# Patient Record
Sex: MALE | Race: WHITE | NOT HISPANIC OR LATINO | URBAN - METROPOLITAN AREA
[De-identification: names, ages, dates, MRNs, and addresses within clinical notes are randomized per-mention and may not be internally consistent; named-entity substitution may affect disease eponyms.]

---

## 2017-12-20 ENCOUNTER — OUTPATIENT (OUTPATIENT)
Dept: OUTPATIENT SERVICES | Facility: HOSPITAL | Age: 77
LOS: 1 days | End: 2017-12-20

## 2017-12-20 ENCOUNTER — APPOINTMENT (OUTPATIENT)
Dept: NUCLEAR MEDICINE | Facility: HOSPITAL | Age: 77
End: 2017-12-20
Payer: MEDICARE

## 2017-12-20 DIAGNOSIS — G20 PARKINSON'S DISEASE: ICD-10-CM

## 2017-12-20 DIAGNOSIS — Z95.9 PRESENCE OF CARDIAC AND VASCULAR IMPLANT AND GRAFT, UNSPECIFIED: Chronic | ICD-10-CM

## 2017-12-20 PROCEDURE — 78607: CPT | Mod: 26

## 2018-01-19 ENCOUNTER — APPOINTMENT (OUTPATIENT)
Dept: NEUROLOGY | Facility: CLINIC | Age: 78
End: 2018-01-19
Payer: MEDICARE

## 2018-01-19 PROCEDURE — 93886 INTRACRANIAL COMPLETE STUDY: CPT

## 2018-01-19 PROCEDURE — 93892 TCD EMBOLI DETECT W/O INJ: CPT

## 2018-03-26 ENCOUNTER — INPATIENT (INPATIENT)
Facility: HOSPITAL | Age: 78
LOS: 52 days | Discharge: NURSING, REHAB OR LONG-TERM CARE | DRG: 3 | End: 2018-05-18
Attending: STUDENT IN AN ORGANIZED HEALTH CARE EDUCATION/TRAINING PROGRAM | Admitting: STUDENT IN AN ORGANIZED HEALTH CARE EDUCATION/TRAINING PROGRAM
Payer: COMMERCIAL

## 2018-03-26 VITALS
HEART RATE: 78 BPM | RESPIRATION RATE: 20 BRPM | DIASTOLIC BLOOD PRESSURE: 50 MMHG | OXYGEN SATURATION: 98 % | SYSTOLIC BLOOD PRESSURE: 119 MMHG

## 2018-03-26 DIAGNOSIS — T07.XXXA UNSPECIFIED MULTIPLE INJURIES, INITIAL ENCOUNTER: ICD-10-CM

## 2018-03-26 DIAGNOSIS — Z95.9 PRESENCE OF CARDIAC AND VASCULAR IMPLANT AND GRAFT, UNSPECIFIED: Chronic | ICD-10-CM

## 2018-03-26 LAB
ALBUMIN SERPL ELPH-MCNC: 4 G/DL — SIGNIFICANT CHANGE UP (ref 3.3–5)
ALP SERPL-CCNC: 98 U/L — SIGNIFICANT CHANGE UP (ref 40–120)
ALT FLD-CCNC: 33 U/L RC — SIGNIFICANT CHANGE UP (ref 10–45)
ANION GAP SERPL CALC-SCNC: 12 MMOL/L — SIGNIFICANT CHANGE UP (ref 5–17)
ANION GAP SERPL CALC-SCNC: 23 MMOL/L — HIGH (ref 5–17)
APPEARANCE UR: CLEAR — SIGNIFICANT CHANGE UP
APTT BLD: 25.4 SEC — LOW (ref 27.5–37.4)
APTT BLD: 43.2 SEC — HIGH (ref 27.5–37.4)
AST SERPL-CCNC: 39 U/L — SIGNIFICANT CHANGE UP (ref 10–40)
BASE EXCESS BLDV CALC-SCNC: 3.2 MMOL/L — HIGH (ref -2–2)
BASOPHILS # BLD AUTO: 0 K/UL — SIGNIFICANT CHANGE UP (ref 0–0.2)
BASOPHILS NFR BLD AUTO: 0.2 % — SIGNIFICANT CHANGE UP (ref 0–2)
BILIRUB SERPL-MCNC: 1.3 MG/DL — HIGH (ref 0.2–1.2)
BILIRUB UR-MCNC: NEGATIVE — SIGNIFICANT CHANGE UP
BLD GP AB SCN SERPL QL: NEGATIVE — SIGNIFICANT CHANGE UP
BUN SERPL-MCNC: 20 MG/DL — SIGNIFICANT CHANGE UP (ref 7–23)
BUN SERPL-MCNC: 23 MG/DL — SIGNIFICANT CHANGE UP (ref 7–23)
CA-I SERPL-SCNC: 1.19 MMOL/L — SIGNIFICANT CHANGE UP (ref 1.12–1.3)
CALCIUM SERPL-MCNC: 14.1 MG/DL — CRITICAL HIGH (ref 8.4–10.5)
CALCIUM SERPL-MCNC: 9.2 MG/DL — SIGNIFICANT CHANGE UP (ref 8.4–10.5)
CHLORIDE BLDV-SCNC: 106 MMOL/L — SIGNIFICANT CHANGE UP (ref 96–108)
CHLORIDE SERPL-SCNC: 103 MMOL/L — SIGNIFICANT CHANGE UP (ref 96–108)
CHLORIDE SERPL-SCNC: 105 MMOL/L — SIGNIFICANT CHANGE UP (ref 96–108)
CO2 BLDV-SCNC: 30 MMOL/L — SIGNIFICANT CHANGE UP (ref 22–30)
CO2 SERPL-SCNC: 18 MMOL/L — LOW (ref 22–31)
CO2 SERPL-SCNC: 27 MMOL/L — SIGNIFICANT CHANGE UP (ref 22–31)
COLOR SPEC: YELLOW — SIGNIFICANT CHANGE UP
CREAT SERPL-MCNC: 1.1 MG/DL — SIGNIFICANT CHANGE UP (ref 0.5–1.3)
CREAT SERPL-MCNC: 1.36 MG/DL — HIGH (ref 0.5–1.3)
DIFF PNL FLD: ABNORMAL
EOSINOPHIL # BLD AUTO: 0.1 K/UL — SIGNIFICANT CHANGE UP (ref 0–0.5)
EOSINOPHIL NFR BLD AUTO: 1.1 % — SIGNIFICANT CHANGE UP (ref 0–6)
FIBRINOGEN PPP-MCNC: 211 MG/DL — LOW (ref 310–510)
GAS PNL BLDV: 139 MMOL/L — SIGNIFICANT CHANGE UP (ref 136–145)
GAS PNL BLDV: SIGNIFICANT CHANGE UP
GLUCOSE BLDV-MCNC: 139 MG/DL — HIGH (ref 70–99)
GLUCOSE SERPL-MCNC: 152 MG/DL — HIGH (ref 70–99)
GLUCOSE SERPL-MCNC: 239 MG/DL — HIGH (ref 70–99)
GLUCOSE UR QL: NEGATIVE — SIGNIFICANT CHANGE UP
HCO3 BLDV-SCNC: 29 MMOL/L — SIGNIFICANT CHANGE UP (ref 21–29)
HCT VFR BLD CALC: 23.8 % — LOW (ref 39–50)
HCT VFR BLD CALC: 30.4 % — LOW (ref 39–50)
HCT VFR BLD CALC: 39.6 % — SIGNIFICANT CHANGE UP (ref 39–50)
HCT VFR BLDA CALC: 38 % — LOW (ref 39–50)
HGB BLD CALC-MCNC: 12.4 G/DL — LOW (ref 13–17)
HGB BLD-MCNC: 10.6 G/DL — LOW (ref 13–17)
HGB BLD-MCNC: 12.7 G/DL — LOW (ref 13–17)
HGB BLD-MCNC: 8.1 G/DL — LOW (ref 13–17)
INR BLD: 1.05 RATIO — SIGNIFICANT CHANGE UP (ref 0.88–1.16)
INR BLD: 1.32 RATIO — HIGH (ref 0.88–1.16)
KETONES UR-MCNC: NEGATIVE — SIGNIFICANT CHANGE UP
LACTATE BLDV-MCNC: 2.1 MMOL/L — HIGH (ref 0.7–2)
LACTATE SERPL-SCNC: 8.8 MMOL/L — CRITICAL HIGH (ref 0.7–2)
LEUKOCYTE ESTERASE UR-ACNC: NEGATIVE — SIGNIFICANT CHANGE UP
LYMPHOCYTES # BLD AUTO: 2.3 K/UL — SIGNIFICANT CHANGE UP (ref 1–3.3)
LYMPHOCYTES # BLD AUTO: 20.6 % — SIGNIFICANT CHANGE UP (ref 13–44)
MCHC RBC-ENTMCNC: 31.2 PG — SIGNIFICANT CHANGE UP (ref 27–34)
MCHC RBC-ENTMCNC: 31.3 PG — SIGNIFICANT CHANGE UP (ref 27–34)
MCHC RBC-ENTMCNC: 32 GM/DL — SIGNIFICANT CHANGE UP (ref 32–36)
MCHC RBC-ENTMCNC: 33.6 PG — SIGNIFICANT CHANGE UP (ref 27–34)
MCHC RBC-ENTMCNC: 33.8 GM/DL — SIGNIFICANT CHANGE UP (ref 32–36)
MCHC RBC-ENTMCNC: 34.8 GM/DL — SIGNIFICANT CHANGE UP (ref 32–36)
MCV RBC AUTO: 89.5 FL — SIGNIFICANT CHANGE UP (ref 80–100)
MCV RBC AUTO: 97.9 FL — SIGNIFICANT CHANGE UP (ref 80–100)
MCV RBC AUTO: 99.3 FL — SIGNIFICANT CHANGE UP (ref 80–100)
MONOCYTES # BLD AUTO: 0.5 K/UL — SIGNIFICANT CHANGE UP (ref 0–0.9)
MONOCYTES NFR BLD AUTO: 4.2 % — SIGNIFICANT CHANGE UP (ref 2–14)
NEUTROPHILS # BLD AUTO: 8.4 K/UL — HIGH (ref 1.8–7.4)
NEUTROPHILS NFR BLD AUTO: 73.9 % — SIGNIFICANT CHANGE UP (ref 43–77)
NITRITE UR-MCNC: NEGATIVE — SIGNIFICANT CHANGE UP
OTHER CELLS CSF MANUAL: 12 ML/DL — LOW (ref 18–22)
PCO2 BLDV: 51 MMHG — HIGH (ref 35–50)
PH BLDV: 7.37 — SIGNIFICANT CHANGE UP (ref 7.35–7.45)
PH UR: 5.5 — SIGNIFICANT CHANGE UP (ref 5–8)
PLATELET # BLD AUTO: 130 K/UL — LOW (ref 150–400)
PLATELET # BLD AUTO: 177 K/UL — SIGNIFICANT CHANGE UP (ref 150–400)
PLATELET # BLD AUTO: 85 K/UL — LOW (ref 150–400)
PO2 BLDV: 39 MMHG — SIGNIFICANT CHANGE UP (ref 25–45)
POTASSIUM BLDV-SCNC: 4.1 MMOL/L — SIGNIFICANT CHANGE UP (ref 3.5–5)
POTASSIUM SERPL-MCNC: 3.6 MMOL/L — SIGNIFICANT CHANGE UP (ref 3.5–5.3)
POTASSIUM SERPL-MCNC: 4 MMOL/L — SIGNIFICANT CHANGE UP (ref 3.5–5.3)
POTASSIUM SERPL-SCNC: 3.6 MMOL/L — SIGNIFICANT CHANGE UP (ref 3.5–5.3)
POTASSIUM SERPL-SCNC: 4 MMOL/L — SIGNIFICANT CHANGE UP (ref 3.5–5.3)
PROT SERPL-MCNC: 6.7 G/DL — SIGNIFICANT CHANGE UP (ref 6–8.3)
PROT UR-MCNC: 300 MG/DL
PROTHROM AB SERPL-ACNC: 11.5 SEC — SIGNIFICANT CHANGE UP (ref 9.8–12.7)
PROTHROM AB SERPL-ACNC: 14.5 SEC — HIGH (ref 9.8–12.7)
RBC # BLD: 2.4 M/UL — LOW (ref 4.2–5.8)
RBC # BLD: 3.39 M/UL — LOW (ref 4.2–5.8)
RBC # BLD: 4.05 M/UL — LOW (ref 4.2–5.8)
RBC # FLD: 11.8 % — SIGNIFICANT CHANGE UP (ref 10.3–14.5)
RBC # FLD: 11.8 % — SIGNIFICANT CHANGE UP (ref 10.3–14.5)
RBC # FLD: 13.6 % — SIGNIFICANT CHANGE UP (ref 10.3–14.5)
RBC CASTS # UR COMP ASSIST: ABNORMAL /HPF (ref 0–2)
RH IG SCN BLD-IMP: POSITIVE — SIGNIFICANT CHANGE UP
SAO2 % BLDV: 70 % — SIGNIFICANT CHANGE UP (ref 67–88)
SODIUM SERPL-SCNC: 142 MMOL/L — SIGNIFICANT CHANGE UP (ref 135–145)
SODIUM SERPL-SCNC: 146 MMOL/L — HIGH (ref 135–145)
SP GR SPEC: 1.02 — SIGNIFICANT CHANGE UP (ref 1.01–1.02)
UROBILINOGEN FLD QL: 1 MG/DL
WBC # BLD: 10.4 K/UL — SIGNIFICANT CHANGE UP (ref 3.8–10.5)
WBC # BLD: 11.3 K/UL — HIGH (ref 3.8–10.5)
WBC # BLD: 23.2 K/UL — HIGH (ref 3.8–10.5)
WBC # FLD AUTO: 10.4 K/UL — SIGNIFICANT CHANGE UP (ref 3.8–10.5)
WBC # FLD AUTO: 11.3 K/UL — HIGH (ref 3.8–10.5)
WBC # FLD AUTO: 23.2 K/UL — HIGH (ref 3.8–10.5)
WBC UR QL: SIGNIFICANT CHANGE UP /HPF (ref 0–5)

## 2018-03-26 PROCEDURE — 73562 X-RAY EXAM OF KNEE 3: CPT | Mod: 26,LT

## 2018-03-26 PROCEDURE — 36247 INS CATH ABD/L-EXT ART 3RD: CPT

## 2018-03-26 PROCEDURE — 74177 CT ABD & PELVIS W/CONTRAST: CPT | Mod: 26

## 2018-03-26 PROCEDURE — 37244 VASC EMBOLIZE/OCCLUDE BLEED: CPT | Mod: 59

## 2018-03-26 PROCEDURE — 36246 INS CATH ABD/L-EXT ART 2ND: CPT | Mod: XS

## 2018-03-26 PROCEDURE — 99291 CRITICAL CARE FIRST HOUR: CPT

## 2018-03-26 PROCEDURE — 71260 CT THORAX DX C+: CPT | Mod: 26

## 2018-03-26 PROCEDURE — 73552 X-RAY EXAM OF FEMUR 2/>: CPT | Mod: 26,LT

## 2018-03-26 PROCEDURE — 99291 CRITICAL CARE FIRST HOUR: CPT | Mod: 25

## 2018-03-26 PROCEDURE — 71045 X-RAY EXAM CHEST 1 VIEW: CPT | Mod: 26

## 2018-03-26 PROCEDURE — 76937 US GUIDE VASCULAR ACCESS: CPT | Mod: 26

## 2018-03-26 PROCEDURE — 70450 CT HEAD/BRAIN W/O DYE: CPT | Mod: 26

## 2018-03-26 PROCEDURE — 36556 INSERT NON-TUNNEL CV CATH: CPT

## 2018-03-26 PROCEDURE — 37243 VASC EMBOLIZE/OCCLUDE ORGAN: CPT

## 2018-03-26 PROCEDURE — 31500 INSERT EMERGENCY AIRWAY: CPT

## 2018-03-26 PROCEDURE — 36245 INS CATH ABD/L-EXT ART 1ST: CPT | Mod: XS

## 2018-03-26 PROCEDURE — 72125 CT NECK SPINE W/O DYE: CPT | Mod: 26

## 2018-03-26 PROCEDURE — 36248 INS CATH ABD/L-EXT ART ADDL: CPT

## 2018-03-26 RX ORDER — ACETAMINOPHEN 500 MG
1000 TABLET ORAL ONCE
Qty: 0 | Refills: 0 | Status: COMPLETED | OUTPATIENT
Start: 2018-03-26 | End: 2018-03-26

## 2018-03-26 RX ORDER — SODIUM CHLORIDE 9 MG/ML
1000 INJECTION, SOLUTION INTRAVENOUS
Qty: 0 | Refills: 0 | Status: DISCONTINUED | OUTPATIENT
Start: 2018-03-26 | End: 2018-03-31

## 2018-03-26 RX ORDER — TETANUS TOXOID, REDUCED DIPHTHERIA TOXOID AND ACELLULAR PERTUSSIS VACCINE, ADSORBED 5; 2.5; 8; 8; 2.5 [IU]/.5ML; [IU]/.5ML; UG/.5ML; UG/.5ML; UG/.5ML
0.5 SUSPENSION INTRAMUSCULAR ONCE
Qty: 0 | Refills: 0 | Status: COMPLETED | OUTPATIENT
Start: 2018-03-26 | End: 2018-03-26

## 2018-03-26 RX ORDER — PHENYLEPHRINE HYDROCHLORIDE 10 MG/ML
4 INJECTION INTRAVENOUS
Qty: 80 | Refills: 0 | Status: DISCONTINUED | OUTPATIENT
Start: 2018-03-26 | End: 2018-03-27

## 2018-03-26 RX ORDER — MECLIZINE HCL 12.5 MG
12.5 TABLET ORAL DAILY
Qty: 0 | Refills: 0 | Status: DISCONTINUED | OUTPATIENT
Start: 2018-03-26 | End: 2018-03-27

## 2018-03-26 RX ORDER — FENTANYL CITRATE 50 UG/ML
1 INJECTION INTRAVENOUS
Qty: 2500 | Refills: 0 | Status: DISCONTINUED | OUTPATIENT
Start: 2018-03-26 | End: 2018-03-26

## 2018-03-26 RX ORDER — DESMOPRESSIN ACETATE 0.1 MG/1
20 TABLET ORAL ONCE
Qty: 0 | Refills: 0 | Status: COMPLETED | OUTPATIENT
Start: 2018-03-26 | End: 2018-03-26

## 2018-03-26 RX ORDER — ROCURONIUM BROMIDE 10 MG/ML
80 VIAL (ML) INTRAVENOUS ONCE
Qty: 0 | Refills: 0 | Status: COMPLETED | OUTPATIENT
Start: 2018-03-26 | End: 2018-03-26

## 2018-03-26 RX ORDER — FENTANYL CITRATE 50 UG/ML
1 INJECTION INTRAVENOUS
Qty: 5000 | Refills: 0 | Status: DISCONTINUED | OUTPATIENT
Start: 2018-03-26 | End: 2018-03-27

## 2018-03-26 RX ORDER — SODIUM CHLORIDE 9 MG/ML
1000 INJECTION INTRAMUSCULAR; INTRAVENOUS; SUBCUTANEOUS ONCE
Qty: 0 | Refills: 0 | Status: COMPLETED | OUTPATIENT
Start: 2018-03-26 | End: 2018-03-26

## 2018-03-26 RX ORDER — ETOMIDATE 2 MG/ML
20 INJECTION INTRAVENOUS ONCE
Qty: 0 | Refills: 0 | Status: COMPLETED | OUTPATIENT
Start: 2018-03-26 | End: 2018-03-26

## 2018-03-26 RX ADMIN — PHENYLEPHRINE HYDROCHLORIDE 90 MICROGRAM(S)/KG/MIN: 10 INJECTION INTRAVENOUS at 19:17

## 2018-03-26 RX ADMIN — ETOMIDATE 20 MILLIGRAM(S): 2 INJECTION INTRAVENOUS at 17:27

## 2018-03-26 RX ADMIN — Medication 80 MILLIGRAM(S): at 17:28

## 2018-03-26 RX ADMIN — SODIUM CHLORIDE 1000 MILLILITER(S): 9 INJECTION INTRAMUSCULAR; INTRAVENOUS; SUBCUTANEOUS at 14:20

## 2018-03-26 RX ADMIN — Medication 400 MILLIGRAM(S): at 14:29

## 2018-03-26 NOTE — PROGRESS NOTE ADULT - SUBJECTIVE AND OBJECTIVE BOX
Interventional Radiology Brief- Operative Note    Procedure: Emergent pelvic angiogram and embolization. Splenic angiogram and embolization    Operators: Oz    Anesthesia (type): general    Contrast: 198cc Omni    EBL: minimal    Findings/Follow up Plan of Care: Consent obtained from the patient's wife following discursion of risks/benefits. Pelvic angiogram performed demonstrating active extravasation from the inferior epigastric arteries bilaterally and other poorly defined foci of bleeding in the left pelvis. Successful left inferior epigastric artery catheterization and embolization. Embolization of left pubic rami supply from distal branch arising from CFA performed. Multiple foci of extravasation seen from left internal iliac which was embolized with Avitene following discussion with Dr. Morrow. Right inferior epigastric artery angiogram demonstrated active extravasation and embolization performed.  Splenic laceration seen on CT and angiogram demonstrated multiple areas of abnormality within the splenic parenchyma and AVF. Discussed findings with family and Dr. Morrow. Distal main splenic artery embolization performed.  Pt tolerated procedure and was discharged to SICU    Specimens Removed: none    Implants: Combination of microcoils, nBCA, and avitene    Complications: none    Condition/Disposition: stable/SICU    Please call Interventional Radiology x [phone] with any questions, concerns, or issues.
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
## 2018-03-26 NOTE — ED PROVIDER NOTE - PROGRESS NOTE DETAILS
Dionte Lopez MD (resident): called to bedside at 17:15 for hypotension and lightheadedness. FAST performed, positive in RUQ and LUQ, called for 2 units of emergency release blood. Pt ill-appearing, pale, lethargy noted. Dr Oliveros called and came down to bedside, upgraded to Lvl 1 trauma and called for MTP. During lvl 1 pt had acute change in mental status, possible seizure like activity w/ worsening hypotension to 50's. Intubated for airway protection and AMS. MTP started. Pt w/ lack of access, placed R subclavian as L subclavian would not thread (on side of PPM). Placed in Pelvic binder. Accepted to SICU, going to IR for pelvic Fx. Dr. Baeza (Attending Physician)  Upon returning from CT scan patient became unstable SBP 70s/40s more tachycardic to 110s, pale, lethargic, FAST positive.  Called Dr. Oliveros and Level 1 trauma activated found to have pelvic hematoma with active extrav on CTA.  Massive transfusion initiated.  Intubated with pushes of phenylephrine, etomidate and rocuronium.  Received 4 units RBC, 2 FFP, and 1 Plts, DDAVP ordered.  Going upstairs to IR with trauma team for embolization. Dr. Baeza (Attending Physician)  We were called to another critically ill patients room with plans for Mr. Hinds to go to CT.  Upon returning seeing several critically ill patients, I noticed Mr. Hinds CT was not done and spoke with surgical resident Dr. Waterman for update on CT scan status and then spoke with radiology when I realized CT had not been done. Upon returning from CT scan patient became unstable SBP 70s/40s more tachycardic to 110s, pale, lethargic, FAST positive.  Called Dr. Oliveros and Level 1 trauma activated found to have pelvic hematoma with active extrav on CTA.  Massive transfusion initiated.  Intubated with pushes of phenylephrine, etomidate and rocuronium.  Received 4 units RBC, 2 FFP, and 1 Plts, DDAVP ordered.  Going upstairs to IR with trauma team for embolization.
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
## 2018-03-26 NOTE — ED ADULT NURSE REASSESSMENT NOTE - NS ED NURSE REASSESS COMMENT FT1
Patient became increasingly altered, dusky skin appearance, cap refill >4 sec in upper extremities. MD Baeza made aware and FAST exam done at bedside by MD Lopez. Pelvic binder placed onto patient. Emergency release for 2 units of blood and an MTP protocol called to blood bank at 1720. Level 1 trauma called at 1720 due to hypotension and tachycardia. All four units of PRBC given under emergency circumstances. Patient intubated by MD Lopez at 1729 for altered mental status. Placement confirmed at 1729 by gloria breath sounds and CO2 detector color change. Left subclavian access placed and secured at 1731. Phenylephrine drip started for hypotension at 1732. Patient rushed to IR from ED 1745. Report given at bedside in IR to SAMMIE Ott at 1751.
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
## 2018-03-26 NOTE — ED PROCEDURE NOTE - ATTENDING CONTRIBUTION TO CARE
Dr. Baeza (Attending Physician)  Hepatorenal and splenorenal fast positive.  Done for hypotension after CT scan.  Found to have splenic laceration and pelvic fractures with active extravasation.
Dr. Baeza (Attending Physician)  Emergent nonsterile line placement for hemorrhagic shock.
Dr. Baeza (Attending Physician)  Intubated emergently for hemorrhagic shock.  Preoxgenated while drawing up meds. Intubation with glidescope supervised with Dr. Velasco. I pushed 20 mg Etomidate and 80 mg rocuronium and gave multiple pushes of phenylephrined to maintain BP during intubation. Massive transfusion started during intubation.
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
## 2018-03-26 NOTE — ED PROCEDURE NOTE - CPROC ED TIME OUT STATEMENT1
“Patient's name, , procedure and correct site were confirmed during the [employer] Timeout.”
“Patient's name, , procedure and correct site were confirmed during the [facility] Timeout.”
“Patient's name, , procedure and correct site were confirmed during the [facility] Timeout.”

## 2018-03-27 ENCOUNTER — TRANSCRIPTION ENCOUNTER (OUTPATIENT)
Age: 78
End: 2018-03-27

## 2018-03-27 DIAGNOSIS — E27.9 DISORDER OF ADRENAL GLAND, UNSPECIFIED: ICD-10-CM

## 2018-03-27 DIAGNOSIS — S36.039A UNSPECIFIED LACERATION OF SPLEEN, INITIAL ENCOUNTER: ICD-10-CM

## 2018-03-27 DIAGNOSIS — S22.42XA MULTIPLE FRACTURES OF RIBS, LEFT SIDE, INITIAL ENCOUNTER FOR CLOSED FRACTURE: ICD-10-CM

## 2018-03-27 DIAGNOSIS — E11.9 TYPE 2 DIABETES MELLITUS WITHOUT COMPLICATIONS: ICD-10-CM

## 2018-03-27 DIAGNOSIS — R57.8 OTHER SHOCK: ICD-10-CM

## 2018-03-27 DIAGNOSIS — R91.1 SOLITARY PULMONARY NODULE: ICD-10-CM

## 2018-03-27 DIAGNOSIS — E04.1 NONTOXIC SINGLE THYROID NODULE: ICD-10-CM

## 2018-03-27 DIAGNOSIS — I25.10 ATHEROSCLEROTIC HEART DISEASE OF NATIVE CORONARY ARTERY WITHOUT ANGINA PECTORIS: ICD-10-CM

## 2018-03-27 DIAGNOSIS — S32.9XXA FRACTURE OF UNSPECIFIED PARTS OF LUMBOSACRAL SPINE AND PELVIS, INITIAL ENCOUNTER FOR CLOSED FRACTURE: ICD-10-CM

## 2018-03-27 DIAGNOSIS — S37.032A LACERATION OF LEFT KIDNEY, UNSPECIFIED DEGREE, INITIAL ENCOUNTER: ICD-10-CM

## 2018-03-27 LAB
ALBUMIN SERPL ELPH-MCNC: 2.7 G/DL — LOW (ref 3.3–5)
ALP SERPL-CCNC: 49 U/L — SIGNIFICANT CHANGE UP (ref 40–120)
ALT FLD-CCNC: 22 U/L RC — SIGNIFICANT CHANGE UP (ref 10–45)
ANION GAP SERPL CALC-SCNC: 15 MMOL/L — SIGNIFICANT CHANGE UP (ref 5–17)
ANION GAP SERPL CALC-SCNC: 15 MMOL/L — SIGNIFICANT CHANGE UP (ref 5–17)
APTT BLD: 28 SEC — SIGNIFICANT CHANGE UP (ref 27.5–37.4)
APTT BLD: 33.2 SEC — SIGNIFICANT CHANGE UP (ref 27.5–37.4)
AST SERPL-CCNC: 30 U/L — SIGNIFICANT CHANGE UP (ref 10–40)
BILIRUB SERPL-MCNC: 2.8 MG/DL — HIGH (ref 0.2–1.2)
BUN SERPL-MCNC: 28 MG/DL — HIGH (ref 7–23)
BUN SERPL-MCNC: 33 MG/DL — HIGH (ref 7–23)
CALCIUM SERPL-MCNC: 10.3 MG/DL — SIGNIFICANT CHANGE UP (ref 8.4–10.5)
CALCIUM SERPL-MCNC: 9.3 MG/DL — SIGNIFICANT CHANGE UP (ref 8.4–10.5)
CHLORIDE SERPL-SCNC: 106 MMOL/L — SIGNIFICANT CHANGE UP (ref 96–108)
CHLORIDE SERPL-SCNC: 107 MMOL/L — SIGNIFICANT CHANGE UP (ref 96–108)
CO2 SERPL-SCNC: 23 MMOL/L — SIGNIFICANT CHANGE UP (ref 22–31)
CO2 SERPL-SCNC: 23 MMOL/L — SIGNIFICANT CHANGE UP (ref 22–31)
CREAT ?TM UR-MCNC: 153 MG/DL — SIGNIFICANT CHANGE UP
CREAT SERPL-MCNC: 1.41 MG/DL — HIGH (ref 0.5–1.3)
CREAT SERPL-MCNC: 1.75 MG/DL — HIGH (ref 0.5–1.3)
GAS PNL BLDA: SIGNIFICANT CHANGE UP
GLUCOSE BLDC GLUCOMTR-MCNC: 147 MG/DL — HIGH (ref 70–99)
GLUCOSE BLDC GLUCOMTR-MCNC: 159 MG/DL — HIGH (ref 70–99)
GLUCOSE BLDC GLUCOMTR-MCNC: 164 MG/DL — HIGH (ref 70–99)
GLUCOSE BLDC GLUCOMTR-MCNC: 174 MG/DL — HIGH (ref 70–99)
GLUCOSE SERPL-MCNC: 169 MG/DL — HIGH (ref 70–99)
GLUCOSE SERPL-MCNC: 235 MG/DL — HIGH (ref 70–99)
HCT VFR BLD CALC: 23.1 % — LOW (ref 39–50)
HCT VFR BLD CALC: 25 % — LOW (ref 39–50)
HCT VFR BLD CALC: 25.1 % — LOW (ref 39–50)
HCT VFR BLD CALC: 25.8 % — LOW (ref 39–50)
HCT VFR BLD CALC: 25.9 % — LOW (ref 39–50)
HCT VFR BLD CALC: 27.3 % — LOW (ref 39–50)
HGB BLD-MCNC: 8.4 G/DL — LOW (ref 13–17)
HGB BLD-MCNC: 8.9 G/DL — LOW (ref 13–17)
HGB BLD-MCNC: 9.2 G/DL — LOW (ref 13–17)
HGB BLD-MCNC: 9.3 G/DL — LOW (ref 13–17)
HGB BLD-MCNC: 9.4 G/DL — LOW (ref 13–17)
HGB BLD-MCNC: 9.5 G/DL — LOW (ref 13–17)
INR BLD: 1.16 RATIO — SIGNIFICANT CHANGE UP (ref 0.88–1.16)
INR BLD: 1.22 RATIO — HIGH (ref 0.88–1.16)
MAGNESIUM SERPL-MCNC: 1.5 MG/DL — LOW (ref 1.6–2.6)
MAGNESIUM SERPL-MCNC: 1.7 MG/DL — SIGNIFICANT CHANGE UP (ref 1.6–2.6)
MCHC RBC-ENTMCNC: 30.5 PG — SIGNIFICANT CHANGE UP (ref 27–34)
MCHC RBC-ENTMCNC: 31.5 PG — SIGNIFICANT CHANGE UP (ref 27–34)
MCHC RBC-ENTMCNC: 31.6 PG — SIGNIFICANT CHANGE UP (ref 27–34)
MCHC RBC-ENTMCNC: 31.8 PG — SIGNIFICANT CHANGE UP (ref 27–34)
MCHC RBC-ENTMCNC: 31.8 PG — SIGNIFICANT CHANGE UP (ref 27–34)
MCHC RBC-ENTMCNC: 32.2 PG — SIGNIFICANT CHANGE UP (ref 27–34)
MCHC RBC-ENTMCNC: 34.6 GM/DL — SIGNIFICANT CHANGE UP (ref 32–36)
MCHC RBC-ENTMCNC: 35.6 GM/DL — SIGNIFICANT CHANGE UP (ref 32–36)
MCHC RBC-ENTMCNC: 36.1 GM/DL — HIGH (ref 32–36)
MCHC RBC-ENTMCNC: 36.2 GM/DL — HIGH (ref 32–36)
MCHC RBC-ENTMCNC: 36.8 GM/DL — HIGH (ref 32–36)
MCHC RBC-ENTMCNC: 36.8 GM/DL — HIGH (ref 32–36)
MCV RBC AUTO: 86.4 FL — SIGNIFICANT CHANGE UP (ref 80–100)
MCV RBC AUTO: 87.3 FL — SIGNIFICANT CHANGE UP (ref 80–100)
MCV RBC AUTO: 87.5 FL — SIGNIFICANT CHANGE UP (ref 80–100)
MCV RBC AUTO: 88.1 FL — SIGNIFICANT CHANGE UP (ref 80–100)
MCV RBC AUTO: 88.1 FL — SIGNIFICANT CHANGE UP (ref 80–100)
MCV RBC AUTO: 88.6 FL — SIGNIFICANT CHANGE UP (ref 80–100)
OSMOLALITY UR: 399 MOS/KG — SIGNIFICANT CHANGE UP (ref 300–900)
PHOSPHATE SERPL-MCNC: 6.5 MG/DL — HIGH (ref 2.5–4.5)
PHOSPHATE SERPL-MCNC: 6.7 MG/DL — HIGH (ref 2.5–4.5)
PLATELET # BLD AUTO: 104 K/UL — LOW (ref 150–400)
PLATELET # BLD AUTO: 55 K/UL — LOW (ref 150–400)
PLATELET # BLD AUTO: 65 K/UL — LOW (ref 150–400)
PLATELET # BLD AUTO: 82 K/UL — LOW (ref 150–400)
PLATELET # BLD AUTO: 86 K/UL — LOW (ref 150–400)
PLATELET # BLD AUTO: 97 K/UL — LOW (ref 150–400)
POTASSIUM SERPL-MCNC: 3.9 MMOL/L — SIGNIFICANT CHANGE UP (ref 3.5–5.3)
POTASSIUM SERPL-MCNC: 4.6 MMOL/L — SIGNIFICANT CHANGE UP (ref 3.5–5.3)
POTASSIUM SERPL-SCNC: 3.9 MMOL/L — SIGNIFICANT CHANGE UP (ref 3.5–5.3)
POTASSIUM SERPL-SCNC: 4.6 MMOL/L — SIGNIFICANT CHANGE UP (ref 3.5–5.3)
PROT SERPL-MCNC: 4.8 G/DL — LOW (ref 6–8.3)
PROTHROM AB SERPL-ACNC: 12.6 SEC — SIGNIFICANT CHANGE UP (ref 9.8–12.7)
PROTHROM AB SERPL-ACNC: 13.4 SEC — HIGH (ref 9.8–12.7)
RBC # BLD: 2.65 M/UL — LOW (ref 4.2–5.8)
RBC # BLD: 2.83 M/UL — LOW (ref 4.2–5.8)
RBC # BLD: 2.87 M/UL — LOW (ref 4.2–5.8)
RBC # BLD: 2.93 M/UL — LOW (ref 4.2–5.8)
RBC # BLD: 2.99 M/UL — LOW (ref 4.2–5.8)
RBC # BLD: 3.1 M/UL — LOW (ref 4.2–5.8)
RBC # FLD: 13.5 % — SIGNIFICANT CHANGE UP (ref 10.3–14.5)
RBC # FLD: 13.6 % — SIGNIFICANT CHANGE UP (ref 10.3–14.5)
RBC # FLD: 13.7 % — SIGNIFICANT CHANGE UP (ref 10.3–14.5)
RBC # FLD: 13.7 % — SIGNIFICANT CHANGE UP (ref 10.3–14.5)
RBC # FLD: 13.8 % — SIGNIFICANT CHANGE UP (ref 10.3–14.5)
RBC # FLD: 14.4 % — SIGNIFICANT CHANGE UP (ref 10.3–14.5)
SODIUM SERPL-SCNC: 144 MMOL/L — SIGNIFICANT CHANGE UP (ref 135–145)
SODIUM SERPL-SCNC: 145 MMOL/L — SIGNIFICANT CHANGE UP (ref 135–145)
SODIUM UR-SCNC: <20 MMOL/L — SIGNIFICANT CHANGE UP
WBC # BLD: 7.3 K/UL — SIGNIFICANT CHANGE UP (ref 3.8–10.5)
WBC # BLD: 7.5 K/UL — SIGNIFICANT CHANGE UP (ref 3.8–10.5)
WBC # BLD: 7.6 K/UL — SIGNIFICANT CHANGE UP (ref 3.8–10.5)
WBC # BLD: 7.8 K/UL — SIGNIFICANT CHANGE UP (ref 3.8–10.5)
WBC # BLD: 7.9 K/UL — SIGNIFICANT CHANGE UP (ref 3.8–10.5)
WBC # BLD: 8.7 K/UL — SIGNIFICANT CHANGE UP (ref 3.8–10.5)
WBC # FLD AUTO: 7.3 K/UL — SIGNIFICANT CHANGE UP (ref 3.8–10.5)
WBC # FLD AUTO: 7.5 K/UL — SIGNIFICANT CHANGE UP (ref 3.8–10.5)
WBC # FLD AUTO: 7.6 K/UL — SIGNIFICANT CHANGE UP (ref 3.8–10.5)
WBC # FLD AUTO: 7.8 K/UL — SIGNIFICANT CHANGE UP (ref 3.8–10.5)
WBC # FLD AUTO: 7.9 K/UL — SIGNIFICANT CHANGE UP (ref 3.8–10.5)
WBC # FLD AUTO: 8.7 K/UL — SIGNIFICANT CHANGE UP (ref 3.8–10.5)

## 2018-03-27 PROCEDURE — 99291 CRITICAL CARE FIRST HOUR: CPT

## 2018-03-27 PROCEDURE — 71045 X-RAY EXAM CHEST 1 VIEW: CPT | Mod: 26

## 2018-03-27 PROCEDURE — 73130 X-RAY EXAM OF HAND: CPT | Mod: 26,50

## 2018-03-27 PROCEDURE — 99292 CRITICAL CARE ADDL 30 MIN: CPT

## 2018-03-27 PROCEDURE — 72170 X-RAY EXAM OF PELVIS: CPT | Mod: 26

## 2018-03-27 PROCEDURE — 93010 ELECTROCARDIOGRAM REPORT: CPT

## 2018-03-27 PROCEDURE — 93926 LOWER EXTREMITY STUDY: CPT | Mod: 26,RT

## 2018-03-27 PROCEDURE — 73551 X-RAY EXAM OF FEMUR 1: CPT | Mod: 26,50

## 2018-03-27 RX ORDER — LIDOCAINE 4 G/100G
1 CREAM TOPICAL DAILY
Qty: 0 | Refills: 0 | Status: DISCONTINUED | OUTPATIENT
Start: 2018-03-27 | End: 2018-05-18

## 2018-03-27 RX ORDER — DEXTROSE 50 % IN WATER 50 %
1 SYRINGE (ML) INTRAVENOUS ONCE
Qty: 0 | Refills: 0 | Status: DISCONTINUED | OUTPATIENT
Start: 2018-03-27 | End: 2018-03-27

## 2018-03-27 RX ORDER — DEXTROSE 50 % IN WATER 50 %
25 SYRINGE (ML) INTRAVENOUS ONCE
Qty: 0 | Refills: 0 | Status: DISCONTINUED | OUTPATIENT
Start: 2018-03-27 | End: 2018-03-27

## 2018-03-27 RX ORDER — MAGNESIUM SULFATE 500 MG/ML
2 VIAL (ML) INJECTION ONCE
Qty: 0 | Refills: 0 | Status: COMPLETED | OUTPATIENT
Start: 2018-03-27 | End: 2018-03-27

## 2018-03-27 RX ORDER — GLUCAGON INJECTION, SOLUTION 0.5 MG/.1ML
1 INJECTION, SOLUTION SUBCUTANEOUS ONCE
Qty: 0 | Refills: 0 | Status: DISCONTINUED | OUTPATIENT
Start: 2018-03-27 | End: 2018-03-27

## 2018-03-27 RX ORDER — IPRATROPIUM/ALBUTEROL SULFATE 18-103MCG
3 AEROSOL WITH ADAPTER (GRAM) INHALATION ONCE
Qty: 0 | Refills: 0 | Status: COMPLETED | OUTPATIENT
Start: 2018-03-27 | End: 2018-03-27

## 2018-03-27 RX ORDER — MAGNESIUM SULFATE 500 MG/ML
2 VIAL (ML) INJECTION ONCE
Qty: 0 | Refills: 0 | Status: DISCONTINUED | OUTPATIENT
Start: 2018-03-27 | End: 2018-03-27

## 2018-03-27 RX ORDER — CHLORHEXIDINE GLUCONATE 213 G/1000ML
15 SOLUTION TOPICAL
Qty: 0 | Refills: 0 | Status: DISCONTINUED | OUTPATIENT
Start: 2018-03-27 | End: 2018-03-31

## 2018-03-27 RX ORDER — DEXTROSE 50 % IN WATER 50 %
12.5 SYRINGE (ML) INTRAVENOUS ONCE
Qty: 0 | Refills: 0 | Status: DISCONTINUED | OUTPATIENT
Start: 2018-03-27 | End: 2018-03-27

## 2018-03-27 RX ORDER — INSULIN LISPRO 100/ML
VIAL (ML) SUBCUTANEOUS AT BEDTIME
Qty: 0 | Refills: 0 | Status: DISCONTINUED | OUTPATIENT
Start: 2018-03-27 | End: 2018-03-27

## 2018-03-27 RX ORDER — SODIUM CHLORIDE 9 MG/ML
500 INJECTION INTRAMUSCULAR; INTRAVENOUS; SUBCUTANEOUS ONCE
Qty: 0 | Refills: 0 | Status: DISCONTINUED | OUTPATIENT
Start: 2018-03-27 | End: 2018-03-27

## 2018-03-27 RX ORDER — SODIUM CHLORIDE 9 MG/ML
500 INJECTION, SOLUTION INTRAVENOUS ONCE
Qty: 0 | Refills: 0 | Status: COMPLETED | OUTPATIENT
Start: 2018-03-27 | End: 2018-03-27

## 2018-03-27 RX ORDER — DEXMEDETOMIDINE HYDROCHLORIDE IN 0.9% SODIUM CHLORIDE 4 UG/ML
0.1 INJECTION INTRAVENOUS
Qty: 200 | Refills: 0 | Status: DISCONTINUED | OUTPATIENT
Start: 2018-03-27 | End: 2018-03-27

## 2018-03-27 RX ORDER — SODIUM CHLORIDE 9 MG/ML
1000 INJECTION INTRAMUSCULAR; INTRAVENOUS; SUBCUTANEOUS ONCE
Qty: 0 | Refills: 0 | Status: COMPLETED | OUTPATIENT
Start: 2018-03-27 | End: 2018-03-27

## 2018-03-27 RX ORDER — SODIUM CHLORIDE 9 MG/ML
1000 INJECTION, SOLUTION INTRAVENOUS
Qty: 0 | Refills: 0 | Status: DISCONTINUED | OUTPATIENT
Start: 2018-03-27 | End: 2018-03-27

## 2018-03-27 RX ORDER — FENTANYL CITRATE 50 UG/ML
1 INJECTION INTRAVENOUS
Qty: 5000 | Refills: 0 | Status: DISCONTINUED | OUTPATIENT
Start: 2018-03-27 | End: 2018-03-28

## 2018-03-27 RX ORDER — INSULIN LISPRO 100/ML
VIAL (ML) SUBCUTANEOUS EVERY 6 HOURS
Qty: 0 | Refills: 0 | Status: DISCONTINUED | OUTPATIENT
Start: 2018-03-27 | End: 2018-04-02

## 2018-03-27 RX ORDER — INSULIN LISPRO 100/ML
VIAL (ML) SUBCUTANEOUS
Qty: 0 | Refills: 0 | Status: DISCONTINUED | OUTPATIENT
Start: 2018-03-27 | End: 2018-03-27

## 2018-03-27 RX ORDER — PANTOPRAZOLE SODIUM 20 MG/1
40 TABLET, DELAYED RELEASE ORAL DAILY
Qty: 0 | Refills: 0 | Status: DISCONTINUED | OUTPATIENT
Start: 2018-03-27 | End: 2018-03-31

## 2018-03-27 RX ORDER — HYDROMORPHONE HYDROCHLORIDE 2 MG/ML
0.5 INJECTION INTRAMUSCULAR; INTRAVENOUS; SUBCUTANEOUS
Qty: 0 | Refills: 0 | Status: DISCONTINUED | OUTPATIENT
Start: 2018-03-27 | End: 2018-03-27

## 2018-03-27 RX ORDER — PANTOPRAZOLE SODIUM 20 MG/1
20 TABLET, DELAYED RELEASE ORAL DAILY
Qty: 0 | Refills: 0 | Status: DISCONTINUED | OUTPATIENT
Start: 2018-03-27 | End: 2018-03-27

## 2018-03-27 RX ADMIN — LIDOCAINE 1 PATCH: 4 CREAM TOPICAL at 20:02

## 2018-03-27 RX ADMIN — Medication 1 MILLIGRAM(S): at 23:28

## 2018-03-27 RX ADMIN — HYDROMORPHONE HYDROCHLORIDE 0.5 MILLIGRAM(S): 2 INJECTION INTRAMUSCULAR; INTRAVENOUS; SUBCUTANEOUS at 06:54

## 2018-03-27 RX ADMIN — HYDROMORPHONE HYDROCHLORIDE 0.5 MILLIGRAM(S): 2 INJECTION INTRAMUSCULAR; INTRAVENOUS; SUBCUTANEOUS at 06:41

## 2018-03-27 RX ADMIN — SODIUM CHLORIDE 125 MILLILITER(S): 9 INJECTION, SOLUTION INTRAVENOUS at 20:02

## 2018-03-27 RX ADMIN — SODIUM CHLORIDE 2000 MILLILITER(S): 9 INJECTION, SOLUTION INTRAVENOUS at 13:04

## 2018-03-27 RX ADMIN — Medication 50 GRAM(S): at 06:39

## 2018-03-27 RX ADMIN — SODIUM CHLORIDE 4000 MILLILITER(S): 9 INJECTION INTRAMUSCULAR; INTRAVENOUS; SUBCUTANEOUS at 04:18

## 2018-03-27 RX ADMIN — PANTOPRAZOLE SODIUM 40 MILLIGRAM(S): 20 TABLET, DELAYED RELEASE ORAL at 13:02

## 2018-03-27 RX ADMIN — FENTANYL CITRATE 3 MICROGRAM(S)/KG/HR: 50 INJECTION INTRAVENOUS at 20:02

## 2018-03-27 RX ADMIN — HYDROMORPHONE HYDROCHLORIDE 0.5 MILLIGRAM(S): 2 INJECTION INTRAMUSCULAR; INTRAVENOUS; SUBCUTANEOUS at 03:32

## 2018-03-27 RX ADMIN — Medication 0.5 MILLIGRAM(S): at 13:03

## 2018-03-27 RX ADMIN — Medication 2: at 23:40

## 2018-03-27 RX ADMIN — Medication 1 MILLIGRAM(S): at 20:20

## 2018-03-27 RX ADMIN — Medication 1 MILLIGRAM(S): at 18:43

## 2018-03-27 RX ADMIN — Medication 50 GRAM(S): at 18:43

## 2018-03-27 RX ADMIN — HYDROMORPHONE HYDROCHLORIDE 0.5 MILLIGRAM(S): 2 INJECTION INTRAMUSCULAR; INTRAVENOUS; SUBCUTANEOUS at 03:50

## 2018-03-27 RX ADMIN — Medication 2: at 06:57

## 2018-03-27 RX ADMIN — FENTANYL CITRATE 3 MICROGRAM(S)/KG/HR: 50 INJECTION INTRAVENOUS at 18:43

## 2018-03-27 RX ADMIN — CHLORHEXIDINE GLUCONATE 15 MILLILITER(S): 213 SOLUTION TOPICAL at 20:02

## 2018-03-27 RX ADMIN — Medication 3 MILLILITER(S): at 11:21

## 2018-03-27 RX ADMIN — Medication 2: at 13:01
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
## 2018-03-27 NOTE — DISCHARGE NOTE ADULT - SECONDARY DIAGNOSIS.
Laceration of spleen, initial encounter Pulmonary nodule, left Thyroid nodule Adrenal nodule Respiratory failure following trauma Type 2 diabetes mellitus without complication, without long-term current use of insulin Urinary retention DVT (deep venous thrombosis)

## 2018-03-27 NOTE — DIETITIAN INITIAL EVALUATION ADULT. - NUTRITION INTERVENTION
Meals and Snack/Enteral Nutrition Medical Food Supplements/Meals and Snack/Enteral Nutrition/Nutrition Education
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
## 2018-03-27 NOTE — DIETITIAN INITIAL EVALUATION ADULT. - NS AS NUTRI INTERV MEALS SNACK
Carbohydrate - modified diet/Pending extubation and tolerance to clear liquids, advance to Consistent Carbohydrate diet; encourage intake of high protein, nutrient dense foods

## 2018-03-27 NOTE — DIETITIAN INITIAL EVALUATION ADULT. - NS AS NUTRI INTERV ENTERAL NUTRITION
Pending need for EN, recommend Vital 1.2, initiate at 10ml/hr, increase as tolerated to goal rate 50ml/hour to provide 1200ml formula, 1440 simon/day, 90Gm protein/day (meets 24cal/Kg and 1.5 Gm protein/Kg per dosing wt 60Kg)

## 2018-03-27 NOTE — DIETITIAN INITIAL EVALUATION ADULT. - FACTORS AFF FOOD INTAKE
Pt NPO, emergently intubated for hemorrhagic shock, off pressors. Cervical collar in place. +BM 3/27.

## 2018-03-27 NOTE — DISCHARGE NOTE ADULT - MEDICATION SUMMARY - MEDICATIONS TO TAKE
I will START or STAY ON the medications listed below when I get home from the hospital:    acetaminophen 160 mg/5 mL oral suspension  -- 20.31 milliliter(s) by gastrostomy tube every 6 hours, As Needed - 3)  -- Indication: For Pain    aspirin 81 mg oral tablet, chewable  -- 1 tab(s) by gastrostomy tube once a day  -- Indication: For secondary stroke prevention    doxazosin 2 mg oral tablet  -- 1 tab(s) by gastrostomy tube once a day (at bedtime)  -- Indication: For Urinary retention    enoxaparin  -- 40 milligram(s) subcutaneous once a day  -- Indication: For DVT ppx    clonazePAM 0.5 mg oral tablet  -- 1 tab(s) by gastrostomy tube  -- Indication: For Anxiety    nystatin 100,000 units/mL oral suspension  -- 5 milliliter(s) by mouth 3 times a day for 3 more days  -- Indication: For Thrush    simvastatin 40 mg oral tablet  -- 1 tab(s) by gastrostomy tube once a day (at bedtime)  -- Indication: For Cholesterol    clopidogrel 75 mg oral tablet  -- 1 tab(s) by gastrostomy tube once a day  -- Indication: For stent    QUEtiapine 50 mg oral tablet  -- 1 tab(s) by gastrostomy tube every 6 hours, As Needed  -- Indication: For Anxiety    QUEtiapine 25 mg oral tablet  -- 3 tab(s) by gastrostomy tube once a day at 8 am  -- Indication: For Anxiety    QUEtiapine 50 mg oral tablet  -- 1 tab(s) by gastrostomy tube once a day (at bedtime)  -- Indication: For Anxiety    metoprolol tartrate 100 mg oral tablet  -- 1 tab(s) by gastrostomy tube every 12 hours  -- Indication: For blood pressure    ipratropium-albuterol 0.5 mg-2.5 mg/3 mLinhalation solution  -- 3 milliliter(s) inhaled every 6 hours  -- Indication: For sob/wheezing    lidocaine 5% topical film  -- Apply on skin to affected area once a day to anterior chest wall  -- Indication: For Pain    lidocaine 5% topical film  -- Apply on skin to affected area every 12 hours apply to posterior chest pain  -- Indication: For Pain    famotidine 20 mg oral tablet  -- 1 tab(s) by mouth 2 times a day  -- Indication: For Reflux    simethicone 80 mg oral tablet, chewable  -- 1 tab(s) by gastrostomy tube every 8 hours  -- Indication: For gas    melatonin 3 mg oral tablet  -- 1 tab(s) by gastrostomy tube once a day (at bedtime), As Needed  -- Indication: For sleep    lactobacillus acidophilus oral capsule  -- 1 tab(s) by gastrostomy tube every 12 hours  -- Indication: For Probiotic I will START or STAY ON the medications listed below when I get home from the hospital:    acetaminophen 160 mg/5 mL oral suspension  -- 20.31 milliliter(s) by gastrostomy tube every 6 hours, As Needed - 3)  -- Indication: For Pain    aspirin 81 mg oral tablet, chewable  -- 1 tab(s) by gastrostomy tube once a day  -- Indication: For secondary stroke prevention    doxazosin 2 mg oral tablet  -- 1 tab(s) by gastrostomy tube once a day (at bedtime)  -- Indication: For Urinary retention    enoxaparin  -- 40 milligram(s) subcutaneous once a day  -- Indication: For DVT ppx    clonazePAM 0.5 mg oral tablet  -- 1 tab(s) by gastrostomy tube once a day (at bedtime) give at 1900  -- Indication: For Anxiety    nystatin 100,000 units/mL oral suspension  -- 5 milliliter(s) by mouth 3 times a day for 3 more days  -- Indication: For Thrush    simvastatin 40 mg oral tablet  -- 1 tab(s) by gastrostomy tube once a day (at bedtime)  -- Indication: For Cholesterol    clopidogrel 75 mg oral tablet  -- 1 tab(s) by gastrostomy tube once a day  -- Indication: For stent    QUEtiapine 50 mg oral tablet  -- 1 tab(s) by gastrostomy tube every 6 hours, As Needed  -- Indication: For Anxiety    QUEtiapine 25 mg oral tablet  -- 3 tab(s) by gastrostomy tube once a day at 8 am  -- Indication: For Anxiety    QUEtiapine 50 mg oral tablet  -- 1 tab(s) by gastrostomy tube once a day (at bedtime)  -- Indication: For Anxiety    metoprolol tartrate 100 mg oral tablet  -- 1 tab(s) by gastrostomy tube every 12 hours  -- Indication: For blood pressure    ipratropium-albuterol 0.5 mg-2.5 mg/3 mLinhalation solution  -- 3 milliliter(s) inhaled every 6 hours  -- Indication: For sob/wheezing    lidocaine 5% topical film  -- Apply on skin to affected area once a day to anterior chest wall  -- Indication: For Pain    lidocaine 5% topical film  -- Apply on skin to affected area every 12 hours apply to posterior chest pain  -- Indication: For Pain    famotidine 20 mg oral tablet  -- 1 tab(s) by mouth 2 times a day  -- Indication: For Reflux    simethicone 80 mg oral tablet, chewable  -- 1 tab(s) by gastrostomy tube every 8 hours  -- Indication: For gas    melatonin 3 mg oral tablet  -- 1 tab(s) by gastrostomy tube once a day (at bedtime), As Needed  -- Indication: For sleep    lactobacillus acidophilus oral capsule  -- 1 tab(s) by gastrostomy tube every 12 hours  -- Indication: For Probiotic
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
## 2018-03-27 NOTE — DIETITIAN INITIAL EVALUATION ADULT. - ENERGY NEEDS
estimated ht: 5 feet 9 inches, wt: 132 pounds, ~BMI: 20 Kg/m2, IBW: ~160 pounds (+/- 10%), 83% IBW. Edema: 2+ generalized; Skin: no pressure injuries.
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
## 2018-03-27 NOTE — PROGRESS NOTE ADULT - SUBJECTIVE AND OBJECTIVE BOX
TRAUMA SURGERY POST-PROCEDURE NOTE      PROCEDURE: Emergent pelvic angiogram and embolization. Splenic angiogram and embolization with IR      SUBJECTIVE/ROS: Patient seen and examined at bedside.         MEDICATIONS  (STANDING):  fentaNYL   Infusion 1 MICROgram(s)/kG/Hr (3 mL/Hr) IV Continuous <Continuous>  lactated ringers. 1000 milliLiter(s) (125 mL/Hr) IV Continuous <Continuous>  pantoprazole  Injectable 20 milliGRAM(s) IV Push daily  phenylephrine    Infusion 4 MICROgram(s)/kG/Min (90 mL/Hr) IV Continuous <Continuous>    MEDICATIONS  (PRN):      OBJECTIVE:    Vital Signs Last 24 Hrs  T(C): 35.2 (26 Mar 2018 23:31), Max: 35.2 (26 Mar 2018 23:31)  T(F): 95.3 (26 Mar 2018 23:31), Max: 95.3 (26 Mar 2018 23:31)  HR: 103 (27 Mar 2018 02:00) (78 - 110)  BP: 184/68 (26 Mar 2018 23:31) (71/44 - 184/68)  BP(mean): 95 (26 Mar 2018 23:31) (95 - 95)  RR: 20 (27 Mar 2018 02:00) (18 - 22)  SpO2: 100% (27 Mar 2018 02:00) (98% - 100%)        I&O's Detail    26 Mar 2018 07:01  -  27 Mar 2018 02:35  --------------------------------------------------------  IN:    Cryoprecipitate: 75 mL    fentaNYL  Infusion: 5.1 mL    lactated ringers.: 250 mL  Total IN: 330.1 mL    OUT:    Indwelling Catheter - Urethral: 150 mL  Total OUT: 150 mL    Total NET: 180.1 mL        Daily     LABS:                        9.4    7.9   )-----------( 55       ( 27 Mar 2018 01:47 )             27.3     -    144  |  106  |  28<H>  ----------------------------<  235<H>  3.9   |  23  |  1.41<H>    Ca    10.3      27 Mar 2018 01:47  Phos  6.5       Mg     1.5         TPro  4.8<L>  /  Alb  2.7<L>  /  TBili  2.8<H>  /  DBili  x   /  AST  30  /  ALT  22  /  AlkPhos  49  -    PT/INR - ( 27 Mar 2018 01:47 )   PT: 13.4 sec;   INR: 1.22 ratio         PTT - ( 27 Mar 2018 01:47 )  PTT:33.2 sec  Urinalysis Basic - ( 26 Mar 2018 16:22 )    Color: Yellow / Appearance: Clear / S.020 / pH: x  Gluc: x / Ketone: Negative  / Bili: Negative / Urobili: 1 mg/dL   Blood: x / Protein: 300 mg/dL / Nitrite: Negative   Leuk Esterase: Negative / RBC: 25-50 /HPF / WBC 2-5 /HPF   Sq Epi: x / Non Sq Epi: x / Bacteria: x                PHYSICAL EXAM:  Constitutional: well developed, well nourished, NAD  Eyes: anicteric  ENMT: normal facies, symmetric  Neck: supple  Respiratory: CTA bilaterally  Cardiovascular: RRR  Gastrointestinal: abdomen soft, nontender, nondistended. No obvious masses. No peritonitis  Extremities: FROM, warm  Neurological: intact, non-focal  Skin: no gross lesions  Lymph Nodes: no gross adenopathy  Musculoskeletal: equal strength bilateral upper and lower extremities  Psychiatric: oriented x 3; appropriate TRAUMA SURGERY POST-PROCEDURE NOTE      PROCEDURE: Emergent pelvic angiogram and embolization. Splenic angiogram and embolization with IR      SUBJECTIVE/ROS: Patient seen and examined at bedside.  Patient Intubated but able to follow simple commands.         MEDICATIONS  (STANDING):  fentaNYL   Infusion 1 MICROgram(s)/kG/Hr (3 mL/Hr) IV Continuous <Continuous>  lactated ringers. 1000 milliLiter(s) (125 mL/Hr) IV Continuous <Continuous>  pantoprazole  Injectable 20 milliGRAM(s) IV Push daily  phenylephrine    Infusion 4 MICROgram(s)/kG/Min (90 mL/Hr) IV Continuous <Continuous>    MEDICATIONS  (PRN):      OBJECTIVE:    Vital Signs Last 24 Hrs  T(C): 35.2 (26 Mar 2018 23:31), Max: 35.2 (26 Mar 2018 23:31)  T(F): 95.3 (26 Mar 2018 23:31), Max: 95.3 (26 Mar 2018 23:31)  HR: 103 (27 Mar 2018 02:00) (78 - 110)  BP: 184/68 (26 Mar 2018 23:31) (71/44 - 184/68)  BP(mean): 95 (26 Mar 2018 23:31) (95 - 95)  RR: 20 (27 Mar 2018 02:00) (18 - 22)  SpO2: 100% (27 Mar 2018 02:00) (98% - 100%)        I&O's Detail    26 Mar 2018 07:01  -  27 Mar 2018 02:35  --------------------------------------------------------  IN:    Cryoprecipitate: 75 mL    fentaNYL  Infusion: 5.1 mL    lactated ringers.: 250 mL  Total IN: 330.1 mL    OUT:    Indwelling Catheter - Urethral: 150 mL  Total OUT: 150 mL    Total NET: 180.1 mL        Daily     LABS:                        9.4    7.9   )-----------( 55       ( 27 Mar 2018 01:47 )             27.3     03    144  |  106  |  28<H>  ----------------------------<  235<H>  3.9   |  23  |  1.41<H>    Ca    10.3      27 Mar 2018 01:47  Phos  6.5     -  Mg     1.5         TPro  4.8<L>  /  Alb  2.7<L>  /  TBili  2.8<H>  /  DBili  x   /  AST  30  /  ALT  22  /  AlkPhos  49  -    PT/INR - ( 27 Mar 2018 01:47 )   PT: 13.4 sec;   INR: 1.22 ratio         PTT - ( 27 Mar 2018 01:47 )  PTT:33.2 sec  Urinalysis Basic - ( 26 Mar 2018 16:22 )    Color: Yellow / Appearance: Clear / S.020 / pH: x  Gluc: x / Ketone: Negative  / Bili: Negative / Urobili: 1 mg/dL   Blood: x / Protein: 300 mg/dL / Nitrite: Negative   Leuk Esterase: Negative / RBC: 25-50 /HPF / WBC 2-5 /HPF   Sq Epi: x / Non Sq Epi: x / Bacteria: x                PHYSICAL EXAM:  Constitutional: Intubated  Gastrointestinal: abdomen soft, moderately distended.  Not able to assess tenderness.  R groin incision c/d/i, +soft, no hematoma.  Mental Status: Able to follow simple commands

## 2018-03-27 NOTE — DIETITIAN INITIAL EVALUATION ADULT. - PHYSICAL APPEARANCE
Estimated BMI 20Kg/m2 based on dosing wt 60Kg and estimated ht 5 feet 9 inches. Pt appears heavier than dosing wt. Unable to perform Nutrition Focused Physical Assessment on intubated pt with cervical collar in place./well nourished

## 2018-03-27 NOTE — DIETITIAN INITIAL EVALUATION ADULT. - NS AS NUTRI INTERV ED CONTENT
RD will follow up with Consistent Carbohydrate diet education as appropriate/feasible/Other (specify)

## 2018-03-27 NOTE — DISCHARGE NOTE ADULT - PATIENT PORTAL LINK FT
You can access the [facility] Patient Portal, offered by [facility], by registering with the following website: [email or web address]

## 2018-03-27 NOTE — DISCHARGE NOTE ADULT - PLAN OF CARE
healing Ortho dc recs:  WBAT upper extremity can DC sling  WBAT lower extremities b/l  LOR w/ Dr. Hector in 14 days. PAtient has a number 7 portex cuffed and is on the vent  PRVC 550/18/40/5. At night ONLY 10P-6am. Otherwise Trach collar at 40% during day time. Once patients generalized weakness improves please attempt trach collar around the clock with patient. Please follow up with a ct scan of chest in 6 months with a pulmonary MD. Continue sliding scale coverage for now. Patient has voided without bustamante but has intermittent urinary retention issues. Patient has had soleal DVT in b/l lower extremities and has gotten weekly serial dopplers while in the hospital to rule out propagation of clot
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
## 2018-03-27 NOTE — DISCHARGE NOTE ADULT - CARE PROVIDER_API CALL
Collin Hector), Orthopaedic Surgery  [address] [address]  Phone: [phone]  Fax: [phone]    Kirt Osorio), Critical Care Medicine; Internal Medicine; Pulmonary Disease  [address] [facility] [address]  [place] [address]  Phone: [phone]  Fax: [phone]

## 2018-03-28 LAB
ALBUMIN SERPL ELPH-MCNC: 2.7 G/DL — LOW (ref 3.3–5)
ALP SERPL-CCNC: 52 U/L — SIGNIFICANT CHANGE UP (ref 40–120)
ALT FLD-CCNC: 28 U/L RC — SIGNIFICANT CHANGE UP (ref 10–45)
ANION GAP SERPL CALC-SCNC: 11 MMOL/L — SIGNIFICANT CHANGE UP (ref 5–17)
APPEARANCE UR: ABNORMAL
APTT BLD: 27.3 SEC — LOW (ref 27.5–37.4)
AST SERPL-CCNC: 41 U/L — HIGH (ref 10–40)
BILIRUB DIRECT SERPL-MCNC: 0.3 MG/DL — HIGH (ref 0–0.2)
BILIRUB INDIRECT FLD-MCNC: 1.1 MG/DL — HIGH (ref 0.2–1)
BILIRUB SERPL-MCNC: 1.4 MG/DL — HIGH (ref 0.2–1.2)
BILIRUB UR-MCNC: NEGATIVE — SIGNIFICANT CHANGE UP
BUN SERPL-MCNC: 37 MG/DL — HIGH (ref 7–23)
CALCIUM SERPL-MCNC: 9 MG/DL — SIGNIFICANT CHANGE UP (ref 8.4–10.5)
CHLORIDE SERPL-SCNC: 108 MMOL/L — SIGNIFICANT CHANGE UP (ref 96–108)
CO2 SERPL-SCNC: 25 MMOL/L — SIGNIFICANT CHANGE UP (ref 22–31)
COLOR SPEC: YELLOW — SIGNIFICANT CHANGE UP
CREAT SERPL-MCNC: 1.8 MG/DL — HIGH (ref 0.5–1.3)
DIFF PNL FLD: ABNORMAL
GAS PNL BLDA: SIGNIFICANT CHANGE UP
GAS PNL BLDA: SIGNIFICANT CHANGE UP
GLUCOSE BLDC GLUCOMTR-MCNC: 127 MG/DL — HIGH (ref 70–99)
GLUCOSE BLDC GLUCOMTR-MCNC: 129 MG/DL — HIGH (ref 70–99)
GLUCOSE BLDC GLUCOMTR-MCNC: 131 MG/DL — HIGH (ref 70–99)
GLUCOSE BLDC GLUCOMTR-MCNC: 150 MG/DL — HIGH (ref 70–99)
GLUCOSE SERPL-MCNC: 155 MG/DL — HIGH (ref 70–99)
GLUCOSE UR QL: NEGATIVE — SIGNIFICANT CHANGE UP
HBA1C BLD-MCNC: 5.3 % — SIGNIFICANT CHANGE UP (ref 4–5.6)
HBA1C BLD-MCNC: 5.4 % — SIGNIFICANT CHANGE UP (ref 4–5.6)
HCT VFR BLD CALC: 21.8 % — LOW (ref 39–50)
HCT VFR BLD CALC: 23.1 % — LOW (ref 39–50)
HCT VFR BLD CALC: 24.5 % — LOW (ref 39–50)
HCT VFR BLD CALC: 24.7 % — LOW (ref 39–50)
HGB BLD-MCNC: 7.7 G/DL — LOW (ref 13–17)
HGB BLD-MCNC: 8.2 G/DL — LOW (ref 13–17)
HGB BLD-MCNC: 8.7 G/DL — LOW (ref 13–17)
HGB BLD-MCNC: 8.8 G/DL — LOW (ref 13–17)
INR BLD: 1.23 RATIO — HIGH (ref 0.88–1.16)
KETONES UR-MCNC: NEGATIVE — SIGNIFICANT CHANGE UP
LEUKOCYTE ESTERASE UR-ACNC: NEGATIVE — SIGNIFICANT CHANGE UP
MAGNESIUM SERPL-MCNC: 2.1 MG/DL — SIGNIFICANT CHANGE UP (ref 1.6–2.6)
MCHC RBC-ENTMCNC: 30.9 PG — SIGNIFICANT CHANGE UP (ref 27–34)
MCHC RBC-ENTMCNC: 31 PG — SIGNIFICANT CHANGE UP (ref 27–34)
MCHC RBC-ENTMCNC: 31.1 PG — SIGNIFICANT CHANGE UP (ref 27–34)
MCHC RBC-ENTMCNC: 31.3 PG — SIGNIFICANT CHANGE UP (ref 27–34)
MCHC RBC-ENTMCNC: 35.2 GM/DL — SIGNIFICANT CHANGE UP (ref 32–36)
MCHC RBC-ENTMCNC: 35.3 GM/DL — SIGNIFICANT CHANGE UP (ref 32–36)
MCHC RBC-ENTMCNC: 35.6 GM/DL — SIGNIFICANT CHANGE UP (ref 32–36)
MCHC RBC-ENTMCNC: 35.7 GM/DL — SIGNIFICANT CHANGE UP (ref 32–36)
MCV RBC AUTO: 86.9 FL — SIGNIFICANT CHANGE UP (ref 80–100)
MCV RBC AUTO: 86.9 FL — SIGNIFICANT CHANGE UP (ref 80–100)
MCV RBC AUTO: 87.9 FL — SIGNIFICANT CHANGE UP (ref 80–100)
MCV RBC AUTO: 88.9 FL — SIGNIFICANT CHANGE UP (ref 80–100)
NITRITE UR-MCNC: NEGATIVE — SIGNIFICANT CHANGE UP
PH UR: 5.5 — SIGNIFICANT CHANGE UP (ref 5–8)
PHOSPHATE SERPL-MCNC: 5.5 MG/DL — HIGH (ref 2.5–4.5)
PLATELET # BLD AUTO: 61 K/UL — LOW (ref 150–400)
PLATELET # BLD AUTO: 71 K/UL — LOW (ref 150–400)
PLATELET # BLD AUTO: 72 K/UL — LOW (ref 150–400)
PLATELET # BLD AUTO: 75 K/UL — LOW (ref 150–400)
POTASSIUM SERPL-MCNC: 4.2 MMOL/L — SIGNIFICANT CHANGE UP (ref 3.5–5.3)
POTASSIUM SERPL-SCNC: 4.2 MMOL/L — SIGNIFICANT CHANGE UP (ref 3.5–5.3)
PROT SERPL-MCNC: 5.2 G/DL — LOW (ref 6–8.3)
PROT UR-MCNC: 30 MG/DL
PROTHROM AB SERPL-ACNC: 13.3 SEC — HIGH (ref 9.8–12.7)
RBC # BLD: 2.46 M/UL — LOW (ref 4.2–5.8)
RBC # BLD: 2.62 M/UL — LOW (ref 4.2–5.8)
RBC # BLD: 2.82 M/UL — LOW (ref 4.2–5.8)
RBC # BLD: 2.85 M/UL — LOW (ref 4.2–5.8)
RBC # FLD: 14.7 % — HIGH (ref 10.3–14.5)
RBC # FLD: 14.8 % — HIGH (ref 10.3–14.5)
RBC # FLD: 14.8 % — HIGH (ref 10.3–14.5)
RBC # FLD: 15 % — HIGH (ref 10.3–14.5)
SODIUM SERPL-SCNC: 144 MMOL/L — SIGNIFICANT CHANGE UP (ref 135–145)
SP GR SPEC: >1.03 — HIGH (ref 1.01–1.02)
UROBILINOGEN FLD QL: NEGATIVE — SIGNIFICANT CHANGE UP
WBC # BLD: 9 K/UL — SIGNIFICANT CHANGE UP (ref 3.8–10.5)
WBC # BLD: 9.2 K/UL — SIGNIFICANT CHANGE UP (ref 3.8–10.5)
WBC # BLD: 9.6 K/UL — SIGNIFICANT CHANGE UP (ref 3.8–10.5)
WBC # BLD: 9.7 K/UL — SIGNIFICANT CHANGE UP (ref 3.8–10.5)
WBC # FLD AUTO: 9 K/UL — SIGNIFICANT CHANGE UP (ref 3.8–10.5)
WBC # FLD AUTO: 9.2 K/UL — SIGNIFICANT CHANGE UP (ref 3.8–10.5)
WBC # FLD AUTO: 9.6 K/UL — SIGNIFICANT CHANGE UP (ref 3.8–10.5)
WBC # FLD AUTO: 9.7 K/UL — SIGNIFICANT CHANGE UP (ref 3.8–10.5)

## 2018-03-28 PROCEDURE — 71045 X-RAY EXAM CHEST 1 VIEW: CPT | Mod: 26

## 2018-03-28 PROCEDURE — 99291 CRITICAL CARE FIRST HOUR: CPT

## 2018-03-28 PROCEDURE — 99254 IP/OBS CNSLTJ NEW/EST MOD 60: CPT | Mod: 57

## 2018-03-28 PROCEDURE — 71045 X-RAY EXAM CHEST 1 VIEW: CPT | Mod: 26,77

## 2018-03-28 PROCEDURE — 32551 INSERTION OF CHEST TUBE: CPT | Mod: GC

## 2018-03-28 RX ORDER — FENTANYL CITRATE 50 UG/ML
1 INJECTION INTRAVENOUS
Qty: 5000 | Refills: 0 | Status: DISCONTINUED | OUTPATIENT
Start: 2018-03-28 | End: 2018-03-28

## 2018-03-28 RX ORDER — HYDROMORPHONE HYDROCHLORIDE 2 MG/ML
0.5 INJECTION INTRAMUSCULAR; INTRAVENOUS; SUBCUTANEOUS
Qty: 0 | Refills: 0 | Status: DISCONTINUED | OUTPATIENT
Start: 2018-03-28 | End: 2018-04-01

## 2018-03-28 RX ORDER — FENTANYL CITRATE 50 UG/ML
25 INJECTION INTRAVENOUS ONCE
Qty: 0 | Refills: 0 | Status: DISCONTINUED | OUTPATIENT
Start: 2018-03-28 | End: 2018-03-28

## 2018-03-28 RX ORDER — LIDOCAINE HCL 20 MG/ML
25 VIAL (ML) INJECTION ONCE
Qty: 0 | Refills: 0 | Status: DISCONTINUED | OUTPATIENT
Start: 2018-03-28 | End: 2018-03-28

## 2018-03-28 RX ADMIN — SODIUM CHLORIDE 125 MILLILITER(S): 9 INJECTION, SOLUTION INTRAVENOUS at 05:21

## 2018-03-28 RX ADMIN — Medication 1 MILLIGRAM(S): at 05:37

## 2018-03-28 RX ADMIN — LIDOCAINE 1 PATCH: 4 CREAM TOPICAL at 11:19

## 2018-03-28 RX ADMIN — LIDOCAINE 1 PATCH: 4 CREAM TOPICAL at 08:58

## 2018-03-28 RX ADMIN — Medication 1 MILLIGRAM(S): at 14:30

## 2018-03-28 RX ADMIN — HYDROMORPHONE HYDROCHLORIDE 0.5 MILLIGRAM(S): 2 INJECTION INTRAMUSCULAR; INTRAVENOUS; SUBCUTANEOUS at 23:02

## 2018-03-28 RX ADMIN — LIDOCAINE 1 PATCH: 4 CREAM TOPICAL at 23:09

## 2018-03-28 RX ADMIN — SODIUM CHLORIDE 125 MILLILITER(S): 9 INJECTION, SOLUTION INTRAVENOUS at 21:37

## 2018-03-28 RX ADMIN — CHLORHEXIDINE GLUCONATE 15 MILLILITER(S): 213 SOLUTION TOPICAL at 05:21

## 2018-03-28 RX ADMIN — PANTOPRAZOLE SODIUM 40 MILLIGRAM(S): 20 TABLET, DELAYED RELEASE ORAL at 11:19

## 2018-03-28 RX ADMIN — CHLORHEXIDINE GLUCONATE 15 MILLILITER(S): 213 SOLUTION TOPICAL at 17:43

## 2018-03-28 RX ADMIN — HYDROMORPHONE HYDROCHLORIDE 0.5 MILLIGRAM(S): 2 INJECTION INTRAMUSCULAR; INTRAVENOUS; SUBCUTANEOUS at 23:17
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
## 2018-03-29 DIAGNOSIS — J94.2 HEMOTHORAX: ICD-10-CM

## 2018-03-29 LAB
ALBUMIN SERPL ELPH-MCNC: 2.6 G/DL — LOW (ref 3.3–5)
ALP SERPL-CCNC: 57 U/L — SIGNIFICANT CHANGE UP (ref 40–120)
ALT FLD-CCNC: 25 U/L RC — SIGNIFICANT CHANGE UP (ref 10–45)
ANION GAP SERPL CALC-SCNC: 11 MMOL/L — SIGNIFICANT CHANGE UP (ref 5–17)
ANION GAP SERPL CALC-SCNC: 9 MMOL/L — SIGNIFICANT CHANGE UP (ref 5–17)
APTT BLD: 25.9 SEC — LOW (ref 27.5–37.4)
AST SERPL-CCNC: 37 U/L — SIGNIFICANT CHANGE UP (ref 10–40)
BILIRUB DIRECT SERPL-MCNC: 0.4 MG/DL — HIGH (ref 0–0.2)
BILIRUB INDIRECT FLD-MCNC: 1.3 MG/DL — HIGH (ref 0.2–1)
BILIRUB SERPL-MCNC: 1.7 MG/DL — HIGH (ref 0.2–1.2)
BLD GP AB SCN SERPL QL: NEGATIVE — SIGNIFICANT CHANGE UP
BUN SERPL-MCNC: 35 MG/DL — HIGH (ref 7–23)
BUN SERPL-MCNC: 37 MG/DL — HIGH (ref 7–23)
CALCIUM SERPL-MCNC: 8.7 MG/DL — SIGNIFICANT CHANGE UP (ref 8.4–10.5)
CALCIUM SERPL-MCNC: 8.7 MG/DL — SIGNIFICANT CHANGE UP (ref 8.4–10.5)
CHLORIDE SERPL-SCNC: 109 MMOL/L — HIGH (ref 96–108)
CHLORIDE SERPL-SCNC: 109 MMOL/L — HIGH (ref 96–108)
CO2 SERPL-SCNC: 25 MMOL/L — SIGNIFICANT CHANGE UP (ref 22–31)
CO2 SERPL-SCNC: 27 MMOL/L — SIGNIFICANT CHANGE UP (ref 22–31)
CREAT SERPL-MCNC: 1.41 MG/DL — HIGH (ref 0.5–1.3)
CREAT SERPL-MCNC: 1.51 MG/DL — HIGH (ref 0.5–1.3)
GAS PNL BLDA: SIGNIFICANT CHANGE UP
GLUCOSE BLDC GLUCOMTR-MCNC: 123 MG/DL — HIGH (ref 70–99)
GLUCOSE BLDC GLUCOMTR-MCNC: 134 MG/DL — HIGH (ref 70–99)
GLUCOSE BLDC GLUCOMTR-MCNC: 152 MG/DL — HIGH (ref 70–99)
GLUCOSE BLDC GLUCOMTR-MCNC: 87 MG/DL — SIGNIFICANT CHANGE UP (ref 70–99)
GLUCOSE SERPL-MCNC: 125 MG/DL — HIGH (ref 70–99)
GLUCOSE SERPL-MCNC: 143 MG/DL — HIGH (ref 70–99)
HCT VFR BLD CALC: 20.9 % — CRITICAL LOW (ref 39–50)
HCT VFR BLD CALC: 22.8 % — LOW (ref 39–50)
HCT VFR BLD CALC: 25.6 % — LOW (ref 39–50)
HCT VFR BLD CALC: 25.8 % — LOW (ref 39–50)
HGB BLD-MCNC: 7.5 G/DL — LOW (ref 13–17)
HGB BLD-MCNC: 8 G/DL — LOW (ref 13–17)
HGB BLD-MCNC: 9 G/DL — LOW (ref 13–17)
HGB BLD-MCNC: 9 G/DL — LOW (ref 13–17)
INR BLD: 1.18 RATIO — HIGH (ref 0.88–1.16)
MAGNESIUM SERPL-MCNC: 2 MG/DL — SIGNIFICANT CHANGE UP (ref 1.6–2.6)
MCHC RBC-ENTMCNC: 31.1 PG — SIGNIFICANT CHANGE UP (ref 27–34)
MCHC RBC-ENTMCNC: 31.1 PG — SIGNIFICANT CHANGE UP (ref 27–34)
MCHC RBC-ENTMCNC: 31.3 PG — SIGNIFICANT CHANGE UP (ref 27–34)
MCHC RBC-ENTMCNC: 31.7 PG — SIGNIFICANT CHANGE UP (ref 27–34)
MCHC RBC-ENTMCNC: 34.8 GM/DL — SIGNIFICANT CHANGE UP (ref 32–36)
MCHC RBC-ENTMCNC: 35.2 GM/DL — SIGNIFICANT CHANGE UP (ref 32–36)
MCHC RBC-ENTMCNC: 35.2 GM/DL — SIGNIFICANT CHANGE UP (ref 32–36)
MCHC RBC-ENTMCNC: 35.8 GM/DL — SIGNIFICANT CHANGE UP (ref 32–36)
MCV RBC AUTO: 88.4 FL — SIGNIFICANT CHANGE UP (ref 80–100)
MCV RBC AUTO: 88.7 FL — SIGNIFICANT CHANGE UP (ref 80–100)
MCV RBC AUTO: 88.9 FL — SIGNIFICANT CHANGE UP (ref 80–100)
MCV RBC AUTO: 89.4 FL — SIGNIFICANT CHANGE UP (ref 80–100)
PHOSPHATE SERPL-MCNC: 3.5 MG/DL — SIGNIFICANT CHANGE UP (ref 2.5–4.5)
PLATELET # BLD AUTO: 55 K/UL — LOW (ref 150–400)
PLATELET # BLD AUTO: 56 K/UL — LOW (ref 150–400)
PLATELET # BLD AUTO: 68 K/UL — LOW (ref 150–400)
PLATELET # BLD AUTO: 72 K/UL — LOW (ref 150–400)
POTASSIUM SERPL-MCNC: 4.1 MMOL/L — SIGNIFICANT CHANGE UP (ref 3.5–5.3)
POTASSIUM SERPL-MCNC: 4.1 MMOL/L — SIGNIFICANT CHANGE UP (ref 3.5–5.3)
POTASSIUM SERPL-SCNC: 4.1 MMOL/L — SIGNIFICANT CHANGE UP (ref 3.5–5.3)
POTASSIUM SERPL-SCNC: 4.1 MMOL/L — SIGNIFICANT CHANGE UP (ref 3.5–5.3)
PROT SERPL-MCNC: 5.1 G/DL — LOW (ref 6–8.3)
PROTHROM AB SERPL-ACNC: 12.9 SEC — HIGH (ref 9.8–12.7)
RBC # BLD: 2.36 M/UL — LOW (ref 4.2–5.8)
RBC # BLD: 2.58 M/UL — LOW (ref 4.2–5.8)
RBC # BLD: 2.88 M/UL — LOW (ref 4.2–5.8)
RBC # BLD: 2.89 M/UL — LOW (ref 4.2–5.8)
RBC # FLD: 14.7 % — HIGH (ref 10.3–14.5)
RBC # FLD: 14.9 % — HIGH (ref 10.3–14.5)
RH IG SCN BLD-IMP: POSITIVE — SIGNIFICANT CHANGE UP
SODIUM SERPL-SCNC: 145 MMOL/L — SIGNIFICANT CHANGE UP (ref 135–145)
SODIUM SERPL-SCNC: 145 MMOL/L — SIGNIFICANT CHANGE UP (ref 135–145)
WBC # BLD: 6.7 K/UL — SIGNIFICANT CHANGE UP (ref 3.8–10.5)
WBC # BLD: 7 K/UL — SIGNIFICANT CHANGE UP (ref 3.8–10.5)
WBC # BLD: 7.5 K/UL — SIGNIFICANT CHANGE UP (ref 3.8–10.5)
WBC # BLD: 8.8 K/UL — SIGNIFICANT CHANGE UP (ref 3.8–10.5)
WBC # FLD AUTO: 6.7 K/UL — SIGNIFICANT CHANGE UP (ref 3.8–10.5)
WBC # FLD AUTO: 7 K/UL — SIGNIFICANT CHANGE UP (ref 3.8–10.5)
WBC # FLD AUTO: 7.5 K/UL — SIGNIFICANT CHANGE UP (ref 3.8–10.5)
WBC # FLD AUTO: 8.8 K/UL — SIGNIFICANT CHANGE UP (ref 3.8–10.5)

## 2018-03-29 PROCEDURE — 71045 X-RAY EXAM CHEST 1 VIEW: CPT | Mod: 26,76

## 2018-03-29 PROCEDURE — 71045 X-RAY EXAM CHEST 1 VIEW: CPT | Mod: 26,77

## 2018-03-29 PROCEDURE — 99291 CRITICAL CARE FIRST HOUR: CPT

## 2018-03-29 RX ORDER — HALOPERIDOL DECANOATE 100 MG/ML
2.5 INJECTION INTRAMUSCULAR ONCE
Qty: 0 | Refills: 0 | Status: COMPLETED | OUTPATIENT
Start: 2018-03-30 | End: 2018-03-30

## 2018-03-29 RX ORDER — DEXMEDETOMIDINE HYDROCHLORIDE IN 0.9% SODIUM CHLORIDE 4 UG/ML
0.2 INJECTION INTRAVENOUS
Qty: 200 | Refills: 0 | Status: DISCONTINUED | OUTPATIENT
Start: 2018-03-29 | End: 2018-03-29

## 2018-03-29 RX ORDER — ACETAMINOPHEN 500 MG
1000 TABLET ORAL ONCE
Qty: 0 | Refills: 0 | Status: COMPLETED | OUTPATIENT
Start: 2018-03-29 | End: 2018-03-29

## 2018-03-29 RX ORDER — SODIUM CHLORIDE 9 MG/ML
500 INJECTION, SOLUTION INTRAVENOUS ONCE
Qty: 0 | Refills: 0 | Status: COMPLETED | OUTPATIENT
Start: 2018-03-29 | End: 2018-03-29

## 2018-03-29 RX ORDER — ACETAMINOPHEN 500 MG
1000 TABLET ORAL ONCE
Qty: 0 | Refills: 0 | Status: COMPLETED | OUTPATIENT
Start: 2018-03-30 | End: 2018-03-30

## 2018-03-29 RX ORDER — PHENYLEPHRINE HYDROCHLORIDE 10 MG/ML
0.4 INJECTION INTRAVENOUS
Qty: 80 | Refills: 0 | Status: DISCONTINUED | OUTPATIENT
Start: 2018-03-29 | End: 2018-03-29

## 2018-03-29 RX ADMIN — SODIUM CHLORIDE 3000 MILLILITER(S): 9 INJECTION, SOLUTION INTRAVENOUS at 09:39

## 2018-03-29 RX ADMIN — HYDROMORPHONE HYDROCHLORIDE 0.5 MILLIGRAM(S): 2 INJECTION INTRAMUSCULAR; INTRAVENOUS; SUBCUTANEOUS at 21:16

## 2018-03-29 RX ADMIN — Medication 1000 MILLIGRAM(S): at 22:05

## 2018-03-29 RX ADMIN — HYDROMORPHONE HYDROCHLORIDE 0.5 MILLIGRAM(S): 2 INJECTION INTRAMUSCULAR; INTRAVENOUS; SUBCUTANEOUS at 21:01

## 2018-03-29 RX ADMIN — PANTOPRAZOLE SODIUM 40 MILLIGRAM(S): 20 TABLET, DELAYED RELEASE ORAL at 12:17

## 2018-03-29 RX ADMIN — LIDOCAINE 1 PATCH: 4 CREAM TOPICAL at 23:37

## 2018-03-29 RX ADMIN — LIDOCAINE 1 PATCH: 4 CREAM TOPICAL at 12:17

## 2018-03-29 RX ADMIN — HYDROMORPHONE HYDROCHLORIDE 0.5 MILLIGRAM(S): 2 INJECTION INTRAMUSCULAR; INTRAVENOUS; SUBCUTANEOUS at 16:33

## 2018-03-29 RX ADMIN — HYDROMORPHONE HYDROCHLORIDE 0.5 MILLIGRAM(S): 2 INJECTION INTRAMUSCULAR; INTRAVENOUS; SUBCUTANEOUS at 13:42

## 2018-03-29 RX ADMIN — Medication 400 MILLIGRAM(S): at 21:35

## 2018-03-29 RX ADMIN — HYDROMORPHONE HYDROCHLORIDE 0.5 MILLIGRAM(S): 2 INJECTION INTRAMUSCULAR; INTRAVENOUS; SUBCUTANEOUS at 08:53

## 2018-03-29 RX ADMIN — SODIUM CHLORIDE 125 MILLILITER(S): 9 INJECTION, SOLUTION INTRAVENOUS at 22:52

## 2018-03-29 RX ADMIN — HYDROMORPHONE HYDROCHLORIDE 0.5 MILLIGRAM(S): 2 INJECTION INTRAMUSCULAR; INTRAVENOUS; SUBCUTANEOUS at 03:42

## 2018-03-29 RX ADMIN — CHLORHEXIDINE GLUCONATE 15 MILLILITER(S): 213 SOLUTION TOPICAL at 17:04

## 2018-03-29 RX ADMIN — HYDROMORPHONE HYDROCHLORIDE 0.5 MILLIGRAM(S): 2 INJECTION INTRAMUSCULAR; INTRAVENOUS; SUBCUTANEOUS at 08:23

## 2018-03-29 RX ADMIN — HYDROMORPHONE HYDROCHLORIDE 0.5 MILLIGRAM(S): 2 INJECTION INTRAMUSCULAR; INTRAVENOUS; SUBCUTANEOUS at 03:57

## 2018-03-29 RX ADMIN — HYDROMORPHONE HYDROCHLORIDE 0.5 MILLIGRAM(S): 2 INJECTION INTRAMUSCULAR; INTRAVENOUS; SUBCUTANEOUS at 17:03

## 2018-03-29 RX ADMIN — Medication 400 MILLIGRAM(S): at 10:11

## 2018-03-29 RX ADMIN — Medication 2: at 12:17

## 2018-03-29 RX ADMIN — HYDROMORPHONE HYDROCHLORIDE 0.5 MILLIGRAM(S): 2 INJECTION INTRAMUSCULAR; INTRAVENOUS; SUBCUTANEOUS at 13:12

## 2018-03-29 RX ADMIN — Medication 400 MILLIGRAM(S): at 17:04

## 2018-03-29 RX ADMIN — CHLORHEXIDINE GLUCONATE 15 MILLILITER(S): 213 SOLUTION TOPICAL at 05:27

## 2018-03-29 RX ADMIN — SODIUM CHLORIDE 3000 MILLILITER(S): 9 INJECTION, SOLUTION INTRAVENOUS at 12:21
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
## 2018-03-29 NOTE — PROGRESS NOTE ADULT - PROBLEM SELECTOR PLAN 1
keep chest tube in for     drainage  daily chest xray please,  will hold off on vats drainage for now

## 2018-03-29 NOTE — PHYSICAL THERAPY INITIAL EVALUATION ADULT - IMPAIRMENTS FOUND, PT EVAL
posture/muscle strength/fine motor/arousal, attention, and cognition/aerobic capacity/endurance/joint integrity and mobility/gait, locomotion, and balance/gross motor

## 2018-03-29 NOTE — PROCEDURE NOTE - ADDITIONAL PROCEDURE DETAILS
1. Normal PPM function with sensing/pacing thresholds and impedances WNL  2. Battery Longevity is 8.5 years  3. Patient is not pacemaker dependent. V pacing <1%  4. Telemetry reviewed and shows "pacing spikes" seen on tele strip were actually artifact. 1. Normal PPM function with sensing/pacing thresholds and impedances WNL  2. Battery Longevity is 8.5 years  3. Patient is not pacemaker dependent. V pacing <1%  4. 2 brief episodes of AT lasting ~1second on 3/28/18  5. Telemetry reviewed and shows "pacing spikes" seen on tele strip were actually artifact.

## 2018-03-29 NOTE — PHYSICAL THERAPY INITIAL EVALUATION ADULT - PASSIVE RANGE OF MOTION EXAMINATION, REHAB EVAL
BLE with pain/bilateral upper extremity Passive ROM was WFL (within functional limits)/bilateral lower extremity Passive ROM was WFL (within functional limits)

## 2018-03-29 NOTE — PROGRESS NOTE ADULT - SUBJECTIVE AND OBJECTIVE BOX
Orthopaedic Surgery Progress Note    Subjective:   Patient seen and examined  No acute events overnight  Remains intubated/sedated  patient AMS this AM after receiving ativan, unable to follow commands    Objective:  Vital Signs Last 24 Hrs  T(C): 37.6 (29 Mar 2018 03:00), Max: 38 (28 Mar 2018 15:00)  T(F): 99.7 (29 Mar 2018 03:00), Max: 100.4 (28 Mar 2018 15:00)  HR: 106 (29 Mar 2018 06:00) (104 - 115)  BP: 147/65 (28 Mar 2018 20:00) (147/65 - 147/65)  BP(mean): 93 (28 Mar 2018 20:00) (93 - 93)  RR: 22 (29 Mar 2018 06:00) (10 - 32)  SpO2: 100% (29 Mar 2018 06:00) (93% - 100%)        Physical Exam  Gen: NAD, intubated/sedated  BLE:   skin intact  compartments soft, compressible  2+ DP, <2s cap refill, wwp  Full survey limited by sedated and AMS, unable to assess NV status                          7.5    8.8   )-----------( 56       ( 29 Mar 2018 02:47 )             20.9     03-29    145  |  109<H>  |  37<H>  ----------------------------<  125<H>  4.1   |  25  |  1.51<H>    Ca    8.7      29 Mar 2018 02:47  Phos  3.5     03-29  Mg     2.0     03-29    TPro  5.1<L>  /  Alb  2.6<L>  /  TBili  1.7<H>  /  DBili  0.4<H>  /  AST  37  /  ALT  25  /  AlkPhos  57  03-29              AP: 77yMale with L LC1 pelvic fx with L5 lamina fx and R sup AK fx  -Pain control  -WBAT RLE  -NWB LLE  -Secondary survey when patient extubated/off sedation  -FU spine rec's  -PT/OT/OOB
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
## 2018-03-29 NOTE — PROGRESS NOTE ADULT - SUBJECTIVE AND OBJECTIVE BOX
VITAL SIGNS  Vital Signs Last 24 Hrs  T(C): 36.7 (03-29-18 @ 15:00), Max: 37.7 (03-28-18 @ 19:00)  T(F): 98.1 (03-29-18 @ 15:00), Max: 99.9 (03-28-18 @ 19:00)  HR: 92 (03-29-18 @ 15:00) (51 - 118)  BP: 147/65 (03-28-18 @ 20:00) (147/65 - 147/65)  RR: 18 (03-29-18 @ 15:00) (15 - 32)  SpO2: 99% (03-29-18 @ 15:00) (96% - 100%)               Bilirubin Direct, Serum: 0.4 mg/dL (03-29 @ 02:47)  Bilirubin Total, Serum: 1.7 mg/dL (03-29 @ 02:47)    CAPILLARY BLOOD GLUCOSE      POCT Blood Glucose.: 152 mg/dL (29 Mar 2018 11:59)  POCT Blood Glucose.: 134 mg/dL (29 Mar 2018 05:03)  POCT Blood Glucose.: 129 mg/dL (28 Mar 2018 23:07)  POCT Blood Glucose.: 127 mg/dL (28 Mar 2018 17:12)                            PHYSICAL EXAM    Neurology: intubated and sedated  CV :  RRR  Lungs:  lt side diminshed     left chest tube draining   h20  Abdomen: soft, nontender, nondistended, positive bowel sounds,  Extremities:    all four extremities with edema
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
## 2018-03-29 NOTE — PHYSICAL THERAPY INITIAL EVALUATION ADULT - ACTIVE RANGE OF MOTION EXAMINATION, REHAB EVAL
except B shoulder not tested due to pt agitation/bilateral upper extremity Active ROM was WFL (within functional limits)

## 2018-03-30 ENCOUNTER — APPOINTMENT (OUTPATIENT)
Dept: THORACIC SURGERY | Facility: HOSPITAL | Age: 78
End: 2018-03-30

## 2018-03-30 LAB
ALBUMIN SERPL ELPH-MCNC: 2.6 G/DL — LOW (ref 3.3–5)
ALP SERPL-CCNC: 61 U/L — SIGNIFICANT CHANGE UP (ref 40–120)
ALT FLD-CCNC: 24 U/L RC — SIGNIFICANT CHANGE UP (ref 10–45)
ANION GAP SERPL CALC-SCNC: 8 MMOL/L — SIGNIFICANT CHANGE UP (ref 5–17)
APTT BLD: 25.9 SEC — LOW (ref 27.5–37.4)
AST SERPL-CCNC: 30 U/L — SIGNIFICANT CHANGE UP (ref 10–40)
BILIRUB SERPL-MCNC: 2.4 MG/DL — HIGH (ref 0.2–1.2)
BUN SERPL-MCNC: 31 MG/DL — HIGH (ref 7–23)
CALCIUM SERPL-MCNC: 8.9 MG/DL — SIGNIFICANT CHANGE UP (ref 8.4–10.5)
CHLORIDE SERPL-SCNC: 111 MMOL/L — HIGH (ref 96–108)
CO2 SERPL-SCNC: 25 MMOL/L — SIGNIFICANT CHANGE UP (ref 22–31)
CREAT SERPL-MCNC: 1.15 MG/DL — SIGNIFICANT CHANGE UP (ref 0.5–1.3)
GAS PNL BLDA: SIGNIFICANT CHANGE UP
GLUCOSE BLDC GLUCOMTR-MCNC: 101 MG/DL — HIGH (ref 70–99)
GLUCOSE BLDC GLUCOMTR-MCNC: 103 MG/DL — HIGH (ref 70–99)
GLUCOSE BLDC GLUCOMTR-MCNC: 104 MG/DL — HIGH (ref 70–99)
GLUCOSE BLDC GLUCOMTR-MCNC: 106 MG/DL — HIGH (ref 70–99)
GLUCOSE SERPL-MCNC: 102 MG/DL — HIGH (ref 70–99)
HCT VFR BLD CALC: 24.8 % — LOW (ref 39–50)
HCT VFR BLD CALC: 26.8 % — LOW (ref 39–50)
HCT VFR BLD CALC: 26.8 % — LOW (ref 39–50)
HGB BLD-MCNC: 8.5 G/DL — LOW (ref 13–17)
HGB BLD-MCNC: 9.2 G/DL — LOW (ref 13–17)
HGB BLD-MCNC: 9.2 G/DL — LOW (ref 13–17)
INR BLD: 1.17 RATIO — HIGH (ref 0.88–1.16)
MAGNESIUM SERPL-MCNC: 2.1 MG/DL — SIGNIFICANT CHANGE UP (ref 1.6–2.6)
MCHC RBC-ENTMCNC: 30.9 PG — SIGNIFICANT CHANGE UP (ref 27–34)
MCHC RBC-ENTMCNC: 31.1 PG — SIGNIFICANT CHANGE UP (ref 27–34)
MCHC RBC-ENTMCNC: 31.4 PG — SIGNIFICANT CHANGE UP (ref 27–34)
MCHC RBC-ENTMCNC: 34.2 GM/DL — SIGNIFICANT CHANGE UP (ref 32–36)
MCHC RBC-ENTMCNC: 34.3 GM/DL — SIGNIFICANT CHANGE UP (ref 32–36)
MCHC RBC-ENTMCNC: 34.5 GM/DL — SIGNIFICANT CHANGE UP (ref 32–36)
MCV RBC AUTO: 89.8 FL — SIGNIFICANT CHANGE UP (ref 80–100)
MCV RBC AUTO: 90.8 FL — SIGNIFICANT CHANGE UP (ref 80–100)
MCV RBC AUTO: 91.4 FL — SIGNIFICANT CHANGE UP (ref 80–100)
PHOSPHATE SERPL-MCNC: 2.8 MG/DL — SIGNIFICANT CHANGE UP (ref 2.5–4.5)
PLATELET # BLD AUTO: 62 K/UL — LOW (ref 150–400)
PLATELET # BLD AUTO: 72 K/UL — LOW (ref 150–400)
PLATELET # BLD AUTO: 72 K/UL — LOW (ref 150–400)
POTASSIUM SERPL-MCNC: 3.9 MMOL/L — SIGNIFICANT CHANGE UP (ref 3.5–5.3)
POTASSIUM SERPL-SCNC: 3.9 MMOL/L — SIGNIFICANT CHANGE UP (ref 3.5–5.3)
PROT SERPL-MCNC: 5.4 G/DL — LOW (ref 6–8.3)
PROTHROM AB SERPL-ACNC: 12.8 SEC — HIGH (ref 9.8–12.7)
RBC # BLD: 2.72 M/UL — LOW (ref 4.2–5.8)
RBC # BLD: 2.95 M/UL — LOW (ref 4.2–5.8)
RBC # BLD: 2.98 M/UL — LOW (ref 4.2–5.8)
RBC # FLD: 14.8 % — HIGH (ref 10.3–14.5)
RBC # FLD: 14.9 % — HIGH (ref 10.3–14.5)
RBC # FLD: 14.9 % — HIGH (ref 10.3–14.5)
SODIUM SERPL-SCNC: 144 MMOL/L — SIGNIFICANT CHANGE UP (ref 135–145)
WBC # BLD: 7.3 K/UL — SIGNIFICANT CHANGE UP (ref 3.8–10.5)
WBC # BLD: 7.4 K/UL — SIGNIFICANT CHANGE UP (ref 3.8–10.5)
WBC # BLD: 7.7 K/UL — SIGNIFICANT CHANGE UP (ref 3.8–10.5)
WBC # FLD AUTO: 7.3 K/UL — SIGNIFICANT CHANGE UP (ref 3.8–10.5)
WBC # FLD AUTO: 7.4 K/UL — SIGNIFICANT CHANGE UP (ref 3.8–10.5)
WBC # FLD AUTO: 7.7 K/UL — SIGNIFICANT CHANGE UP (ref 3.8–10.5)

## 2018-03-30 PROCEDURE — 74018 RADEX ABDOMEN 1 VIEW: CPT | Mod: 26

## 2018-03-30 PROCEDURE — 71045 X-RAY EXAM CHEST 1 VIEW: CPT | Mod: 26

## 2018-03-30 PROCEDURE — 71260 CT THORAX DX C+: CPT | Mod: 26

## 2018-03-30 PROCEDURE — 74177 CT ABD & PELVIS W/CONTRAST: CPT | Mod: 26

## 2018-03-30 RX ORDER — HYDROMORPHONE HYDROCHLORIDE 2 MG/ML
0.5 INJECTION INTRAMUSCULAR; INTRAVENOUS; SUBCUTANEOUS ONCE
Qty: 0 | Refills: 0 | Status: DISCONTINUED | OUTPATIENT
Start: 2018-03-30 | End: 2018-03-30

## 2018-03-30 RX ORDER — DEXMEDETOMIDINE HYDROCHLORIDE IN 0.9% SODIUM CHLORIDE 4 UG/ML
0.1 INJECTION INTRAVENOUS
Qty: 200 | Refills: 0 | Status: DISCONTINUED | OUTPATIENT
Start: 2018-03-30 | End: 2018-03-31

## 2018-03-30 RX ORDER — HYDROMORPHONE HYDROCHLORIDE 2 MG/ML
1 INJECTION INTRAMUSCULAR; INTRAVENOUS; SUBCUTANEOUS ONCE
Qty: 0 | Refills: 0 | Status: DISCONTINUED | OUTPATIENT
Start: 2018-03-30 | End: 2018-03-30

## 2018-03-30 RX ORDER — ACETAMINOPHEN 500 MG
1000 TABLET ORAL ONCE
Qty: 0 | Refills: 0 | Status: COMPLETED | OUTPATIENT
Start: 2018-03-30 | End: 2018-03-30

## 2018-03-30 RX ORDER — FENTANYL CITRATE 50 UG/ML
1 INJECTION INTRAVENOUS
Qty: 5000 | Refills: 0 | Status: DISCONTINUED | OUTPATIENT
Start: 2018-03-30 | End: 2018-03-31

## 2018-03-30 RX ORDER — FENTANYL CITRATE 50 UG/ML
100 INJECTION INTRAVENOUS ONCE
Qty: 0 | Refills: 0 | Status: DISCONTINUED | OUTPATIENT
Start: 2018-03-30 | End: 2018-03-31

## 2018-03-30 RX ORDER — POTASSIUM PHOSPHATE, MONOBASIC POTASSIUM PHOSPHATE, DIBASIC 236; 224 MG/ML; MG/ML
15 INJECTION, SOLUTION INTRAVENOUS ONCE
Qty: 0 | Refills: 0 | Status: COMPLETED | OUTPATIENT
Start: 2018-03-30 | End: 2018-03-30

## 2018-03-30 RX ORDER — ENOXAPARIN SODIUM 100 MG/ML
40 INJECTION SUBCUTANEOUS DAILY
Qty: 0 | Refills: 0 | Status: DISCONTINUED | OUTPATIENT
Start: 2018-03-30 | End: 2018-03-31

## 2018-03-30 RX ORDER — POTASSIUM CHLORIDE 20 MEQ
10 PACKET (EA) ORAL ONCE
Qty: 0 | Refills: 0 | Status: COMPLETED | OUTPATIENT
Start: 2018-03-30 | End: 2018-03-30

## 2018-03-30 RX ADMIN — SODIUM CHLORIDE 125 MILLILITER(S): 9 INJECTION, SOLUTION INTRAVENOUS at 05:18

## 2018-03-30 RX ADMIN — ENOXAPARIN SODIUM 40 MILLIGRAM(S): 100 INJECTION SUBCUTANEOUS at 11:23

## 2018-03-30 RX ADMIN — LIDOCAINE 1 PATCH: 4 CREAM TOPICAL at 11:23

## 2018-03-30 RX ADMIN — LIDOCAINE 1 PATCH: 4 CREAM TOPICAL at 23:00

## 2018-03-30 RX ADMIN — Medication 100 MILLIEQUIVALENT(S): at 04:18

## 2018-03-30 RX ADMIN — Medication 400 MILLIGRAM(S): at 12:00

## 2018-03-30 RX ADMIN — HYDROMORPHONE HYDROCHLORIDE 0.5 MILLIGRAM(S): 2 INJECTION INTRAMUSCULAR; INTRAVENOUS; SUBCUTANEOUS at 01:05

## 2018-03-30 RX ADMIN — FENTANYL CITRATE 2.25 MICROGRAM(S)/KG/HR: 50 INJECTION INTRAVENOUS at 23:44

## 2018-03-30 RX ADMIN — HYDROMORPHONE HYDROCHLORIDE 0.5 MILLIGRAM(S): 2 INJECTION INTRAMUSCULAR; INTRAVENOUS; SUBCUTANEOUS at 01:44

## 2018-03-30 RX ADMIN — HYDROMORPHONE HYDROCHLORIDE 0.5 MILLIGRAM(S): 2 INJECTION INTRAMUSCULAR; INTRAVENOUS; SUBCUTANEOUS at 07:24

## 2018-03-30 RX ADMIN — HYDROMORPHONE HYDROCHLORIDE 0.5 MILLIGRAM(S): 2 INJECTION INTRAMUSCULAR; INTRAVENOUS; SUBCUTANEOUS at 10:00

## 2018-03-30 RX ADMIN — HYDROMORPHONE HYDROCHLORIDE 0.5 MILLIGRAM(S): 2 INJECTION INTRAMUSCULAR; INTRAVENOUS; SUBCUTANEOUS at 04:26

## 2018-03-30 RX ADMIN — HYDROMORPHONE HYDROCHLORIDE 1 MILLIGRAM(S): 2 INJECTION INTRAMUSCULAR; INTRAVENOUS; SUBCUTANEOUS at 20:01

## 2018-03-30 RX ADMIN — HYDROMORPHONE HYDROCHLORIDE 1 MILLIGRAM(S): 2 INJECTION INTRAMUSCULAR; INTRAVENOUS; SUBCUTANEOUS at 20:45

## 2018-03-30 RX ADMIN — HYDROMORPHONE HYDROCHLORIDE 0.5 MILLIGRAM(S): 2 INJECTION INTRAMUSCULAR; INTRAVENOUS; SUBCUTANEOUS at 04:41

## 2018-03-30 RX ADMIN — CHLORHEXIDINE GLUCONATE 15 MILLILITER(S): 213 SOLUTION TOPICAL at 05:17

## 2018-03-30 RX ADMIN — Medication 1 MILLIGRAM(S): at 20:01

## 2018-03-30 RX ADMIN — DEXMEDETOMIDINE HYDROCHLORIDE IN 0.9% SODIUM CHLORIDE 2.25 MICROGRAM(S)/KG/HR: 4 INJECTION INTRAVENOUS at 23:44

## 2018-03-30 RX ADMIN — HYDROMORPHONE HYDROCHLORIDE 0.5 MILLIGRAM(S): 2 INJECTION INTRAMUSCULAR; INTRAVENOUS; SUBCUTANEOUS at 01:59

## 2018-03-30 RX ADMIN — PANTOPRAZOLE SODIUM 40 MILLIGRAM(S): 20 TABLET, DELAYED RELEASE ORAL at 11:23

## 2018-03-30 RX ADMIN — HYDROMORPHONE HYDROCHLORIDE 0.5 MILLIGRAM(S): 2 INJECTION INTRAMUSCULAR; INTRAVENOUS; SUBCUTANEOUS at 06:54

## 2018-03-30 RX ADMIN — Medication 400 MILLIGRAM(S): at 03:14

## 2018-03-30 RX ADMIN — HYDROMORPHONE HYDROCHLORIDE 0.5 MILLIGRAM(S): 2 INJECTION INTRAMUSCULAR; INTRAVENOUS; SUBCUTANEOUS at 15:13

## 2018-03-30 RX ADMIN — HYDROMORPHONE HYDROCHLORIDE 0.5 MILLIGRAM(S): 2 INJECTION INTRAMUSCULAR; INTRAVENOUS; SUBCUTANEOUS at 14:58

## 2018-03-30 RX ADMIN — Medication 400 MILLIGRAM(S): at 17:50

## 2018-03-30 RX ADMIN — HYDROMORPHONE HYDROCHLORIDE 0.5 MILLIGRAM(S): 2 INJECTION INTRAMUSCULAR; INTRAVENOUS; SUBCUTANEOUS at 20:45

## 2018-03-30 RX ADMIN — HYDROMORPHONE HYDROCHLORIDE 0.5 MILLIGRAM(S): 2 INJECTION INTRAMUSCULAR; INTRAVENOUS; SUBCUTANEOUS at 10:30

## 2018-03-30 RX ADMIN — POTASSIUM PHOSPHATE, MONOBASIC POTASSIUM PHOSPHATE, DIBASIC 62.5 MILLIMOLE(S): 236; 224 INJECTION, SOLUTION INTRAVENOUS at 04:18

## 2018-03-30 RX ADMIN — LIDOCAINE 1 PATCH: 4 CREAM TOPICAL at 11:22

## 2018-03-30 RX ADMIN — Medication 1000 MILLIGRAM(S): at 12:30

## 2018-03-30 RX ADMIN — Medication 1000 MILLIGRAM(S): at 03:44

## 2018-03-30 RX ADMIN — HYDROMORPHONE HYDROCHLORIDE 0.5 MILLIGRAM(S): 2 INJECTION INTRAMUSCULAR; INTRAVENOUS; SUBCUTANEOUS at 23:24

## 2018-03-30 RX ADMIN — HYDROMORPHONE HYDROCHLORIDE 0.5 MILLIGRAM(S): 2 INJECTION INTRAMUSCULAR; INTRAVENOUS; SUBCUTANEOUS at 19:29

## 2018-03-30 RX ADMIN — Medication 1000 MILLIGRAM(S): at 18:20

## 2018-03-30 RX ADMIN — HYDROMORPHONE HYDROCHLORIDE 0.5 MILLIGRAM(S): 2 INJECTION INTRAMUSCULAR; INTRAVENOUS; SUBCUTANEOUS at 00:50

## 2018-03-30 RX ADMIN — HALOPERIDOL DECANOATE 2.5 MILLIGRAM(S): 100 INJECTION INTRAMUSCULAR at 06:48

## 2018-03-30 NOTE — PROGRESS NOTE ADULT - SUBJECTIVE AND OBJECTIVE BOX
Subjective: Pt intubated and sedated.    Vital Signs Last 24 Hrs  T(C): 37.3 (18 @ 11:00), Max: 37.3 (18 @ 11:00)  T(F): 99.1 (18 @ 11:00), Max: 99.1 (18 @ 11:00)  HR: 81 (18 @ 16:00) (77 - 127)  BP: 157/67 (18 @ 22:00) (157/67 - 157/67)  RR: 18 (18 @ 16:00) (18 - 30)  SpO2: 99% (18 @ 16:00) (94% - 100%)          Mode: AC/ CMV (Assist Control/ Continuous Mandatory Ventilation), RR (machine): 18, TV (machine): 550, FiO2: 40, PEEP: 5, ITime: 1, MAP: 10, PIP: 26      18 @ 07:01  -  18 @ 17:07  --------------------------------------------------------  IN: 1074 mL / OUT: 515 mL / NET: 559 mL      Daily Weight in k.2 (30 Mar 2018 06:09)    Relevant labs, radiology and Medications reviewed                        9.2    7.7   )-----------( 72       ( 30 Mar 2018 08:17 )             26.8     144  |  111<H>  |  31<H>  ----------------------------<  102<H>  3.9   |  25  |  1.15    Ca    8.9      30 Mar 2018 02:40  Phos  2.8       Mg     2.1         TPro  5.4<L>  /  Alb  2.6<L>  /  TBili  2.4<H>  /  DBili  x   /  AST  30  /  ALT  24  /  AlkPhos  61            PHYSICAL EXAM  Neurology: Sedated, arousable, NAD  CV : RRR+S1S2  Lungs: Intubated, B/L BS CTA  + Left pleural CT to H20 seal with serosanguinous drainage, no air leak  Abdomen: Soft, NT/ND, +BSx4Q, +NGT  : +Ybarra with clear, yellow urine  Extremities: B/L LE no edema, negative calf tenderness, +PP           MEDICATIONS  chlorhexidine 0.12% Liquid 15 milliLiter(s) Swish and Spit two times a day  dexmedetomidine Infusion 0.1 MICROgram(s)/kG/Hr IV Continuous <Continuous>  enoxaparin Injectable 40 milliGRAM(s) SubCutaneous daily  HYDROmorphone  Injectable 0.5 milliGRAM(s) IV Push every 3 hours PRN  insulin lispro (HumaLOG) corrective regimen sliding scale   SubCutaneous every 6 hours  lactated ringers. 1000 milliLiter(s) IV Continuous <Continuous>  lidocaine   Patch 1 Patch Transdermal daily  lidocaine   Patch 1 Patch Transdermal daily  pantoprazole  Injectable 40 milliGRAM(s) IV Push daily      Pt seen and examined with thoracic surgery attending, Dr. Varela.
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
## 2018-03-30 NOTE — PROGRESS NOTE ADULT - PROBLEM SELECTOR PLAN 1
Maintain left chest tube to water seal. Strict I & Os.  Check daily CXR, will hold off on vats drainage for now  Wean ventilator as tolerated.  Continue management per primary team, will follow. Per Dr. Varela, no further indication for VATS drainage at this time, hemothorax evacuated.  Continue left chest tube management and SICU care as per primary team.  Thoracic Surgery sign off, please re-consult as needed.

## 2018-03-31 LAB
ANION GAP SERPL CALC-SCNC: 11 MMOL/L — SIGNIFICANT CHANGE UP (ref 5–17)
ANION GAP SERPL CALC-SCNC: 12 MMOL/L — SIGNIFICANT CHANGE UP (ref 5–17)
BLD GP AB SCN SERPL QL: NEGATIVE — SIGNIFICANT CHANGE UP
BUN SERPL-MCNC: 29 MG/DL — HIGH (ref 7–23)
BUN SERPL-MCNC: 30 MG/DL — HIGH (ref 7–23)
CALCIUM SERPL-MCNC: 8.4 MG/DL — SIGNIFICANT CHANGE UP (ref 8.4–10.5)
CALCIUM SERPL-MCNC: 8.8 MG/DL — SIGNIFICANT CHANGE UP (ref 8.4–10.5)
CHLORIDE SERPL-SCNC: 107 MMOL/L — SIGNIFICANT CHANGE UP (ref 96–108)
CHLORIDE SERPL-SCNC: 110 MMOL/L — HIGH (ref 96–108)
CO2 SERPL-SCNC: 24 MMOL/L — SIGNIFICANT CHANGE UP (ref 22–31)
CO2 SERPL-SCNC: 26 MMOL/L — SIGNIFICANT CHANGE UP (ref 22–31)
CREAT SERPL-MCNC: 0.95 MG/DL — SIGNIFICANT CHANGE UP (ref 0.5–1.3)
CREAT SERPL-MCNC: 1.02 MG/DL — SIGNIFICANT CHANGE UP (ref 0.5–1.3)
GAS PNL BLDA: SIGNIFICANT CHANGE UP
GLUCOSE BLDC GLUCOMTR-MCNC: 119 MG/DL — HIGH (ref 70–99)
GLUCOSE BLDC GLUCOMTR-MCNC: 120 MG/DL — HIGH (ref 70–99)
GLUCOSE BLDC GLUCOMTR-MCNC: 124 MG/DL — HIGH (ref 70–99)
GLUCOSE BLDC GLUCOMTR-MCNC: 131 MG/DL — HIGH (ref 70–99)
GLUCOSE SERPL-MCNC: 114 MG/DL — HIGH (ref 70–99)
GLUCOSE SERPL-MCNC: 138 MG/DL — HIGH (ref 70–99)
HCT VFR BLD CALC: 27.3 % — LOW (ref 39–50)
HCT VFR BLD CALC: 33.6 % — LOW (ref 39–50)
HGB BLD-MCNC: 11.5 G/DL — LOW (ref 13–17)
HGB BLD-MCNC: 9.4 G/DL — LOW (ref 13–17)
INR BLD: 1.15 RATIO — SIGNIFICANT CHANGE UP (ref 0.88–1.16)
MAGNESIUM SERPL-MCNC: 1.9 MG/DL — SIGNIFICANT CHANGE UP (ref 1.6–2.6)
MAGNESIUM SERPL-MCNC: 2 MG/DL — SIGNIFICANT CHANGE UP (ref 1.6–2.6)
MCHC RBC-ENTMCNC: 31.6 PG — SIGNIFICANT CHANGE UP (ref 27–34)
MCHC RBC-ENTMCNC: 31.6 PG — SIGNIFICANT CHANGE UP (ref 27–34)
MCHC RBC-ENTMCNC: 34.4 GM/DL — SIGNIFICANT CHANGE UP (ref 32–36)
MCHC RBC-ENTMCNC: 34.4 GM/DL — SIGNIFICANT CHANGE UP (ref 32–36)
MCV RBC AUTO: 91.8 FL — SIGNIFICANT CHANGE UP (ref 80–100)
MCV RBC AUTO: 91.8 FL — SIGNIFICANT CHANGE UP (ref 80–100)
PHOSPHATE SERPL-MCNC: 3 MG/DL — SIGNIFICANT CHANGE UP (ref 2.5–4.5)
PHOSPHATE SERPL-MCNC: 3 MG/DL — SIGNIFICANT CHANGE UP (ref 2.5–4.5)
PLATELET # BLD AUTO: 74 K/UL — LOW (ref 150–400)
PLATELET # BLD AUTO: 93 K/UL — LOW (ref 150–400)
POTASSIUM SERPL-MCNC: 3.9 MMOL/L — SIGNIFICANT CHANGE UP (ref 3.5–5.3)
POTASSIUM SERPL-MCNC: 4.1 MMOL/L — SIGNIFICANT CHANGE UP (ref 3.5–5.3)
POTASSIUM SERPL-SCNC: 3.9 MMOL/L — SIGNIFICANT CHANGE UP (ref 3.5–5.3)
POTASSIUM SERPL-SCNC: 4.1 MMOL/L — SIGNIFICANT CHANGE UP (ref 3.5–5.3)
PROTHROM AB SERPL-ACNC: 12.5 SEC — SIGNIFICANT CHANGE UP (ref 9.8–12.7)
RBC # BLD: 2.97 M/UL — LOW (ref 4.2–5.8)
RBC # BLD: 3.65 M/UL — LOW (ref 4.2–5.8)
RBC # FLD: 14.9 % — HIGH (ref 10.3–14.5)
RBC # FLD: 14.9 % — HIGH (ref 10.3–14.5)
RH IG SCN BLD-IMP: POSITIVE — SIGNIFICANT CHANGE UP
SODIUM SERPL-SCNC: 145 MMOL/L — SIGNIFICANT CHANGE UP (ref 135–145)
SODIUM SERPL-SCNC: 145 MMOL/L — SIGNIFICANT CHANGE UP (ref 135–145)
WBC # BLD: 10 K/UL — SIGNIFICANT CHANGE UP (ref 3.8–10.5)
WBC # BLD: 6.9 K/UL — SIGNIFICANT CHANGE UP (ref 3.8–10.5)
WBC # FLD AUTO: 10 K/UL — SIGNIFICANT CHANGE UP (ref 3.8–10.5)
WBC # FLD AUTO: 6.9 K/UL — SIGNIFICANT CHANGE UP (ref 3.8–10.5)

## 2018-03-31 PROCEDURE — 71045 X-RAY EXAM CHEST 1 VIEW: CPT | Mod: 26,76

## 2018-03-31 PROCEDURE — 73060 X-RAY EXAM OF HUMERUS: CPT | Mod: 26,LT

## 2018-03-31 PROCEDURE — 73030 X-RAY EXAM OF SHOULDER: CPT | Mod: 26,LT

## 2018-03-31 PROCEDURE — 71045 X-RAY EXAM CHEST 1 VIEW: CPT | Mod: 26,77

## 2018-03-31 PROCEDURE — 73070 X-RAY EXAM OF ELBOW: CPT | Mod: 26,LT

## 2018-03-31 RX ORDER — FUROSEMIDE 40 MG
20 TABLET ORAL ONCE
Qty: 0 | Refills: 0 | Status: COMPLETED | OUTPATIENT
Start: 2018-03-31 | End: 2018-03-31

## 2018-03-31 RX ORDER — POTASSIUM CHLORIDE 20 MEQ
20 PACKET (EA) ORAL ONCE
Qty: 0 | Refills: 0 | Status: COMPLETED | OUTPATIENT
Start: 2018-03-31 | End: 2018-03-31

## 2018-03-31 RX ORDER — PANTOPRAZOLE SODIUM 20 MG/1
40 TABLET, DELAYED RELEASE ORAL DAILY
Qty: 0 | Refills: 0 | Status: DISCONTINUED | OUTPATIENT
Start: 2018-03-31 | End: 2018-04-05

## 2018-03-31 RX ORDER — PROPOFOL 10 MG/ML
25 INJECTION, EMULSION INTRAVENOUS
Qty: 500 | Refills: 0 | Status: DISCONTINUED | OUTPATIENT
Start: 2018-03-31 | End: 2018-03-31

## 2018-03-31 RX ORDER — SIMVASTATIN 20 MG/1
40 TABLET, FILM COATED ORAL AT BEDTIME
Qty: 0 | Refills: 0 | Status: DISCONTINUED | OUTPATIENT
Start: 2018-03-31 | End: 2018-04-05

## 2018-03-31 RX ORDER — MAGNESIUM SULFATE 500 MG/ML
2 VIAL (ML) INJECTION ONCE
Qty: 0 | Refills: 0 | Status: COMPLETED | OUTPATIENT
Start: 2018-03-31 | End: 2018-03-31

## 2018-03-31 RX ORDER — SODIUM CHLORIDE 9 MG/ML
1000 INJECTION, SOLUTION INTRAVENOUS
Qty: 0 | Refills: 0 | Status: DISCONTINUED | OUTPATIENT
Start: 2018-03-31 | End: 2018-03-31

## 2018-03-31 RX ORDER — CHLORHEXIDINE GLUCONATE 213 G/1000ML
15 SOLUTION TOPICAL
Qty: 0 | Refills: 0 | Status: DISCONTINUED | OUTPATIENT
Start: 2018-03-31 | End: 2018-04-06

## 2018-03-31 RX ORDER — ENOXAPARIN SODIUM 100 MG/ML
40 INJECTION SUBCUTANEOUS DAILY
Qty: 0 | Refills: 0 | Status: DISCONTINUED | OUTPATIENT
Start: 2018-03-31 | End: 2018-05-18

## 2018-03-31 RX ORDER — FENTANYL CITRATE 50 UG/ML
100 INJECTION INTRAVENOUS ONCE
Qty: 0 | Refills: 0 | Status: DISCONTINUED | OUTPATIENT
Start: 2018-03-31 | End: 2018-03-31

## 2018-03-31 RX ORDER — PROPOFOL 10 MG/ML
25 INJECTION, EMULSION INTRAVENOUS
Qty: 500 | Refills: 0 | Status: DISCONTINUED | OUTPATIENT
Start: 2018-03-31 | End: 2018-04-01

## 2018-03-31 RX ORDER — FENTANYL CITRATE 50 UG/ML
50 INJECTION INTRAVENOUS ONCE
Qty: 0 | Refills: 0 | Status: DISCONTINUED | OUTPATIENT
Start: 2018-03-31 | End: 2018-03-31

## 2018-03-31 RX ORDER — HYDROMORPHONE HYDROCHLORIDE 2 MG/ML
0.5 INJECTION INTRAMUSCULAR; INTRAVENOUS; SUBCUTANEOUS ONCE
Qty: 0 | Refills: 0 | Status: DISCONTINUED | OUTPATIENT
Start: 2018-03-31 | End: 2018-03-31

## 2018-03-31 RX ORDER — FENTANYL CITRATE 50 UG/ML
1 INJECTION INTRAVENOUS
Qty: 5000 | Refills: 0 | Status: DISCONTINUED | OUTPATIENT
Start: 2018-03-31 | End: 2018-04-01

## 2018-03-31 RX ORDER — DEXMEDETOMIDINE HYDROCHLORIDE IN 0.9% SODIUM CHLORIDE 4 UG/ML
0.4 INJECTION INTRAVENOUS
Qty: 200 | Refills: 0 | Status: DISCONTINUED | OUTPATIENT
Start: 2018-03-31 | End: 2018-03-31

## 2018-03-31 RX ADMIN — CHLORHEXIDINE GLUCONATE 15 MILLILITER(S): 213 SOLUTION TOPICAL at 05:45

## 2018-03-31 RX ADMIN — Medication 20 MILLIEQUIVALENT(S): at 19:36

## 2018-03-31 RX ADMIN — FENTANYL CITRATE 4.51 MICROGRAM(S)/KG/HR: 50 INJECTION INTRAVENOUS at 19:19

## 2018-03-31 RX ADMIN — FENTANYL CITRATE 50 MICROGRAM(S): 50 INJECTION INTRAVENOUS at 07:14

## 2018-03-31 RX ADMIN — HYDROMORPHONE HYDROCHLORIDE 0.5 MILLIGRAM(S): 2 INJECTION INTRAMUSCULAR; INTRAVENOUS; SUBCUTANEOUS at 21:53

## 2018-03-31 RX ADMIN — Medication 20 MILLIGRAM(S): at 09:18

## 2018-03-31 RX ADMIN — SIMVASTATIN 40 MILLIGRAM(S): 20 TABLET, FILM COATED ORAL at 21:09

## 2018-03-31 RX ADMIN — FENTANYL CITRATE 100 MICROGRAM(S): 50 INJECTION INTRAVENOUS at 00:03

## 2018-03-31 RX ADMIN — HYDROMORPHONE HYDROCHLORIDE 0.5 MILLIGRAM(S): 2 INJECTION INTRAMUSCULAR; INTRAVENOUS; SUBCUTANEOUS at 13:54

## 2018-03-31 RX ADMIN — PANTOPRAZOLE SODIUM 40 MILLIGRAM(S): 20 TABLET, DELAYED RELEASE ORAL at 11:37

## 2018-03-31 RX ADMIN — DEXMEDETOMIDINE HYDROCHLORIDE IN 0.9% SODIUM CHLORIDE 2.25 MICROGRAM(S)/KG/HR: 4 INJECTION INTRAVENOUS at 09:18

## 2018-03-31 RX ADMIN — FENTANYL CITRATE 100 MICROGRAM(S): 50 INJECTION INTRAVENOUS at 02:08

## 2018-03-31 RX ADMIN — FENTANYL CITRATE 100 MICROGRAM(S): 50 INJECTION INTRAVENOUS at 03:00

## 2018-03-31 RX ADMIN — FENTANYL CITRATE 50 MICROGRAM(S): 50 INJECTION INTRAVENOUS at 07:06

## 2018-03-31 RX ADMIN — PROPOFOL 13.53 MICROGRAM(S)/KG/MIN: 10 INJECTION, EMULSION INTRAVENOUS at 19:18

## 2018-03-31 RX ADMIN — LIDOCAINE 1 PATCH: 4 CREAM TOPICAL at 12:13

## 2018-03-31 RX ADMIN — Medication 50 GRAM(S): at 20:58

## 2018-03-31 RX ADMIN — Medication 20 MILLIGRAM(S): at 19:18

## 2018-03-31 RX ADMIN — HYDROMORPHONE HYDROCHLORIDE 0.5 MILLIGRAM(S): 2 INJECTION INTRAMUSCULAR; INTRAVENOUS; SUBCUTANEOUS at 13:59

## 2018-03-31 RX ADMIN — LIDOCAINE 1 PATCH: 4 CREAM TOPICAL at 12:12

## 2018-03-31 RX ADMIN — CHLORHEXIDINE GLUCONATE 15 MILLILITER(S): 213 SOLUTION TOPICAL at 17:16

## 2018-03-31 RX ADMIN — HYDROMORPHONE HYDROCHLORIDE 0.5 MILLIGRAM(S): 2 INJECTION INTRAMUSCULAR; INTRAVENOUS; SUBCUTANEOUS at 23:30

## 2018-03-31 RX ADMIN — ENOXAPARIN SODIUM 40 MILLIGRAM(S): 100 INJECTION SUBCUTANEOUS at 11:23

## 2018-03-31 RX ADMIN — PROPOFOL 13.53 MICROGRAM(S)/KG/MIN: 10 INJECTION, EMULSION INTRAVENOUS at 15:39

## 2018-03-31 RX ADMIN — SODIUM CHLORIDE 30 MILLILITER(S): 9 INJECTION, SOLUTION INTRAVENOUS at 09:19

## 2018-03-31 RX ADMIN — SODIUM CHLORIDE 30 MILLILITER(S): 9 INJECTION, SOLUTION INTRAVENOUS at 11:21

## 2018-03-31 RX ADMIN — HYDROMORPHONE HYDROCHLORIDE 0.5 MILLIGRAM(S): 2 INJECTION INTRAMUSCULAR; INTRAVENOUS; SUBCUTANEOUS at 00:00

## 2018-03-31 RX ADMIN — FENTANYL CITRATE 100 MICROGRAM(S): 50 INJECTION INTRAVENOUS at 01:00
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
## 2018-03-31 NOTE — CHART NOTE - NSCHARTNOTEFT_GEN_A_CORE
Pt S&E at bedside, patient re intubated after initial extubation.  Patient TTP along L Shoulder with significant bruising.  CT was reviewed with radiologist that states no signs of Fx or acute pathology.  WIll get proper secondary survey once patient is extubated.
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
## 2018-03-31 NOTE — PROGRESS NOTE ADULT - SUBJECTIVE AND OBJECTIVE BOX
Called to patient's bedside for intubation.  Therapy initiated by primary medical team.  Patient was recently extubated and placed on 100% fio2 bipap.  At bedside with Dr. Ugarte    Patient Assessment:   Agitated, non verbal    Baseline Vital Signs:  BP: 200/71  HR: 112  RR: 24  SpO2: 100     Medications Administered: 100mg Propofol, 100mg Succinycholine    Intubation:      [X] Direct Laryngoscopy    [ ] Video-Assisted Laryngoscopy   [ ] Fiberoptic Intubation    Blade:   Cormack-Lehane View: [ ] 1     [X] 2A     [ ] 2B     [ ] 3     [ ]  4  ETT Size: 7.5   Marking at Teeth: 22    Post-Intubation Vital Signs:  BP: 138/56  HR: 62   RR: 14  SpO2: 100    Positive end-tidal carbon dioxide via EasyCap, positive bilateral breath sounds.  CXR to be reviewed by primary team.  Atraumatic intubation with no complications. Called to patient's bedside for intubation.  Therapy initiated by primary medical team.  Patient was recently extubated and placed on 100% fio2 bipap.  At bedside with Dr. Ugarte    Patient Assessment:   Agitated, non verbal    Baseline Vital Signs:  BP: 200/71  HR: 112  RR: 24  SpO2: 100     Medications Administered: 100mg Propofol, 100mg Succinycholine    Intubation:      [X] Direct Laryngoscopy    [ ] Video-Assisted Laryngoscopy   [ ] Fiberoptic Intubation    Blade: Brian 3  Cormack-Lehane View: [ ] 1     [X] 2A     [ ] 2B     [ ] 3     [ ]  4  ETT Size: 7.5   Marking at Teeth: 22    Post-Intubation Vital Signs:  BP: 138/56  HR: 62   RR: 14  SpO2: 100    Positive end-tidal carbon dioxide via EasyCap, positive bilateral breath sounds.  CXR to be reviewed by primary team.  Atraumatic intubation with no complications.

## 2018-03-31 NOTE — AIRWAY REMOVAL NOTE  ADULT & PEDS - ARTIFICAL AIRWAY REMOVAL COMMENTS
Written order for extubation verified. The patient was identified by full name and birth date compared to the identification band. Present during the procedure was SAMMIE Duran.

## 2018-04-01 LAB
ALBUMIN SERPL ELPH-MCNC: 2.6 G/DL — LOW (ref 3.3–5)
ALP SERPL-CCNC: 66 U/L — SIGNIFICANT CHANGE UP (ref 40–120)
ALT FLD-CCNC: 18 U/L RC — SIGNIFICANT CHANGE UP (ref 10–45)
ANION GAP SERPL CALC-SCNC: 11 MMOL/L — SIGNIFICANT CHANGE UP (ref 5–17)
APPEARANCE UR: CLEAR — SIGNIFICANT CHANGE UP
APTT BLD: 25.7 SEC — LOW (ref 27.5–37.4)
AST SERPL-CCNC: 25 U/L — SIGNIFICANT CHANGE UP (ref 10–40)
BACTERIA # UR AUTO: ABNORMAL /HPF
BASOPHILS # BLD AUTO: 0 K/UL — SIGNIFICANT CHANGE UP (ref 0–0.2)
BASOPHILS NFR BLD AUTO: 0.1 % — SIGNIFICANT CHANGE UP (ref 0–2)
BILIRUB DIRECT SERPL-MCNC: 1.4 MG/DL — HIGH (ref 0–0.2)
BILIRUB INDIRECT FLD-MCNC: 2.7 MG/DL — HIGH (ref 0.2–1)
BILIRUB SERPL-MCNC: 4.1 MG/DL — HIGH (ref 0.2–1.2)
BILIRUB UR-MCNC: NEGATIVE — SIGNIFICANT CHANGE UP
BUN SERPL-MCNC: 31 MG/DL — HIGH (ref 7–23)
CALCIUM SERPL-MCNC: 8.5 MG/DL — SIGNIFICANT CHANGE UP (ref 8.4–10.5)
CHLORIDE SERPL-SCNC: 106 MMOL/L — SIGNIFICANT CHANGE UP (ref 96–108)
CO2 SERPL-SCNC: 28 MMOL/L — SIGNIFICANT CHANGE UP (ref 22–31)
COLOR SPEC: YELLOW — SIGNIFICANT CHANGE UP
CREAT SERPL-MCNC: 0.92 MG/DL — SIGNIFICANT CHANGE UP (ref 0.5–1.3)
DIFF PNL FLD: NEGATIVE — SIGNIFICANT CHANGE UP
EOSINOPHIL # BLD AUTO: 0.2 K/UL — SIGNIFICANT CHANGE UP (ref 0–0.5)
EOSINOPHIL NFR BLD AUTO: 2.4 % — SIGNIFICANT CHANGE UP (ref 0–6)
GAS PNL BLDA: SIGNIFICANT CHANGE UP
GLUCOSE BLDC GLUCOMTR-MCNC: 139 MG/DL — HIGH (ref 70–99)
GLUCOSE BLDC GLUCOMTR-MCNC: 158 MG/DL — HIGH (ref 70–99)
GLUCOSE BLDC GLUCOMTR-MCNC: 172 MG/DL — HIGH (ref 70–99)
GLUCOSE SERPL-MCNC: 140 MG/DL — HIGH (ref 70–99)
GLUCOSE UR QL: 50 MG/DL
HCT VFR BLD CALC: 28 % — LOW (ref 39–50)
HGB BLD-MCNC: 9.8 G/DL — LOW (ref 13–17)
HYALINE CASTS # UR AUTO: ABNORMAL
INR BLD: 1.16 RATIO — SIGNIFICANT CHANGE UP (ref 0.88–1.16)
KETONES UR-MCNC: NEGATIVE — SIGNIFICANT CHANGE UP
LEUKOCYTE ESTERASE UR-ACNC: NEGATIVE — SIGNIFICANT CHANGE UP
LYMPHOCYTES # BLD AUTO: 0.7 K/UL — LOW (ref 1–3.3)
LYMPHOCYTES # BLD AUTO: 7.8 % — LOW (ref 13–44)
MAGNESIUM SERPL-MCNC: 2.3 MG/DL — SIGNIFICANT CHANGE UP (ref 1.6–2.6)
MCHC RBC-ENTMCNC: 32 PG — SIGNIFICANT CHANGE UP (ref 27–34)
MCHC RBC-ENTMCNC: 35 GM/DL — SIGNIFICANT CHANGE UP (ref 32–36)
MCV RBC AUTO: 91.4 FL — SIGNIFICANT CHANGE UP (ref 80–100)
MONOCYTES # BLD AUTO: 1.2 K/UL — HIGH (ref 0–0.9)
MONOCYTES NFR BLD AUTO: 13.3 % — SIGNIFICANT CHANGE UP (ref 2–14)
NEUTROPHILS # BLD AUTO: 6.6 K/UL — SIGNIFICANT CHANGE UP (ref 1.8–7.4)
NEUTROPHILS NFR BLD AUTO: 76.4 % — SIGNIFICANT CHANGE UP (ref 43–77)
NITRITE UR-MCNC: NEGATIVE — SIGNIFICANT CHANGE UP
NT-PROBNP SERPL-SCNC: 2034 PG/ML — HIGH (ref 0–300)
PH UR: 6 — SIGNIFICANT CHANGE UP (ref 5–8)
PHOSPHATE SERPL-MCNC: 2.2 MG/DL — LOW (ref 2.5–4.5)
PLATELET # BLD AUTO: 93 K/UL — LOW (ref 150–400)
POTASSIUM SERPL-MCNC: 3.7 MMOL/L — SIGNIFICANT CHANGE UP (ref 3.5–5.3)
POTASSIUM SERPL-SCNC: 3.7 MMOL/L — SIGNIFICANT CHANGE UP (ref 3.5–5.3)
PROT SERPL-MCNC: 5.3 G/DL — LOW (ref 6–8.3)
PROT UR-MCNC: SIGNIFICANT CHANGE UP
PROTHROM AB SERPL-ACNC: 12.6 SEC — SIGNIFICANT CHANGE UP (ref 9.8–12.7)
RBC # BLD: 3.06 M/UL — LOW (ref 4.2–5.8)
RBC # FLD: 14.7 % — HIGH (ref 10.3–14.5)
RBC CASTS # UR COMP ASSIST: ABNORMAL /HPF (ref 0–2)
SODIUM SERPL-SCNC: 145 MMOL/L — SIGNIFICANT CHANGE UP (ref 135–145)
SP GR SPEC: 1.01 — SIGNIFICANT CHANGE UP (ref 1.01–1.02)
UROBILINOGEN FLD QL: 4 MG/DL
WBC # BLD: 8.7 K/UL — SIGNIFICANT CHANGE UP (ref 3.8–10.5)
WBC # FLD AUTO: 8.7 K/UL — SIGNIFICANT CHANGE UP (ref 3.8–10.5)
WBC UR QL: SIGNIFICANT CHANGE UP /HPF (ref 0–5)

## 2018-04-01 PROCEDURE — 93970 EXTREMITY STUDY: CPT | Mod: 26

## 2018-04-01 PROCEDURE — 99291 CRITICAL CARE FIRST HOUR: CPT

## 2018-04-01 PROCEDURE — 71045 X-RAY EXAM CHEST 1 VIEW: CPT | Mod: 26

## 2018-04-01 RX ORDER — FUROSEMIDE 40 MG
10 TABLET ORAL ONCE
Qty: 0 | Refills: 0 | Status: COMPLETED | OUTPATIENT
Start: 2018-04-01 | End: 2018-04-01

## 2018-04-01 RX ORDER — DEXMEDETOMIDINE HYDROCHLORIDE IN 0.9% SODIUM CHLORIDE 4 UG/ML
0.2 INJECTION INTRAVENOUS
Qty: 200 | Refills: 0 | Status: DISCONTINUED | OUTPATIENT
Start: 2018-04-01 | End: 2018-04-05

## 2018-04-01 RX ORDER — POTASSIUM CHLORIDE 20 MEQ
20 PACKET (EA) ORAL ONCE
Qty: 0 | Refills: 0 | Status: COMPLETED | OUTPATIENT
Start: 2018-04-01 | End: 2018-04-01

## 2018-04-01 RX ORDER — FUROSEMIDE 40 MG
10 TABLET ORAL
Qty: 0 | Refills: 0 | Status: DISCONTINUED | OUTPATIENT
Start: 2018-04-01 | End: 2018-04-01

## 2018-04-01 RX ORDER — POTASSIUM PHOSPHATE, MONOBASIC POTASSIUM PHOSPHATE, DIBASIC 236; 224 MG/ML; MG/ML
15 INJECTION, SOLUTION INTRAVENOUS EVERY 6 HOURS
Qty: 0 | Refills: 0 | Status: COMPLETED | OUTPATIENT
Start: 2018-04-01 | End: 2018-04-01

## 2018-04-01 RX ORDER — ACETAMINOPHEN 500 MG
650 TABLET ORAL ONCE
Qty: 0 | Refills: 0 | Status: COMPLETED | OUTPATIENT
Start: 2018-04-01 | End: 2018-04-01

## 2018-04-01 RX ORDER — FENTANYL CITRATE 50 UG/ML
0.5 INJECTION INTRAVENOUS
Qty: 5000 | Refills: 0 | Status: DISCONTINUED | OUTPATIENT
Start: 2018-04-01 | End: 2018-04-02

## 2018-04-01 RX ADMIN — CHLORHEXIDINE GLUCONATE 15 MILLILITER(S): 213 SOLUTION TOPICAL at 18:07

## 2018-04-01 RX ADMIN — FENTANYL CITRATE 2.25 MICROGRAM(S)/KG/HR: 50 INJECTION INTRAVENOUS at 21:51

## 2018-04-01 RX ADMIN — DEXMEDETOMIDINE HYDROCHLORIDE IN 0.9% SODIUM CHLORIDE 4.51 MICROGRAM(S)/KG/HR: 4 INJECTION INTRAVENOUS at 18:15

## 2018-04-01 RX ADMIN — Medication 10 MILLIGRAM(S): at 22:06

## 2018-04-01 RX ADMIN — PROPOFOL 13.53 MICROGRAM(S)/KG/MIN: 10 INJECTION, EMULSION INTRAVENOUS at 07:36

## 2018-04-01 RX ADMIN — Medication 2: at 18:07

## 2018-04-01 RX ADMIN — LIDOCAINE 1 PATCH: 4 CREAM TOPICAL at 11:59

## 2018-04-01 RX ADMIN — POTASSIUM PHOSPHATE, MONOBASIC POTASSIUM PHOSPHATE, DIBASIC 62.5 MILLIMOLE(S): 236; 224 INJECTION, SOLUTION INTRAVENOUS at 05:21

## 2018-04-01 RX ADMIN — FENTANYL CITRATE 4.51 MICROGRAM(S)/KG/HR: 50 INJECTION INTRAVENOUS at 07:37

## 2018-04-01 RX ADMIN — CHLORHEXIDINE GLUCONATE 15 MILLILITER(S): 213 SOLUTION TOPICAL at 05:21

## 2018-04-01 RX ADMIN — LIDOCAINE 1 PATCH: 4 CREAM TOPICAL at 00:00

## 2018-04-01 RX ADMIN — LIDOCAINE 1 PATCH: 4 CREAM TOPICAL at 21:51

## 2018-04-01 RX ADMIN — ENOXAPARIN SODIUM 40 MILLIGRAM(S): 100 INJECTION SUBCUTANEOUS at 11:59

## 2018-04-01 RX ADMIN — DEXMEDETOMIDINE HYDROCHLORIDE IN 0.9% SODIUM CHLORIDE 4.51 MICROGRAM(S)/KG/HR: 4 INJECTION INTRAVENOUS at 14:03

## 2018-04-01 RX ADMIN — Medication 20 MILLIEQUIVALENT(S): at 05:21

## 2018-04-01 RX ADMIN — FENTANYL CITRATE 2.25 MICROGRAM(S)/KG/HR: 50 INJECTION INTRAVENOUS at 18:15

## 2018-04-01 RX ADMIN — PANTOPRAZOLE SODIUM 40 MILLIGRAM(S): 20 TABLET, DELAYED RELEASE ORAL at 11:59

## 2018-04-01 RX ADMIN — Medication 10 MILLIGRAM(S): at 18:07

## 2018-04-01 RX ADMIN — SIMVASTATIN 40 MILLIGRAM(S): 20 TABLET, FILM COATED ORAL at 21:50

## 2018-04-01 RX ADMIN — Medication 2: at 11:58

## 2018-04-01 RX ADMIN — DEXMEDETOMIDINE HYDROCHLORIDE IN 0.9% SODIUM CHLORIDE 4.51 MICROGRAM(S)/KG/HR: 4 INJECTION INTRAVENOUS at 21:00

## 2018-04-01 RX ADMIN — Medication 0.1 MILLIGRAM(S): at 15:23

## 2018-04-01 RX ADMIN — Medication 10 MILLIGRAM(S): at 10:31

## 2018-04-01 RX ADMIN — POTASSIUM PHOSPHATE, MONOBASIC POTASSIUM PHOSPHATE, DIBASIC 62.5 MILLIMOLE(S): 236; 224 INJECTION, SOLUTION INTRAVENOUS at 11:58

## 2018-04-01 RX ADMIN — Medication 650 MILLIGRAM(S): at 21:50
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
## 2018-04-02 LAB
ALBUMIN SERPL ELPH-MCNC: 2.4 G/DL — LOW (ref 3.3–5)
ALP SERPL-CCNC: 87 U/L — SIGNIFICANT CHANGE UP (ref 40–120)
ALT FLD-CCNC: 23 U/L RC — SIGNIFICANT CHANGE UP (ref 10–45)
ANION GAP SERPL CALC-SCNC: 11 MMOL/L — SIGNIFICANT CHANGE UP (ref 5–17)
APTT BLD: 25.8 SEC — LOW (ref 27.5–37.4)
AST SERPL-CCNC: 34 U/L — SIGNIFICANT CHANGE UP (ref 10–40)
BASOPHILS # BLD AUTO: 0 K/UL — SIGNIFICANT CHANGE UP (ref 0–0.2)
BASOPHILS NFR BLD AUTO: 0.4 % — SIGNIFICANT CHANGE UP (ref 0–2)
BILIRUB DIRECT SERPL-MCNC: 2.4 MG/DL — HIGH (ref 0–0.2)
BILIRUB INDIRECT FLD-MCNC: 2.9 MG/DL — HIGH (ref 0.2–1)
BILIRUB SERPL-MCNC: 5.3 MG/DL — HIGH (ref 0.2–1.2)
BUN SERPL-MCNC: 33 MG/DL — HIGH (ref 7–23)
CALCIUM SERPL-MCNC: 8.5 MG/DL — SIGNIFICANT CHANGE UP (ref 8.4–10.5)
CHLORIDE SERPL-SCNC: 101 MMOL/L — SIGNIFICANT CHANGE UP (ref 96–108)
CO2 SERPL-SCNC: 30 MMOL/L — SIGNIFICANT CHANGE UP (ref 22–31)
CREAT SERPL-MCNC: 0.93 MG/DL — SIGNIFICANT CHANGE UP (ref 0.5–1.3)
EOSINOPHIL # BLD AUTO: 0.1 K/UL — SIGNIFICANT CHANGE UP (ref 0–0.5)
EOSINOPHIL NFR BLD AUTO: 0.6 % — SIGNIFICANT CHANGE UP (ref 0–6)
GAS PNL BLDA: SIGNIFICANT CHANGE UP
GAS PNL BLDA: SIGNIFICANT CHANGE UP
GLUCOSE BLDC GLUCOMTR-MCNC: 179 MG/DL — HIGH (ref 70–99)
GLUCOSE BLDC GLUCOMTR-MCNC: 183 MG/DL — HIGH (ref 70–99)
GLUCOSE BLDC GLUCOMTR-MCNC: 187 MG/DL — HIGH (ref 70–99)
GLUCOSE BLDC GLUCOMTR-MCNC: 191 MG/DL — HIGH (ref 70–99)
GLUCOSE BLDC GLUCOMTR-MCNC: 199 MG/DL — HIGH (ref 70–99)
GLUCOSE BLDC GLUCOMTR-MCNC: 216 MG/DL — HIGH (ref 70–99)
GLUCOSE BLDC GLUCOMTR-MCNC: 277 MG/DL — HIGH (ref 70–99)
GLUCOSE SERPL-MCNC: 206 MG/DL — HIGH (ref 70–99)
GRAM STN FLD: SIGNIFICANT CHANGE UP
HCT VFR BLD CALC: 28.6 % — LOW (ref 39–50)
HGB BLD-MCNC: 9.8 G/DL — LOW (ref 13–17)
INR BLD: 1.24 RATIO — HIGH (ref 0.88–1.16)
LYMPHOCYTES # BLD AUTO: 0.8 K/UL — LOW (ref 1–3.3)
LYMPHOCYTES # BLD AUTO: 6.2 % — LOW (ref 13–44)
MAGNESIUM SERPL-MCNC: 2 MG/DL — SIGNIFICANT CHANGE UP (ref 1.6–2.6)
MCHC RBC-ENTMCNC: 31.7 PG — SIGNIFICANT CHANGE UP (ref 27–34)
MCHC RBC-ENTMCNC: 34.3 GM/DL — SIGNIFICANT CHANGE UP (ref 32–36)
MCV RBC AUTO: 92.4 FL — SIGNIFICANT CHANGE UP (ref 80–100)
MONOCYTES # BLD AUTO: 1.1 K/UL — HIGH (ref 0–0.9)
MONOCYTES NFR BLD AUTO: 9 % — SIGNIFICANT CHANGE UP (ref 2–14)
NEUTROPHILS # BLD AUTO: 10.7 K/UL — HIGH (ref 1.8–7.4)
NEUTROPHILS NFR BLD AUTO: 83.8 % — HIGH (ref 43–77)
NT-PROBNP SERPL-SCNC: 829 PG/ML — HIGH (ref 0–300)
PHOSPHATE SERPL-MCNC: 3 MG/DL — SIGNIFICANT CHANGE UP (ref 2.5–4.5)
PLATELET # BLD AUTO: 126 K/UL — LOW (ref 150–400)
POTASSIUM SERPL-MCNC: 3.6 MMOL/L — SIGNIFICANT CHANGE UP (ref 3.5–5.3)
POTASSIUM SERPL-SCNC: 3.6 MMOL/L — SIGNIFICANT CHANGE UP (ref 3.5–5.3)
PROCALCITONIN SERPL-MCNC: 0.5 NG/ML — HIGH (ref 0–0.04)
PROT SERPL-MCNC: 5.3 G/DL — LOW (ref 6–8.3)
PROTHROM AB SERPL-ACNC: 13.4 SEC — HIGH (ref 9.8–12.7)
RBC # BLD: 3.1 M/UL — LOW (ref 4.2–5.8)
RBC # FLD: 14.8 % — HIGH (ref 10.3–14.5)
SODIUM SERPL-SCNC: 142 MMOL/L — SIGNIFICANT CHANGE UP (ref 135–145)
SPECIMEN SOURCE: SIGNIFICANT CHANGE UP
WBC # BLD: 12.7 K/UL — HIGH (ref 3.8–10.5)
WBC # FLD AUTO: 12.7 K/UL — HIGH (ref 3.8–10.5)

## 2018-04-02 PROCEDURE — 93306 TTE W/DOPPLER COMPLETE: CPT | Mod: 26

## 2018-04-02 PROCEDURE — 93010 ELECTROCARDIOGRAM REPORT: CPT

## 2018-04-02 PROCEDURE — 71045 X-RAY EXAM CHEST 1 VIEW: CPT | Mod: 26

## 2018-04-02 RX ORDER — HYDROMORPHONE HYDROCHLORIDE 2 MG/ML
0.5 INJECTION INTRAMUSCULAR; INTRAVENOUS; SUBCUTANEOUS
Qty: 0 | Refills: 0 | Status: DISCONTINUED | OUTPATIENT
Start: 2018-04-02 | End: 2018-04-08

## 2018-04-02 RX ORDER — INSULIN GLARGINE 100 [IU]/ML
10 INJECTION, SOLUTION SUBCUTANEOUS EVERY MORNING
Qty: 0 | Refills: 0 | Status: DISCONTINUED | OUTPATIENT
Start: 2018-04-03 | End: 2018-04-03

## 2018-04-02 RX ORDER — HALOPERIDOL DECANOATE 100 MG/ML
2.5 INJECTION INTRAMUSCULAR ONCE
Qty: 0 | Refills: 0 | Status: DISCONTINUED | OUTPATIENT
Start: 2018-04-02 | End: 2018-04-02

## 2018-04-02 RX ORDER — PHENYLEPHRINE HYDROCHLORIDE 10 MG/ML
0.4 INJECTION INTRAVENOUS
Qty: 80 | Refills: 0 | Status: DISCONTINUED | OUTPATIENT
Start: 2018-04-02 | End: 2018-04-02

## 2018-04-02 RX ORDER — INSULIN GLARGINE 100 [IU]/ML
10 INJECTION, SOLUTION SUBCUTANEOUS ONCE
Qty: 0 | Refills: 0 | Status: COMPLETED | OUTPATIENT
Start: 2018-04-02 | End: 2018-04-02

## 2018-04-02 RX ORDER — OXYCODONE HYDROCHLORIDE 5 MG/1
5 TABLET ORAL ONCE
Qty: 0 | Refills: 0 | Status: DISCONTINUED | OUTPATIENT
Start: 2018-04-02 | End: 2018-04-02

## 2018-04-02 RX ORDER — VANCOMYCIN HCL 1 G
VIAL (EA) INTRAVENOUS
Qty: 0 | Refills: 0 | Status: DISCONTINUED | OUTPATIENT
Start: 2018-04-02 | End: 2018-04-03

## 2018-04-02 RX ORDER — OXYCODONE HYDROCHLORIDE 5 MG/1
5 TABLET ORAL EVERY 6 HOURS
Qty: 0 | Refills: 0 | Status: DISCONTINUED | OUTPATIENT
Start: 2018-04-02 | End: 2018-04-02

## 2018-04-02 RX ORDER — INSULIN LISPRO 100/ML
VIAL (ML) SUBCUTANEOUS EVERY 4 HOURS
Qty: 0 | Refills: 0 | Status: DISCONTINUED | OUTPATIENT
Start: 2018-04-02 | End: 2018-04-08

## 2018-04-02 RX ORDER — FENTANYL CITRATE 50 UG/ML
100 INJECTION INTRAVENOUS ONCE
Qty: 0 | Refills: 0 | Status: DISCONTINUED | OUTPATIENT
Start: 2018-04-02 | End: 2018-04-02

## 2018-04-02 RX ORDER — VANCOMYCIN HCL 1 G
1250 VIAL (EA) INTRAVENOUS EVERY 12 HOURS
Qty: 0 | Refills: 0 | Status: DISCONTINUED | OUTPATIENT
Start: 2018-04-02 | End: 2018-04-03

## 2018-04-02 RX ORDER — HALOPERIDOL DECANOATE 100 MG/ML
2.5 INJECTION INTRAMUSCULAR ONCE
Qty: 0 | Refills: 0 | Status: COMPLETED | OUTPATIENT
Start: 2018-04-02 | End: 2018-04-02

## 2018-04-02 RX ORDER — HALOPERIDOL DECANOATE 100 MG/ML
1 INJECTION INTRAMUSCULAR EVERY 12 HOURS
Qty: 0 | Refills: 0 | Status: DISCONTINUED | OUTPATIENT
Start: 2018-04-02 | End: 2018-04-03

## 2018-04-02 RX ORDER — FUROSEMIDE 40 MG
20 TABLET ORAL ONCE
Qty: 0 | Refills: 0 | Status: COMPLETED | OUTPATIENT
Start: 2018-04-02 | End: 2018-04-02

## 2018-04-02 RX ORDER — VANCOMYCIN HCL 1 G
1500 VIAL (EA) INTRAVENOUS ONCE
Qty: 0 | Refills: 0 | Status: COMPLETED | OUTPATIENT
Start: 2018-04-02 | End: 2018-04-02

## 2018-04-02 RX ORDER — OXYCODONE HYDROCHLORIDE 5 MG/1
5 TABLET ORAL EVERY 4 HOURS
Qty: 0 | Refills: 0 | Status: DISCONTINUED | OUTPATIENT
Start: 2018-04-02 | End: 2018-04-08

## 2018-04-02 RX ORDER — ACETAMINOPHEN 500 MG
975 TABLET ORAL EVERY 6 HOURS
Qty: 0 | Refills: 0 | Status: COMPLETED | OUTPATIENT
Start: 2018-04-02 | End: 2018-04-05

## 2018-04-02 RX ORDER — PIPERACILLIN AND TAZOBACTAM 4; .5 G/20ML; G/20ML
3.38 INJECTION, POWDER, LYOPHILIZED, FOR SOLUTION INTRAVENOUS EVERY 8 HOURS
Qty: 0 | Refills: 0 | Status: DISCONTINUED | OUTPATIENT
Start: 2018-04-02 | End: 2018-04-05

## 2018-04-02 RX ORDER — POTASSIUM CHLORIDE 20 MEQ
40 PACKET (EA) ORAL ONCE
Qty: 0 | Refills: 0 | Status: COMPLETED | OUTPATIENT
Start: 2018-04-02 | End: 2018-04-02

## 2018-04-02 RX ORDER — PIPERACILLIN AND TAZOBACTAM 4; .5 G/20ML; G/20ML
3.38 INJECTION, POWDER, LYOPHILIZED, FOR SOLUTION INTRAVENOUS ONCE
Qty: 0 | Refills: 0 | Status: COMPLETED | OUTPATIENT
Start: 2018-04-02 | End: 2018-04-02

## 2018-04-02 RX ADMIN — OXYCODONE HYDROCHLORIDE 5 MILLIGRAM(S): 5 TABLET ORAL at 10:52

## 2018-04-02 RX ADMIN — CHLORHEXIDINE GLUCONATE 15 MILLILITER(S): 213 SOLUTION TOPICAL at 05:18

## 2018-04-02 RX ADMIN — LIDOCAINE 1 PATCH: 4 CREAM TOPICAL at 12:23

## 2018-04-02 RX ADMIN — HYDROMORPHONE HYDROCHLORIDE 0.5 MILLIGRAM(S): 2 INJECTION INTRAMUSCULAR; INTRAVENOUS; SUBCUTANEOUS at 05:30

## 2018-04-02 RX ADMIN — Medication 2: at 22:17

## 2018-04-02 RX ADMIN — PIPERACILLIN AND TAZOBACTAM 200 GRAM(S): 4; .5 INJECTION, POWDER, LYOPHILIZED, FOR SOLUTION INTRAVENOUS at 08:29

## 2018-04-02 RX ADMIN — Medication 2: at 18:20

## 2018-04-02 RX ADMIN — Medication 975 MILLIGRAM(S): at 23:39

## 2018-04-02 RX ADMIN — LIDOCAINE 1 PATCH: 4 CREAM TOPICAL at 22:18

## 2018-04-02 RX ADMIN — Medication 2: at 15:45

## 2018-04-02 RX ADMIN — PIPERACILLIN AND TAZOBACTAM 25 GRAM(S): 4; .5 INJECTION, POWDER, LYOPHILIZED, FOR SOLUTION INTRAVENOUS at 14:38

## 2018-04-02 RX ADMIN — HALOPERIDOL DECANOATE 1 MILLIGRAM(S): 100 INJECTION INTRAMUSCULAR at 08:29

## 2018-04-02 RX ADMIN — DEXMEDETOMIDINE HYDROCHLORIDE IN 0.9% SODIUM CHLORIDE 4.51 MICROGRAM(S)/KG/HR: 4 INJECTION INTRAVENOUS at 08:31

## 2018-04-02 RX ADMIN — Medication 166.67 MILLIGRAM(S): at 18:13

## 2018-04-02 RX ADMIN — Medication 20 MILLIGRAM(S): at 18:12

## 2018-04-02 RX ADMIN — OXYCODONE HYDROCHLORIDE 5 MILLIGRAM(S): 5 TABLET ORAL at 11:28

## 2018-04-02 RX ADMIN — OXYCODONE HYDROCHLORIDE 5 MILLIGRAM(S): 5 TABLET ORAL at 22:15

## 2018-04-02 RX ADMIN — ENOXAPARIN SODIUM 40 MILLIGRAM(S): 100 INJECTION SUBCUTANEOUS at 12:23

## 2018-04-02 RX ADMIN — PHENYLEPHRINE HYDROCHLORIDE 13.53 MICROGRAM(S)/KG/MIN: 10 INJECTION INTRAVENOUS at 01:18

## 2018-04-02 RX ADMIN — Medication 975 MILLIGRAM(S): at 12:15

## 2018-04-02 RX ADMIN — SIMVASTATIN 40 MILLIGRAM(S): 20 TABLET, FILM COATED ORAL at 22:16

## 2018-04-02 RX ADMIN — INSULIN GLARGINE 10 UNIT(S): 100 INJECTION, SOLUTION SUBCUTANEOUS at 11:26

## 2018-04-02 RX ADMIN — Medication 2: at 09:53

## 2018-04-02 RX ADMIN — FENTANYL CITRATE 100 MICROGRAM(S): 50 INJECTION INTRAVENOUS at 03:25

## 2018-04-02 RX ADMIN — HALOPERIDOL DECANOATE 2.5 MILLIGRAM(S): 100 INJECTION INTRAMUSCULAR at 19:10

## 2018-04-02 RX ADMIN — FENTANYL CITRATE 100 MICROGRAM(S): 50 INJECTION INTRAVENOUS at 03:10

## 2018-04-02 RX ADMIN — Medication 333.33 MILLIGRAM(S): at 09:47

## 2018-04-02 RX ADMIN — OXYCODONE HYDROCHLORIDE 5 MILLIGRAM(S): 5 TABLET ORAL at 15:20

## 2018-04-02 RX ADMIN — DEXMEDETOMIDINE HYDROCHLORIDE IN 0.9% SODIUM CHLORIDE 4.51 MICROGRAM(S)/KG/HR: 4 INJECTION INTRAVENOUS at 23:40

## 2018-04-02 RX ADMIN — CHLORHEXIDINE GLUCONATE 15 MILLILITER(S): 213 SOLUTION TOPICAL at 18:12

## 2018-04-02 RX ADMIN — DEXMEDETOMIDINE HYDROCHLORIDE IN 0.9% SODIUM CHLORIDE 4.51 MICROGRAM(S)/KG/HR: 4 INJECTION INTRAVENOUS at 05:15

## 2018-04-02 RX ADMIN — HALOPERIDOL DECANOATE 1 MILLIGRAM(S): 100 INJECTION INTRAMUSCULAR at 22:03

## 2018-04-02 RX ADMIN — Medication 2: at 05:56

## 2018-04-02 RX ADMIN — OXYCODONE HYDROCHLORIDE 5 MILLIGRAM(S): 5 TABLET ORAL at 14:50

## 2018-04-02 RX ADMIN — OXYCODONE HYDROCHLORIDE 5 MILLIGRAM(S): 5 TABLET ORAL at 22:45

## 2018-04-02 RX ADMIN — Medication 975 MILLIGRAM(S): at 18:30

## 2018-04-02 RX ADMIN — PANTOPRAZOLE SODIUM 40 MILLIGRAM(S): 20 TABLET, DELAYED RELEASE ORAL at 12:23

## 2018-04-02 RX ADMIN — Medication 40 MILLIEQUIVALENT(S): at 05:18

## 2018-04-02 RX ADMIN — PIPERACILLIN AND TAZOBACTAM 25 GRAM(S): 4; .5 INJECTION, POWDER, LYOPHILIZED, FOR SOLUTION INTRAVENOUS at 22:17

## 2018-04-02 RX ADMIN — Medication 975 MILLIGRAM(S): at 18:00

## 2018-04-02 RX ADMIN — Medication 4: at 00:47
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
## 2018-04-03 LAB
ALBUMIN SERPL ELPH-MCNC: 2.4 G/DL — LOW (ref 3.3–5)
ALP SERPL-CCNC: 110 U/L — SIGNIFICANT CHANGE UP (ref 40–120)
ALT FLD-CCNC: 30 U/L RC — SIGNIFICANT CHANGE UP (ref 10–45)
ANION GAP SERPL CALC-SCNC: 12 MMOL/L — SIGNIFICANT CHANGE UP (ref 5–17)
APTT BLD: 26.6 SEC — LOW (ref 27.5–37.4)
AST SERPL-CCNC: 42 U/L — HIGH (ref 10–40)
BILIRUB DIRECT SERPL-MCNC: 4.4 MG/DL — HIGH (ref 0–0.2)
BILIRUB INDIRECT FLD-MCNC: 2.9 MG/DL — HIGH (ref 0.2–1)
BILIRUB SERPL-MCNC: 7.3 MG/DL — HIGH (ref 0.2–1.2)
BUN SERPL-MCNC: 32 MG/DL — HIGH (ref 7–23)
CALCIUM SERPL-MCNC: 8.2 MG/DL — LOW (ref 8.4–10.5)
CHLORIDE SERPL-SCNC: 96 MMOL/L — SIGNIFICANT CHANGE UP (ref 96–108)
CO2 SERPL-SCNC: 30 MMOL/L — SIGNIFICANT CHANGE UP (ref 22–31)
CREAT SERPL-MCNC: 0.9 MG/DL — SIGNIFICANT CHANGE UP (ref 0.5–1.3)
CULTURE RESULTS: SIGNIFICANT CHANGE UP
GAS PNL BLDA: SIGNIFICANT CHANGE UP
GLUCOSE BLDC GLUCOMTR-MCNC: 152 MG/DL — HIGH (ref 70–99)
GLUCOSE BLDC GLUCOMTR-MCNC: 154 MG/DL — HIGH (ref 70–99)
GLUCOSE BLDC GLUCOMTR-MCNC: 167 MG/DL — HIGH (ref 70–99)
GLUCOSE BLDC GLUCOMTR-MCNC: 187 MG/DL — HIGH (ref 70–99)
GLUCOSE BLDC GLUCOMTR-MCNC: 213 MG/DL — HIGH (ref 70–99)
GLUCOSE BLDC GLUCOMTR-MCNC: 233 MG/DL — HIGH (ref 70–99)
GLUCOSE SERPL-MCNC: 241 MG/DL — HIGH (ref 70–99)
HAPTOGLOB SERPL-MCNC: <20 MG/DL — LOW (ref 34–200)
HCT VFR BLD CALC: 29.1 % — LOW (ref 39–50)
HGB BLD-MCNC: 9.9 G/DL — LOW (ref 13–17)
INR BLD: 1.27 RATIO — HIGH (ref 0.88–1.16)
LDH SERPL L TO P-CCNC: 406 U/L — HIGH (ref 50–242)
MAGNESIUM SERPL-MCNC: 1.6 MG/DL — SIGNIFICANT CHANGE UP (ref 1.6–2.6)
MCHC RBC-ENTMCNC: 31.9 PG — SIGNIFICANT CHANGE UP (ref 27–34)
MCHC RBC-ENTMCNC: 34.2 GM/DL — SIGNIFICANT CHANGE UP (ref 32–36)
MCV RBC AUTO: 93.4 FL — SIGNIFICANT CHANGE UP (ref 80–100)
NT-PROBNP SERPL-SCNC: 1372 PG/ML — HIGH (ref 0–300)
PHOSPHATE SERPL-MCNC: 2.3 MG/DL — LOW (ref 2.5–4.5)
PLATELET # BLD AUTO: 158 K/UL — SIGNIFICANT CHANGE UP (ref 150–400)
POTASSIUM SERPL-MCNC: 3.7 MMOL/L — SIGNIFICANT CHANGE UP (ref 3.5–5.3)
POTASSIUM SERPL-SCNC: 3.7 MMOL/L — SIGNIFICANT CHANGE UP (ref 3.5–5.3)
PROCALCITONIN SERPL-MCNC: 0.63 NG/ML — HIGH (ref 0–0.04)
PROT SERPL-MCNC: 5.3 G/DL — LOW (ref 6–8.3)
PROTHROM AB SERPL-ACNC: 13.9 SEC — HIGH (ref 9.8–12.7)
RBC # BLD: 3.11 M/UL — LOW (ref 4.2–5.8)
RBC # FLD: 14.6 % — HIGH (ref 10.3–14.5)
SODIUM SERPL-SCNC: 138 MMOL/L — SIGNIFICANT CHANGE UP (ref 135–145)
SPECIMEN SOURCE: SIGNIFICANT CHANGE UP
VANCOMYCIN TROUGH SERPL-MCNC: 12.8 UG/ML — SIGNIFICANT CHANGE UP (ref 10–20)
WBC # BLD: 18.2 K/UL — HIGH (ref 3.8–10.5)
WBC # FLD AUTO: 18.2 K/UL — HIGH (ref 3.8–10.5)

## 2018-04-03 PROCEDURE — 76705 ECHO EXAM OF ABDOMEN: CPT | Mod: 26,RT

## 2018-04-03 PROCEDURE — 71045 X-RAY EXAM CHEST 1 VIEW: CPT | Mod: 26

## 2018-04-03 PROCEDURE — 99291 CRITICAL CARE FIRST HOUR: CPT

## 2018-04-03 RX ORDER — INSULIN GLARGINE 100 [IU]/ML
16 INJECTION, SOLUTION SUBCUTANEOUS EVERY MORNING
Qty: 0 | Refills: 0 | Status: DISCONTINUED | OUTPATIENT
Start: 2018-04-03 | End: 2018-04-03

## 2018-04-03 RX ORDER — MAGNESIUM SULFATE 500 MG/ML
2 VIAL (ML) INJECTION
Qty: 0 | Refills: 0 | Status: COMPLETED | OUTPATIENT
Start: 2018-04-03 | End: 2018-04-03

## 2018-04-03 RX ORDER — POTASSIUM CHLORIDE 20 MEQ
20 PACKET (EA) ORAL
Qty: 0 | Refills: 0 | Status: COMPLETED | OUTPATIENT
Start: 2018-04-03 | End: 2018-04-03

## 2018-04-03 RX ORDER — VANCOMYCIN HCL 1 G
1250 VIAL (EA) INTRAVENOUS EVERY 12 HOURS
Qty: 0 | Refills: 0 | Status: DISCONTINUED | OUTPATIENT
Start: 2018-04-03 | End: 2018-04-03

## 2018-04-03 RX ORDER — SODIUM,POTASSIUM PHOSPHATES 278-250MG
1 POWDER IN PACKET (EA) ORAL EVERY 6 HOURS
Qty: 0 | Refills: 0 | Status: DISCONTINUED | OUTPATIENT
Start: 2018-04-03 | End: 2018-04-03

## 2018-04-03 RX ORDER — INSULIN GLARGINE 100 [IU]/ML
16 INJECTION, SOLUTION SUBCUTANEOUS AT BEDTIME
Qty: 0 | Refills: 0 | Status: DISCONTINUED | OUTPATIENT
Start: 2018-04-03 | End: 2018-04-08

## 2018-04-03 RX ORDER — FUROSEMIDE 40 MG
20 TABLET ORAL ONCE
Qty: 0 | Refills: 0 | Status: COMPLETED | OUTPATIENT
Start: 2018-04-03 | End: 2018-04-03

## 2018-04-03 RX ORDER — VANCOMYCIN HCL 1 G
1500 VIAL (EA) INTRAVENOUS EVERY 12 HOURS
Qty: 0 | Refills: 0 | Status: DISCONTINUED | OUTPATIENT
Start: 2018-04-04 | End: 2018-04-04

## 2018-04-03 RX ORDER — MAGNESIUM SULFATE 500 MG/ML
2 VIAL (ML) INJECTION ONCE
Qty: 0 | Refills: 0 | Status: DISCONTINUED | OUTPATIENT
Start: 2018-04-03 | End: 2018-04-03

## 2018-04-03 RX ORDER — CALCIUM GLUCONATE 100 MG/ML
1 VIAL (ML) INTRAVENOUS ONCE
Qty: 0 | Refills: 0 | Status: COMPLETED | OUTPATIENT
Start: 2018-04-03 | End: 2018-04-03

## 2018-04-03 RX ORDER — MIRTAZAPINE 45 MG/1
15 TABLET, ORALLY DISINTEGRATING ORAL
Qty: 0 | Refills: 0 | Status: DISCONTINUED | OUTPATIENT
Start: 2018-04-03 | End: 2018-04-05

## 2018-04-03 RX ORDER — HALOPERIDOL DECANOATE 100 MG/ML
2.5 INJECTION INTRAMUSCULAR ONCE
Qty: 0 | Refills: 0 | Status: COMPLETED | OUTPATIENT
Start: 2018-04-03 | End: 2018-04-03

## 2018-04-03 RX ADMIN — Medication 2: at 12:34

## 2018-04-03 RX ADMIN — Medication 20 MILLIEQUIVALENT(S): at 04:14

## 2018-04-03 RX ADMIN — SIMVASTATIN 40 MILLIGRAM(S): 20 TABLET, FILM COATED ORAL at 21:41

## 2018-04-03 RX ADMIN — HYDROMORPHONE HYDROCHLORIDE 0.5 MILLIGRAM(S): 2 INJECTION INTRAMUSCULAR; INTRAVENOUS; SUBCUTANEOUS at 15:45

## 2018-04-03 RX ADMIN — DEXMEDETOMIDINE HYDROCHLORIDE IN 0.9% SODIUM CHLORIDE 4.51 MICROGRAM(S)/KG/HR: 4 INJECTION INTRAVENOUS at 05:59

## 2018-04-03 RX ADMIN — Medication 1 TABLET(S): at 05:04

## 2018-04-03 RX ADMIN — HYDROMORPHONE HYDROCHLORIDE 0.5 MILLIGRAM(S): 2 INJECTION INTRAMUSCULAR; INTRAVENOUS; SUBCUTANEOUS at 01:12

## 2018-04-03 RX ADMIN — LIDOCAINE 1 PATCH: 4 CREAM TOPICAL at 12:35

## 2018-04-03 RX ADMIN — Medication 20 MILLIEQUIVALENT(S): at 06:00

## 2018-04-03 RX ADMIN — HYDROMORPHONE HYDROCHLORIDE 0.5 MILLIGRAM(S): 2 INJECTION INTRAMUSCULAR; INTRAVENOUS; SUBCUTANEOUS at 01:27

## 2018-04-03 RX ADMIN — Medication 975 MILLIGRAM(S): at 13:10

## 2018-04-03 RX ADMIN — Medication 4: at 08:54

## 2018-04-03 RX ADMIN — Medication 50 GRAM(S): at 04:14

## 2018-04-03 RX ADMIN — CHLORHEXIDINE GLUCONATE 15 MILLILITER(S): 213 SOLUTION TOPICAL at 17:15

## 2018-04-03 RX ADMIN — Medication 2: at 20:15

## 2018-04-03 RX ADMIN — HYDROMORPHONE HYDROCHLORIDE 0.5 MILLIGRAM(S): 2 INJECTION INTRAMUSCULAR; INTRAVENOUS; SUBCUTANEOUS at 15:15

## 2018-04-03 RX ADMIN — ENOXAPARIN SODIUM 40 MILLIGRAM(S): 100 INJECTION SUBCUTANEOUS at 12:34

## 2018-04-03 RX ADMIN — PIPERACILLIN AND TAZOBACTAM 25 GRAM(S): 4; .5 INJECTION, POWDER, LYOPHILIZED, FOR SOLUTION INTRAVENOUS at 05:04

## 2018-04-03 RX ADMIN — Medication 975 MILLIGRAM(S): at 17:15

## 2018-04-03 RX ADMIN — Medication 975 MILLIGRAM(S): at 05:04

## 2018-04-03 RX ADMIN — Medication 200 GRAM(S): at 04:15

## 2018-04-03 RX ADMIN — PIPERACILLIN AND TAZOBACTAM 25 GRAM(S): 4; .5 INJECTION, POWDER, LYOPHILIZED, FOR SOLUTION INTRAVENOUS at 15:16

## 2018-04-03 RX ADMIN — DEXMEDETOMIDINE HYDROCHLORIDE IN 0.9% SODIUM CHLORIDE 4.51 MICROGRAM(S)/KG/HR: 4 INJECTION INTRAVENOUS at 17:16

## 2018-04-03 RX ADMIN — MIRTAZAPINE 15 MILLIGRAM(S): 45 TABLET, ORALLY DISINTEGRATING ORAL at 21:41

## 2018-04-03 RX ADMIN — HALOPERIDOL DECANOATE 1 MILLIGRAM(S): 100 INJECTION INTRAMUSCULAR at 08:05

## 2018-04-03 RX ADMIN — Medication 20 MILLIGRAM(S): at 07:40

## 2018-04-03 RX ADMIN — Medication 50 GRAM(S): at 06:00

## 2018-04-03 RX ADMIN — Medication 2: at 17:15

## 2018-04-03 RX ADMIN — Medication 975 MILLIGRAM(S): at 12:35

## 2018-04-03 RX ADMIN — Medication 975 MILLIGRAM(S): at 18:50

## 2018-04-03 RX ADMIN — INSULIN GLARGINE 16 UNIT(S): 100 INJECTION, SOLUTION SUBCUTANEOUS at 22:04

## 2018-04-03 RX ADMIN — PANTOPRAZOLE SODIUM 40 MILLIGRAM(S): 20 TABLET, DELAYED RELEASE ORAL at 12:34

## 2018-04-03 RX ADMIN — DEXMEDETOMIDINE HYDROCHLORIDE IN 0.9% SODIUM CHLORIDE 4.51 MICROGRAM(S)/KG/HR: 4 INJECTION INTRAVENOUS at 15:15

## 2018-04-03 RX ADMIN — CHLORHEXIDINE GLUCONATE 15 MILLILITER(S): 213 SOLUTION TOPICAL at 05:04

## 2018-04-03 RX ADMIN — PIPERACILLIN AND TAZOBACTAM 25 GRAM(S): 4; .5 INJECTION, POWDER, LYOPHILIZED, FOR SOLUTION INTRAVENOUS at 21:41

## 2018-04-03 RX ADMIN — Medication 166.67 MILLIGRAM(S): at 17:19

## 2018-04-03 RX ADMIN — Medication 166.67 MILLIGRAM(S): at 06:09

## 2018-04-03 RX ADMIN — OXYCODONE HYDROCHLORIDE 5 MILLIGRAM(S): 5 TABLET ORAL at 21:41

## 2018-04-03 RX ADMIN — HALOPERIDOL DECANOATE 2.5 MILLIGRAM(S): 100 INJECTION INTRAMUSCULAR at 16:35

## 2018-04-03 RX ADMIN — OXYCODONE HYDROCHLORIDE 5 MILLIGRAM(S): 5 TABLET ORAL at 22:15

## 2018-04-03 RX ADMIN — Medication 4: at 03:57
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
## 2018-04-03 NOTE — PROGRESS NOTE ADULT - ATTENDING COMMENTS
Patient seen and examined on rounds  Awake, agitation with better control today  Hemodynamically stable  Failed spontaneous breathing trials x 2 today secondary to tachypnea and high RSBI  actively diuresing with lasix today.    - Postoperative respiratory failure and fluid overload  - On empiric antibiotics for possible pneumonia,  if all culture remain negative and procalcitonin trending down, would favor short course  - Although with good response to lasix today, I doubt will be able to extubate given recent failure of extubation and failure of spontaneous breathing trials x 2 today.  If not able to extubate by tomorrow AM, I feel a tracheostomy would be very helpful in liberation from mechanical ventilation.  - 35 minutes direct critical care

## 2018-04-04 ENCOUNTER — TRANSCRIPTION ENCOUNTER (OUTPATIENT)
Age: 78
End: 2018-04-04

## 2018-04-04 LAB
ALBUMIN SERPL ELPH-MCNC: 2.4 G/DL — LOW (ref 3.3–5)
ALP SERPL-CCNC: 128 U/L — HIGH (ref 40–120)
ALT FLD-CCNC: 33 U/L RC — SIGNIFICANT CHANGE UP (ref 10–45)
ANION GAP SERPL CALC-SCNC: 12 MMOL/L — SIGNIFICANT CHANGE UP (ref 5–17)
ANION GAP SERPL CALC-SCNC: 12 MMOL/L — SIGNIFICANT CHANGE UP (ref 5–17)
APTT BLD: 26.6 SEC — LOW (ref 27.5–37.4)
AST SERPL-CCNC: 46 U/L — HIGH (ref 10–40)
BASOPHILS # BLD AUTO: 0 K/UL — SIGNIFICANT CHANGE UP (ref 0–0.2)
BASOPHILS NFR BLD AUTO: 0.2 % — SIGNIFICANT CHANGE UP (ref 0–2)
BILIRUB DIRECT SERPL-MCNC: 4.3 MG/DL — HIGH (ref 0–0.2)
BILIRUB INDIRECT FLD-MCNC: 3.1 MG/DL — HIGH (ref 0.2–1)
BILIRUB SERPL-MCNC: 7.4 MG/DL — HIGH (ref 0.2–1.2)
BUN SERPL-MCNC: 31 MG/DL — HIGH (ref 7–23)
BUN SERPL-MCNC: 34 MG/DL — HIGH (ref 7–23)
CALCIUM SERPL-MCNC: 8.5 MG/DL — SIGNIFICANT CHANGE UP (ref 8.4–10.5)
CALCIUM SERPL-MCNC: 8.5 MG/DL — SIGNIFICANT CHANGE UP (ref 8.4–10.5)
CHLORIDE SERPL-SCNC: 96 MMOL/L — SIGNIFICANT CHANGE UP (ref 96–108)
CHLORIDE SERPL-SCNC: 99 MMOL/L — SIGNIFICANT CHANGE UP (ref 96–108)
CO2 SERPL-SCNC: 28 MMOL/L — SIGNIFICANT CHANGE UP (ref 22–31)
CO2 SERPL-SCNC: 32 MMOL/L — HIGH (ref 22–31)
CREAT SERPL-MCNC: 0.91 MG/DL — SIGNIFICANT CHANGE UP (ref 0.5–1.3)
CREAT SERPL-MCNC: 0.96 MG/DL — SIGNIFICANT CHANGE UP (ref 0.5–1.3)
EOSINOPHIL # BLD AUTO: 0.1 K/UL — SIGNIFICANT CHANGE UP (ref 0–0.5)
EOSINOPHIL NFR BLD AUTO: 1 % — SIGNIFICANT CHANGE UP (ref 0–6)
GAS PNL BLDA: SIGNIFICANT CHANGE UP
GLUCOSE BLDC GLUCOMTR-MCNC: 161 MG/DL — HIGH (ref 70–99)
GLUCOSE BLDC GLUCOMTR-MCNC: 173 MG/DL — HIGH (ref 70–99)
GLUCOSE BLDC GLUCOMTR-MCNC: 183 MG/DL — HIGH (ref 70–99)
GLUCOSE BLDC GLUCOMTR-MCNC: 193 MG/DL — HIGH (ref 70–99)
GLUCOSE BLDC GLUCOMTR-MCNC: 228 MG/DL — HIGH (ref 70–99)
GLUCOSE BLDC GLUCOMTR-MCNC: 51 MG/DL — LOW (ref 70–99)
GLUCOSE SERPL-MCNC: 178 MG/DL — HIGH (ref 70–99)
GLUCOSE SERPL-MCNC: 187 MG/DL — HIGH (ref 70–99)
HCT VFR BLD CALC: 29.7 % — LOW (ref 39–50)
HGB BLD-MCNC: 10 G/DL — LOW (ref 13–17)
INR BLD: 1.26 RATIO — HIGH (ref 0.88–1.16)
LYMPHOCYTES # BLD AUTO: 0.9 K/UL — LOW (ref 1–3.3)
LYMPHOCYTES # BLD AUTO: 6.1 % — LOW (ref 13–44)
MAGNESIUM SERPL-MCNC: 2.1 MG/DL — SIGNIFICANT CHANGE UP (ref 1.6–2.6)
MAGNESIUM SERPL-MCNC: 2.2 MG/DL — SIGNIFICANT CHANGE UP (ref 1.6–2.6)
MCHC RBC-ENTMCNC: 31.6 PG — SIGNIFICANT CHANGE UP (ref 27–34)
MCHC RBC-ENTMCNC: 33.6 GM/DL — SIGNIFICANT CHANGE UP (ref 32–36)
MCV RBC AUTO: 94 FL — SIGNIFICANT CHANGE UP (ref 80–100)
MONOCYTES # BLD AUTO: 1 K/UL — HIGH (ref 0–0.9)
MONOCYTES NFR BLD AUTO: 6.7 % — SIGNIFICANT CHANGE UP (ref 2–14)
NEUTROPHILS # BLD AUTO: 12.6 K/UL — HIGH (ref 1.8–7.4)
NEUTROPHILS NFR BLD AUTO: 86.1 % — HIGH (ref 43–77)
PHOSPHATE SERPL-MCNC: 2.2 MG/DL — LOW (ref 2.5–4.5)
PHOSPHATE SERPL-MCNC: 4.7 MG/DL — HIGH (ref 2.5–4.5)
PLATELET # BLD AUTO: 218 K/UL — SIGNIFICANT CHANGE UP (ref 150–400)
POTASSIUM SERPL-MCNC: 3.4 MMOL/L — LOW (ref 3.5–5.3)
POTASSIUM SERPL-MCNC: 3.9 MMOL/L — SIGNIFICANT CHANGE UP (ref 3.5–5.3)
POTASSIUM SERPL-SCNC: 3.4 MMOL/L — LOW (ref 3.5–5.3)
POTASSIUM SERPL-SCNC: 3.9 MMOL/L — SIGNIFICANT CHANGE UP (ref 3.5–5.3)
PROCALCITONIN SERPL-MCNC: 0.62 NG/ML — HIGH (ref 0–0.04)
PROT SERPL-MCNC: 5.3 G/DL — LOW (ref 6–8.3)
PROTHROM AB SERPL-ACNC: 13.7 SEC — HIGH (ref 9.8–12.7)
RBC # BLD: 3.16 M/UL — LOW (ref 4.2–5.8)
RBC # FLD: 14.9 % — HIGH (ref 10.3–14.5)
SODIUM SERPL-SCNC: 139 MMOL/L — SIGNIFICANT CHANGE UP (ref 135–145)
SODIUM SERPL-SCNC: 140 MMOL/L — SIGNIFICANT CHANGE UP (ref 135–145)
WBC # BLD: 14.6 K/UL — HIGH (ref 3.8–10.5)
WBC # FLD AUTO: 14.6 K/UL — HIGH (ref 3.8–10.5)

## 2018-04-04 PROCEDURE — 99291 CRITICAL CARE FIRST HOUR: CPT

## 2018-04-04 PROCEDURE — 71045 X-RAY EXAM CHEST 1 VIEW: CPT | Mod: 26

## 2018-04-04 RX ORDER — DOCUSATE SODIUM 100 MG
100 CAPSULE ORAL
Qty: 0 | Refills: 0 | Status: DISCONTINUED | OUTPATIENT
Start: 2018-04-04 | End: 2018-04-05

## 2018-04-04 RX ORDER — FUROSEMIDE 40 MG
20 TABLET ORAL ONCE
Qty: 0 | Refills: 0 | Status: COMPLETED | OUTPATIENT
Start: 2018-04-04 | End: 2018-04-04

## 2018-04-04 RX ORDER — POLYETHYLENE GLYCOL 3350 17 G/17G
17 POWDER, FOR SOLUTION ORAL ONCE
Qty: 0 | Refills: 0 | Status: COMPLETED | OUTPATIENT
Start: 2018-04-04 | End: 2018-04-04

## 2018-04-04 RX ORDER — POTASSIUM CHLORIDE 20 MEQ
20 PACKET (EA) ORAL
Qty: 0 | Refills: 0 | Status: COMPLETED | OUTPATIENT
Start: 2018-04-04 | End: 2018-04-04

## 2018-04-04 RX ORDER — ACETAZOLAMIDE 250 MG/1
250 TABLET ORAL EVERY 6 HOURS
Qty: 0 | Refills: 0 | Status: DISCONTINUED | OUTPATIENT
Start: 2018-04-04 | End: 2018-04-04

## 2018-04-04 RX ORDER — DOCUSATE SODIUM 100 MG
100 CAPSULE ORAL THREE TIMES A DAY
Qty: 0 | Refills: 0 | Status: DISCONTINUED | OUTPATIENT
Start: 2018-04-04 | End: 2018-04-04

## 2018-04-04 RX ORDER — POTASSIUM PHOSPHATE, MONOBASIC POTASSIUM PHOSPHATE, DIBASIC 236; 224 MG/ML; MG/ML
15 INJECTION, SOLUTION INTRAVENOUS EVERY 6 HOURS
Qty: 0 | Refills: 0 | Status: COMPLETED | OUTPATIENT
Start: 2018-04-04 | End: 2018-04-04

## 2018-04-04 RX ORDER — VANCOMYCIN HCL 1 G
1500 VIAL (EA) INTRAVENOUS EVERY 12 HOURS
Qty: 0 | Refills: 0 | Status: DISCONTINUED | OUTPATIENT
Start: 2018-04-04 | End: 2018-04-05

## 2018-04-04 RX ORDER — ACETAZOLAMIDE 250 MG/1
250 TABLET ORAL EVERY 6 HOURS
Qty: 0 | Refills: 0 | Status: COMPLETED | OUTPATIENT
Start: 2018-04-04 | End: 2018-04-05

## 2018-04-04 RX ORDER — CALCIUM GLUCONATE 100 MG/ML
1 VIAL (ML) INTRAVENOUS ONCE
Qty: 0 | Refills: 0 | Status: COMPLETED | OUTPATIENT
Start: 2018-04-04 | End: 2018-04-04

## 2018-04-04 RX ADMIN — Medication 975 MILLIGRAM(S): at 11:46

## 2018-04-04 RX ADMIN — MIRTAZAPINE 15 MILLIGRAM(S): 45 TABLET, ORALLY DISINTEGRATING ORAL at 20:23

## 2018-04-04 RX ADMIN — CHLORHEXIDINE GLUCONATE 15 MILLILITER(S): 213 SOLUTION TOPICAL at 05:44

## 2018-04-04 RX ADMIN — Medication 4: at 08:56

## 2018-04-04 RX ADMIN — LIDOCAINE 1 PATCH: 4 CREAM TOPICAL at 23:56

## 2018-04-04 RX ADMIN — Medication 20 MILLIEQUIVALENT(S): at 09:31

## 2018-04-04 RX ADMIN — DEXMEDETOMIDINE HYDROCHLORIDE IN 0.9% SODIUM CHLORIDE 4.51 MICROGRAM(S)/KG/HR: 4 INJECTION INTRAVENOUS at 16:21

## 2018-04-04 RX ADMIN — OXYCODONE HYDROCHLORIDE 5 MILLIGRAM(S): 5 TABLET ORAL at 04:00

## 2018-04-04 RX ADMIN — LIDOCAINE 1 PATCH: 4 CREAM TOPICAL at 00:00

## 2018-04-04 RX ADMIN — ENOXAPARIN SODIUM 40 MILLIGRAM(S): 100 INJECTION SUBCUTANEOUS at 11:19

## 2018-04-04 RX ADMIN — Medication 2: at 16:23

## 2018-04-04 RX ADMIN — Medication 975 MILLIGRAM(S): at 18:07

## 2018-04-04 RX ADMIN — LIDOCAINE 1 PATCH: 4 CREAM TOPICAL at 11:20

## 2018-04-04 RX ADMIN — OXYCODONE HYDROCHLORIDE 5 MILLIGRAM(S): 5 TABLET ORAL at 09:30

## 2018-04-04 RX ADMIN — POTASSIUM PHOSPHATE, MONOBASIC POTASSIUM PHOSPHATE, DIBASIC 62.5 MILLIMOLE(S): 236; 224 INJECTION, SOLUTION INTRAVENOUS at 05:44

## 2018-04-04 RX ADMIN — Medication 20 MILLIGRAM(S): at 11:19

## 2018-04-04 RX ADMIN — Medication 300 MILLIGRAM(S): at 19:06

## 2018-04-04 RX ADMIN — Medication 975 MILLIGRAM(S): at 18:32

## 2018-04-04 RX ADMIN — Medication 20 MILLIEQUIVALENT(S): at 05:45

## 2018-04-04 RX ADMIN — HYDROMORPHONE HYDROCHLORIDE 0.5 MILLIGRAM(S): 2 INJECTION INTRAMUSCULAR; INTRAVENOUS; SUBCUTANEOUS at 21:05

## 2018-04-04 RX ADMIN — Medication 975 MILLIGRAM(S): at 00:46

## 2018-04-04 RX ADMIN — Medication 2: at 04:02

## 2018-04-04 RX ADMIN — HYDROMORPHONE HYDROCHLORIDE 0.5 MILLIGRAM(S): 2 INJECTION INTRAMUSCULAR; INTRAVENOUS; SUBCUTANEOUS at 21:35

## 2018-04-04 RX ADMIN — Medication 975 MILLIGRAM(S): at 00:13

## 2018-04-04 RX ADMIN — Medication 2: at 11:44

## 2018-04-04 RX ADMIN — Medication 20 MILLIEQUIVALENT(S): at 08:51

## 2018-04-04 RX ADMIN — ACETAZOLAMIDE 250 MILLIGRAM(S): 250 TABLET ORAL at 11:52

## 2018-04-04 RX ADMIN — OXYCODONE HYDROCHLORIDE 5 MILLIGRAM(S): 5 TABLET ORAL at 03:23

## 2018-04-04 RX ADMIN — LIDOCAINE 1 PATCH: 4 CREAM TOPICAL at 00:44

## 2018-04-04 RX ADMIN — PIPERACILLIN AND TAZOBACTAM 25 GRAM(S): 4; .5 INJECTION, POWDER, LYOPHILIZED, FOR SOLUTION INTRAVENOUS at 13:08

## 2018-04-04 RX ADMIN — POLYETHYLENE GLYCOL 3350 17 GRAM(S): 17 POWDER, FOR SOLUTION ORAL at 11:19

## 2018-04-04 RX ADMIN — Medication 975 MILLIGRAM(S): at 06:00

## 2018-04-04 RX ADMIN — Medication 300 MILLIGRAM(S): at 05:44

## 2018-04-04 RX ADMIN — Medication 975 MILLIGRAM(S): at 12:23

## 2018-04-04 RX ADMIN — Medication 200 GRAM(S): at 04:42

## 2018-04-04 RX ADMIN — ACETAZOLAMIDE 250 MILLIGRAM(S): 250 TABLET ORAL at 18:07

## 2018-04-04 RX ADMIN — Medication 975 MILLIGRAM(S): at 05:44

## 2018-04-04 RX ADMIN — Medication 100 MILLIGRAM(S): at 18:07

## 2018-04-04 RX ADMIN — PIPERACILLIN AND TAZOBACTAM 25 GRAM(S): 4; .5 INJECTION, POWDER, LYOPHILIZED, FOR SOLUTION INTRAVENOUS at 05:45

## 2018-04-04 RX ADMIN — OXYCODONE HYDROCHLORIDE 5 MILLIGRAM(S): 5 TABLET ORAL at 10:47

## 2018-04-04 RX ADMIN — PANTOPRAZOLE SODIUM 40 MILLIGRAM(S): 20 TABLET, DELAYED RELEASE ORAL at 11:46

## 2018-04-04 RX ADMIN — POTASSIUM PHOSPHATE, MONOBASIC POTASSIUM PHOSPHATE, DIBASIC 62.5 MILLIMOLE(S): 236; 224 INJECTION, SOLUTION INTRAVENOUS at 11:20

## 2018-04-04 RX ADMIN — Medication 2: at 20:22

## 2018-04-04 RX ADMIN — Medication 2: at 00:13
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
## 2018-04-04 NOTE — PROGRESS NOTE ADULT - ATTENDING COMMENTS
Patient seen and examined on AM SICU rounds  Awake, following commands on Precedex infusion as needed  Hemodynamically stable  Failed Spontaneous breath trial this AM secondary to tachypnea and RSBI of 80-90.  Most recent blood / sputum cultures negative, procal stable in 2-3 range.  Will treat empirically for pneumonia x 7 days.  I=O (fluid balance even) for the last 2 days.  Will attempt to keep even to negative for the next 24 hours.  LFTs stabilizing, no signs of extrahepatic obstruction on CT  On stress ulcer & DVT prophylaxis  - 35 minutes of direct critical care time
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
## 2018-04-05 LAB
ALBUMIN SERPL ELPH-MCNC: 2.4 G/DL — LOW (ref 3.3–5)
ALP SERPL-CCNC: 181 U/L — HIGH (ref 40–120)
ALT FLD-CCNC: 42 U/L RC — SIGNIFICANT CHANGE UP (ref 10–45)
ANION GAP SERPL CALC-SCNC: 14 MMOL/L — SIGNIFICANT CHANGE UP (ref 5–17)
APPEARANCE UR: CLEAR — SIGNIFICANT CHANGE UP
APTT BLD: 27.4 SEC — LOW (ref 27.5–37.4)
AST SERPL-CCNC: 49 U/L — HIGH (ref 10–40)
BILIRUB SERPL-MCNC: 7.2 MG/DL — HIGH (ref 0.2–1.2)
BILIRUB UR-MCNC: NEGATIVE — SIGNIFICANT CHANGE UP
BLD GP AB SCN SERPL QL: NEGATIVE — SIGNIFICANT CHANGE UP
BUN SERPL-MCNC: 39 MG/DL — HIGH (ref 7–23)
CA-I BLD-SCNC: 1.08 MMOL/L — LOW (ref 1.12–1.3)
CALCIUM SERPL-MCNC: 8.3 MG/DL — LOW (ref 8.4–10.5)
CHLORIDE SERPL-SCNC: 99 MMOL/L — SIGNIFICANT CHANGE UP (ref 96–108)
CO2 SERPL-SCNC: 26 MMOL/L — SIGNIFICANT CHANGE UP (ref 22–31)
COLOR SPEC: YELLOW — SIGNIFICANT CHANGE UP
CREAT SERPL-MCNC: 1.09 MG/DL — SIGNIFICANT CHANGE UP (ref 0.5–1.3)
DIFF PNL FLD: NEGATIVE — SIGNIFICANT CHANGE UP
GAS PNL BLDA: SIGNIFICANT CHANGE UP
GLUCOSE BLDC GLUCOMTR-MCNC: 134 MG/DL — HIGH (ref 70–99)
GLUCOSE BLDC GLUCOMTR-MCNC: 139 MG/DL — HIGH (ref 70–99)
GLUCOSE BLDC GLUCOMTR-MCNC: 142 MG/DL — HIGH (ref 70–99)
GLUCOSE BLDC GLUCOMTR-MCNC: 205 MG/DL — HIGH (ref 70–99)
GLUCOSE BLDC GLUCOMTR-MCNC: 209 MG/DL — HIGH (ref 70–99)
GLUCOSE SERPL-MCNC: 159 MG/DL — HIGH (ref 70–99)
GLUCOSE UR QL: NEGATIVE — SIGNIFICANT CHANGE UP
GRAM STN FLD: SIGNIFICANT CHANGE UP
HCT VFR BLD CALC: 32.8 % — LOW (ref 39–50)
HGB BLD-MCNC: 10.7 G/DL — LOW (ref 13–17)
INR BLD: 1.22 RATIO — HIGH (ref 0.88–1.16)
KETONES UR-MCNC: NEGATIVE — SIGNIFICANT CHANGE UP
LEUKOCYTE ESTERASE UR-ACNC: NEGATIVE — SIGNIFICANT CHANGE UP
MAGNESIUM SERPL-MCNC: 2 MG/DL — SIGNIFICANT CHANGE UP (ref 1.6–2.6)
MCHC RBC-ENTMCNC: 30.9 PG — SIGNIFICANT CHANGE UP (ref 27–34)
MCHC RBC-ENTMCNC: 32.5 GM/DL — SIGNIFICANT CHANGE UP (ref 32–36)
MCV RBC AUTO: 94.9 FL — SIGNIFICANT CHANGE UP (ref 80–100)
NITRITE UR-MCNC: NEGATIVE — SIGNIFICANT CHANGE UP
PH UR: 8.5 — HIGH (ref 5–8)
PHOSPHATE SERPL-MCNC: 3.8 MG/DL — SIGNIFICANT CHANGE UP (ref 2.5–4.5)
PLATELET # BLD AUTO: 304 K/UL — SIGNIFICANT CHANGE UP (ref 150–400)
POTASSIUM SERPL-MCNC: 3.5 MMOL/L — SIGNIFICANT CHANGE UP (ref 3.5–5.3)
POTASSIUM SERPL-SCNC: 3.5 MMOL/L — SIGNIFICANT CHANGE UP (ref 3.5–5.3)
PROCALCITONIN SERPL-MCNC: 0.51 NG/ML — HIGH (ref 0–0.04)
PROT SERPL-MCNC: 6 G/DL — SIGNIFICANT CHANGE UP (ref 6–8.3)
PROT UR-MCNC: 30 MG/DL
PROTHROM AB SERPL-ACNC: 13.3 SEC — HIGH (ref 9.8–12.7)
RBC # BLD: 3.45 M/UL — LOW (ref 4.2–5.8)
RBC # FLD: 15 % — HIGH (ref 10.3–14.5)
RH IG SCN BLD-IMP: POSITIVE — SIGNIFICANT CHANGE UP
SODIUM SERPL-SCNC: 139 MMOL/L — SIGNIFICANT CHANGE UP (ref 135–145)
SP GR SPEC: 1.02 — SIGNIFICANT CHANGE UP (ref 1.01–1.02)
SPECIMEN SOURCE: SIGNIFICANT CHANGE UP
UROBILINOGEN FLD QL: >8 MG/DL
WBC # BLD: 17.2 K/UL — HIGH (ref 3.8–10.5)
WBC # FLD AUTO: 17.2 K/UL — HIGH (ref 3.8–10.5)

## 2018-04-05 PROCEDURE — 43246 EGD PLACE GASTROSTOMY TUBE: CPT

## 2018-04-05 PROCEDURE — 71045 X-RAY EXAM CHEST 1 VIEW: CPT | Mod: 26,76

## 2018-04-05 PROCEDURE — 31600 PLANNED TRACHEOSTOMY: CPT

## 2018-04-05 PROCEDURE — 99291 CRITICAL CARE FIRST HOUR: CPT

## 2018-04-05 RX ORDER — BACITRACIN ZINC 500 UNIT/G
1 OINTMENT IN PACKET (EA) TOPICAL DAILY
Qty: 0 | Refills: 0 | Status: DISCONTINUED | OUTPATIENT
Start: 2018-04-05 | End: 2018-04-26

## 2018-04-05 RX ORDER — PANTOPRAZOLE SODIUM 20 MG/1
40 TABLET, DELAYED RELEASE ORAL DAILY
Qty: 0 | Refills: 0 | Status: DISCONTINUED | OUTPATIENT
Start: 2018-04-05 | End: 2018-04-05

## 2018-04-05 RX ORDER — ASPIRIN/CALCIUM CARB/MAGNESIUM 324 MG
81 TABLET ORAL ONCE
Qty: 0 | Refills: 0 | Status: DISCONTINUED | OUTPATIENT
Start: 2018-04-05 | End: 2018-04-05

## 2018-04-05 RX ORDER — DOCUSATE SODIUM 100 MG
100 CAPSULE ORAL
Qty: 0 | Refills: 0 | Status: DISCONTINUED | OUTPATIENT
Start: 2018-04-05 | End: 2018-04-13

## 2018-04-05 RX ORDER — CALCIUM GLUCONATE 100 MG/ML
2 VIAL (ML) INTRAVENOUS ONCE
Qty: 0 | Refills: 0 | Status: COMPLETED | OUTPATIENT
Start: 2018-04-05 | End: 2018-04-05

## 2018-04-05 RX ORDER — ASPIRIN/CALCIUM CARB/MAGNESIUM 324 MG
81 TABLET ORAL ONCE
Qty: 0 | Refills: 0 | Status: COMPLETED | OUTPATIENT
Start: 2018-04-05 | End: 2018-04-05

## 2018-04-05 RX ORDER — PANTOPRAZOLE SODIUM 20 MG/1
40 TABLET, DELAYED RELEASE ORAL DAILY
Qty: 0 | Refills: 0 | Status: DISCONTINUED | OUTPATIENT
Start: 2018-04-05 | End: 2018-04-10

## 2018-04-05 RX ORDER — PIPERACILLIN AND TAZOBACTAM 4; .5 G/20ML; G/20ML
3.38 INJECTION, POWDER, LYOPHILIZED, FOR SOLUTION INTRAVENOUS EVERY 8 HOURS
Qty: 0 | Refills: 0 | Status: COMPLETED | OUTPATIENT
Start: 2018-04-05 | End: 2018-04-09

## 2018-04-05 RX ORDER — HALOPERIDOL DECANOATE 100 MG/ML
1 INJECTION INTRAMUSCULAR ONCE
Qty: 0 | Refills: 0 | Status: COMPLETED | OUTPATIENT
Start: 2018-04-05 | End: 2018-04-05

## 2018-04-05 RX ORDER — HALOPERIDOL DECANOATE 100 MG/ML
1 INJECTION INTRAMUSCULAR
Qty: 0 | Refills: 0 | Status: COMPLETED | OUTPATIENT
Start: 2018-04-05 | End: 2018-04-08

## 2018-04-05 RX ORDER — VANCOMYCIN HCL 1 G
1500 VIAL (EA) INTRAVENOUS EVERY 12 HOURS
Qty: 0 | Refills: 0 | Status: DISCONTINUED | OUTPATIENT
Start: 2018-04-05 | End: 2018-04-06

## 2018-04-05 RX ORDER — MIRTAZAPINE 45 MG/1
15 TABLET, ORALLY DISINTEGRATING ORAL
Qty: 0 | Refills: 0 | Status: DISCONTINUED | OUTPATIENT
Start: 2018-04-05 | End: 2018-04-30

## 2018-04-05 RX ORDER — POTASSIUM CHLORIDE 20 MEQ
40 PACKET (EA) ORAL ONCE
Qty: 0 | Refills: 0 | Status: COMPLETED | OUTPATIENT
Start: 2018-04-05 | End: 2018-04-05

## 2018-04-05 RX ORDER — SIMVASTATIN 20 MG/1
40 TABLET, FILM COATED ORAL AT BEDTIME
Qty: 0 | Refills: 0 | Status: DISCONTINUED | OUTPATIENT
Start: 2018-04-05 | End: 2018-05-18

## 2018-04-05 RX ORDER — ASPIRIN/CALCIUM CARB/MAGNESIUM 324 MG
81 TABLET ORAL DAILY
Qty: 0 | Refills: 0 | Status: DISCONTINUED | OUTPATIENT
Start: 2018-04-05 | End: 2018-05-18

## 2018-04-05 RX ORDER — HALOPERIDOL DECANOATE 100 MG/ML
5 INJECTION INTRAMUSCULAR ONCE
Qty: 0 | Refills: 0 | Status: COMPLETED | OUTPATIENT
Start: 2018-04-05 | End: 2018-04-05

## 2018-04-05 RX ORDER — ACETAMINOPHEN 500 MG
1000 TABLET ORAL ONCE
Qty: 0 | Refills: 0 | Status: COMPLETED | OUTPATIENT
Start: 2018-04-05 | End: 2018-04-05

## 2018-04-05 RX ORDER — HYDRALAZINE HCL 50 MG
10 TABLET ORAL ONCE
Qty: 0 | Refills: 0 | Status: COMPLETED | OUTPATIENT
Start: 2018-04-05 | End: 2018-04-05

## 2018-04-05 RX ADMIN — ENOXAPARIN SODIUM 40 MILLIGRAM(S): 100 INJECTION SUBCUTANEOUS at 12:38

## 2018-04-05 RX ADMIN — HALOPERIDOL DECANOATE 1 MILLIGRAM(S): 100 INJECTION INTRAMUSCULAR at 19:00

## 2018-04-05 RX ADMIN — Medication 300 MILLIGRAM(S): at 05:07

## 2018-04-05 RX ADMIN — Medication 4: at 05:02

## 2018-04-05 RX ADMIN — ACETAZOLAMIDE 250 MILLIGRAM(S): 250 TABLET ORAL at 07:01

## 2018-04-05 RX ADMIN — Medication 400 MILLIGRAM(S): at 19:55

## 2018-04-05 RX ADMIN — OXYCODONE HYDROCHLORIDE 5 MILLIGRAM(S): 5 TABLET ORAL at 20:19

## 2018-04-05 RX ADMIN — PIPERACILLIN AND TAZOBACTAM 25 GRAM(S): 4; .5 INJECTION, POWDER, LYOPHILIZED, FOR SOLUTION INTRAVENOUS at 05:08

## 2018-04-05 RX ADMIN — HALOPERIDOL DECANOATE 5 MILLIGRAM(S): 100 INJECTION INTRAMUSCULAR at 19:55

## 2018-04-05 RX ADMIN — LIDOCAINE 1 PATCH: 4 CREAM TOPICAL at 23:44

## 2018-04-05 RX ADMIN — LIDOCAINE 1 PATCH: 4 CREAM TOPICAL at 12:38

## 2018-04-05 RX ADMIN — Medication 81 MILLIGRAM(S): at 09:14

## 2018-04-05 RX ADMIN — HYDROMORPHONE HYDROCHLORIDE 0.5 MILLIGRAM(S): 2 INJECTION INTRAMUSCULAR; INTRAVENOUS; SUBCUTANEOUS at 01:01

## 2018-04-05 RX ADMIN — HYDROMORPHONE HYDROCHLORIDE 0.5 MILLIGRAM(S): 2 INJECTION INTRAMUSCULAR; INTRAVENOUS; SUBCUTANEOUS at 01:15

## 2018-04-05 RX ADMIN — MIRTAZAPINE 15 MILLIGRAM(S): 45 TABLET, ORALLY DISINTEGRATING ORAL at 23:13

## 2018-04-05 RX ADMIN — ACETAZOLAMIDE 250 MILLIGRAM(S): 250 TABLET ORAL at 00:54

## 2018-04-05 RX ADMIN — Medication 1000 MILLIGRAM(S): at 20:10

## 2018-04-05 RX ADMIN — INSULIN GLARGINE 16 UNIT(S): 100 INJECTION, SOLUTION SUBCUTANEOUS at 00:02

## 2018-04-05 RX ADMIN — Medication 975 MILLIGRAM(S): at 05:07

## 2018-04-05 RX ADMIN — DEXMEDETOMIDINE HYDROCHLORIDE IN 0.9% SODIUM CHLORIDE 4.51 MICROGRAM(S)/KG/HR: 4 INJECTION INTRAVENOUS at 05:07

## 2018-04-05 RX ADMIN — INSULIN GLARGINE 16 UNIT(S): 100 INJECTION, SOLUTION SUBCUTANEOUS at 23:05

## 2018-04-05 RX ADMIN — Medication 10 MILLIGRAM(S): at 03:00

## 2018-04-05 RX ADMIN — PANTOPRAZOLE SODIUM 40 MILLIGRAM(S): 20 TABLET, DELAYED RELEASE ORAL at 12:38

## 2018-04-05 RX ADMIN — CHLORHEXIDINE GLUCONATE 15 MILLILITER(S): 213 SOLUTION TOPICAL at 17:52

## 2018-04-05 RX ADMIN — Medication 4: at 08:46

## 2018-04-05 RX ADMIN — Medication 975 MILLIGRAM(S): at 00:44

## 2018-04-05 RX ADMIN — PIPERACILLIN AND TAZOBACTAM 25 GRAM(S): 4; .5 INJECTION, POWDER, LYOPHILIZED, FOR SOLUTION INTRAVENOUS at 00:03

## 2018-04-05 RX ADMIN — Medication 40 MILLIEQUIVALENT(S): at 07:14

## 2018-04-05 RX ADMIN — Medication 5 MILLIGRAM(S): at 17:51

## 2018-04-05 RX ADMIN — Medication 100 MILLIGRAM(S): at 17:53

## 2018-04-05 RX ADMIN — PIPERACILLIN AND TAZOBACTAM 25 GRAM(S): 4; .5 INJECTION, POWDER, LYOPHILIZED, FOR SOLUTION INTRAVENOUS at 23:14

## 2018-04-05 RX ADMIN — PIPERACILLIN AND TAZOBACTAM 25 GRAM(S): 4; .5 INJECTION, POWDER, LYOPHILIZED, FOR SOLUTION INTRAVENOUS at 13:36

## 2018-04-05 RX ADMIN — OXYCODONE HYDROCHLORIDE 5 MILLIGRAM(S): 5 TABLET ORAL at 21:05

## 2018-04-05 RX ADMIN — Medication 200 GRAM(S): at 05:51

## 2018-04-05 RX ADMIN — Medication 1 APPLICATION(S): at 12:38

## 2018-04-05 RX ADMIN — CHLORHEXIDINE GLUCONATE 15 MILLILITER(S): 213 SOLUTION TOPICAL at 05:07

## 2018-04-05 RX ADMIN — SIMVASTATIN 40 MILLIGRAM(S): 20 TABLET, FILM COATED ORAL at 23:13

## 2018-04-05 RX ADMIN — SIMVASTATIN 40 MILLIGRAM(S): 20 TABLET, FILM COATED ORAL at 00:03

## 2018-04-05 RX ADMIN — Medication 300 MILLIGRAM(S): at 17:51
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
## 2018-04-05 NOTE — PROGRESS NOTE ADULT - ATTENDING COMMENTS
Patient seen and examined on AM rounds with SICU staff  Awake, alert.  Hemodynamically stable  Failed spontaneous breathing trial x 2 in the last 24 hours.  I doubt would remain extubated long if we attempted extubation at this point.  Agree with tracheostomy today.  On empiric antibiotics for possible pneumonia - will give 7 day course - stop on 4/9/18.  Will restart ASA as no signs of ongoing bleeding and with CHAU placed less than one year ago.    - 35 minutes direct critical care today managing anti-platelets tx, respiratory failure, possible pneumonia
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
## 2018-04-05 NOTE — BRIEF OPERATIVE NOTE - PRE-OP DX
Acute respiratory failure with hypercapnia  04/05/2018    [place]  Other dysphagia  04/05/2018    Active  [place]

## 2018-04-05 NOTE — BRIEF OPERATIVE NOTE - PROCEDURE
<<-----Click on this checkbox to enter Procedure PEG (percutaneous endoscopic gastrostomy)  04/05/2018    Active  RFAUGHT  Tracheostomy  04/05/2018    Active  RFAUGHT

## 2018-04-06 LAB
ALBUMIN SERPL ELPH-MCNC: 2.4 G/DL — LOW (ref 3.3–5)
ALP SERPL-CCNC: 209 U/L — HIGH (ref 40–120)
ALT FLD-CCNC: 47 U/L RC — HIGH (ref 10–45)
ANION GAP SERPL CALC-SCNC: 13 MMOL/L — SIGNIFICANT CHANGE UP (ref 5–17)
ANION GAP SERPL CALC-SCNC: 15 MMOL/L — SIGNIFICANT CHANGE UP (ref 5–17)
APTT BLD: 27 SEC — LOW (ref 27.5–37.4)
AST SERPL-CCNC: 64 U/L — HIGH (ref 10–40)
BILIRUB SERPL-MCNC: 5.3 MG/DL — HIGH (ref 0.2–1.2)
BUN SERPL-MCNC: 46 MG/DL — HIGH (ref 7–23)
BUN SERPL-MCNC: 55 MG/DL — HIGH (ref 7–23)
CA-I BLD-SCNC: 1.1 MMOL/L — LOW (ref 1.12–1.3)
CALCIUM SERPL-MCNC: 8.1 MG/DL — LOW (ref 8.4–10.5)
CALCIUM SERPL-MCNC: 8.4 MG/DL — SIGNIFICANT CHANGE UP (ref 8.4–10.5)
CHLORIDE SERPL-SCNC: 99 MMOL/L — SIGNIFICANT CHANGE UP (ref 96–108)
CHLORIDE SERPL-SCNC: 99 MMOL/L — SIGNIFICANT CHANGE UP (ref 96–108)
CO2 SERPL-SCNC: 22 MMOL/L — SIGNIFICANT CHANGE UP (ref 22–31)
CO2 SERPL-SCNC: 23 MMOL/L — SIGNIFICANT CHANGE UP (ref 22–31)
CREAT SERPL-MCNC: 1.41 MG/DL — HIGH (ref 0.5–1.3)
CREAT SERPL-MCNC: 1.43 MG/DL — HIGH (ref 0.5–1.3)
GAS PNL BLDA: SIGNIFICANT CHANGE UP
GLUCOSE BLDC GLUCOMTR-MCNC: 198 MG/DL — HIGH (ref 70–99)
GLUCOSE BLDC GLUCOMTR-MCNC: 201 MG/DL — HIGH (ref 70–99)
GLUCOSE BLDC GLUCOMTR-MCNC: 210 MG/DL — HIGH (ref 70–99)
GLUCOSE BLDC GLUCOMTR-MCNC: 213 MG/DL — HIGH (ref 70–99)
GLUCOSE BLDC GLUCOMTR-MCNC: 242 MG/DL — HIGH (ref 70–99)
GLUCOSE SERPL-MCNC: 201 MG/DL — HIGH (ref 70–99)
GLUCOSE SERPL-MCNC: 205 MG/DL — HIGH (ref 70–99)
HCT VFR BLD CALC: 33.3 % — LOW (ref 39–50)
HGB BLD-MCNC: 11.2 G/DL — LOW (ref 13–17)
INR BLD: 1.15 RATIO — SIGNIFICANT CHANGE UP (ref 0.88–1.16)
MAGNESIUM SERPL-MCNC: 2.1 MG/DL — SIGNIFICANT CHANGE UP (ref 1.6–2.6)
MAGNESIUM SERPL-MCNC: 2.2 MG/DL — SIGNIFICANT CHANGE UP (ref 1.6–2.6)
MCHC RBC-ENTMCNC: 32.2 PG — SIGNIFICANT CHANGE UP (ref 27–34)
MCHC RBC-ENTMCNC: 33.8 GM/DL — SIGNIFICANT CHANGE UP (ref 32–36)
MCV RBC AUTO: 95.1 FL — SIGNIFICANT CHANGE UP (ref 80–100)
PHOSPHATE SERPL-MCNC: 3 MG/DL — SIGNIFICANT CHANGE UP (ref 2.5–4.5)
PHOSPHATE SERPL-MCNC: 3.6 MG/DL — SIGNIFICANT CHANGE UP (ref 2.5–4.5)
PLATELET # BLD AUTO: 388 K/UL — SIGNIFICANT CHANGE UP (ref 150–400)
POTASSIUM SERPL-MCNC: 3.8 MMOL/L — SIGNIFICANT CHANGE UP (ref 3.5–5.3)
POTASSIUM SERPL-MCNC: 4 MMOL/L — SIGNIFICANT CHANGE UP (ref 3.5–5.3)
POTASSIUM SERPL-SCNC: 3.8 MMOL/L — SIGNIFICANT CHANGE UP (ref 3.5–5.3)
POTASSIUM SERPL-SCNC: 4 MMOL/L — SIGNIFICANT CHANGE UP (ref 3.5–5.3)
PROT SERPL-MCNC: 6.2 G/DL — SIGNIFICANT CHANGE UP (ref 6–8.3)
PROTHROM AB SERPL-ACNC: 12.5 SEC — SIGNIFICANT CHANGE UP (ref 9.8–12.7)
RBC # BLD: 3.5 M/UL — LOW (ref 4.2–5.8)
RBC # FLD: 15.1 % — HIGH (ref 10.3–14.5)
SODIUM SERPL-SCNC: 135 MMOL/L — SIGNIFICANT CHANGE UP (ref 135–145)
SODIUM SERPL-SCNC: 136 MMOL/L — SIGNIFICANT CHANGE UP (ref 135–145)
VANCOMYCIN TROUGH SERPL-MCNC: 33.7 UG/ML — CRITICAL HIGH (ref 10–20)
WBC # BLD: 19.1 K/UL — HIGH (ref 3.8–10.5)
WBC # FLD AUTO: 19.1 K/UL — HIGH (ref 3.8–10.5)

## 2018-04-06 PROCEDURE — 73000 X-RAY EXAM OF COLLAR BONE: CPT | Mod: 26,LT

## 2018-04-06 PROCEDURE — 73030 X-RAY EXAM OF SHOULDER: CPT | Mod: 26,LT

## 2018-04-06 PROCEDURE — 71045 X-RAY EXAM CHEST 1 VIEW: CPT | Mod: 26

## 2018-04-06 PROCEDURE — 99291 CRITICAL CARE FIRST HOUR: CPT

## 2018-04-06 RX ORDER — CLOPIDOGREL BISULFATE 75 MG/1
75 TABLET, FILM COATED ORAL DAILY
Qty: 0 | Refills: 0 | Status: DISCONTINUED | OUTPATIENT
Start: 2018-04-06 | End: 2018-05-18

## 2018-04-06 RX ORDER — SODIUM CHLORIDE 9 MG/ML
1000 INJECTION, SOLUTION INTRAVENOUS
Qty: 0 | Refills: 0 | Status: DISCONTINUED | OUTPATIENT
Start: 2018-04-06 | End: 2018-04-07

## 2018-04-06 RX ORDER — METOPROLOL TARTRATE 50 MG
25 TABLET ORAL EVERY 12 HOURS
Qty: 0 | Refills: 0 | Status: DISCONTINUED | OUTPATIENT
Start: 2018-04-06 | End: 2018-04-08

## 2018-04-06 RX ORDER — CALCIUM GLUCONATE 100 MG/ML
1 VIAL (ML) INTRAVENOUS ONCE
Qty: 0 | Refills: 0 | Status: COMPLETED | OUTPATIENT
Start: 2018-04-06 | End: 2018-04-06

## 2018-04-06 RX ORDER — LABETALOL HCL 100 MG
10 TABLET ORAL ONCE
Qty: 0 | Refills: 0 | Status: COMPLETED | OUTPATIENT
Start: 2018-04-06 | End: 2018-04-06

## 2018-04-06 RX ADMIN — Medication 100 MILLIGRAM(S): at 06:35

## 2018-04-06 RX ADMIN — HYDROMORPHONE HYDROCHLORIDE 0.5 MILLIGRAM(S): 2 INJECTION INTRAMUSCULAR; INTRAVENOUS; SUBCUTANEOUS at 22:33

## 2018-04-06 RX ADMIN — HYDROMORPHONE HYDROCHLORIDE 0.5 MILLIGRAM(S): 2 INJECTION INTRAMUSCULAR; INTRAVENOUS; SUBCUTANEOUS at 20:00

## 2018-04-06 RX ADMIN — CLOPIDOGREL BISULFATE 75 MILLIGRAM(S): 75 TABLET, FILM COATED ORAL at 12:26

## 2018-04-06 RX ADMIN — SIMVASTATIN 40 MILLIGRAM(S): 20 TABLET, FILM COATED ORAL at 21:15

## 2018-04-06 RX ADMIN — ENOXAPARIN SODIUM 40 MILLIGRAM(S): 100 INJECTION SUBCUTANEOUS at 12:25

## 2018-04-06 RX ADMIN — LIDOCAINE 1 PATCH: 4 CREAM TOPICAL at 12:33

## 2018-04-06 RX ADMIN — Medication 200 GRAM(S): at 06:34

## 2018-04-06 RX ADMIN — PIPERACILLIN AND TAZOBACTAM 25 GRAM(S): 4; .5 INJECTION, POWDER, LYOPHILIZED, FOR SOLUTION INTRAVENOUS at 06:34

## 2018-04-06 RX ADMIN — Medication 4: at 16:42

## 2018-04-06 RX ADMIN — Medication 4: at 06:07

## 2018-04-06 RX ADMIN — HALOPERIDOL DECANOATE 1 MILLIGRAM(S): 100 INJECTION INTRAMUSCULAR at 08:59

## 2018-04-06 RX ADMIN — Medication 5 MILLIGRAM(S): at 06:34

## 2018-04-06 RX ADMIN — PIPERACILLIN AND TAZOBACTAM 25 GRAM(S): 4; .5 INJECTION, POWDER, LYOPHILIZED, FOR SOLUTION INTRAVENOUS at 21:14

## 2018-04-06 RX ADMIN — HYDROMORPHONE HYDROCHLORIDE 0.5 MILLIGRAM(S): 2 INJECTION INTRAMUSCULAR; INTRAVENOUS; SUBCUTANEOUS at 14:20

## 2018-04-06 RX ADMIN — Medication 300 MILLIGRAM(S): at 18:01

## 2018-04-06 RX ADMIN — INSULIN GLARGINE 16 UNIT(S): 100 INJECTION, SOLUTION SUBCUTANEOUS at 21:14

## 2018-04-06 RX ADMIN — PIPERACILLIN AND TAZOBACTAM 25 GRAM(S): 4; .5 INJECTION, POWDER, LYOPHILIZED, FOR SOLUTION INTRAVENOUS at 13:27

## 2018-04-06 RX ADMIN — OXYCODONE HYDROCHLORIDE 5 MILLIGRAM(S): 5 TABLET ORAL at 01:05

## 2018-04-06 RX ADMIN — HYDROMORPHONE HYDROCHLORIDE 0.5 MILLIGRAM(S): 2 INJECTION INTRAMUSCULAR; INTRAVENOUS; SUBCUTANEOUS at 05:19

## 2018-04-06 RX ADMIN — Medication 1 APPLICATION(S): at 12:26

## 2018-04-06 RX ADMIN — Medication 300 MILLIGRAM(S): at 06:34

## 2018-04-06 RX ADMIN — HYDROMORPHONE HYDROCHLORIDE 0.5 MILLIGRAM(S): 2 INJECTION INTRAMUSCULAR; INTRAVENOUS; SUBCUTANEOUS at 14:35

## 2018-04-06 RX ADMIN — Medication 2: at 21:15

## 2018-04-06 RX ADMIN — Medication 10 MILLIGRAM(S): at 03:25

## 2018-04-06 RX ADMIN — PANTOPRAZOLE SODIUM 40 MILLIGRAM(S): 20 TABLET, DELAYED RELEASE ORAL at 12:45

## 2018-04-06 RX ADMIN — MIRTAZAPINE 15 MILLIGRAM(S): 45 TABLET, ORALLY DISINTEGRATING ORAL at 21:15

## 2018-04-06 RX ADMIN — CHLORHEXIDINE GLUCONATE 15 MILLILITER(S): 213 SOLUTION TOPICAL at 06:34

## 2018-04-06 RX ADMIN — HYDROMORPHONE HYDROCHLORIDE 0.5 MILLIGRAM(S): 2 INJECTION INTRAMUSCULAR; INTRAVENOUS; SUBCUTANEOUS at 19:44

## 2018-04-06 RX ADMIN — SODIUM CHLORIDE 50 MILLILITER(S): 9 INJECTION, SOLUTION INTRAVENOUS at 21:16

## 2018-04-06 RX ADMIN — Medication 25 MILLIGRAM(S): at 18:01

## 2018-04-06 RX ADMIN — LIDOCAINE 1 PATCH: 4 CREAM TOPICAL at 12:34

## 2018-04-06 RX ADMIN — Medication 100 MILLIGRAM(S): at 18:00

## 2018-04-06 RX ADMIN — OXYCODONE HYDROCHLORIDE 5 MILLIGRAM(S): 5 TABLET ORAL at 01:45

## 2018-04-06 RX ADMIN — HYDROMORPHONE HYDROCHLORIDE 0.5 MILLIGRAM(S): 2 INJECTION INTRAMUSCULAR; INTRAVENOUS; SUBCUTANEOUS at 22:53

## 2018-04-06 RX ADMIN — HALOPERIDOL DECANOATE 1 MILLIGRAM(S): 100 INJECTION INTRAMUSCULAR at 21:14

## 2018-04-06 RX ADMIN — Medication 4: at 08:55

## 2018-04-06 RX ADMIN — Medication 4: at 12:41

## 2018-04-06 RX ADMIN — Medication 81 MILLIGRAM(S): at 12:26
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
## 2018-04-06 NOTE — PROGRESS NOTE ADULT - ATTENDING COMMENTS
Patient seen and examined on AM rounds with SICU team  Awake, alert.  Hemodynamically stable  Did well with spontaneous breathing trial after tracheostomy, now doing well on trach collar  Will start enteral feedings through PEG tube  Not planning any further procedures, will restart Plavix (ASA started yesterday)  Empiric antibiotics for pneumonia, stop date 4-9-18  bilirubin = 7.2 -> 5.3 today    - Doing very well  - Respiratory failure much improved after tracheostomy  - Bilirubin normalizing without treatment  - May eventually be transferred to [facility]  - WBC slowly increasing for last couple of days, no other signs of uncontrolled sepsis, on antibiotics, will observe for now  - 35 minutes direct critical care
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
## 2018-04-06 NOTE — OCCUPATIONAL THERAPY INITIAL EVALUATION ADULT - ADDITIONAL COMMENTS
CT scans were obtained, which revealed acute left 4th-8th rib fractures, left superior ramus fracture with a large extraperitoneal hematoma & multiple foci of active extravasation, left inferior pubic ramus fracture, right superior pubic ramus fracture, left L5 lamina & lolis sacrum fractures, several spleen lacerations (largest is a grade 2 laceration), and grade 2 left kidney laceration. Patient was taken to IR for embolization and was intubated for the procedure. He underwent glue & coil embolization of the left inferior epigastric artery, left pubic rami supply from a distal branch of the CFA, left internal iliac artery branches, right inferior epigastric artery, and distal main splenic artery.

## 2018-04-06 NOTE — OCCUPATIONAL THERAPY INITIAL EVALUATION ADULT - ADL RETRAINING, OT EVAL
GOALS: Pt will perform UB dressing with minimal assist within 2 wks.  Pt will perform grooming with setup and supervision within 2 weeks.
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
## 2018-04-06 NOTE — OCCUPATIONAL THERAPY INITIAL EVALUATION ADULT - PLANNED THERAPY INTERVENTIONS, OT EVAL
neuromuscular re-education/balance training/motor coordination training/strengthening/transfer training/parent/caregiver training.../ADL retraining/bed mobility training
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
## 2018-04-06 NOTE — CHART NOTE - NSCHARTNOTEFT_GEN_A_CORE
Secondary Survey: Patient non TTP along RLE and RUE, no ttp along long bones or bony prominences, full painless ROM of ankle/knee/hip/wrist/shoulder and elbow of RUE.  LLE: no TTp along ankle, full painless ROM of ankle, TTP along L knee, mild pain with ROM of knee, painless ROM of hip.  LUE swollen, no pain with ROM of wrist and elbow.  TTP along L shoulder, mild pain with ROM of L [place]. SILT, NVI BL Upper and lower extremities, mild tremor throughout exam appreciated.     Plan:   FU XR L AC joint and clavicle, likely AC joint separation- NWB LUE In sling, no likely sx intervention during this hospital stay  DVT ppx per SICU  PT/OT  Pain Control  FU with Dr Krishnan as outpatient once discharged, please call office for appointment  Will Continue to follow once imaging is completed

## 2018-04-07 LAB
ALBUMIN SERPL ELPH-MCNC: 2.5 G/DL — LOW (ref 3.3–5)
ALP SERPL-CCNC: 246 U/L — HIGH (ref 40–120)
ALT FLD-CCNC: 48 U/L RC — HIGH (ref 10–45)
ANION GAP SERPL CALC-SCNC: 14 MMOL/L — SIGNIFICANT CHANGE UP (ref 5–17)
AST SERPL-CCNC: 60 U/L — HIGH (ref 10–40)
BILIRUB SERPL-MCNC: 4.6 MG/DL — HIGH (ref 0.2–1.2)
BUN SERPL-MCNC: 54 MG/DL — HIGH (ref 7–23)
CA-I BLD-SCNC: 1.13 MMOL/L — SIGNIFICANT CHANGE UP (ref 1.12–1.3)
CALCIUM SERPL-MCNC: 8.3 MG/DL — LOW (ref 8.4–10.5)
CHLORIDE SERPL-SCNC: 100 MMOL/L — SIGNIFICANT CHANGE UP (ref 96–108)
CO2 SERPL-SCNC: 23 MMOL/L — SIGNIFICANT CHANGE UP (ref 22–31)
CREAT ?TM UR-MCNC: 59 MG/DL — SIGNIFICANT CHANGE UP
CREAT SERPL-MCNC: 1.53 MG/DL — HIGH (ref 0.5–1.3)
CULTURE RESULTS: SIGNIFICANT CHANGE UP
GLUCOSE BLDC GLUCOMTR-MCNC: 155 MG/DL — HIGH (ref 70–99)
GLUCOSE BLDC GLUCOMTR-MCNC: 173 MG/DL — HIGH (ref 70–99)
GLUCOSE BLDC GLUCOMTR-MCNC: 204 MG/DL — HIGH (ref 70–99)
GLUCOSE BLDC GLUCOMTR-MCNC: 212 MG/DL — HIGH (ref 70–99)
GLUCOSE BLDC GLUCOMTR-MCNC: 223 MG/DL — HIGH (ref 70–99)
GLUCOSE SERPL-MCNC: 223 MG/DL — HIGH (ref 70–99)
HCT VFR BLD CALC: 32.5 % — LOW (ref 39–50)
HGB BLD-MCNC: 10.6 G/DL — LOW (ref 13–17)
MAGNESIUM SERPL-MCNC: 2.3 MG/DL — SIGNIFICANT CHANGE UP (ref 1.6–2.6)
MCHC RBC-ENTMCNC: 31.2 PG — SIGNIFICANT CHANGE UP (ref 27–34)
MCHC RBC-ENTMCNC: 32.7 GM/DL — SIGNIFICANT CHANGE UP (ref 32–36)
MCV RBC AUTO: 95.4 FL — SIGNIFICANT CHANGE UP (ref 80–100)
PHOSPHATE SERPL-MCNC: 2.7 MG/DL — SIGNIFICANT CHANGE UP (ref 2.5–4.5)
PLATELET # BLD AUTO: 470 K/UL — HIGH (ref 150–400)
POTASSIUM SERPL-MCNC: 3.6 MMOL/L — SIGNIFICANT CHANGE UP (ref 3.5–5.3)
POTASSIUM SERPL-SCNC: 3.6 MMOL/L — SIGNIFICANT CHANGE UP (ref 3.5–5.3)
PROT SERPL-MCNC: 6 G/DL — SIGNIFICANT CHANGE UP (ref 6–8.3)
RBC # BLD: 3.4 M/UL — LOW (ref 4.2–5.8)
RBC # FLD: 15.3 % — HIGH (ref 10.3–14.5)
SODIUM SERPL-SCNC: 137 MMOL/L — SIGNIFICANT CHANGE UP (ref 135–145)
SODIUM UR-SCNC: 52 MMOL/L — SIGNIFICANT CHANGE UP
SPECIMEN SOURCE: SIGNIFICANT CHANGE UP
VANCOMYCIN TROUGH SERPL-MCNC: 23.4 UG/ML — HIGH (ref 10–20)
WBC # BLD: 16.3 K/UL — HIGH (ref 3.8–10.5)
WBC # FLD AUTO: 16.3 K/UL — HIGH (ref 3.8–10.5)

## 2018-04-07 PROCEDURE — 99232 SBSQ HOSP IP/OBS MODERATE 35: CPT

## 2018-04-07 PROCEDURE — 71045 X-RAY EXAM CHEST 1 VIEW: CPT | Mod: 26

## 2018-04-07 RX ORDER — POTASSIUM PHOSPHATE, MONOBASIC POTASSIUM PHOSPHATE, DIBASIC 236; 224 MG/ML; MG/ML
15 INJECTION, SOLUTION INTRAVENOUS ONCE
Qty: 0 | Refills: 0 | Status: COMPLETED | OUTPATIENT
Start: 2018-04-07 | End: 2018-04-07

## 2018-04-07 RX ORDER — SODIUM CHLORIDE 9 MG/ML
1000 INJECTION, SOLUTION INTRAVENOUS
Qty: 0 | Refills: 0 | Status: DISCONTINUED | OUTPATIENT
Start: 2018-04-07 | End: 2018-04-08

## 2018-04-07 RX ORDER — SODIUM CHLORIDE 9 MG/ML
1000 INJECTION, SOLUTION INTRAVENOUS
Qty: 0 | Refills: 0 | Status: DISCONTINUED | OUTPATIENT
Start: 2018-04-07 | End: 2018-04-07

## 2018-04-07 RX ADMIN — OXYCODONE HYDROCHLORIDE 5 MILLIGRAM(S): 5 TABLET ORAL at 01:37

## 2018-04-07 RX ADMIN — OXYCODONE HYDROCHLORIDE 5 MILLIGRAM(S): 5 TABLET ORAL at 18:20

## 2018-04-07 RX ADMIN — PIPERACILLIN AND TAZOBACTAM 25 GRAM(S): 4; .5 INJECTION, POWDER, LYOPHILIZED, FOR SOLUTION INTRAVENOUS at 05:16

## 2018-04-07 RX ADMIN — HYDROMORPHONE HYDROCHLORIDE 0.5 MILLIGRAM(S): 2 INJECTION INTRAMUSCULAR; INTRAVENOUS; SUBCUTANEOUS at 20:09

## 2018-04-07 RX ADMIN — OXYCODONE HYDROCHLORIDE 5 MILLIGRAM(S): 5 TABLET ORAL at 01:07

## 2018-04-07 RX ADMIN — LIDOCAINE 1 PATCH: 4 CREAM TOPICAL at 12:11

## 2018-04-07 RX ADMIN — PIPERACILLIN AND TAZOBACTAM 25 GRAM(S): 4; .5 INJECTION, POWDER, LYOPHILIZED, FOR SOLUTION INTRAVENOUS at 13:30

## 2018-04-07 RX ADMIN — HYDROMORPHONE HYDROCHLORIDE 0.5 MILLIGRAM(S): 2 INJECTION INTRAMUSCULAR; INTRAVENOUS; SUBCUTANEOUS at 06:22

## 2018-04-07 RX ADMIN — LIDOCAINE 1 PATCH: 4 CREAM TOPICAL at 01:14

## 2018-04-07 RX ADMIN — HALOPERIDOL DECANOATE 1 MILLIGRAM(S): 100 INJECTION INTRAMUSCULAR at 05:16

## 2018-04-07 RX ADMIN — Medication 100 MILLIGRAM(S): at 05:15

## 2018-04-07 RX ADMIN — LIDOCAINE 1 PATCH: 4 CREAM TOPICAL at 12:10

## 2018-04-07 RX ADMIN — Medication 81 MILLIGRAM(S): at 12:10

## 2018-04-07 RX ADMIN — Medication 25 MILLIGRAM(S): at 05:15

## 2018-04-07 RX ADMIN — SODIUM CHLORIDE 75 MILLILITER(S): 9 INJECTION, SOLUTION INTRAVENOUS at 20:13

## 2018-04-07 RX ADMIN — HYDROMORPHONE HYDROCHLORIDE 0.5 MILLIGRAM(S): 2 INJECTION INTRAMUSCULAR; INTRAVENOUS; SUBCUTANEOUS at 20:29

## 2018-04-07 RX ADMIN — SODIUM CHLORIDE 75 MILLILITER(S): 9 INJECTION, SOLUTION INTRAVENOUS at 06:09

## 2018-04-07 RX ADMIN — OXYCODONE HYDROCHLORIDE 5 MILLIGRAM(S): 5 TABLET ORAL at 22:02

## 2018-04-07 RX ADMIN — HYDROMORPHONE HYDROCHLORIDE 0.5 MILLIGRAM(S): 2 INJECTION INTRAMUSCULAR; INTRAVENOUS; SUBCUTANEOUS at 09:25

## 2018-04-07 RX ADMIN — MIRTAZAPINE 15 MILLIGRAM(S): 45 TABLET, ORALLY DISINTEGRATING ORAL at 21:15

## 2018-04-07 RX ADMIN — Medication 2: at 20:13

## 2018-04-07 RX ADMIN — HYDROMORPHONE HYDROCHLORIDE 0.5 MILLIGRAM(S): 2 INJECTION INTRAMUSCULAR; INTRAVENOUS; SUBCUTANEOUS at 09:40

## 2018-04-07 RX ADMIN — Medication 4: at 04:47

## 2018-04-07 RX ADMIN — OXYCODONE HYDROCHLORIDE 5 MILLIGRAM(S): 5 TABLET ORAL at 05:46

## 2018-04-07 RX ADMIN — OXYCODONE HYDROCHLORIDE 5 MILLIGRAM(S): 5 TABLET ORAL at 22:32

## 2018-04-07 RX ADMIN — Medication 1 APPLICATION(S): at 12:13

## 2018-04-07 RX ADMIN — Medication 2: at 08:29

## 2018-04-07 RX ADMIN — Medication 4: at 12:12

## 2018-04-07 RX ADMIN — CLOPIDOGREL BISULFATE 75 MILLIGRAM(S): 75 TABLET, FILM COATED ORAL at 12:10

## 2018-04-07 RX ADMIN — OXYCODONE HYDROCHLORIDE 5 MILLIGRAM(S): 5 TABLET ORAL at 17:45

## 2018-04-07 RX ADMIN — Medication 100 MILLIGRAM(S): at 18:06

## 2018-04-07 RX ADMIN — PANTOPRAZOLE SODIUM 40 MILLIGRAM(S): 20 TABLET, DELAYED RELEASE ORAL at 12:10

## 2018-04-07 RX ADMIN — ENOXAPARIN SODIUM 40 MILLIGRAM(S): 100 INJECTION SUBCUTANEOUS at 12:10

## 2018-04-07 RX ADMIN — Medication 25 MILLIGRAM(S): at 18:10

## 2018-04-07 RX ADMIN — POTASSIUM PHOSPHATE, MONOBASIC POTASSIUM PHOSPHATE, DIBASIC 62.5 MILLIMOLE(S): 236; 224 INJECTION, SOLUTION INTRAVENOUS at 06:02

## 2018-04-07 RX ADMIN — PIPERACILLIN AND TAZOBACTAM 25 GRAM(S): 4; .5 INJECTION, POWDER, LYOPHILIZED, FOR SOLUTION INTRAVENOUS at 21:14

## 2018-04-07 RX ADMIN — SODIUM CHLORIDE 50 MILLILITER(S): 9 INJECTION, SOLUTION INTRAVENOUS at 01:07

## 2018-04-07 RX ADMIN — OXYCODONE HYDROCHLORIDE 5 MILLIGRAM(S): 5 TABLET ORAL at 05:16

## 2018-04-07 RX ADMIN — Medication 4: at 17:06

## 2018-04-07 RX ADMIN — HALOPERIDOL DECANOATE 1 MILLIGRAM(S): 100 INJECTION INTRAMUSCULAR at 18:07

## 2018-04-07 RX ADMIN — HYDROMORPHONE HYDROCHLORIDE 0.5 MILLIGRAM(S): 2 INJECTION INTRAMUSCULAR; INTRAVENOUS; SUBCUTANEOUS at 06:02

## 2018-04-07 RX ADMIN — INSULIN GLARGINE 16 UNIT(S): 100 INJECTION, SOLUTION SUBCUTANEOUS at 21:14

## 2018-04-07 RX ADMIN — SIMVASTATIN 40 MILLIGRAM(S): 20 TABLET, FILM COATED ORAL at 21:15

## 2018-04-07 RX ADMIN — Medication 4: at 00:00
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
## 2018-04-07 NOTE — PROGRESS NOTE ADULT - ATTENDING COMMENTS
Patient seen and examined on AM rounds with SICU staff  Awake, alert, following commands, mildly confused  Oxygenating well on trach collar  Hemodynamically stable   Antibiotics to complete 7 days (done on 4/9/18)  Restarting plavix today - no signs of bleeding

## 2018-04-08 LAB
ANION GAP SERPL CALC-SCNC: 11 MMOL/L — SIGNIFICANT CHANGE UP (ref 5–17)
ANION GAP SERPL CALC-SCNC: 11 MMOL/L — SIGNIFICANT CHANGE UP (ref 5–17)
APTT BLD: 25.6 SEC — LOW (ref 27.5–37.4)
BUN SERPL-MCNC: 40 MG/DL — HIGH (ref 7–23)
BUN SERPL-MCNC: 47 MG/DL — HIGH (ref 7–23)
CALCIUM SERPL-MCNC: 8 MG/DL — LOW (ref 8.4–10.5)
CALCIUM SERPL-MCNC: 8.1 MG/DL — LOW (ref 8.4–10.5)
CHLORIDE SERPL-SCNC: 105 MMOL/L — SIGNIFICANT CHANGE UP (ref 96–108)
CHLORIDE SERPL-SCNC: 105 MMOL/L — SIGNIFICANT CHANGE UP (ref 96–108)
CO2 SERPL-SCNC: 22 MMOL/L — SIGNIFICANT CHANGE UP (ref 22–31)
CO2 SERPL-SCNC: 24 MMOL/L — SIGNIFICANT CHANGE UP (ref 22–31)
CREAT SERPL-MCNC: 0.88 MG/DL — SIGNIFICANT CHANGE UP (ref 0.5–1.3)
CREAT SERPL-MCNC: 1.07 MG/DL — SIGNIFICANT CHANGE UP (ref 0.5–1.3)
GAS PNL BLDA: SIGNIFICANT CHANGE UP
GAS PNL BLDA: SIGNIFICANT CHANGE UP
GLUCOSE BLDC GLUCOMTR-MCNC: 120 MG/DL — HIGH (ref 70–99)
GLUCOSE BLDC GLUCOMTR-MCNC: 148 MG/DL — HIGH (ref 70–99)
GLUCOSE BLDC GLUCOMTR-MCNC: 160 MG/DL — HIGH (ref 70–99)
GLUCOSE BLDC GLUCOMTR-MCNC: 175 MG/DL — HIGH (ref 70–99)
GLUCOSE BLDC GLUCOMTR-MCNC: 186 MG/DL — HIGH (ref 70–99)
GLUCOSE BLDC GLUCOMTR-MCNC: 186 MG/DL — HIGH (ref 70–99)
GLUCOSE SERPL-MCNC: 135 MG/DL — HIGH (ref 70–99)
GLUCOSE SERPL-MCNC: 151 MG/DL — HIGH (ref 70–99)
HCT VFR BLD CALC: 27.6 % — LOW (ref 39–50)
HGB BLD-MCNC: 9.1 G/DL — LOW (ref 13–17)
INR BLD: 1.17 RATIO — HIGH (ref 0.88–1.16)
MAGNESIUM SERPL-MCNC: 2.1 MG/DL — SIGNIFICANT CHANGE UP (ref 1.6–2.6)
MCHC RBC-ENTMCNC: 31.7 PG — SIGNIFICANT CHANGE UP (ref 27–34)
MCHC RBC-ENTMCNC: 33.1 GM/DL — SIGNIFICANT CHANGE UP (ref 32–36)
MCV RBC AUTO: 95.8 FL — SIGNIFICANT CHANGE UP (ref 80–100)
PHOSPHATE SERPL-MCNC: 1.9 MG/DL — LOW (ref 2.5–4.5)
PLATELET # BLD AUTO: 455 K/UL — HIGH (ref 150–400)
POTASSIUM SERPL-MCNC: 3.1 MMOL/L — LOW (ref 3.5–5.3)
POTASSIUM SERPL-MCNC: 5 MMOL/L — SIGNIFICANT CHANGE UP (ref 3.5–5.3)
POTASSIUM SERPL-SCNC: 3.1 MMOL/L — LOW (ref 3.5–5.3)
POTASSIUM SERPL-SCNC: 5 MMOL/L — SIGNIFICANT CHANGE UP (ref 3.5–5.3)
PROTHROM AB SERPL-ACNC: 12.7 SEC — SIGNIFICANT CHANGE UP (ref 9.8–12.7)
RBC # BLD: 2.88 M/UL — LOW (ref 4.2–5.8)
RBC # FLD: 15.5 % — HIGH (ref 10.3–14.5)
SODIUM SERPL-SCNC: 138 MMOL/L — SIGNIFICANT CHANGE UP (ref 135–145)
SODIUM SERPL-SCNC: 140 MMOL/L — SIGNIFICANT CHANGE UP (ref 135–145)
WBC # BLD: 12.6 K/UL — HIGH (ref 3.8–10.5)
WBC # FLD AUTO: 12.6 K/UL — HIGH (ref 3.8–10.5)

## 2018-04-08 PROCEDURE — 99233 SBSQ HOSP IP/OBS HIGH 50: CPT

## 2018-04-08 PROCEDURE — 71045 X-RAY EXAM CHEST 1 VIEW: CPT | Mod: 26

## 2018-04-08 RX ORDER — ACETAMINOPHEN 500 MG
975 TABLET ORAL EVERY 6 HOURS
Qty: 0 | Refills: 0 | Status: DISCONTINUED | OUTPATIENT
Start: 2018-04-08 | End: 2018-04-09

## 2018-04-08 RX ORDER — METOPROLOL TARTRATE 50 MG
50 TABLET ORAL EVERY 12 HOURS
Qty: 0 | Refills: 0 | Status: DISCONTINUED | OUTPATIENT
Start: 2018-04-08 | End: 2018-04-09

## 2018-04-08 RX ORDER — POTASSIUM CHLORIDE 20 MEQ
10 PACKET (EA) ORAL
Qty: 0 | Refills: 0 | Status: COMPLETED | OUTPATIENT
Start: 2018-04-08 | End: 2018-04-08

## 2018-04-08 RX ORDER — POLYETHYLENE GLYCOL 3350 17 G/17G
17 POWDER, FOR SOLUTION ORAL DAILY
Qty: 0 | Refills: 0 | Status: COMPLETED | OUTPATIENT
Start: 2018-04-08 | End: 2018-04-10

## 2018-04-08 RX ORDER — SENNA PLUS 8.6 MG/1
5 TABLET ORAL AT BEDTIME
Qty: 0 | Refills: 0 | Status: DISCONTINUED | OUTPATIENT
Start: 2018-04-08 | End: 2018-04-13

## 2018-04-08 RX ORDER — POTASSIUM PHOSPHATE, MONOBASIC POTASSIUM PHOSPHATE, DIBASIC 236; 224 MG/ML; MG/ML
15 INJECTION, SOLUTION INTRAVENOUS EVERY 6 HOURS
Qty: 0 | Refills: 0 | Status: COMPLETED | OUTPATIENT
Start: 2018-04-08 | End: 2018-04-08

## 2018-04-08 RX ORDER — OXYCODONE HYDROCHLORIDE 5 MG/1
10 TABLET ORAL EVERY 6 HOURS
Qty: 0 | Refills: 0 | Status: DISCONTINUED | OUTPATIENT
Start: 2018-04-08 | End: 2018-04-15

## 2018-04-08 RX ORDER — HUMAN INSULIN 100 [IU]/ML
6 INJECTION, SUSPENSION SUBCUTANEOUS EVERY 6 HOURS
Qty: 0 | Refills: 0 | Status: DISCONTINUED | OUTPATIENT
Start: 2018-04-08 | End: 2018-04-26

## 2018-04-08 RX ORDER — INSULIN LISPRO 100/ML
VIAL (ML) SUBCUTANEOUS EVERY 6 HOURS
Qty: 0 | Refills: 0 | Status: DISCONTINUED | OUTPATIENT
Start: 2018-04-08 | End: 2018-04-09

## 2018-04-08 RX ADMIN — PANTOPRAZOLE SODIUM 40 MILLIGRAM(S): 20 TABLET, DELAYED RELEASE ORAL at 11:30

## 2018-04-08 RX ADMIN — Medication 1 APPLICATION(S): at 11:31

## 2018-04-08 RX ADMIN — Medication 25 MILLIGRAM(S): at 05:29

## 2018-04-08 RX ADMIN — OXYCODONE HYDROCHLORIDE 5 MILLIGRAM(S): 5 TABLET ORAL at 08:51

## 2018-04-08 RX ADMIN — MIRTAZAPINE 15 MILLIGRAM(S): 45 TABLET, ORALLY DISINTEGRATING ORAL at 21:51

## 2018-04-08 RX ADMIN — HUMAN INSULIN 6 UNIT(S): 100 INJECTION, SUSPENSION SUBCUTANEOUS at 13:19

## 2018-04-08 RX ADMIN — HUMAN INSULIN 6 UNIT(S): 100 INJECTION, SUSPENSION SUBCUTANEOUS at 23:57

## 2018-04-08 RX ADMIN — OXYCODONE HYDROCHLORIDE 5 MILLIGRAM(S): 5 TABLET ORAL at 09:21

## 2018-04-08 RX ADMIN — LIDOCAINE 1 PATCH: 4 CREAM TOPICAL at 00:18

## 2018-04-08 RX ADMIN — Medication 100 MILLIGRAM(S): at 17:17

## 2018-04-08 RX ADMIN — ENOXAPARIN SODIUM 40 MILLIGRAM(S): 100 INJECTION SUBCUTANEOUS at 11:29

## 2018-04-08 RX ADMIN — Medication 1: at 23:57

## 2018-04-08 RX ADMIN — POTASSIUM PHOSPHATE, MONOBASIC POTASSIUM PHOSPHATE, DIBASIC 62.5 MILLIMOLE(S): 236; 224 INJECTION, SOLUTION INTRAVENOUS at 06:49

## 2018-04-08 RX ADMIN — CLOPIDOGREL BISULFATE 75 MILLIGRAM(S): 75 TABLET, FILM COATED ORAL at 11:30

## 2018-04-08 RX ADMIN — Medication 5 MILLIGRAM(S): at 17:17

## 2018-04-08 RX ADMIN — Medication 2: at 00:04

## 2018-04-08 RX ADMIN — HYDROMORPHONE HYDROCHLORIDE 0.5 MILLIGRAM(S): 2 INJECTION INTRAMUSCULAR; INTRAVENOUS; SUBCUTANEOUS at 00:36

## 2018-04-08 RX ADMIN — Medication 1: at 08:34

## 2018-04-08 RX ADMIN — LIDOCAINE 1 PATCH: 4 CREAM TOPICAL at 11:30

## 2018-04-08 RX ADMIN — Medication 100 MILLIGRAM(S): at 05:28

## 2018-04-08 RX ADMIN — LIDOCAINE 1 PATCH: 4 CREAM TOPICAL at 23:35

## 2018-04-08 RX ADMIN — Medication 81 MILLIGRAM(S): at 11:30

## 2018-04-08 RX ADMIN — Medication 100 MILLIEQUIVALENT(S): at 08:33

## 2018-04-08 RX ADMIN — Medication 100 MILLIEQUIVALENT(S): at 05:28

## 2018-04-08 RX ADMIN — Medication 1: at 13:19

## 2018-04-08 RX ADMIN — PIPERACILLIN AND TAZOBACTAM 25 GRAM(S): 4; .5 INJECTION, POWDER, LYOPHILIZED, FOR SOLUTION INTRAVENOUS at 21:51

## 2018-04-08 RX ADMIN — HYDROMORPHONE HYDROCHLORIDE 0.5 MILLIGRAM(S): 2 INJECTION INTRAMUSCULAR; INTRAVENOUS; SUBCUTANEOUS at 00:56

## 2018-04-08 RX ADMIN — PIPERACILLIN AND TAZOBACTAM 25 GRAM(S): 4; .5 INJECTION, POWDER, LYOPHILIZED, FOR SOLUTION INTRAVENOUS at 13:20

## 2018-04-08 RX ADMIN — Medication 100 MILLIEQUIVALENT(S): at 06:49

## 2018-04-08 RX ADMIN — SIMVASTATIN 40 MILLIGRAM(S): 20 TABLET, FILM COATED ORAL at 21:51

## 2018-04-08 RX ADMIN — HUMAN INSULIN 6 UNIT(S): 100 INJECTION, SUSPENSION SUBCUTANEOUS at 08:36

## 2018-04-08 RX ADMIN — POLYETHYLENE GLYCOL 3350 17 GRAM(S): 17 POWDER, FOR SOLUTION ORAL at 11:30

## 2018-04-08 RX ADMIN — POTASSIUM PHOSPHATE, MONOBASIC POTASSIUM PHOSPHATE, DIBASIC 62.5 MILLIMOLE(S): 236; 224 INJECTION, SOLUTION INTRAVENOUS at 11:31

## 2018-04-08 RX ADMIN — HALOPERIDOL DECANOATE 1 MILLIGRAM(S): 100 INJECTION INTRAMUSCULAR at 17:17

## 2018-04-08 RX ADMIN — OXYCODONE HYDROCHLORIDE 5 MILLIGRAM(S): 5 TABLET ORAL at 16:15

## 2018-04-08 RX ADMIN — HALOPERIDOL DECANOATE 1 MILLIGRAM(S): 100 INJECTION INTRAMUSCULAR at 05:36

## 2018-04-08 RX ADMIN — PIPERACILLIN AND TAZOBACTAM 25 GRAM(S): 4; .5 INJECTION, POWDER, LYOPHILIZED, FOR SOLUTION INTRAVENOUS at 05:28

## 2018-04-08 RX ADMIN — OXYCODONE HYDROCHLORIDE 5 MILLIGRAM(S): 5 TABLET ORAL at 16:45

## 2018-04-08 RX ADMIN — Medication 50 MILLIGRAM(S): at 17:17
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
## 2018-04-08 NOTE — PROGRESS NOTE ADULT - ATTENDING COMMENTS
Patient was on CPAP pressure support mode.  Placed on trach collar today and doing well.  The elevated creatinine is normalized.    getting enteral nutrition via PEG  Plan to continue vent support as needed.    If continues to do well would consider decannulation at possibly later in the week.

## 2018-04-09 LAB
ALBUMIN SERPL ELPH-MCNC: 2.5 G/DL — LOW (ref 3.3–5)
ALP SERPL-CCNC: 306 U/L — HIGH (ref 40–120)
ALT FLD-CCNC: 53 U/L RC — HIGH (ref 10–45)
ANION GAP SERPL CALC-SCNC: 11 MMOL/L — SIGNIFICANT CHANGE UP (ref 5–17)
AST SERPL-CCNC: 80 U/L — HIGH (ref 10–40)
BILIRUB DIRECT SERPL-MCNC: 1.1 MG/DL — HIGH (ref 0–0.2)
BILIRUB INDIRECT FLD-MCNC: 2.3 MG/DL — HIGH (ref 0.2–1)
BILIRUB SERPL-MCNC: 3.4 MG/DL — HIGH (ref 0.2–1.2)
BUN SERPL-MCNC: 36 MG/DL — HIGH (ref 7–23)
CALCIUM SERPL-MCNC: 8.6 MG/DL — SIGNIFICANT CHANGE UP (ref 8.4–10.5)
CHLORIDE SERPL-SCNC: 106 MMOL/L — SIGNIFICANT CHANGE UP (ref 96–108)
CO2 SERPL-SCNC: 23 MMOL/L — SIGNIFICANT CHANGE UP (ref 22–31)
CREAT SERPL-MCNC: 0.86 MG/DL — SIGNIFICANT CHANGE UP (ref 0.5–1.3)
GLUCOSE BLDC GLUCOMTR-MCNC: 130 MG/DL — HIGH (ref 70–99)
GLUCOSE BLDC GLUCOMTR-MCNC: 164 MG/DL — HIGH (ref 70–99)
GLUCOSE BLDC GLUCOMTR-MCNC: 180 MG/DL — HIGH (ref 70–99)
GLUCOSE SERPL-MCNC: 191 MG/DL — HIGH (ref 70–99)
HCT VFR BLD CALC: 28.5 % — LOW (ref 39–50)
HGB BLD-MCNC: 9.5 G/DL — LOW (ref 13–17)
MAGNESIUM SERPL-MCNC: 1.9 MG/DL — SIGNIFICANT CHANGE UP (ref 1.6–2.6)
MCHC RBC-ENTMCNC: 32.1 PG — SIGNIFICANT CHANGE UP (ref 27–34)
MCHC RBC-ENTMCNC: 33.4 GM/DL — SIGNIFICANT CHANGE UP (ref 32–36)
MCV RBC AUTO: 96.3 FL — SIGNIFICANT CHANGE UP (ref 80–100)
PHOSPHATE SERPL-MCNC: 2.5 MG/DL — SIGNIFICANT CHANGE UP (ref 2.5–4.5)
PLATELET # BLD AUTO: 540 K/UL — HIGH (ref 150–400)
POTASSIUM SERPL-MCNC: 4 MMOL/L — SIGNIFICANT CHANGE UP (ref 3.5–5.3)
POTASSIUM SERPL-SCNC: 4 MMOL/L — SIGNIFICANT CHANGE UP (ref 3.5–5.3)
PROT SERPL-MCNC: 6 G/DL — SIGNIFICANT CHANGE UP (ref 6–8.3)
RBC # BLD: 2.96 M/UL — LOW (ref 4.2–5.8)
RBC # FLD: 15.7 % — HIGH (ref 10.3–14.5)
SODIUM SERPL-SCNC: 140 MMOL/L — SIGNIFICANT CHANGE UP (ref 135–145)
WBC # BLD: 12.8 K/UL — HIGH (ref 3.8–10.5)
WBC # FLD AUTO: 12.8 K/UL — HIGH (ref 3.8–10.5)

## 2018-04-09 PROCEDURE — 99223 1ST HOSP IP/OBS HIGH 75: CPT

## 2018-04-09 PROCEDURE — 71045 X-RAY EXAM CHEST 1 VIEW: CPT | Mod: 26

## 2018-04-09 PROCEDURE — 99233 SBSQ HOSP IP/OBS HIGH 50: CPT

## 2018-04-09 RX ORDER — METOPROLOL TARTRATE 50 MG
75 TABLET ORAL EVERY 12 HOURS
Qty: 0 | Refills: 0 | Status: DISCONTINUED | OUTPATIENT
Start: 2018-04-09 | End: 2018-04-12

## 2018-04-09 RX ORDER — DOXAZOSIN MESYLATE 4 MG
1 TABLET ORAL AT BEDTIME
Qty: 0 | Refills: 0 | Status: DISCONTINUED | OUTPATIENT
Start: 2018-04-09 | End: 2018-04-21

## 2018-04-09 RX ORDER — ACETAMINOPHEN 500 MG
650 TABLET ORAL EVERY 6 HOURS
Qty: 0 | Refills: 0 | Status: DISCONTINUED | OUTPATIENT
Start: 2018-04-09 | End: 2018-05-18

## 2018-04-09 RX ORDER — TAMSULOSIN HYDROCHLORIDE 0.4 MG/1
0.4 CAPSULE ORAL AT BEDTIME
Qty: 0 | Refills: 0 | Status: DISCONTINUED | OUTPATIENT
Start: 2018-04-09 | End: 2018-04-09

## 2018-04-09 RX ORDER — INSULIN LISPRO 100/ML
VIAL (ML) SUBCUTANEOUS EVERY 6 HOURS
Qty: 0 | Refills: 0 | Status: DISCONTINUED | OUTPATIENT
Start: 2018-04-09 | End: 2018-05-18

## 2018-04-09 RX ORDER — HYDROMORPHONE HYDROCHLORIDE 2 MG/ML
0.25 INJECTION INTRAMUSCULAR; INTRAVENOUS; SUBCUTANEOUS ONCE
Qty: 0 | Refills: 0 | Status: DISCONTINUED | OUTPATIENT
Start: 2018-04-09 | End: 2018-04-09

## 2018-04-09 RX ORDER — MAGNESIUM SULFATE 500 MG/ML
2 VIAL (ML) INJECTION ONCE
Qty: 0 | Refills: 0 | Status: COMPLETED | OUTPATIENT
Start: 2018-04-09 | End: 2018-04-09

## 2018-04-09 RX ADMIN — LIDOCAINE 1 PATCH: 4 CREAM TOPICAL at 23:38

## 2018-04-09 RX ADMIN — Medication 62.5 MILLIMOLE(S): at 08:52

## 2018-04-09 RX ADMIN — HUMAN INSULIN 6 UNIT(S): 100 INJECTION, SUSPENSION SUBCUTANEOUS at 05:08

## 2018-04-09 RX ADMIN — OXYCODONE HYDROCHLORIDE 10 MILLIGRAM(S): 5 TABLET ORAL at 21:49

## 2018-04-09 RX ADMIN — CLOPIDOGREL BISULFATE 75 MILLIGRAM(S): 75 TABLET, FILM COATED ORAL at 11:35

## 2018-04-09 RX ADMIN — Medication 81 MILLIGRAM(S): at 11:35

## 2018-04-09 RX ADMIN — Medication 50 MILLIGRAM(S): at 05:02

## 2018-04-09 RX ADMIN — LIDOCAINE 1 PATCH: 4 CREAM TOPICAL at 11:36

## 2018-04-09 RX ADMIN — Medication 5 MILLIGRAM(S): at 08:43

## 2018-04-09 RX ADMIN — Medication 10 MILLIGRAM(S): at 17:26

## 2018-04-09 RX ADMIN — PIPERACILLIN AND TAZOBACTAM 25 GRAM(S): 4; .5 INJECTION, POWDER, LYOPHILIZED, FOR SOLUTION INTRAVENOUS at 05:02

## 2018-04-09 RX ADMIN — SIMVASTATIN 40 MILLIGRAM(S): 20 TABLET, FILM COATED ORAL at 21:49

## 2018-04-09 RX ADMIN — Medication 1: at 05:07

## 2018-04-09 RX ADMIN — ENOXAPARIN SODIUM 40 MILLIGRAM(S): 100 INJECTION SUBCUTANEOUS at 11:35

## 2018-04-09 RX ADMIN — OXYCODONE HYDROCHLORIDE 10 MILLIGRAM(S): 5 TABLET ORAL at 02:00

## 2018-04-09 RX ADMIN — OXYCODONE HYDROCHLORIDE 10 MILLIGRAM(S): 5 TABLET ORAL at 02:30

## 2018-04-09 RX ADMIN — Medication 2: at 11:36

## 2018-04-09 RX ADMIN — HUMAN INSULIN 6 UNIT(S): 100 INJECTION, SUSPENSION SUBCUTANEOUS at 17:27

## 2018-04-09 RX ADMIN — Medication 5 MILLIGRAM(S): at 05:02

## 2018-04-09 RX ADMIN — HYDROMORPHONE HYDROCHLORIDE 0.25 MILLIGRAM(S): 2 INJECTION INTRAMUSCULAR; INTRAVENOUS; SUBCUTANEOUS at 23:47

## 2018-04-09 RX ADMIN — PANTOPRAZOLE SODIUM 40 MILLIGRAM(S): 20 TABLET, DELAYED RELEASE ORAL at 11:35

## 2018-04-09 RX ADMIN — OXYCODONE HYDROCHLORIDE 10 MILLIGRAM(S): 5 TABLET ORAL at 22:47

## 2018-04-09 RX ADMIN — HUMAN INSULIN 6 UNIT(S): 100 INJECTION, SUSPENSION SUBCUTANEOUS at 11:36

## 2018-04-09 RX ADMIN — HYDROMORPHONE HYDROCHLORIDE 0.25 MILLIGRAM(S): 2 INJECTION INTRAMUSCULAR; INTRAVENOUS; SUBCUTANEOUS at 23:31

## 2018-04-09 RX ADMIN — Medication 50 GRAM(S): at 04:01

## 2018-04-09 RX ADMIN — Medication 50 MILLIGRAM(S): at 17:26

## 2018-04-09 RX ADMIN — Medication 1 APPLICATION(S): at 11:35

## 2018-04-09 RX ADMIN — MIRTAZAPINE 15 MILLIGRAM(S): 45 TABLET, ORALLY DISINTEGRATING ORAL at 21:49
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
## 2018-04-09 NOTE — PROGRESS NOTE ADULT - ATTENDING COMMENTS
Occ agitated, awake and alert now  Required full vent support last night, RCU consult  HTN control, increase dose of ACEI  Change tube feed to bolus feed, swallow eval at some point  Glycemic control, will change to Lantus  PT
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
## 2018-04-10 LAB
ALBUMIN SERPL ELPH-MCNC: 2.5 G/DL — LOW (ref 3.3–5)
ALP SERPL-CCNC: 329 U/L — HIGH (ref 40–120)
ALT FLD-CCNC: 46 U/L RC — HIGH (ref 10–45)
ANION GAP SERPL CALC-SCNC: 11 MMOL/L — SIGNIFICANT CHANGE UP (ref 5–17)
ANION GAP SERPL CALC-SCNC: 12 MMOL/L — SIGNIFICANT CHANGE UP (ref 5–17)
AST SERPL-CCNC: 41 U/L — HIGH (ref 10–40)
BILIRUB DIRECT SERPL-MCNC: 1.1 MG/DL — HIGH (ref 0–0.2)
BILIRUB INDIRECT FLD-MCNC: 2.3 MG/DL — HIGH (ref 0.2–1)
BILIRUB SERPL-MCNC: 3.4 MG/DL — HIGH (ref 0.2–1.2)
BUN SERPL-MCNC: 29 MG/DL — HIGH (ref 7–23)
BUN SERPL-MCNC: 29 MG/DL — HIGH (ref 7–23)
CALCIUM SERPL-MCNC: 8.7 MG/DL — SIGNIFICANT CHANGE UP (ref 8.4–10.5)
CALCIUM SERPL-MCNC: 9 MG/DL — SIGNIFICANT CHANGE UP (ref 8.4–10.5)
CHLORIDE SERPL-SCNC: 106 MMOL/L — SIGNIFICANT CHANGE UP (ref 96–108)
CHLORIDE SERPL-SCNC: 106 MMOL/L — SIGNIFICANT CHANGE UP (ref 96–108)
CO2 SERPL-SCNC: 24 MMOL/L — SIGNIFICANT CHANGE UP (ref 22–31)
CO2 SERPL-SCNC: 26 MMOL/L — SIGNIFICANT CHANGE UP (ref 22–31)
CREAT SERPL-MCNC: 0.67 MG/DL — SIGNIFICANT CHANGE UP (ref 0.5–1.3)
CREAT SERPL-MCNC: 0.74 MG/DL — SIGNIFICANT CHANGE UP (ref 0.5–1.3)
CULTURE RESULTS: SIGNIFICANT CHANGE UP
CULTURE RESULTS: SIGNIFICANT CHANGE UP
GLUCOSE BLDC GLUCOMTR-MCNC: 108 MG/DL — HIGH (ref 70–99)
GLUCOSE BLDC GLUCOMTR-MCNC: 116 MG/DL — HIGH (ref 70–99)
GLUCOSE BLDC GLUCOMTR-MCNC: 152 MG/DL — HIGH (ref 70–99)
GLUCOSE BLDC GLUCOMTR-MCNC: 176 MG/DL — HIGH (ref 70–99)
GLUCOSE BLDC GLUCOMTR-MCNC: 263 MG/DL — HIGH (ref 70–99)
GLUCOSE SERPL-MCNC: 116 MG/DL — HIGH (ref 70–99)
GLUCOSE SERPL-MCNC: 227 MG/DL — HIGH (ref 70–99)
HCT VFR BLD CALC: 31.1 % — LOW (ref 39–50)
HGB BLD-MCNC: 10.3 G/DL — LOW (ref 13–17)
MAGNESIUM SERPL-MCNC: 2.2 MG/DL — SIGNIFICANT CHANGE UP (ref 1.6–2.6)
MCHC RBC-ENTMCNC: 32 PG — SIGNIFICANT CHANGE UP (ref 27–34)
MCHC RBC-ENTMCNC: 33.2 GM/DL — SIGNIFICANT CHANGE UP (ref 32–36)
MCV RBC AUTO: 96.3 FL — SIGNIFICANT CHANGE UP (ref 80–100)
PHOSPHATE SERPL-MCNC: 3.1 MG/DL — SIGNIFICANT CHANGE UP (ref 2.5–4.5)
PLATELET # BLD AUTO: 677 K/UL — HIGH (ref 150–400)
POTASSIUM SERPL-MCNC: 3.1 MMOL/L — LOW (ref 3.5–5.3)
POTASSIUM SERPL-MCNC: 3.7 MMOL/L — SIGNIFICANT CHANGE UP (ref 3.5–5.3)
POTASSIUM SERPL-SCNC: 3.1 MMOL/L — LOW (ref 3.5–5.3)
POTASSIUM SERPL-SCNC: 3.7 MMOL/L — SIGNIFICANT CHANGE UP (ref 3.5–5.3)
PROT SERPL-MCNC: 6.4 G/DL — SIGNIFICANT CHANGE UP (ref 6–8.3)
RBC # BLD: 3.23 M/UL — LOW (ref 4.2–5.8)
RBC # FLD: 16 % — HIGH (ref 10.3–14.5)
SODIUM SERPL-SCNC: 142 MMOL/L — SIGNIFICANT CHANGE UP (ref 135–145)
SODIUM SERPL-SCNC: 143 MMOL/L — SIGNIFICANT CHANGE UP (ref 135–145)
SPECIMEN SOURCE: SIGNIFICANT CHANGE UP
SPECIMEN SOURCE: SIGNIFICANT CHANGE UP
WBC # BLD: 14.2 K/UL — HIGH (ref 3.8–10.5)
WBC # FLD AUTO: 14.2 K/UL — HIGH (ref 3.8–10.5)

## 2018-04-10 PROCEDURE — 93970 EXTREMITY STUDY: CPT | Mod: 26

## 2018-04-10 PROCEDURE — 99233 SBSQ HOSP IP/OBS HIGH 50: CPT

## 2018-04-10 PROCEDURE — 71045 X-RAY EXAM CHEST 1 VIEW: CPT | Mod: 26

## 2018-04-10 RX ORDER — HYDROMORPHONE HYDROCHLORIDE 2 MG/ML
0.25 INJECTION INTRAMUSCULAR; INTRAVENOUS; SUBCUTANEOUS ONCE
Qty: 0 | Refills: 0 | Status: DISCONTINUED | OUTPATIENT
Start: 2018-04-10 | End: 2018-04-10

## 2018-04-10 RX ORDER — POTASSIUM CHLORIDE 20 MEQ
10 PACKET (EA) ORAL
Qty: 0 | Refills: 0 | Status: COMPLETED | OUTPATIENT
Start: 2018-04-10 | End: 2018-04-10

## 2018-04-10 RX ORDER — POTASSIUM CHLORIDE 20 MEQ
40 PACKET (EA) ORAL ONCE
Qty: 0 | Refills: 0 | Status: COMPLETED | OUTPATIENT
Start: 2018-04-10 | End: 2018-04-10

## 2018-04-10 RX ADMIN — HYDROMORPHONE HYDROCHLORIDE 0.25 MILLIGRAM(S): 2 INJECTION INTRAMUSCULAR; INTRAVENOUS; SUBCUTANEOUS at 21:30

## 2018-04-10 RX ADMIN — Medication 100 MILLIEQUIVALENT(S): at 06:18

## 2018-04-10 RX ADMIN — CLOPIDOGREL BISULFATE 75 MILLIGRAM(S): 75 TABLET, FILM COATED ORAL at 12:36

## 2018-04-10 RX ADMIN — Medication 100 MILLIEQUIVALENT(S): at 09:13

## 2018-04-10 RX ADMIN — HUMAN INSULIN 6 UNIT(S): 100 INJECTION, SUSPENSION SUBCUTANEOUS at 17:25

## 2018-04-10 RX ADMIN — HUMAN INSULIN 6 UNIT(S): 100 INJECTION, SUSPENSION SUBCUTANEOUS at 06:42

## 2018-04-10 RX ADMIN — SIMVASTATIN 40 MILLIGRAM(S): 20 TABLET, FILM COATED ORAL at 22:30

## 2018-04-10 RX ADMIN — Medication 1 MILLIGRAM(S): at 01:09

## 2018-04-10 RX ADMIN — LIDOCAINE 1 PATCH: 4 CREAM TOPICAL at 23:27

## 2018-04-10 RX ADMIN — Medication 1 APPLICATION(S): at 12:34

## 2018-04-10 RX ADMIN — Medication 10 MILLIGRAM(S): at 17:24

## 2018-04-10 RX ADMIN — Medication 1 MILLIGRAM(S): at 22:35

## 2018-04-10 RX ADMIN — LIDOCAINE 1 PATCH: 4 CREAM TOPICAL at 12:37

## 2018-04-10 RX ADMIN — PANTOPRAZOLE SODIUM 40 MILLIGRAM(S): 20 TABLET, DELAYED RELEASE ORAL at 12:35

## 2018-04-10 RX ADMIN — Medication 2: at 23:53

## 2018-04-10 RX ADMIN — HUMAN INSULIN 6 UNIT(S): 100 INJECTION, SUSPENSION SUBCUTANEOUS at 12:34

## 2018-04-10 RX ADMIN — ENOXAPARIN SODIUM 40 MILLIGRAM(S): 100 INJECTION SUBCUTANEOUS at 12:34

## 2018-04-10 RX ADMIN — Medication 10 MILLIGRAM(S): at 06:19

## 2018-04-10 RX ADMIN — Medication 81 MILLIGRAM(S): at 12:37

## 2018-04-10 RX ADMIN — HYDROMORPHONE HYDROCHLORIDE 0.25 MILLIGRAM(S): 2 INJECTION INTRAMUSCULAR; INTRAVENOUS; SUBCUTANEOUS at 21:45

## 2018-04-10 RX ADMIN — Medication 6: at 12:34

## 2018-04-10 RX ADMIN — Medication 2: at 17:24

## 2018-04-10 RX ADMIN — Medication 100 MILLIEQUIVALENT(S): at 08:00

## 2018-04-10 RX ADMIN — OXYCODONE HYDROCHLORIDE 10 MILLIGRAM(S): 5 TABLET ORAL at 15:00

## 2018-04-10 RX ADMIN — OXYCODONE HYDROCHLORIDE 10 MILLIGRAM(S): 5 TABLET ORAL at 14:31

## 2018-04-10 RX ADMIN — Medication 75 MILLIGRAM(S): at 17:24

## 2018-04-10 RX ADMIN — MIRTAZAPINE 15 MILLIGRAM(S): 45 TABLET, ORALLY DISINTEGRATING ORAL at 22:30

## 2018-04-10 RX ADMIN — Medication 100 MILLIGRAM(S): at 17:24

## 2018-04-10 RX ADMIN — Medication 40 MILLIEQUIVALENT(S): at 12:41

## 2018-04-10 RX ADMIN — Medication 75 MILLIGRAM(S): at 06:19

## 2018-04-10 RX ADMIN — SENNA PLUS 5 MILLILITER(S): 8.6 TABLET ORAL at 22:36
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
## 2018-04-10 NOTE — PROGRESS NOTE ADULT - SUBJECTIVE AND OBJECTIVE BOX
GENERAL SURGERY PROGRESS NOTE    POST OPERATIVE DAY #: 15      SUBJECTIVE: Pt seen and examined at bedside. REYNALDO o/n, currently on ventilator. Accepted by RCU, awaiting bed.    Vital Signs Last 24 Hrs  T(C): 36.9 (10 Apr 2018 04:00), Max: 37.7 (09 Apr 2018 19:00)  T(F): 98.4 (10 Apr 2018 04:00), Max: 99.8 (09 Apr 2018 19:00)  HR: 74 (10 Apr 2018 08:13) (74 - 88)  BP: 169/75 (10 Apr 2018 07:00) (128/62 - 177/74)  BP(mean): 108 (10 Apr 2018 07:00) (83 - 121)  RR: 26 (10 Apr 2018 08:13) (15 - 38)  SpO2: 100% (10 Apr 2018 08:13) (95% - 100%)    Physical Exam  General: awake, alert  Pulm: on ventilator  Abdomen: soft, non-tender  Extremities: Grossly symmetric    I&O's Summary    09 Apr 2018 07:01  -  10 Apr 2018 07:00  --------------------------------------------------------  IN: 2365 mL / OUT: 1055 mL / NET: 1310 mL    10 Apr 2018 07:01  -  10 Apr 2018 10:46  --------------------------------------------------------  IN: 200 mL / OUT: 0 mL / NET: 200 mL      I&O's Detail    09 Apr 2018 07:01  -  10 Apr 2018 07:00  --------------------------------------------------------  IN:    Enteral Tube Flush: 300 mL    Solution: 100 mL    Solution: 75 mL    Solution: 250 mL    Vital HN: 1640 mL  Total IN: 2365 mL    OUT:    Indwelling Catheter - Urethral: 580 mL    Intermittent Catheterization - Urethral: 475 mL  Total OUT: 1055 mL    Total NET: 1310 mL      10 Apr 2018 07:01  -  10 Apr 2018 10:46  --------------------------------------------------------  IN:    Solution: 200 mL  Total IN: 200 mL    OUT:  Total OUT: 0 mL    Total NET: 200 mL          MEDICATIONS  (STANDING):  aspirin  chewable 81 milliGRAM(s) Oral daily  BACItracin   Ointment 1 Application(s) Topical daily  clopidogrel Tablet 75 milliGRAM(s) Oral daily  docusate sodium Liquid 100 milliGRAM(s) Oral two times a day  doxazosin 1 milliGRAM(s) Oral at bedtime  enalapril 10 milliGRAM(s) Oral every 12 hours  enoxaparin Injectable 40 milliGRAM(s) SubCutaneous daily  insulin lispro (HumaLOG) corrective regimen sliding scale   SubCutaneous every 6 hours  insulin NPH human recombinant 6 Unit(s) SubCutaneous every 6 hours  lidocaine   Patch 1 Patch Transdermal daily  lidocaine   Patch 1 Patch Transdermal daily  metoprolol tartrate 75 milliGRAM(s) Oral every 12 hours  mirtazapine 15 milliGRAM(s) Oral <User Schedule>  pantoprazole   Suspension 40 milliGRAM(s) Oral daily  polyethylene glycol 3350 17 Gram(s) Oral daily  senna Syrup 5 milliLiter(s) Oral at bedtime  simvastatin 40 milliGRAM(s) Oral at bedtime    MEDICATIONS  (PRN):  acetaminophen    Suspension. 650 milliGRAM(s) Oral every 6 hours PRN Mild Pain (1 - 3)  oxyCODONE    Solution 10 milliGRAM(s) Oral every 6 hours PRN Severe Pain (7 - 10)      LABS:                        10.3   14.2  )-----------( 677      ( 10 Apr 2018 04:22 )             31.1     04-10    142  |  106  |  29<H>  ----------------------------<  116<H>  3.1<L>   |  24  |  0.74    Ca    8.7      10 Apr 2018 04:22  Phos  3.1     04-10  Mg     2.2     04-10    TPro  6.4  /  Alb  2.5<L>  /  TBili  3.4<H>  /  DBili  1.1<H>  /  AST  41<H>  /  ALT  46<H>  /  AlkPhos  329<H>  04-10          RADIOLOGY & ADDITIONAL STUDIES:
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
## 2018-04-11 LAB
ALBUMIN SERPL ELPH-MCNC: 2.4 G/DL — LOW (ref 3.3–5)
ALP SERPL-CCNC: 344 U/L — HIGH (ref 40–120)
ALT FLD-CCNC: 40 U/L RC — SIGNIFICANT CHANGE UP (ref 10–45)
ANION GAP SERPL CALC-SCNC: 12 MMOL/L — SIGNIFICANT CHANGE UP (ref 5–17)
AST SERPL-CCNC: 34 U/L — SIGNIFICANT CHANGE UP (ref 10–40)
BILIRUB SERPL-MCNC: 3 MG/DL — HIGH (ref 0.2–1.2)
BUN SERPL-MCNC: 29 MG/DL — HIGH (ref 7–23)
CA-I BLD-SCNC: 1.27 MMOL/L — SIGNIFICANT CHANGE UP (ref 1.12–1.3)
CALCIUM SERPL-MCNC: 8.9 MG/DL — SIGNIFICANT CHANGE UP (ref 8.4–10.5)
CHLORIDE SERPL-SCNC: 109 MMOL/L — HIGH (ref 96–108)
CO2 SERPL-SCNC: 25 MMOL/L — SIGNIFICANT CHANGE UP (ref 22–31)
CREAT SERPL-MCNC: 0.66 MG/DL — SIGNIFICANT CHANGE UP (ref 0.5–1.3)
GLUCOSE BLDC GLUCOMTR-MCNC: 112 MG/DL — HIGH (ref 70–99)
GLUCOSE BLDC GLUCOMTR-MCNC: 112 MG/DL — HIGH (ref 70–99)
GLUCOSE BLDC GLUCOMTR-MCNC: 223 MG/DL — HIGH (ref 70–99)
GLUCOSE BLDC GLUCOMTR-MCNC: 234 MG/DL — HIGH (ref 70–99)
GLUCOSE SERPL-MCNC: 150 MG/DL — HIGH (ref 70–99)
HCT VFR BLD CALC: 29.7 % — LOW (ref 39–50)
HGB BLD-MCNC: 9.5 G/DL — LOW (ref 13–17)
MAGNESIUM SERPL-MCNC: 2 MG/DL — SIGNIFICANT CHANGE UP (ref 1.6–2.6)
MCHC RBC-ENTMCNC: 31.1 PG — SIGNIFICANT CHANGE UP (ref 27–34)
MCHC RBC-ENTMCNC: 31.9 GM/DL — LOW (ref 32–36)
MCV RBC AUTO: 97.4 FL — SIGNIFICANT CHANGE UP (ref 80–100)
PHOSPHATE SERPL-MCNC: 2.6 MG/DL — SIGNIFICANT CHANGE UP (ref 2.5–4.5)
PLATELET # BLD AUTO: 690 K/UL — HIGH (ref 150–400)
POTASSIUM SERPL-MCNC: 3.7 MMOL/L — SIGNIFICANT CHANGE UP (ref 3.5–5.3)
POTASSIUM SERPL-SCNC: 3.7 MMOL/L — SIGNIFICANT CHANGE UP (ref 3.5–5.3)
PROT SERPL-MCNC: 6 G/DL — SIGNIFICANT CHANGE UP (ref 6–8.3)
RBC # BLD: 3.05 M/UL — LOW (ref 4.2–5.8)
RBC # FLD: 16.7 % — HIGH (ref 10.3–14.5)
SODIUM SERPL-SCNC: 146 MMOL/L — HIGH (ref 135–145)
WBC # BLD: 11.5 K/UL — HIGH (ref 3.8–10.5)
WBC # FLD AUTO: 11.5 K/UL — HIGH (ref 3.8–10.5)

## 2018-04-11 PROCEDURE — 99233 SBSQ HOSP IP/OBS HIGH 50: CPT

## 2018-04-11 PROCEDURE — 71045 X-RAY EXAM CHEST 1 VIEW: CPT | Mod: 26

## 2018-04-11 RX ORDER — POTASSIUM PHOSPHATE, MONOBASIC POTASSIUM PHOSPHATE, DIBASIC 236; 224 MG/ML; MG/ML
15 INJECTION, SOLUTION INTRAVENOUS ONCE
Qty: 0 | Refills: 0 | Status: COMPLETED | OUTPATIENT
Start: 2018-04-11 | End: 2018-04-11

## 2018-04-11 RX ADMIN — OXYCODONE HYDROCHLORIDE 10 MILLIGRAM(S): 5 TABLET ORAL at 03:27

## 2018-04-11 RX ADMIN — Medication 4: at 12:40

## 2018-04-11 RX ADMIN — HUMAN INSULIN 6 UNIT(S): 100 INJECTION, SUSPENSION SUBCUTANEOUS at 17:59

## 2018-04-11 RX ADMIN — CLOPIDOGREL BISULFATE 75 MILLIGRAM(S): 75 TABLET, FILM COATED ORAL at 12:25

## 2018-04-11 RX ADMIN — Medication 1 MILLIGRAM(S): at 21:05

## 2018-04-11 RX ADMIN — OXYCODONE HYDROCHLORIDE 10 MILLIGRAM(S): 5 TABLET ORAL at 11:17

## 2018-04-11 RX ADMIN — MIRTAZAPINE 15 MILLIGRAM(S): 45 TABLET, ORALLY DISINTEGRATING ORAL at 21:04

## 2018-04-11 RX ADMIN — Medication 75 MILLIGRAM(S): at 06:12

## 2018-04-11 RX ADMIN — SENNA PLUS 5 MILLILITER(S): 8.6 TABLET ORAL at 21:05

## 2018-04-11 RX ADMIN — OXYCODONE HYDROCHLORIDE 10 MILLIGRAM(S): 5 TABLET ORAL at 17:46

## 2018-04-11 RX ADMIN — Medication 10 MILLIGRAM(S): at 17:47

## 2018-04-11 RX ADMIN — SIMVASTATIN 40 MILLIGRAM(S): 20 TABLET, FILM COATED ORAL at 21:04

## 2018-04-11 RX ADMIN — LIDOCAINE 1 PATCH: 4 CREAM TOPICAL at 12:25

## 2018-04-11 RX ADMIN — POTASSIUM PHOSPHATE, MONOBASIC POTASSIUM PHOSPHATE, DIBASIC 62.5 MILLIMOLE(S): 236; 224 INJECTION, SOLUTION INTRAVENOUS at 06:17

## 2018-04-11 RX ADMIN — ENOXAPARIN SODIUM 40 MILLIGRAM(S): 100 INJECTION SUBCUTANEOUS at 12:24

## 2018-04-11 RX ADMIN — Medication 4: at 17:59

## 2018-04-11 RX ADMIN — OXYCODONE HYDROCHLORIDE 10 MILLIGRAM(S): 5 TABLET ORAL at 18:15

## 2018-04-11 RX ADMIN — OXYCODONE HYDROCHLORIDE 10 MILLIGRAM(S): 5 TABLET ORAL at 11:45

## 2018-04-11 RX ADMIN — Medication 10 MILLIGRAM(S): at 06:11

## 2018-04-11 RX ADMIN — Medication 81 MILLIGRAM(S): at 12:26

## 2018-04-11 RX ADMIN — Medication 100 MILLIGRAM(S): at 06:13

## 2018-04-11 RX ADMIN — Medication 100 MILLIGRAM(S): at 17:47

## 2018-04-11 RX ADMIN — Medication 75 MILLIGRAM(S): at 17:47

## 2018-04-11 RX ADMIN — HUMAN INSULIN 6 UNIT(S): 100 INJECTION, SUSPENSION SUBCUTANEOUS at 12:40

## 2018-04-11 RX ADMIN — Medication 1 APPLICATION(S): at 12:25

## 2018-04-11 RX ADMIN — HUMAN INSULIN 6 UNIT(S): 100 INJECTION, SUSPENSION SUBCUTANEOUS at 06:10
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
## 2018-04-11 NOTE — PROGRESS NOTE ADULT - SUBJECTIVE AND OBJECTIVE BOX
ACS Progress Note    S: Patient seen and examined. No acute events overnight. Patient continues to tolerate trach collar w/o issue. Awaiting RCU bed.    O:  Vital Signs Last 24 Hrs  T(C): 36.9 (11 Apr 2018 07:30), Max: 37.3 (10 Apr 2018 20:00)  T(F): 98.4 (11 Apr 2018 07:30), Max: 99.1 (10 Apr 2018 20:00)  HR: 75 (11 Apr 2018 07:28) (68 - 92)  BP: 121/56 (11 Apr 2018 03:00) (121/56 - 173/72)  BP(mean): 81 (11 Apr 2018 03:00) (81 - 103)  RR: 26 (11 Apr 2018 07:28) (20 - 36)  SpO2: 100% (11 Apr 2018 07:28) (95% - 100%)    I&O's Detail    10 Apr 2018 07:01  -  11 Apr 2018 07:00  --------------------------------------------------------  IN:    Enteral Tube Flush: 210 mL    Solution: 200 mL    Vital HN: 1360 mL  Total IN: 1770 mL    OUT:  Total OUT: 0 mL    Total NET: 1770 mL    11 Apr 2018 07:01  -  11 Apr 2018 10:50  --------------------------------------------------------  IN:    Solution: 187.5 mL    Vital HN: 340 mL  Total IN: 527.5 mL    OUT:  Total OUT: 0 mL    Total NET: 527.5 mL    MEDICATIONS  (STANDING):  aspirin  chewable 81 milliGRAM(s) Oral daily  BACItracin   Ointment 1 Application(s) Topical daily  clopidogrel Tablet 75 milliGRAM(s) Oral daily  docusate sodium Liquid 100 milliGRAM(s) Oral two times a day  doxazosin 1 milliGRAM(s) Oral at bedtime  enalapril 10 milliGRAM(s) Oral every 12 hours  enoxaparin Injectable 40 milliGRAM(s) SubCutaneous daily  insulin lispro (HumaLOG) corrective regimen sliding scale   SubCutaneous every 6 hours  insulin NPH human recombinant 6 Unit(s) SubCutaneous every 6 hours  lidocaine   Patch 1 Patch Transdermal daily  lidocaine   Patch 1 Patch Transdermal daily  metoprolol tartrate 75 milliGRAM(s) Oral every 12 hours  mirtazapine 15 milliGRAM(s) Oral <User Schedule>  senna Syrup 5 milliLiter(s) Oral at bedtime  simvastatin 40 milliGRAM(s) Oral at bedtime    MEDICATIONS  (PRN):  acetaminophen    Suspension. 650 milliGRAM(s) Oral every 6 hours PRN Mild Pain (1 - 3)  oxyCODONE    Solution 10 milliGRAM(s) Oral every 6 hours PRN Severe Pain (7 - 10)                        9.5    11.5  )-----------( 690      ( 11 Apr 2018 05:23 )             29.7     04-11    146<H>  |  109<H>  |  29<H>  ----------------------------<  150<H>  3.7   |  25  |  0.66    Ca    8.9      11 Apr 2018 05:23  Phos  2.6     04-11  Mg     2.0     04-11    TPro  6.0  /  Alb  2.4<L>  /  TBili  3.0<H>  /  DBili  x   /  AST  34  /  ALT  40  /  AlkPhos  344<H>  04-11    Physical Exam:  Gen: Laying in bed, NAD, able to be awakened and moves all ext  Resp: Unlabored breathing, trach collar  Abd: soft, NT, ND  Ext: WWP distally    Lines:   IV: patent, in place.
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
## 2018-04-12 LAB
ALBUMIN SERPL ELPH-MCNC: 2.6 G/DL — LOW (ref 3.3–5)
ALP SERPL-CCNC: 389 U/L — HIGH (ref 40–120)
ALT FLD-CCNC: 39 U/L RC — SIGNIFICANT CHANGE UP (ref 10–45)
ANION GAP SERPL CALC-SCNC: 9 MMOL/L — SIGNIFICANT CHANGE UP (ref 5–17)
AST SERPL-CCNC: 34 U/L — SIGNIFICANT CHANGE UP (ref 10–40)
BILIRUB SERPL-MCNC: 3.4 MG/DL — HIGH (ref 0.2–1.2)
BUN SERPL-MCNC: 25 MG/DL — HIGH (ref 7–23)
CA-I BLD-SCNC: 1.27 MMOL/L — SIGNIFICANT CHANGE UP (ref 1.12–1.3)
CALCIUM SERPL-MCNC: 8.8 MG/DL — SIGNIFICANT CHANGE UP (ref 8.4–10.5)
CHLORIDE SERPL-SCNC: 108 MMOL/L — SIGNIFICANT CHANGE UP (ref 96–108)
CO2 SERPL-SCNC: 26 MMOL/L — SIGNIFICANT CHANGE UP (ref 22–31)
CREAT SERPL-MCNC: 0.64 MG/DL — SIGNIFICANT CHANGE UP (ref 0.5–1.3)
GLUCOSE BLDC GLUCOMTR-MCNC: 125 MG/DL — HIGH (ref 70–99)
GLUCOSE BLDC GLUCOMTR-MCNC: 198 MG/DL — HIGH (ref 70–99)
GLUCOSE BLDC GLUCOMTR-MCNC: 268 MG/DL — HIGH (ref 70–99)
GLUCOSE SERPL-MCNC: 148 MG/DL — HIGH (ref 70–99)
HCT VFR BLD CALC: 32.2 % — LOW (ref 39–50)
HGB BLD-MCNC: 10.5 G/DL — LOW (ref 13–17)
MAGNESIUM SERPL-MCNC: 2 MG/DL — SIGNIFICANT CHANGE UP (ref 1.6–2.6)
MCHC RBC-ENTMCNC: 32.2 PG — SIGNIFICANT CHANGE UP (ref 27–34)
MCHC RBC-ENTMCNC: 32.6 GM/DL — SIGNIFICANT CHANGE UP (ref 32–36)
MCV RBC AUTO: 98.6 FL — SIGNIFICANT CHANGE UP (ref 80–100)
PHOSPHATE SERPL-MCNC: 2.9 MG/DL — SIGNIFICANT CHANGE UP (ref 2.5–4.5)
PLATELET # BLD AUTO: 701 K/UL — HIGH (ref 150–400)
POTASSIUM SERPL-MCNC: 4.1 MMOL/L — SIGNIFICANT CHANGE UP (ref 3.5–5.3)
POTASSIUM SERPL-SCNC: 4.1 MMOL/L — SIGNIFICANT CHANGE UP (ref 3.5–5.3)
PROT SERPL-MCNC: 6.6 G/DL — SIGNIFICANT CHANGE UP (ref 6–8.3)
RBC # BLD: 3.26 M/UL — LOW (ref 4.2–5.8)
RBC # FLD: 16.6 % — HIGH (ref 10.3–14.5)
SODIUM SERPL-SCNC: 143 MMOL/L — SIGNIFICANT CHANGE UP (ref 135–145)
WBC # BLD: 13.5 K/UL — HIGH (ref 3.8–10.5)
WBC # FLD AUTO: 13.5 K/UL — HIGH (ref 3.8–10.5)

## 2018-04-12 PROCEDURE — 71045 X-RAY EXAM CHEST 1 VIEW: CPT | Mod: 26

## 2018-04-12 PROCEDURE — 99233 SBSQ HOSP IP/OBS HIGH 50: CPT

## 2018-04-12 PROCEDURE — 99254 IP/OBS CNSLTJ NEW/EST MOD 60: CPT

## 2018-04-12 RX ORDER — METOPROLOL TARTRATE 50 MG
100 TABLET ORAL EVERY 12 HOURS
Qty: 0 | Refills: 0 | Status: DISCONTINUED | OUTPATIENT
Start: 2018-04-12 | End: 2018-04-27

## 2018-04-12 RX ORDER — FUROSEMIDE 40 MG
20 TABLET ORAL ONCE
Qty: 0 | Refills: 0 | Status: COMPLETED | OUTPATIENT
Start: 2018-04-12 | End: 2018-04-12

## 2018-04-12 RX ORDER — METOPROLOL TARTRATE 50 MG
25 TABLET ORAL ONCE
Qty: 0 | Refills: 0 | Status: COMPLETED | OUTPATIENT
Start: 2018-04-12 | End: 2018-04-12

## 2018-04-12 RX ORDER — QUETIAPINE FUMARATE 200 MG/1
12.5 TABLET, FILM COATED ORAL ONCE
Qty: 0 | Refills: 0 | Status: DISCONTINUED | OUTPATIENT
Start: 2018-04-12 | End: 2018-04-12

## 2018-04-12 RX ORDER — HALOPERIDOL DECANOATE 100 MG/ML
0.5 INJECTION INTRAMUSCULAR ONCE
Qty: 0 | Refills: 0 | Status: COMPLETED | OUTPATIENT
Start: 2018-04-12 | End: 2018-04-12

## 2018-04-12 RX ADMIN — Medication 1 APPLICATION(S): at 13:29

## 2018-04-12 RX ADMIN — Medication 2: at 18:00

## 2018-04-12 RX ADMIN — HUMAN INSULIN 6 UNIT(S): 100 INJECTION, SUSPENSION SUBCUTANEOUS at 06:13

## 2018-04-12 RX ADMIN — OXYCODONE HYDROCHLORIDE 10 MILLIGRAM(S): 5 TABLET ORAL at 22:00

## 2018-04-12 RX ADMIN — Medication 100 MILLIGRAM(S): at 06:12

## 2018-04-12 RX ADMIN — HUMAN INSULIN 6 UNIT(S): 100 INJECTION, SUSPENSION SUBCUTANEOUS at 18:01

## 2018-04-12 RX ADMIN — Medication 10 MILLIGRAM(S): at 06:11

## 2018-04-12 RX ADMIN — LIDOCAINE 1 PATCH: 4 CREAM TOPICAL at 13:31

## 2018-04-12 RX ADMIN — LIDOCAINE 1 PATCH: 4 CREAM TOPICAL at 01:31

## 2018-04-12 RX ADMIN — ENOXAPARIN SODIUM 40 MILLIGRAM(S): 100 INJECTION SUBCUTANEOUS at 13:30

## 2018-04-12 RX ADMIN — CLOPIDOGREL BISULFATE 75 MILLIGRAM(S): 75 TABLET, FILM COATED ORAL at 13:29

## 2018-04-12 RX ADMIN — SIMVASTATIN 40 MILLIGRAM(S): 20 TABLET, FILM COATED ORAL at 21:16

## 2018-04-12 RX ADMIN — Medication 10 MILLIGRAM(S): at 21:00

## 2018-04-12 RX ADMIN — Medication 75 MILLIGRAM(S): at 06:12

## 2018-04-12 RX ADMIN — HALOPERIDOL DECANOATE 0.5 MILLIGRAM(S): 100 INJECTION INTRAMUSCULAR at 17:59

## 2018-04-12 RX ADMIN — Medication 20 MILLIGRAM(S): at 10:24

## 2018-04-12 RX ADMIN — MIRTAZAPINE 15 MILLIGRAM(S): 45 TABLET, ORALLY DISINTEGRATING ORAL at 21:16

## 2018-04-12 RX ADMIN — OXYCODONE HYDROCHLORIDE 10 MILLIGRAM(S): 5 TABLET ORAL at 03:19

## 2018-04-12 RX ADMIN — Medication 6: at 13:36

## 2018-04-12 RX ADMIN — OXYCODONE HYDROCHLORIDE 10 MILLIGRAM(S): 5 TABLET ORAL at 21:00

## 2018-04-12 RX ADMIN — Medication 25 MILLIGRAM(S): at 10:24

## 2018-04-12 RX ADMIN — HUMAN INSULIN 6 UNIT(S): 100 INJECTION, SUSPENSION SUBCUTANEOUS at 13:37

## 2018-04-12 RX ADMIN — Medication 100 MILLIGRAM(S): at 17:44

## 2018-04-12 RX ADMIN — OXYCODONE HYDROCHLORIDE 10 MILLIGRAM(S): 5 TABLET ORAL at 02:55

## 2018-04-12 RX ADMIN — Medication 81 MILLIGRAM(S): at 13:29
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
## 2018-04-12 NOTE — PROGRESS NOTE ADULT - ATTENDING COMMENTS
Tolerating TC now  HTN control  Tube feed  CXR pul vascular congestion, will give one dose diuretic  PT, awaiting RCU bed  Can be transferred to rehab
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
## 2018-04-13 DIAGNOSIS — I10 ESSENTIAL (PRIMARY) HYPERTENSION: ICD-10-CM
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
## 2018-04-13 DIAGNOSIS — J96.90 RESPIRATORY FAILURE, UNSPECIFIED, UNSPECIFIED WHETHER WITH HYPOXIA OR HYPERCAPNIA: ICD-10-CM

## 2018-04-13 DIAGNOSIS — F05 DELIRIUM DUE TO KNOWN PHYSIOLOGICAL CONDITION: ICD-10-CM

## 2018-04-13 LAB
ANION GAP SERPL CALC-SCNC: 10 MMOL/L — SIGNIFICANT CHANGE UP (ref 5–17)
BUN SERPL-MCNC: 30 MG/DL — HIGH (ref 7–23)
CA-I BLD-SCNC: 1.26 MMOL/L — SIGNIFICANT CHANGE UP (ref 1.12–1.3)
CALCIUM SERPL-MCNC: 8.9 MG/DL — SIGNIFICANT CHANGE UP (ref 8.4–10.5)
CHLORIDE SERPL-SCNC: 108 MMOL/L — SIGNIFICANT CHANGE UP (ref 96–108)
CO2 SERPL-SCNC: 28 MMOL/L — SIGNIFICANT CHANGE UP (ref 22–31)
CREAT SERPL-MCNC: 0.67 MG/DL — SIGNIFICANT CHANGE UP (ref 0.5–1.3)
GLUCOSE SERPL-MCNC: 233 MG/DL — HIGH (ref 70–99)
GRAM STN FLD: SIGNIFICANT CHANGE UP
HCT VFR BLD CALC: 33.2 % — LOW (ref 39–50)
HGB BLD-MCNC: 10 G/DL — LOW (ref 13–17)
MAGNESIUM SERPL-MCNC: 1.9 MG/DL — SIGNIFICANT CHANGE UP (ref 1.6–2.6)
MCHC RBC-ENTMCNC: 30.1 GM/DL — LOW (ref 32–36)
MCHC RBC-ENTMCNC: 30.5 PG — SIGNIFICANT CHANGE UP (ref 27–34)
MCV RBC AUTO: 101.2 FL — HIGH (ref 80–100)
PHOSPHATE SERPL-MCNC: 2.1 MG/DL — LOW (ref 2.5–4.5)
PLATELET # BLD AUTO: 724 K/UL — HIGH (ref 150–400)
POTASSIUM SERPL-MCNC: 3.6 MMOL/L — SIGNIFICANT CHANGE UP (ref 3.5–5.3)
POTASSIUM SERPL-SCNC: 3.6 MMOL/L — SIGNIFICANT CHANGE UP (ref 3.5–5.3)
RBC # BLD: 3.28 M/UL — LOW (ref 4.2–5.8)
RBC # FLD: 19.6 % — HIGH (ref 10.3–14.5)
SODIUM SERPL-SCNC: 146 MMOL/L — HIGH (ref 135–145)
SPECIMEN SOURCE: SIGNIFICANT CHANGE UP
WBC # BLD: 16.7 K/UL — HIGH (ref 3.8–10.5)
WBC # FLD AUTO: 16.7 K/UL — HIGH (ref 3.8–10.5)

## 2018-04-13 PROCEDURE — 71045 X-RAY EXAM CHEST 1 VIEW: CPT | Mod: 26

## 2018-04-13 PROCEDURE — 99222 1ST HOSP IP/OBS MODERATE 55: CPT

## 2018-04-13 PROCEDURE — 31615 TRCHEOBRNCHSC EST TRACHS INC: CPT

## 2018-04-13 PROCEDURE — 31502 CHANGE OF WINDPIPE AIRWAY: CPT

## 2018-04-13 PROCEDURE — 93010 ELECTROCARDIOGRAM REPORT: CPT

## 2018-04-13 PROCEDURE — 99232 SBSQ HOSP IP/OBS MODERATE 35: CPT | Mod: 25

## 2018-04-13 PROCEDURE — 99223 1ST HOSP IP/OBS HIGH 75: CPT | Mod: GC

## 2018-04-13 PROCEDURE — 31575 DIAGNOSTIC LARYNGOSCOPY: CPT | Mod: 59

## 2018-04-13 RX ORDER — FUROSEMIDE 40 MG
20 TABLET ORAL
Qty: 0 | Refills: 0 | Status: DISCONTINUED | OUTPATIENT
Start: 2018-04-13 | End: 2018-04-14

## 2018-04-13 RX ORDER — VANCOMYCIN HCL 1 G
1000 VIAL (EA) INTRAVENOUS EVERY 12 HOURS
Qty: 0 | Refills: 0 | Status: DISCONTINUED | OUTPATIENT
Start: 2018-04-13 | End: 2018-04-15

## 2018-04-13 RX ORDER — PIPERACILLIN AND TAZOBACTAM 4; .5 G/20ML; G/20ML
3.38 INJECTION, POWDER, LYOPHILIZED, FOR SOLUTION INTRAVENOUS EVERY 8 HOURS
Qty: 0 | Refills: 0 | Status: DISCONTINUED | OUTPATIENT
Start: 2018-04-13 | End: 2018-04-16

## 2018-04-13 RX ORDER — VANCOMYCIN HCL 1 G
1000 VIAL (EA) INTRAVENOUS EVERY 12 HOURS
Qty: 0 | Refills: 0 | Status: DISCONTINUED | OUTPATIENT
Start: 2018-04-13 | End: 2018-04-13

## 2018-04-13 RX ORDER — POTASSIUM CHLORIDE 20 MEQ
40 PACKET (EA) ORAL ONCE
Qty: 0 | Refills: 0 | Status: COMPLETED | OUTPATIENT
Start: 2018-04-13 | End: 2018-04-13

## 2018-04-13 RX ORDER — HALOPERIDOL DECANOATE 100 MG/ML
0.5 INJECTION INTRAMUSCULAR EVERY 4 HOURS
Qty: 0 | Refills: 0 | Status: DISCONTINUED | OUTPATIENT
Start: 2018-04-13 | End: 2018-04-15

## 2018-04-13 RX ADMIN — OXYCODONE HYDROCHLORIDE 10 MILLIGRAM(S): 5 TABLET ORAL at 08:20

## 2018-04-13 RX ADMIN — OXYCODONE HYDROCHLORIDE 10 MILLIGRAM(S): 5 TABLET ORAL at 21:47

## 2018-04-13 RX ADMIN — HUMAN INSULIN 6 UNIT(S): 100 INJECTION, SUSPENSION SUBCUTANEOUS at 23:40

## 2018-04-13 RX ADMIN — Medication 250 MILLIGRAM(S): at 18:01

## 2018-04-13 RX ADMIN — Medication 1 MILLIGRAM(S): at 00:03

## 2018-04-13 RX ADMIN — HALOPERIDOL DECANOATE 0.5 MILLIGRAM(S): 100 INJECTION INTRAMUSCULAR at 22:48

## 2018-04-13 RX ADMIN — LIDOCAINE 1 PATCH: 4 CREAM TOPICAL at 12:46

## 2018-04-13 RX ADMIN — Medication 1 APPLICATION(S): at 12:45

## 2018-04-13 RX ADMIN — HUMAN INSULIN 6 UNIT(S): 100 INJECTION, SUSPENSION SUBCUTANEOUS at 18:01

## 2018-04-13 RX ADMIN — HUMAN INSULIN 6 UNIT(S): 100 INJECTION, SUSPENSION SUBCUTANEOUS at 00:04

## 2018-04-13 RX ADMIN — Medication 4: at 06:07

## 2018-04-13 RX ADMIN — Medication 40 MILLIEQUIVALENT(S): at 18:00

## 2018-04-13 RX ADMIN — Medication 20 MILLIGRAM(S): at 18:00

## 2018-04-13 RX ADMIN — CLOPIDOGREL BISULFATE 75 MILLIGRAM(S): 75 TABLET, FILM COATED ORAL at 12:45

## 2018-04-13 RX ADMIN — Medication 100 MILLIGRAM(S): at 18:00

## 2018-04-13 RX ADMIN — Medication 10 MILLIGRAM(S): at 18:00

## 2018-04-13 RX ADMIN — Medication 1 MILLIGRAM(S): at 21:50

## 2018-04-13 RX ADMIN — Medication 100 MILLIGRAM(S): at 06:08

## 2018-04-13 RX ADMIN — LIDOCAINE 1 PATCH: 4 CREAM TOPICAL at 01:00

## 2018-04-13 RX ADMIN — PIPERACILLIN AND TAZOBACTAM 25 GRAM(S): 4; .5 INJECTION, POWDER, LYOPHILIZED, FOR SOLUTION INTRAVENOUS at 21:50

## 2018-04-13 RX ADMIN — OXYCODONE HYDROCHLORIDE 10 MILLIGRAM(S): 5 TABLET ORAL at 22:40

## 2018-04-13 RX ADMIN — Medication 4: at 18:01

## 2018-04-13 RX ADMIN — OXYCODONE HYDROCHLORIDE 10 MILLIGRAM(S): 5 TABLET ORAL at 13:30

## 2018-04-13 RX ADMIN — SIMVASTATIN 40 MILLIGRAM(S): 20 TABLET, FILM COATED ORAL at 21:50

## 2018-04-13 RX ADMIN — OXYCODONE HYDROCHLORIDE 10 MILLIGRAM(S): 5 TABLET ORAL at 13:03

## 2018-04-13 RX ADMIN — HUMAN INSULIN 6 UNIT(S): 100 INJECTION, SUSPENSION SUBCUTANEOUS at 12:46

## 2018-04-13 RX ADMIN — PIPERACILLIN AND TAZOBACTAM 25 GRAM(S): 4; .5 INJECTION, POWDER, LYOPHILIZED, FOR SOLUTION INTRAVENOUS at 16:03

## 2018-04-13 RX ADMIN — Medication 10 MILLIGRAM(S): at 07:53

## 2018-04-13 RX ADMIN — ENOXAPARIN SODIUM 40 MILLIGRAM(S): 100 INJECTION SUBCUTANEOUS at 12:45

## 2018-04-13 RX ADMIN — Medication 81 MILLIGRAM(S): at 12:45

## 2018-04-13 RX ADMIN — OXYCODONE HYDROCHLORIDE 10 MILLIGRAM(S): 5 TABLET ORAL at 07:53

## 2018-04-13 RX ADMIN — MIRTAZAPINE 15 MILLIGRAM(S): 45 TABLET, ORALLY DISINTEGRATING ORAL at 21:50

## 2018-04-13 RX ADMIN — HALOPERIDOL DECANOATE 0.5 MILLIGRAM(S): 100 INJECTION INTRAMUSCULAR at 18:44

## 2018-04-13 RX ADMIN — HUMAN INSULIN 6 UNIT(S): 100 INJECTION, SUSPENSION SUBCUTANEOUS at 06:07

## 2018-04-13 NOTE — PROGRESS NOTE ADULT - PROBLEM SELECTOR PLAN 1
right frontal hematoma, left chest & flank tenderness, left thigh tenderness, and right hand laceration. CT scans were obtained, which revealed acute left 4th-8th rib fractures, left superior ramus fracture with a large extraperitoneal hematoma & multiple foci of active extravasation, left inferior pubic ramus fracture, right superior pubic ramus fracture, left L5 lamina & hemisacrum fractures, several spleen lacerations (largest is a grade 2 laceration), and grade 2 left kidney laceration. S/P IR for glue and coil embolization

## 2018-04-13 NOTE — CHART NOTE - NSCHARTNOTEFT_GEN_A_CORE
supplement KCL since K 3.6 and Lasix added today  Trach down sized to Portex#7 cuffless w/o difficulty by ENT  PMV eval requested, pt can be seen early next wk when tracheobronchitis improves  Changed iv Vanco to 0.9 NS base due to DM2    Psych input appreciated. will add Haldol prn  Continue Seroquel  EKG QTc 430 today   po4 low today, repeat 4/14
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
## 2018-04-13 NOTE — PROGRESS NOTE ADULT - PROBLEM SELECTOR PLAN 2
Does not require ventilator currently on trach collar since 4/9. Called SICU [id number] this am 808am regarding clearance to downsize trach. Was told that this was a straight forward trach with out any issues.

## 2018-04-13 NOTE — BEHAVIORAL HEALTH ASSESSMENT NOTE - NSBHCHARTREVIEWVS_PSY_A_CORE FT
Vital Signs Last 24 Hrs  T(C): 37 (13 Apr 2018 12:25), Max: 37.4 (12 Apr 2018 16:00)  T(F): 98.6 (13 Apr 2018 12:25), Max: 99.3 (12 Apr 2018 16:00)  HR: 81 (13 Apr 2018 12:25) (74 - 101)  BP: 133/65 (13 Apr 2018 12:25) (133/65 - 180/65)  BP(mean): 105 (12 Apr 2018 16:00) (105 - 105)  RR: 18 (13 Apr 2018 12:25) (18 - 31)  SpO2: 98% (13 Apr 2018 12:25) (96% - 100%)
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
## 2018-04-13 NOTE — BEHAVIORAL HEALTH ASSESSMENT NOTE - NSBHCHARTREVIEWINVESTIGATE_PSY_A_CORE FT
Ventricular Rate 75 BPM    Atrial Rate 75 BPM    P-R Interval 152 ms    QRS Duration 90 ms     ms    QTc 419 ms    P Axis 45 degrees    R Axis 81 degrees    T Axis 41 degrees    Diagnosis Line NORMAL SINUS RHYTHM  NORMAL ECG
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
## 2018-04-13 NOTE — BEHAVIORAL HEALTH ASSESSMENT NOTE - NSBHCHARTREVIEWLAB_PSY_A_CORE FT
10.0   16.70 )-----------( 724      ( 13 Apr 2018 09:35 )             33.2     04-13    146<H>  |  108  |  30<H>  ----------------------------<  233<H>  3.6   |  28  |  0.67    Ca    8.9      13 Apr 2018 07:20  Phos  2.1     04-13  Mg     1.9     04-13    TPro  6.6  /  Alb  2.6<L>  /  TBili  3.4<H>  /  DBili  x   /  AST  34  /  ALT  39  /  AlkPhos  389<H>  04-12

## 2018-04-14 DIAGNOSIS — J40 BRONCHITIS, NOT SPECIFIED AS ACUTE OR CHRONIC: ICD-10-CM

## 2018-04-14 DIAGNOSIS — I25.10 ATHEROSCLEROTIC HEART DISEASE OF NATIVE CORONARY ARTERY WITHOUT ANGINA PECTORIS: ICD-10-CM

## 2018-04-14 DIAGNOSIS — R41.0 DISORIENTATION, UNSPECIFIED: ICD-10-CM

## 2018-04-14 DIAGNOSIS — Z29.9 ENCOUNTER FOR PROPHYLACTIC MEASURES, UNSPECIFIED: ICD-10-CM

## 2018-04-14 DIAGNOSIS — I82.409 ACUTE EMBOLISM AND THROMBOSIS OF UNSPECIFIED DEEP VEINS OF UNSPECIFIED LOWER EXTREMITY: ICD-10-CM

## 2018-04-14 DIAGNOSIS — E87.0 HYPEROSMOLALITY AND HYPERNATREMIA: ICD-10-CM

## 2018-04-14 DIAGNOSIS — R14.0 ABDOMINAL DISTENSION (GASEOUS): ICD-10-CM

## 2018-04-14 LAB
ANION GAP SERPL CALC-SCNC: 10 MMOL/L — SIGNIFICANT CHANGE UP (ref 5–17)
BUN SERPL-MCNC: 36 MG/DL — HIGH (ref 7–23)
CALCIUM SERPL-MCNC: 8.9 MG/DL — SIGNIFICANT CHANGE UP (ref 8.4–10.5)
CHLORIDE SERPL-SCNC: 109 MMOL/L — HIGH (ref 96–108)
CO2 SERPL-SCNC: 29 MMOL/L — SIGNIFICANT CHANGE UP (ref 22–31)
CREAT SERPL-MCNC: 0.77 MG/DL — SIGNIFICANT CHANGE UP (ref 0.5–1.3)
GLUCOSE SERPL-MCNC: 140 MG/DL — HIGH (ref 70–99)
HCT VFR BLD CALC: 34.8 % — LOW (ref 39–50)
HGB BLD-MCNC: 11.1 G/DL — LOW (ref 13–17)
MAGNESIUM SERPL-MCNC: 2 MG/DL — SIGNIFICANT CHANGE UP (ref 1.6–2.6)
MCHC RBC-ENTMCNC: 31.9 GM/DL — LOW (ref 32–36)
MCHC RBC-ENTMCNC: 32 PG — SIGNIFICANT CHANGE UP (ref 27–34)
MCV RBC AUTO: 100 FL — SIGNIFICANT CHANGE UP (ref 80–100)
PHOSPHATE SERPL-MCNC: 2.8 MG/DL — SIGNIFICANT CHANGE UP (ref 2.5–4.5)
PLATELET # BLD AUTO: 636 K/UL — HIGH (ref 150–400)
POTASSIUM SERPL-MCNC: 3.2 MMOL/L — LOW (ref 3.5–5.3)
POTASSIUM SERPL-SCNC: 3.2 MMOL/L — LOW (ref 3.5–5.3)
RBC # BLD: 3.47 M/UL — LOW (ref 4.2–5.8)
RBC # FLD: 16.8 % — HIGH (ref 10.3–14.5)
SODIUM SERPL-SCNC: 148 MMOL/L — HIGH (ref 135–145)
WBC # BLD: 16.5 K/UL — HIGH (ref 3.8–10.5)
WBC # FLD AUTO: 16.5 K/UL — HIGH (ref 3.8–10.5)

## 2018-04-14 PROCEDURE — 74177 CT ABD & PELVIS W/CONTRAST: CPT | Mod: 26

## 2018-04-14 PROCEDURE — 74018 RADEX ABDOMEN 1 VIEW: CPT | Mod: 26

## 2018-04-14 PROCEDURE — 99233 SBSQ HOSP IP/OBS HIGH 50: CPT

## 2018-04-14 RX ORDER — POTASSIUM CHLORIDE 20 MEQ
20 PACKET (EA) ORAL ONCE
Qty: 0 | Refills: 0 | Status: COMPLETED | OUTPATIENT
Start: 2018-04-14 | End: 2018-04-14

## 2018-04-14 RX ORDER — LACTOBACILLUS ACIDOPHILUS 100MM CELL
1 CAPSULE ORAL EVERY 12 HOURS
Qty: 0 | Refills: 0 | Status: DISCONTINUED | OUTPATIENT
Start: 2018-04-14 | End: 2018-05-18

## 2018-04-14 RX ORDER — SIMETHICONE 80 MG/1
80 TABLET, CHEWABLE ORAL EVERY 8 HOURS
Qty: 0 | Refills: 0 | Status: DISCONTINUED | OUTPATIENT
Start: 2018-04-14 | End: 2018-05-18

## 2018-04-14 RX ORDER — POTASSIUM CHLORIDE 20 MEQ
40 PACKET (EA) ORAL ONCE
Qty: 0 | Refills: 0 | Status: COMPLETED | OUTPATIENT
Start: 2018-04-14 | End: 2018-04-14

## 2018-04-14 RX ORDER — SODIUM CHLORIDE 9 MG/ML
1000 INJECTION, SOLUTION INTRAVENOUS
Qty: 0 | Refills: 0 | Status: DISCONTINUED | OUTPATIENT
Start: 2018-04-14 | End: 2018-04-21

## 2018-04-14 RX ADMIN — LIDOCAINE 1 PATCH: 4 CREAM TOPICAL at 23:00

## 2018-04-14 RX ADMIN — Medication 1 MILLIGRAM(S): at 22:09

## 2018-04-14 RX ADMIN — Medication 10 MILLIGRAM(S): at 05:27

## 2018-04-14 RX ADMIN — Medication 100 MILLIGRAM(S): at 05:27

## 2018-04-14 RX ADMIN — Medication 40 MILLIEQUIVALENT(S): at 10:00

## 2018-04-14 RX ADMIN — Medication 81 MILLIGRAM(S): at 11:36

## 2018-04-14 RX ADMIN — Medication 20 MILLIGRAM(S): at 05:31

## 2018-04-14 RX ADMIN — SIMVASTATIN 40 MILLIGRAM(S): 20 TABLET, FILM COATED ORAL at 22:21

## 2018-04-14 RX ADMIN — CLOPIDOGREL BISULFATE 75 MILLIGRAM(S): 75 TABLET, FILM COATED ORAL at 11:36

## 2018-04-14 RX ADMIN — MIRTAZAPINE 15 MILLIGRAM(S): 45 TABLET, ORALLY DISINTEGRATING ORAL at 22:11

## 2018-04-14 RX ADMIN — OXYCODONE HYDROCHLORIDE 10 MILLIGRAM(S): 5 TABLET ORAL at 11:30

## 2018-04-14 RX ADMIN — OXYCODONE HYDROCHLORIDE 10 MILLIGRAM(S): 5 TABLET ORAL at 20:35

## 2018-04-14 RX ADMIN — HALOPERIDOL DECANOATE 0.5 MILLIGRAM(S): 100 INJECTION INTRAMUSCULAR at 03:18

## 2018-04-14 RX ADMIN — Medication 250 MILLIGRAM(S): at 18:50

## 2018-04-14 RX ADMIN — SIMETHICONE 80 MILLIGRAM(S): 80 TABLET, CHEWABLE ORAL at 13:13

## 2018-04-14 RX ADMIN — Medication 1 APPLICATION(S): at 11:36

## 2018-04-14 RX ADMIN — SIMETHICONE 80 MILLIGRAM(S): 80 TABLET, CHEWABLE ORAL at 22:09

## 2018-04-14 RX ADMIN — Medication 4: at 11:37

## 2018-04-14 RX ADMIN — LIDOCAINE 1 PATCH: 4 CREAM TOPICAL at 00:40

## 2018-04-14 RX ADMIN — HUMAN INSULIN 6 UNIT(S): 100 INJECTION, SUSPENSION SUBCUTANEOUS at 11:37

## 2018-04-14 RX ADMIN — SODIUM CHLORIDE 50 MILLILITER(S): 9 INJECTION, SOLUTION INTRAVENOUS at 13:13

## 2018-04-14 RX ADMIN — Medication 100 MILLIGRAM(S): at 18:05

## 2018-04-14 RX ADMIN — Medication 250 MILLIGRAM(S): at 05:21

## 2018-04-14 RX ADMIN — OXYCODONE HYDROCHLORIDE 10 MILLIGRAM(S): 5 TABLET ORAL at 10:00

## 2018-04-14 RX ADMIN — Medication 1 TABLET(S): at 18:05

## 2018-04-14 RX ADMIN — LIDOCAINE 1 PATCH: 4 CREAM TOPICAL at 11:36

## 2018-04-14 RX ADMIN — PIPERACILLIN AND TAZOBACTAM 25 GRAM(S): 4; .5 INJECTION, POWDER, LYOPHILIZED, FOR SOLUTION INTRAVENOUS at 05:23

## 2018-04-14 RX ADMIN — Medication 20 MILLIEQUIVALENT(S): at 18:05

## 2018-04-14 RX ADMIN — PIPERACILLIN AND TAZOBACTAM 25 GRAM(S): 4; .5 INJECTION, POWDER, LYOPHILIZED, FOR SOLUTION INTRAVENOUS at 22:11

## 2018-04-14 RX ADMIN — PIPERACILLIN AND TAZOBACTAM 25 GRAM(S): 4; .5 INJECTION, POWDER, LYOPHILIZED, FOR SOLUTION INTRAVENOUS at 13:13

## 2018-04-14 RX ADMIN — HALOPERIDOL DECANOATE 0.5 MILLIGRAM(S): 100 INJECTION INTRAMUSCULAR at 19:35

## 2018-04-14 RX ADMIN — Medication 10 MILLIGRAM(S): at 18:05

## 2018-04-14 RX ADMIN — ENOXAPARIN SODIUM 40 MILLIGRAM(S): 100 INJECTION SUBCUTANEOUS at 11:36

## 2018-04-14 RX ADMIN — OXYCODONE HYDROCHLORIDE 10 MILLIGRAM(S): 5 TABLET ORAL at 19:35

## 2018-04-14 RX ADMIN — HUMAN INSULIN 6 UNIT(S): 100 INJECTION, SUSPENSION SUBCUTANEOUS at 05:38

## 2018-04-14 NOTE — PROGRESS NOTE ADULT - PROBLEM SELECTOR PLAN 5
Patient currently NPO pending CT abdomen pelvis  RN instructed to hold NPH q 6hrs while NPO   Continue Insulin sliding scale   Monitor Blood Glucose monitoring q 4 hrs while NPO Patient with Na of 148 this morning   Will hold evening IVP Lasix  Patient with hypokalemia patient supplemented 60 meq of potassium   Continue IVF hydration while NPO

## 2018-04-14 NOTE — PROGRESS NOTE ADULT - PROBLEM SELECTOR PROBLEM 6
CAD (coronary artery disease) Type 2 diabetes mellitus without complication, without long-term current use of insulin

## 2018-04-14 NOTE — PROGRESS NOTE ADULT - PROBLEM SELECTOR PLAN 6
Continue Asa and Plavix  Continue Vasotec and Lopressor for HTN  Continue to monitor BP and HR Patient currently NPO pending CT abdomen pelvis  RN instructed to hold NPH q 6hrs while NPO   Continue Insulin sliding scale   Monitor Blood Glucose monitoring q 4 hrs while NPO
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
## 2018-04-14 NOTE — PROGRESS NOTE ADULT - PROBLEM SELECTOR PLAN 2
Patient S/p tracheostomy by trauma SX on 4/5  Patient tolerating TC atc   Patient downsized to #  7 Uncuffed Trach on 4/13 by ENT  CXR 4/13: Bilateral Pleural effusions with Pulmonary edema   IVP Lasix held this evening as pt is currently NPO and will receive IV contrast today   Continue Chest PT and Suctioning PRN
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
## 2018-04-14 NOTE — PROGRESS NOTE ADULT - PROBLEM SELECTOR PLAN 8
Venous Duplex 4/1: Acute DVT of Rt and Lft Soleal Vein   Venous Duplex 4/10: Patient with persistent Soleal DVT b/l w/o propagation   Patient to have repeat LE duplex performed on 4/17
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
## 2018-04-14 NOTE — PROGRESS NOTE ADULT - PROBLEM SELECTOR PLAN 7
Continue with Vasotec and lopressor Continue Asa,  Plavix and Simvastatin   Continue Vasotec and Lopressor for HTN  Continue to monitor BP and HR

## 2018-04-14 NOTE — PROGRESS NOTE ADULT - PROBLEM SELECTOR PROBLEM 5
Type 2 diabetes mellitus without complication, without long-term current use of insulin Hypernatremia

## 2018-04-14 NOTE — PROGRESS NOTE ADULT - PROBLEM SELECTOR PLAN 4
Patient with Na of 148 this morning   Will hold evening IVP Lasix  Patient with hypokalemia patient supplemented 60 meq of potassium   Continue IVF hydration only while NPO KUB 4/14: Distended loops of Large and small bowel  CT abdomen and Pelvis ordered w/ IV contrast as per Radiology   NPO except meds, 1/2 NS @ 50 cc/hr  Simethicone atc added and Acidophilous  If patient develops Diarrhea will send stool for C.diff

## 2018-04-14 NOTE — PROGRESS NOTE ADULT - PROBLEM SELECTOR PLAN 3
KUB 4/14: Distended loops of Large and small bowel  CT abdomen and Pelvis ordered w/ IV contrast as per Radiology   NPO except meds, 1/2 NS @ 50 cc/hr  Simethicone atc added Sputum cx 4/13: Gram positive Cocci and Gram Negative rods  Identification pending   Continue IV Vanco and Zosyn
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
## 2018-04-15 DIAGNOSIS — E83.39 OTHER DISORDERS OF PHOSPHORUS METABOLISM: ICD-10-CM

## 2018-04-15 LAB
-  AMPICILLIN/SULBACTAM: SIGNIFICANT CHANGE UP
-  CEFAZOLIN: SIGNIFICANT CHANGE UP
-  CIPROFLOXACIN: SIGNIFICANT CHANGE UP
-  CLINDAMYCIN: SIGNIFICANT CHANGE UP
-  ERYTHROMYCIN: SIGNIFICANT CHANGE UP
-  GENTAMICIN: SIGNIFICANT CHANGE UP
-  LEVOFLOXACIN: SIGNIFICANT CHANGE UP
-  MOXIFLOXACIN(AEROBIC): SIGNIFICANT CHANGE UP
-  OXACILLIN: SIGNIFICANT CHANGE UP
-  PENICILLIN: SIGNIFICANT CHANGE UP
-  RIFAMPIN: SIGNIFICANT CHANGE UP
-  TETRACYCLINE: SIGNIFICANT CHANGE UP
-  TRIMETHOPRIM/SULFAMETHOXAZOLE: SIGNIFICANT CHANGE UP
-  VANCOMYCIN: SIGNIFICANT CHANGE UP
ANION GAP SERPL CALC-SCNC: 11 MMOL/L — SIGNIFICANT CHANGE UP (ref 5–17)
BUN SERPL-MCNC: 30 MG/DL — HIGH (ref 7–23)
CALCIUM SERPL-MCNC: 8.5 MG/DL — SIGNIFICANT CHANGE UP (ref 8.4–10.5)
CHLORIDE SERPL-SCNC: 108 MMOL/L — SIGNIFICANT CHANGE UP (ref 96–108)
CO2 SERPL-SCNC: 26 MMOL/L — SIGNIFICANT CHANGE UP (ref 22–31)
CREAT SERPL-MCNC: 0.71 MG/DL — SIGNIFICANT CHANGE UP (ref 0.5–1.3)
CULTURE RESULTS: SIGNIFICANT CHANGE UP
GLUCOSE SERPL-MCNC: 132 MG/DL — HIGH (ref 70–99)
HCT VFR BLD CALC: 32.7 % — LOW (ref 39–50)
HCT VFR BLD CALC: 34.4 % — LOW (ref 39–50)
HGB BLD-MCNC: 10.9 G/DL — LOW (ref 13–17)
HGB BLD-MCNC: 10.9 G/DL — LOW (ref 13–17)
MAGNESIUM SERPL-MCNC: 1.9 MG/DL — SIGNIFICANT CHANGE UP (ref 1.6–2.6)
MCHC RBC-ENTMCNC: 31.6 GM/DL — LOW (ref 32–36)
MCHC RBC-ENTMCNC: 31.7 PG — SIGNIFICANT CHANGE UP (ref 27–34)
MCHC RBC-ENTMCNC: 32.8 PG — SIGNIFICANT CHANGE UP (ref 27–34)
MCHC RBC-ENTMCNC: 33.2 GM/DL — SIGNIFICANT CHANGE UP (ref 32–36)
MCV RBC AUTO: 101 FL — HIGH (ref 80–100)
MCV RBC AUTO: 98.7 FL — SIGNIFICANT CHANGE UP (ref 80–100)
METHOD TYPE: SIGNIFICANT CHANGE UP
ORGANISM # SPEC MICROSCOPIC CNT: SIGNIFICANT CHANGE UP
ORGANISM # SPEC MICROSCOPIC CNT: SIGNIFICANT CHANGE UP
PHOSPHATE SERPL-MCNC: 2.2 MG/DL — LOW (ref 2.5–4.5)
PLATELET # BLD AUTO: 571 K/UL — HIGH (ref 150–400)
PLATELET # BLD AUTO: 664 K/UL — HIGH (ref 150–400)
POTASSIUM SERPL-MCNC: 3.6 MMOL/L — SIGNIFICANT CHANGE UP (ref 3.5–5.3)
POTASSIUM SERPL-SCNC: 3.6 MMOL/L — SIGNIFICANT CHANGE UP (ref 3.5–5.3)
RBC # BLD: 3.31 M/UL — LOW (ref 4.2–5.8)
RBC # BLD: 3.42 M/UL — LOW (ref 4.2–5.8)
RBC # FLD: 16.4 % — HIGH (ref 10.3–14.5)
RBC # FLD: 16.7 % — HIGH (ref 10.3–14.5)
SODIUM SERPL-SCNC: 145 MMOL/L — SIGNIFICANT CHANGE UP (ref 135–145)
SPECIMEN SOURCE: SIGNIFICANT CHANGE UP
VANCOMYCIN TROUGH SERPL-MCNC: 9.7 UG/ML — LOW (ref 10–20)
WBC # BLD: 12.6 K/UL — HIGH (ref 3.8–10.5)
WBC # BLD: 14.5 K/UL — HIGH (ref 3.8–10.5)
WBC # FLD AUTO: 12.6 K/UL — HIGH (ref 3.8–10.5)
WBC # FLD AUTO: 14.5 K/UL — HIGH (ref 3.8–10.5)

## 2018-04-15 PROCEDURE — 74018 RADEX ABDOMEN 1 VIEW: CPT | Mod: 26

## 2018-04-15 PROCEDURE — 99233 SBSQ HOSP IP/OBS HIGH 50: CPT

## 2018-04-15 PROCEDURE — 99232 SBSQ HOSP IP/OBS MODERATE 35: CPT

## 2018-04-15 RX ORDER — OXYCODONE HYDROCHLORIDE 5 MG/1
5 TABLET ORAL EVERY 6 HOURS
Qty: 0 | Refills: 0 | Status: DISCONTINUED | OUTPATIENT
Start: 2018-04-15 | End: 2018-04-21

## 2018-04-15 RX ORDER — LANOLIN ALCOHOL/MO/W.PET/CERES
3 CREAM (GRAM) TOPICAL AT BEDTIME
Qty: 0 | Refills: 0 | Status: DISCONTINUED | OUTPATIENT
Start: 2018-04-15 | End: 2018-05-18

## 2018-04-15 RX ORDER — POTASSIUM CHLORIDE 20 MEQ
10 PACKET (EA) ORAL ONCE
Qty: 0 | Refills: 0 | Status: COMPLETED | OUTPATIENT
Start: 2018-04-15 | End: 2018-04-15

## 2018-04-15 RX ORDER — VANCOMYCIN HCL 1 G
1000 VIAL (EA) INTRAVENOUS EVERY 8 HOURS
Qty: 0 | Refills: 0 | Status: DISCONTINUED | OUTPATIENT
Start: 2018-04-15 | End: 2018-04-20

## 2018-04-15 RX ORDER — POTASSIUM PHOSPHATE, MONOBASIC POTASSIUM PHOSPHATE, DIBASIC 236; 224 MG/ML; MG/ML
15 INJECTION, SOLUTION INTRAVENOUS ONCE
Qty: 0 | Refills: 0 | Status: COMPLETED | OUTPATIENT
Start: 2018-04-15 | End: 2018-04-15

## 2018-04-15 RX ORDER — QUETIAPINE FUMARATE 200 MG/1
25 TABLET, FILM COATED ORAL AT BEDTIME
Qty: 0 | Refills: 0 | Status: DISCONTINUED | OUTPATIENT
Start: 2018-04-15 | End: 2018-04-15

## 2018-04-15 RX ORDER — HALOPERIDOL DECANOATE 100 MG/ML
1 INJECTION INTRAMUSCULAR EVERY 4 HOURS
Qty: 0 | Refills: 0 | Status: DISCONTINUED | OUTPATIENT
Start: 2018-04-15 | End: 2018-04-20

## 2018-04-15 RX ORDER — QUETIAPINE FUMARATE 200 MG/1
25 TABLET, FILM COATED ORAL AT BEDTIME
Qty: 0 | Refills: 0 | Status: DISCONTINUED | OUTPATIENT
Start: 2018-04-15 | End: 2018-04-28

## 2018-04-15 RX ADMIN — Medication 10 MILLIGRAM(S): at 17:25

## 2018-04-15 RX ADMIN — LIDOCAINE 1 PATCH: 4 CREAM TOPICAL at 11:56

## 2018-04-15 RX ADMIN — Medication 250 MILLIGRAM(S): at 21:53

## 2018-04-15 RX ADMIN — POTASSIUM PHOSPHATE, MONOBASIC POTASSIUM PHOSPHATE, DIBASIC 62.5 MILLIMOLE(S): 236; 224 INJECTION, SOLUTION INTRAVENOUS at 10:54

## 2018-04-15 RX ADMIN — Medication 81 MILLIGRAM(S): at 11:56

## 2018-04-15 RX ADMIN — PIPERACILLIN AND TAZOBACTAM 25 GRAM(S): 4; .5 INJECTION, POWDER, LYOPHILIZED, FOR SOLUTION INTRAVENOUS at 05:10

## 2018-04-15 RX ADMIN — Medication 10 MILLIGRAM(S): at 05:10

## 2018-04-15 RX ADMIN — Medication 250 MILLIGRAM(S): at 05:35

## 2018-04-15 RX ADMIN — Medication 250 MILLIGRAM(S): at 14:14

## 2018-04-15 RX ADMIN — Medication 100 MILLIEQUIVALENT(S): at 15:55

## 2018-04-15 RX ADMIN — ENOXAPARIN SODIUM 40 MILLIGRAM(S): 100 INJECTION SUBCUTANEOUS at 11:55

## 2018-04-15 RX ADMIN — SIMETHICONE 80 MILLIGRAM(S): 80 TABLET, CHEWABLE ORAL at 21:10

## 2018-04-15 RX ADMIN — SIMETHICONE 80 MILLIGRAM(S): 80 TABLET, CHEWABLE ORAL at 14:14

## 2018-04-15 RX ADMIN — MIRTAZAPINE 15 MILLIGRAM(S): 45 TABLET, ORALLY DISINTEGRATING ORAL at 21:00

## 2018-04-15 RX ADMIN — Medication 100 MILLIGRAM(S): at 17:25

## 2018-04-15 RX ADMIN — Medication 1 APPLICATION(S): at 11:55

## 2018-04-15 RX ADMIN — OXYCODONE HYDROCHLORIDE 5 MILLIGRAM(S): 5 TABLET ORAL at 21:00

## 2018-04-15 RX ADMIN — Medication 1 TABLET(S): at 05:10

## 2018-04-15 RX ADMIN — SODIUM CHLORIDE 50 MILLILITER(S): 9 INJECTION, SOLUTION INTRAVENOUS at 05:10

## 2018-04-15 RX ADMIN — Medication 1 MILLIGRAM(S): at 21:11

## 2018-04-15 RX ADMIN — PIPERACILLIN AND TAZOBACTAM 25 GRAM(S): 4; .5 INJECTION, POWDER, LYOPHILIZED, FOR SOLUTION INTRAVENOUS at 21:12

## 2018-04-15 RX ADMIN — Medication 100 MILLIGRAM(S): at 05:10

## 2018-04-15 RX ADMIN — CLOPIDOGREL BISULFATE 75 MILLIGRAM(S): 75 TABLET, FILM COATED ORAL at 11:56

## 2018-04-15 RX ADMIN — OXYCODONE HYDROCHLORIDE 5 MILLIGRAM(S): 5 TABLET ORAL at 21:35

## 2018-04-15 RX ADMIN — OXYCODONE HYDROCHLORIDE 10 MILLIGRAM(S): 5 TABLET ORAL at 05:11

## 2018-04-15 RX ADMIN — Medication 100 MILLIEQUIVALENT(S): at 17:25

## 2018-04-15 RX ADMIN — SIMETHICONE 80 MILLIGRAM(S): 80 TABLET, CHEWABLE ORAL at 05:10

## 2018-04-15 RX ADMIN — PIPERACILLIN AND TAZOBACTAM 25 GRAM(S): 4; .5 INJECTION, POWDER, LYOPHILIZED, FOR SOLUTION INTRAVENOUS at 15:56

## 2018-04-15 RX ADMIN — QUETIAPINE FUMARATE 25 MILLIGRAM(S): 200 TABLET, FILM COATED ORAL at 21:11

## 2018-04-15 RX ADMIN — SIMVASTATIN 40 MILLIGRAM(S): 20 TABLET, FILM COATED ORAL at 21:12

## 2018-04-15 RX ADMIN — Medication 1 TABLET(S): at 17:25

## 2018-04-15 RX ADMIN — OXYCODONE HYDROCHLORIDE 10 MILLIGRAM(S): 5 TABLET ORAL at 06:10

## 2018-04-15 NOTE — PROGRESS NOTE ADULT - PROBLEM SELECTOR PLAN 5
Patient with Hypophosphatemia, Phosphorus 2.2 this morning   Patient given K-phos Drip this morning   Potassium 3.6 will give 2 K-riders this afternoon as patient with apparent Ileus and currently remains  NPO   Will Continue Aggressive Electrolyte Supplementation Patient with Hypophosphatemia, Phosphorus 2.2 this morning   Patient given K-phos Drip  Potassium 3.6 will give 2 K-riders this afternoon as patient with apparent Ileus and currently remains  NPO   Will Continue Aggressive Electrolyte Supplementation

## 2018-04-15 NOTE — PROGRESS NOTE ADULT - SUBJECTIVE AND OBJECTIVE BOX
called by RCU NP about displaced PEG which they replaced with a bustamante catheter.   Pt had also been evaluated earlier during the day for a CT which was concerning for slight increase in size of pelvic hematoma. The hematoma seems  clinically unchanged. H/H and hemodynamics have been stable. Pt has an ileus.    Objective:   Vitals:   Vital Signs Last 24 Hrs  T(C): 36.6 (15 Apr 2018 19:48), Max: 37.1 (15 Apr 2018 13:49)  T(F): 97.9 (15 Apr 2018 19:48), Max: 98.7 (15 Apr 2018 13:49)  HR: 84 (15 Apr 2018 20:20) (78 - 95)  BP: 170/63 (15 Apr 2018 19:48) (163/66 - 170/63)  BP(mean): --  RR: 18 (15 Apr 2018 20:20) (18 - 20)  SpO2: 100% (15 Apr 2018 20:20) (96% - 100%)  In/Outs:    04-14 @ 07:01  -  04-15 @ 07:00  --------------------------------------------------------  IN:    Enteral Tube Flush: 110 mL    sodium chloride 0.45%.: 500 mL    Solution: 100 mL    Solution: 250 mL    Vital HN: 340 mL  Total IN: 1300 mL    OUT:    Voided: 500 mL  Total OUT: 500 mL    Total NET: 800 mL      04-15 @ 07:01  -  04-15 @ 23:47  --------------------------------------------------------  IN:  Total IN: 0 mL    OUT:    Intermittent Catheterization - Urethral: 500 mL    Voided: 200 mL  Total OUT: 700 mL    Total NET: -700 mL      Physical Exam  General: NAD   Abdomen: softly distended  Bustamante in place of G tube, with yellowish gastric content, no bile    MEDICATIONS  (STANDING):  aspirin  chewable 81 milliGRAM(s) Oral daily  BACItracin   Ointment 1 Application(s) Topical daily  clopidogrel Tablet 75 milliGRAM(s) Oral daily  doxazosin 1 milliGRAM(s) Oral at bedtime  enalapril 10 milliGRAM(s) Oral every 12 hours  enoxaparin Injectable 40 milliGRAM(s) SubCutaneous daily  insulin lispro (HumaLOG) corrective regimen sliding scale   SubCutaneous every 6 hours  insulin NPH human recombinant 6 Unit(s) SubCutaneous every 6 hours  lactobacillus acidophilus 1 Tablet(s) Oral every 12 hours  lidocaine   Patch 1 Patch Transdermal daily  lidocaine   Patch 1 Patch Transdermal daily  metoprolol tartrate 100 milliGRAM(s) Oral every 12 hours  mirtazapine 15 milliGRAM(s) Oral <User Schedule>  piperacillin/tazobactam IVPB. 3.375 Gram(s) IV Intermittent every 8 hours  QUEtiapine 25 milliGRAM(s) Oral at bedtime  simethicone 80 milliGRAM(s) Chew every 8 hours  simvastatin 40 milliGRAM(s) Oral at bedtime  sodium chloride 0.45%. 1000 milliLiter(s) (50 mL/Hr) IV Continuous <Continuous>  vancomycin  IVPB 1000 milliGRAM(s) IV Intermittent every 8 hours    MEDICATIONS  (PRN):  acetaminophen    Suspension. 650 milliGRAM(s) Oral every 6 hours PRN Mild Pain (1 - 3)  haloperidol    Injectable 1 milliGRAM(s) IV Push every 4 hours PRN agitation  melatonin 3 milliGRAM(s) Oral at bedtime PRN Insomnia  oxyCODONE    Solution 5 milliGRAM(s) Oral every 6 hours PRN Severe Pain (7 - 10)      LABS:                        10.9   12.6  )-----------( 571      ( 15 Apr 2018 14:13 )             32.7     04-15    145  |  108  |  30<H>  ----------------------------<  132<H>  3.6   |  26  |  0.71    Ca    8.5      15 Apr 2018 06:43  Phos  2.2     04-15  Mg     1.9     04-15      X-ray with contrast- Bustamante appears in the stomach    Plan:  Keep NPO with bustamante (G-tube) to gravity  Consult IR for replacement of G-tube in AM  await GI fxn and resolution of ileus prior to feeding  NTD for pelvic hematoma  please call surgery with questions.    Gianluca Manning MD, PhD  General Surgery Chief Resident   [phone]
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
## 2018-04-15 NOTE — CHART NOTE - NSCHARTNOTEFT_GEN_A_CORE
Called by RN to bedside patient agitated and pulled out PEG. Ybarra catheter inserted into PEG tract without difficulty. Case d/w Trauma SX team again this evening they will attempt to get G-tube from OR and replace at bedside. Signed out to RCU overnight coverage.
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
## 2018-04-15 NOTE — PROGRESS NOTE ADULT - PROBLEM SELECTOR PLAN 1
+ Acute left 4th-8th rib fractures  + Left superior ramus fracture with a large extraperitoneal hematoma    + Left inferior pubic ramus fracture / right superior pubic ramus fracture  + Left L5 lamina & hemisacrum fractures  + Several spleen lacerations/  Grade 2 left kidney laceration  Patient S/P IR  glue and coil embolization  CT Scan 4/14: Extraperitoneal hematoma slightly increased in size compared to prior imaging with increasing organization   Trauma Sx called and notified, Follow up Requested   Will repeat CBC this afternoon and monitor HD   Continue Lidocaine patch and Oxy PRN Severe Pain  Will decrease PRN Oxy to 5 mg given possible Ileus

## 2018-04-15 NOTE — PROGRESS NOTE ADULT - PROBLEM SELECTOR PLAN 6
Patient currently Remains NPO   RN instructed to hold NPH q 6hrs while NPO   Continue Insulin sliding scale   Monitor Blood Glucose monitoring q 4 hrs while NPO

## 2018-04-15 NOTE — PROGRESS NOTE ADULT - PROBLEM SELECTOR PLAN 7
Case d/w Dr. Steward regarding ASA And Plavix and Enlarging Hematoma on CT  As per D/w  will repeat CBC this afternoon if remains stable will cont ASA and Plavix   Continue Simvastatin   Continue Vasotec and Lopressor for HTN  Continue to monitor BP and HR

## 2018-04-15 NOTE — PROGRESS NOTE ADULT - PROBLEM SELECTOR PLAN 4
KUB 4/14: Distended loops of Large and small bowel  CT abdomen and Pelvis: Possible Ileus   Patient still with abdominal distention, PEG Placed to gravity   Will Cont NPO except meds, 1/2 NS @ 50 cc/hr  GI Consult called for evaluation   Cont Simethicone atc and Acidophilous  Patient with diarrhea yesterday ( Laxatives d/cd on 4/13) if still continues will send stool C.Diff tomorrow

## 2018-04-15 NOTE — PROGRESS NOTE ADULT - PROBLEM SELECTOR PLAN 9
Patient seen by Psych this afternoon   Will initiate Seroquel 25 mg qhs   Will add Melatonin prn Insomnia and Increase IVP Haldol 1 mg q 4 hrs PRN  Continue Mirtazapine   EKG 4/13 : QTc 430   Continue to monitor QTc

## 2018-04-15 NOTE — PROGRESS NOTE ADULT - PROBLEM SELECTOR PLAN 3
Sputum cx 4/13: Moderate Staph Aureus ( Sensitivity pending)   Identification pending   Continue IV Vanco and Zosyn  IV Vanco increased to 1 gram q 8 hrs based on morning level Sputum cx 4/13: Moderate Staph Aureus ( Sensitivity pending)   Continue IV Vanco and Zosyn  IV Vanco increased to 1 gram q 8 hrs based on morning level

## 2018-04-16 DIAGNOSIS — J95.00 UNSPECIFIED TRACHEOSTOMY COMPLICATION: ICD-10-CM

## 2018-04-16 LAB
ANION GAP SERPL CALC-SCNC: 11 MMOL/L — SIGNIFICANT CHANGE UP (ref 5–17)
BUN SERPL-MCNC: 20 MG/DL — SIGNIFICANT CHANGE UP (ref 7–23)
CALCIUM SERPL-MCNC: 8.6 MG/DL — SIGNIFICANT CHANGE UP (ref 8.4–10.5)
CHLORIDE SERPL-SCNC: 106 MMOL/L — SIGNIFICANT CHANGE UP (ref 96–108)
CO2 SERPL-SCNC: 28 MMOL/L — SIGNIFICANT CHANGE UP (ref 22–31)
CREAT SERPL-MCNC: 0.69 MG/DL — SIGNIFICANT CHANGE UP (ref 0.5–1.3)
GLUCOSE SERPL-MCNC: 126 MG/DL — HIGH (ref 70–99)
HCT VFR BLD CALC: 32.2 % — LOW (ref 39–50)
HGB BLD-MCNC: 10.4 G/DL — LOW (ref 13–17)
MAGNESIUM SERPL-MCNC: 1.8 MG/DL — SIGNIFICANT CHANGE UP (ref 1.6–2.6)
MCHC RBC-ENTMCNC: 31.9 PG — SIGNIFICANT CHANGE UP (ref 27–34)
MCHC RBC-ENTMCNC: 32.2 GM/DL — SIGNIFICANT CHANGE UP (ref 32–36)
MCV RBC AUTO: 99.3 FL — SIGNIFICANT CHANGE UP (ref 80–100)
PHOSPHATE SERPL-MCNC: 2.7 MG/DL — SIGNIFICANT CHANGE UP (ref 2.5–4.5)
PLATELET # BLD AUTO: 532 K/UL — HIGH (ref 150–400)
POTASSIUM SERPL-MCNC: 3.7 MMOL/L — SIGNIFICANT CHANGE UP (ref 3.5–5.3)
POTASSIUM SERPL-SCNC: 3.7 MMOL/L — SIGNIFICANT CHANGE UP (ref 3.5–5.3)
RBC # BLD: 3.25 M/UL — LOW (ref 4.2–5.8)
RBC # FLD: 16.4 % — HIGH (ref 10.3–14.5)
SODIUM SERPL-SCNC: 145 MMOL/L — SIGNIFICANT CHANGE UP (ref 135–145)
VANCOMYCIN TROUGH SERPL-MCNC: 15.9 UG/ML — SIGNIFICANT CHANGE UP (ref 10–20)
WBC # BLD: 11.2 K/UL — HIGH (ref 3.8–10.5)
WBC # FLD AUTO: 11.2 K/UL — HIGH (ref 3.8–10.5)

## 2018-04-16 PROCEDURE — 93971 EXTREMITY STUDY: CPT | Mod: 26

## 2018-04-16 PROCEDURE — 49450 REPLACE G/C TUBE PERC: CPT

## 2018-04-16 PROCEDURE — 31615 TRCHEOBRNCHSC EST TRACHS INC: CPT

## 2018-04-16 PROCEDURE — 99232 SBSQ HOSP IP/OBS MODERATE 35: CPT | Mod: 25

## 2018-04-16 PROCEDURE — 99233 SBSQ HOSP IP/OBS HIGH 50: CPT | Mod: GC

## 2018-04-16 RX ADMIN — SIMETHICONE 80 MILLIGRAM(S): 80 TABLET, CHEWABLE ORAL at 21:01

## 2018-04-16 RX ADMIN — CLOPIDOGREL BISULFATE 75 MILLIGRAM(S): 75 TABLET, FILM COATED ORAL at 12:22

## 2018-04-16 RX ADMIN — PIPERACILLIN AND TAZOBACTAM 25 GRAM(S): 4; .5 INJECTION, POWDER, LYOPHILIZED, FOR SOLUTION INTRAVENOUS at 05:34

## 2018-04-16 RX ADMIN — LIDOCAINE 1 PATCH: 4 CREAM TOPICAL at 00:23

## 2018-04-16 RX ADMIN — Medication 250 MILLIGRAM(S): at 21:05

## 2018-04-16 RX ADMIN — MIRTAZAPINE 15 MILLIGRAM(S): 45 TABLET, ORALLY DISINTEGRATING ORAL at 20:41

## 2018-04-16 RX ADMIN — Medication 81 MILLIGRAM(S): at 12:22

## 2018-04-16 RX ADMIN — SODIUM CHLORIDE 50 MILLILITER(S): 9 INJECTION, SOLUTION INTRAVENOUS at 06:52

## 2018-04-16 RX ADMIN — SIMVASTATIN 40 MILLIGRAM(S): 20 TABLET, FILM COATED ORAL at 21:02

## 2018-04-16 RX ADMIN — SIMETHICONE 80 MILLIGRAM(S): 80 TABLET, CHEWABLE ORAL at 05:33

## 2018-04-16 RX ADMIN — OXYCODONE HYDROCHLORIDE 5 MILLIGRAM(S): 5 TABLET ORAL at 15:49

## 2018-04-16 RX ADMIN — Medication 100 MILLIGRAM(S): at 17:30

## 2018-04-16 RX ADMIN — Medication 250 MILLIGRAM(S): at 14:05

## 2018-04-16 RX ADMIN — LIDOCAINE 1 PATCH: 4 CREAM TOPICAL at 12:23

## 2018-04-16 RX ADMIN — Medication 10 MILLIGRAM(S): at 05:33

## 2018-04-16 RX ADMIN — Medication 10 MILLIGRAM(S): at 17:30

## 2018-04-16 RX ADMIN — Medication 1 TABLET(S): at 17:30

## 2018-04-16 RX ADMIN — Medication 250 MILLIGRAM(S): at 05:34

## 2018-04-16 RX ADMIN — Medication 100 MILLIGRAM(S): at 05:33

## 2018-04-16 RX ADMIN — Medication 1 APPLICATION(S): at 12:22

## 2018-04-16 RX ADMIN — HALOPERIDOL DECANOATE 1 MILLIGRAM(S): 100 INJECTION INTRAMUSCULAR at 20:40

## 2018-04-16 RX ADMIN — Medication 1 MILLIGRAM(S): at 21:01

## 2018-04-16 RX ADMIN — Medication 1 TABLET(S): at 05:33

## 2018-04-16 RX ADMIN — ENOXAPARIN SODIUM 40 MILLIGRAM(S): 100 INJECTION SUBCUTANEOUS at 12:22

## 2018-04-16 RX ADMIN — QUETIAPINE FUMARATE 25 MILLIGRAM(S): 200 TABLET, FILM COATED ORAL at 21:05

## 2018-04-16 RX ADMIN — SIMETHICONE 80 MILLIGRAM(S): 80 TABLET, CHEWABLE ORAL at 14:07

## 2018-04-16 NOTE — PROGRESS NOTE ADULT - PROBLEM SELECTOR PLAN 1
Trach care, stoma care Soft Suction using RED RUBBER SUCTION PRN  Avoid Trach Manipulation  Monitor for further bleeding  RCU care
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
## 2018-04-16 NOTE — PROGRESS NOTE ADULT - ATTENDING COMMENTS
Tracheoscopy: Scope #5 used. Tracheostomy tube patent. Tip well above rafia. Thick mucopurulent secretions in trachea and along mainstem bronchi. Pink tinged secretions noted to be draining from above tip of trach tube but no clinton bleeding. Stoma itself is still quite open but healing. Suspect bleeding from around stoma and draining into trachea. If bleeding recurs call ENT and we will remove trach tube and cauterize any granulation tissue that may be bleeding. In event of significant blood in airway change to cuffed trach and inflate cuff to protect airway and call ENT. Please call with questions.
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
## 2018-04-16 NOTE — CHART NOTE - NSCHARTNOTEFT_GEN_A_CORE
SP  peg placement in IR             discussed with IR  successful peg re placement-    MAY RESUME FEEDS, MEDS.      MITESH Carnes NP  RCU

## 2018-04-16 NOTE — PROGRESS NOTE ADULT - PROBLEM SELECTOR PLAN 3
Sputum cx 4/13: Moderate Staph Aureus ( Sensitivity pending)   Continue IV Vanco d/c zosyn   IV Vanco increased to 1 gram q 8 hrs based on morning level

## 2018-04-16 NOTE — PROGRESS NOTE ADULT - SUBJECTIVE AND OBJECTIVE BOX
Interventional Radiology Procedure Note    Procedure: G-tube replacement    Indication:  dislodged tube    Operators: Antonella    Anesthesia (type): lidocaine gel    Contrast:  15 cc    EBL: none    Findings/Follow up Plan of Care:  Successful replacement of 18 [place] gastrostomy tube using fluoroscopic guidance.    Specimens Removed: none    Implants: 18 Urdu    Complications: None    Condition/Disposition:  Recovery on floors    Please call Interventional Radiology x [phone] with any questions, concerns, or issues.
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
## 2018-04-16 NOTE — PROGRESS NOTE ADULT - PROBLEM SELECTOR PLAN 4
KUB 4/14: Distended loops of Large and small bowel  CT abdomen and Pelvis: Possible Ileus   Patient still with abdominal distention, PEG Placed to gravity   Will Cont NPO except meds, 1/2 NS @ 50 cc/hr  GI Consult called for evaluation but cancelled will reconsult if there are worsening illeus symptomd  Cont Simethicone atc and Acidophilous  Patient with diarrhea yesterday ( Laxatives d/cd on 4/13) if still continues will send stool C.Diff tomorrow
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
## 2018-04-16 NOTE — PROGRESS NOTE ADULT - PROBLEM SELECTOR PLAN 2
Patient S/p tracheostomy by trauma SX on 4/5  Patient tolerating TC atc   Patient downsized to #  7 Uncuffed Trach on 4/13 by ENT  CXR 4/13: Bilateral Pleural effusions with Pulmonary edema   IVP Lasix held to prevent dehydration as pt remains NPO as per    Continue Chest PT and Suctioning PRN
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
## 2018-04-16 NOTE — PROGRESS NOTE ADULT - PROBLEM SELECTOR PLAN 8
Patient seen by Psych   Continue Seroquel 25 mg qhs   Continue Melatonin prn Insomnia and Increase IVP Haldol 1 mg q 4 hrs PRN  Continue Mirtazapine

## 2018-04-16 NOTE — SPEAKING VALVE EVALUATION - DIAGNOSTIC IMPRESSIONS
Consult for beto crow speaking valve evaluation received and appreciated, case d/w NP: Gracia who reported pt with blood from trach site and ENT to be called to assess pt. Speaking valve evaluation to be deferred at present time. Team to re-consult this service when clinically appropriate.
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
## 2018-04-17 LAB
ANION GAP SERPL CALC-SCNC: 10 MMOL/L — SIGNIFICANT CHANGE UP (ref 5–17)
BUN SERPL-MCNC: 18 MG/DL — SIGNIFICANT CHANGE UP (ref 7–23)
CALCIUM SERPL-MCNC: 8.4 MG/DL — SIGNIFICANT CHANGE UP (ref 8.4–10.5)
CHLORIDE SERPL-SCNC: 104 MMOL/L — SIGNIFICANT CHANGE UP (ref 96–108)
CO2 SERPL-SCNC: 28 MMOL/L — SIGNIFICANT CHANGE UP (ref 22–31)
CREAT SERPL-MCNC: 0.61 MG/DL — SIGNIFICANT CHANGE UP (ref 0.5–1.3)
GLUCOSE SERPL-MCNC: 122 MG/DL — HIGH (ref 70–99)
HCT VFR BLD CALC: 32.1 % — LOW (ref 39–50)
HGB BLD-MCNC: 10.4 G/DL — LOW (ref 13–17)
MAGNESIUM SERPL-MCNC: 1.8 MG/DL — SIGNIFICANT CHANGE UP (ref 1.6–2.6)
MCHC RBC-ENTMCNC: 32.3 GM/DL — SIGNIFICANT CHANGE UP (ref 32–36)
MCHC RBC-ENTMCNC: 32.5 PG — SIGNIFICANT CHANGE UP (ref 27–34)
MCV RBC AUTO: 101 FL — HIGH (ref 80–100)
PHOSPHATE SERPL-MCNC: 2.5 MG/DL — SIGNIFICANT CHANGE UP (ref 2.5–4.5)
PLATELET # BLD AUTO: 451 K/UL — HIGH (ref 150–400)
POTASSIUM SERPL-MCNC: 3.2 MMOL/L — LOW (ref 3.5–5.3)
POTASSIUM SERPL-SCNC: 3.2 MMOL/L — LOW (ref 3.5–5.3)
RBC # BLD: 3.19 M/UL — LOW (ref 4.2–5.8)
RBC # FLD: 16.2 % — HIGH (ref 10.3–14.5)
SODIUM SERPL-SCNC: 142 MMOL/L — SIGNIFICANT CHANGE UP (ref 135–145)
VANCOMYCIN TROUGH SERPL-MCNC: 14.2 UG/ML — SIGNIFICANT CHANGE UP (ref 10–20)
WBC # BLD: 10.6 K/UL — HIGH (ref 3.8–10.5)
WBC # FLD AUTO: 10.6 K/UL — HIGH (ref 3.8–10.5)

## 2018-04-17 PROCEDURE — 99231 SBSQ HOSP IP/OBS SF/LOW 25: CPT

## 2018-04-17 PROCEDURE — 99233 SBSQ HOSP IP/OBS HIGH 50: CPT | Mod: GC

## 2018-04-17 RX ORDER — POTASSIUM CHLORIDE 20 MEQ
40 PACKET (EA) ORAL
Qty: 0 | Refills: 0 | Status: COMPLETED | OUTPATIENT
Start: 2018-04-17 | End: 2018-04-17

## 2018-04-17 RX ORDER — FAMOTIDINE 10 MG/ML
20 INJECTION INTRAVENOUS
Qty: 0 | Refills: 0 | Status: DISCONTINUED | OUTPATIENT
Start: 2018-04-17 | End: 2018-05-18

## 2018-04-17 RX ADMIN — SIMETHICONE 80 MILLIGRAM(S): 80 TABLET, CHEWABLE ORAL at 05:44

## 2018-04-17 RX ADMIN — QUETIAPINE FUMARATE 25 MILLIGRAM(S): 200 TABLET, FILM COATED ORAL at 21:54

## 2018-04-17 RX ADMIN — LIDOCAINE 1 PATCH: 4 CREAM TOPICAL at 11:20

## 2018-04-17 RX ADMIN — LIDOCAINE 1 PATCH: 4 CREAM TOPICAL at 00:06

## 2018-04-17 RX ADMIN — Medication 1 TABLET(S): at 05:44

## 2018-04-17 RX ADMIN — Medication 1 TABLET(S): at 17:16

## 2018-04-17 RX ADMIN — HUMAN INSULIN 6 UNIT(S): 100 INJECTION, SUSPENSION SUBCUTANEOUS at 00:09

## 2018-04-17 RX ADMIN — Medication 10 MILLIGRAM(S): at 17:16

## 2018-04-17 RX ADMIN — ENOXAPARIN SODIUM 40 MILLIGRAM(S): 100 INJECTION SUBCUTANEOUS at 11:20

## 2018-04-17 RX ADMIN — Medication 100 MILLIGRAM(S): at 05:46

## 2018-04-17 RX ADMIN — Medication 81 MILLIGRAM(S): at 11:19

## 2018-04-17 RX ADMIN — CLOPIDOGREL BISULFATE 75 MILLIGRAM(S): 75 TABLET, FILM COATED ORAL at 11:19

## 2018-04-17 RX ADMIN — Medication 1 MILLIGRAM(S): at 21:54

## 2018-04-17 RX ADMIN — Medication 250 MILLIGRAM(S): at 16:42

## 2018-04-17 RX ADMIN — Medication 40 MILLIEQUIVALENT(S): at 09:13

## 2018-04-17 RX ADMIN — Medication 1 APPLICATION(S): at 11:19

## 2018-04-17 RX ADMIN — LIDOCAINE 1 PATCH: 4 CREAM TOPICAL at 23:33

## 2018-04-17 RX ADMIN — HUMAN INSULIN 6 UNIT(S): 100 INJECTION, SUSPENSION SUBCUTANEOUS at 12:31

## 2018-04-17 RX ADMIN — Medication 2: at 12:30

## 2018-04-17 RX ADMIN — OXYCODONE HYDROCHLORIDE 5 MILLIGRAM(S): 5 TABLET ORAL at 20:32

## 2018-04-17 RX ADMIN — Medication 250 MILLIGRAM(S): at 21:55

## 2018-04-17 RX ADMIN — MIRTAZAPINE 15 MILLIGRAM(S): 45 TABLET, ORALLY DISINTEGRATING ORAL at 21:54

## 2018-04-17 RX ADMIN — Medication 10 MILLIGRAM(S): at 05:44

## 2018-04-17 RX ADMIN — OXYCODONE HYDROCHLORIDE 5 MILLIGRAM(S): 5 TABLET ORAL at 19:59

## 2018-04-17 RX ADMIN — SIMETHICONE 80 MILLIGRAM(S): 80 TABLET, CHEWABLE ORAL at 21:54

## 2018-04-17 RX ADMIN — SIMVASTATIN 40 MILLIGRAM(S): 20 TABLET, FILM COATED ORAL at 21:54

## 2018-04-17 RX ADMIN — SIMETHICONE 80 MILLIGRAM(S): 80 TABLET, CHEWABLE ORAL at 14:08

## 2018-04-17 RX ADMIN — FAMOTIDINE 20 MILLIGRAM(S): 10 INJECTION INTRAVENOUS at 17:15

## 2018-04-17 RX ADMIN — OXYCODONE HYDROCHLORIDE 5 MILLIGRAM(S): 5 TABLET ORAL at 11:34

## 2018-04-17 RX ADMIN — HALOPERIDOL DECANOATE 1 MILLIGRAM(S): 100 INJECTION INTRAMUSCULAR at 14:03

## 2018-04-17 RX ADMIN — HUMAN INSULIN 6 UNIT(S): 100 INJECTION, SUSPENSION SUBCUTANEOUS at 18:11

## 2018-04-17 RX ADMIN — OXYCODONE HYDROCHLORIDE 5 MILLIGRAM(S): 5 TABLET ORAL at 11:04

## 2018-04-17 RX ADMIN — Medication 100 MILLIGRAM(S): at 17:16

## 2018-04-17 RX ADMIN — HUMAN INSULIN 6 UNIT(S): 100 INJECTION, SUSPENSION SUBCUTANEOUS at 05:46

## 2018-04-17 RX ADMIN — Medication 250 MILLIGRAM(S): at 05:44

## 2018-04-17 RX ADMIN — Medication 40 MILLIEQUIVALENT(S): at 11:21

## 2018-04-17 NOTE — CHART NOTE - NSCHARTNOTEFT_GEN_A_CORE
Interim note. Verbal consult for tube feed evaluation. Pt noted with "functional ileus", discussed on team rounds. Patient noted with abdominal distention.    Diet : NPO with bolus tube feeding      Patient reports UBW  170lbs      Enteral /Parenteral Nutrition: currently Vital bolus feeds 340ml 5x/day      Current Weight:   % Weight Change    Pertinent Medications: MEDICATIONS  (STANDING):  aspirin  chewable 81 milliGRAM(s) Oral daily  BACItracin   Ointment 1 Application(s) Topical daily  clopidogrel Tablet 75 milliGRAM(s) Oral daily  doxazosin 1 milliGRAM(s) Oral at bedtime  enalapril 10 milliGRAM(s) Oral every 12 hours  enoxaparin Injectable 40 milliGRAM(s) SubCutaneous daily  famotidine    Tablet 20 milliGRAM(s) Oral two times a day  insulin lispro (HumaLOG) corrective regimen sliding scale   SubCutaneous every 6 hours  insulin NPH human recombinant 6 Unit(s) SubCutaneous every 6 hours  lactobacillus acidophilus 1 Tablet(s) Oral every 12 hours  lidocaine   Patch 1 Patch Transdermal daily  lidocaine   Patch 1 Patch Transdermal daily  metoprolol tartrate 100 milliGRAM(s) Oral every 12 hours  mirtazapine 15 milliGRAM(s) Oral <User Schedule>  potassium chloride   Solution 40 milliEquivalent(s) Oral every 2 hours  QUEtiapine 25 milliGRAM(s) Oral at bedtime  simethicone 80 milliGRAM(s) Chew every 8 hours  simvastatin 40 milliGRAM(s) Oral at bedtime  sodium chloride 0.45%. 1000 milliLiter(s) (50 mL/Hr) IV Continuous <Continuous>  vancomycin  IVPB 1000 milliGRAM(s) IV Intermittent every 8 hours    MEDICATIONS  (PRN):  acetaminophen    Suspension. 650 milliGRAM(s) Oral every 6 hours PRN Mild Pain (1 - 3)  haloperidol    Injectable 1 milliGRAM(s) IV Push every 4 hours PRN agitation  melatonin 3 milliGRAM(s) Oral at bedtime PRN Insomnia  oxyCODONE    Solution 5 milliGRAM(s) Oral every 6 hours PRN Severe Pain (7 - 10)    Pertinent Labs:  04-17 Na142 mmol/L Glu 122 mg/dL<H> K+ 3.2 mmol/L<L> Cr  0.61 mg/dL BUN 18 mg/dL 04-17 Phos 2.5 mg/dL 04-12 Alb 2.6 g/dL<L> 03-28 [id number] 5.3 %      Skin:     Estimated Needs:   [ ] no change since previous assessment  [ ] recalculated:       Previous Nutrition Diagnosis:     [ ] Inadequate Energy Intake [ ]Inadequate Oral Intake [ ] Excessive Energy Intake     [ ] Underweight [ ] Increased Nutrient Needs [ ] Overweight/Obesity     [ ] Altered GI Function [ ] Unintended Weight Loss [ ] Food & Nutrition Related Knowledge Deficit [ ] Malnutrition          Nutrition Diagnosis is [ ] ongoing  [ ] resolved [ ] not applicable          New Nutrition Diagnosis: [ ] not applicable    [ ] Inadequate Protein Energy Intake [ ]Inadequate Oral Intake [ ] Excessive Energy Intake     [ ] Underweight [ ] Increased Nutrient Needs [ ] Overweight/Obesity     [ ] Altered GI Function [ ] Unintended Weight Loss [ ] Food & Nutrition Related Knowledge Deficit[ ] Limited Adherence to nutrition related recommendations [ ] Malnutrition  [ ] other: Free text       Related to:      As evidenced by:      Interventions:     Recommend    [ ] Change Diet To:    [ ] Nutrition Supplement    [ ] Nutrition Support    [ ] Other:        Monitoring and Evaluation:     [ ] PO intake [ ] Tolerance to diet prescription [ ] weights [ ] follow up per protocol    [ ] other: Interim note. See full note 4/16/2018.  Verbal consult by MD for tube feed evaluation. Pt noted with "functional ileus", discussed on team rounds. Patient noted with abdominal distention.    Diet : NPO with bolus tube feeding      Patient reports UBW  170lbs      Enteral /Parenteral Nutrition: currently Vital bolus feeds 340ml 5x/day      Current Weight: 88.9kg , 195 lbs abdominal distention noted  % Weight Change fluctuating since admission 198 lbs -186 lbs    Pertinent Medications: MEDICATIONS  (STANDING):  aspirin  chewable 81 milliGRAM(s) Oral daily  BACItracin   Ointment 1 Application(s) Topical daily  clopidogrel Tablet 75 milliGRAM(s) Oral daily  doxazosin 1 milliGRAM(s) Oral at bedtime  enalapril 10 milliGRAM(s) Oral every 12 hours  enoxaparin Injectable 40 milliGRAM(s) SubCutaneous daily  famotidine    Tablet 20 milliGRAM(s) Oral two times a day  insulin lispro (HumaLOG) corrective regimen sliding scale   SubCutaneous every 6 hours  insulin NPH human recombinant 6 Unit(s) SubCutaneous every 6 hours  lactobacillus acidophilus 1 Tablet(s) Oral every 12 hours  lidocaine   Patch 1 Patch Transdermal daily  lidocaine   Patch 1 Patch Transdermal daily  metoprolol tartrate 100 milliGRAM(s) Oral every 12 hours  mirtazapine 15 milliGRAM(s) Oral <User Schedule>  potassium chloride   Solution 40 milliEquivalent(s) Oral every 2 hours  QUEtiapine 25 milliGRAM(s) Oral at bedtime  simethicone 80 milliGRAM(s) Chew every 8 hours  simvastatin 40 milliGRAM(s) Oral at bedtime  sodium chloride 0.45%. 1000 milliLiter(s) (50 mL/Hr) IV Continuous <Continuous>  vancomycin  IVPB 1000 milliGRAM(s) IV Intermittent every 8 hours    MEDICATIONS  (PRN):  acetaminophen    Suspension. 650 milliGRAM(s) Oral every 6 hours PRN Mild Pain (1 - 3)  haloperidol    Injectable 1 milliGRAM(s) IV Push every 4 hours PRN agitation  melatonin 3 milliGRAM(s) Oral at bedtime PRN Insomnia  oxyCODONE    Solution 5 milliGRAM(s) Oral every 6 hours PRN Severe Pain (7 - 10)    Pertinent Labs:  04-17 Na142 mmol/L Glu 122 mg/dL<H> K+ 3.2 mmol/L<L> Cr  0.61 mg/dL BUN 18 mg/dL 04-17 Phos 2.5 mg/dL 04-12 Alb 2.6 g/dL<L> 03-28 [id number] 5.3 %          Estimated Needs:   [ ] no change since previous assessment  [x] recalculated:       Previous Nutrition Diagnosis:       [ ] Underweight [x ] Increased Nutrient Needs [ ] Overweight/Obesity               Nutrition Diagnosis is [x ] ongoing  [ ] resolved [ ] not applicable          New Nutrition Diagnosis: [x ] not applicable        Recommend    [ ] Change Diet To:    [ x] Nutrition Supplement    [ ] Nutrition Support   change to continuous feeds Vital AF @ 85ml/hr x18hrs to provide 1836 calories, 22/kg, protein 114.75gm,   ~1.4/kg, based on IBW 83.6kg           Monitoring and Evaluation:    [x ] Tolerance to diet prescription [x] weights [x ] follow up per protocol    [x] other: monitor GI output, abdominal distention
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
## 2018-04-17 NOTE — PROGRESS NOTE ADULT - PROBLEM SELECTOR PLAN 6
Continue asa plavix   Continue Simvastatin   Continue Vasotec and Lopressor for HTN  Continue to monitor BP and HR

## 2018-04-17 NOTE — PROGRESS NOTE ADULT - PROBLEM SELECTOR PLAN 4
Known illeus, however patient is tolerating tube feed and having bowel movements. Will continue to monitor at this time
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
## 2018-04-18 LAB
ANION GAP SERPL CALC-SCNC: 10 MMOL/L — SIGNIFICANT CHANGE UP (ref 5–17)
BUN SERPL-MCNC: 16 MG/DL — SIGNIFICANT CHANGE UP (ref 7–23)
CALCIUM SERPL-MCNC: 8.3 MG/DL — LOW (ref 8.4–10.5)
CHLORIDE SERPL-SCNC: 102 MMOL/L — SIGNIFICANT CHANGE UP (ref 96–108)
CO2 SERPL-SCNC: 29 MMOL/L — SIGNIFICANT CHANGE UP (ref 22–31)
CREAT SERPL-MCNC: 0.59 MG/DL — SIGNIFICANT CHANGE UP (ref 0.5–1.3)
GLUCOSE SERPL-MCNC: 119 MG/DL — HIGH (ref 70–99)
HCT VFR BLD CALC: 31.2 % — LOW (ref 39–50)
HGB BLD-MCNC: 10.1 G/DL — LOW (ref 13–17)
MAGNESIUM SERPL-MCNC: 1.8 MG/DL — SIGNIFICANT CHANGE UP (ref 1.6–2.6)
MCHC RBC-ENTMCNC: 32.4 PG — SIGNIFICANT CHANGE UP (ref 27–34)
MCHC RBC-ENTMCNC: 32.5 GM/DL — SIGNIFICANT CHANGE UP (ref 32–36)
MCV RBC AUTO: 99.6 FL — SIGNIFICANT CHANGE UP (ref 80–100)
PHOSPHATE SERPL-MCNC: 2 MG/DL — LOW (ref 2.5–4.5)
PLATELET # BLD AUTO: 380 K/UL — SIGNIFICANT CHANGE UP (ref 150–400)
POTASSIUM SERPL-MCNC: 3.4 MMOL/L — LOW (ref 3.5–5.3)
POTASSIUM SERPL-SCNC: 3.4 MMOL/L — LOW (ref 3.5–5.3)
RBC # BLD: 3.13 M/UL — LOW (ref 4.2–5.8)
RBC # FLD: 16.3 % — HIGH (ref 10.3–14.5)
SODIUM SERPL-SCNC: 141 MMOL/L — SIGNIFICANT CHANGE UP (ref 135–145)
VANCOMYCIN TROUGH SERPL-MCNC: 16.4 UG/ML — SIGNIFICANT CHANGE UP (ref 10–20)
WBC # BLD: 12.1 K/UL — HIGH (ref 3.8–10.5)
WBC # FLD AUTO: 12.1 K/UL — HIGH (ref 3.8–10.5)

## 2018-04-18 PROCEDURE — 93970 EXTREMITY STUDY: CPT | Mod: 26

## 2018-04-18 PROCEDURE — 99233 SBSQ HOSP IP/OBS HIGH 50: CPT | Mod: 25,GC

## 2018-04-18 RX ORDER — METOCLOPRAMIDE HCL 10 MG
5 TABLET ORAL EVERY 8 HOURS
Qty: 0 | Refills: 0 | Status: DISCONTINUED | OUTPATIENT
Start: 2018-04-18 | End: 2018-05-18

## 2018-04-18 RX ORDER — FUROSEMIDE 40 MG
20 TABLET ORAL
Qty: 0 | Refills: 0 | Status: COMPLETED | OUTPATIENT
Start: 2018-04-18 | End: 2018-04-19

## 2018-04-18 RX ORDER — POTASSIUM CHLORIDE 20 MEQ
40 PACKET (EA) ORAL
Qty: 0 | Refills: 0 | Status: COMPLETED | OUTPATIENT
Start: 2018-04-18 | End: 2018-04-18

## 2018-04-18 RX ADMIN — HUMAN INSULIN 6 UNIT(S): 100 INJECTION, SUSPENSION SUBCUTANEOUS at 12:45

## 2018-04-18 RX ADMIN — Medication 2: at 17:42

## 2018-04-18 RX ADMIN — Medication 5 MILLIGRAM(S): at 21:13

## 2018-04-18 RX ADMIN — Medication 1 MILLIGRAM(S): at 21:10

## 2018-04-18 RX ADMIN — Medication 20 MILLIGRAM(S): at 17:29

## 2018-04-18 RX ADMIN — LIDOCAINE 1 PATCH: 4 CREAM TOPICAL at 11:15

## 2018-04-18 RX ADMIN — Medication 10 MILLIGRAM(S): at 17:30

## 2018-04-18 RX ADMIN — Medication 5 MILLIGRAM(S): at 14:00

## 2018-04-18 RX ADMIN — HUMAN INSULIN 6 UNIT(S): 100 INJECTION, SUSPENSION SUBCUTANEOUS at 00:13

## 2018-04-18 RX ADMIN — OXYCODONE HYDROCHLORIDE 5 MILLIGRAM(S): 5 TABLET ORAL at 22:00

## 2018-04-18 RX ADMIN — Medication 10 MILLIGRAM(S): at 05:01

## 2018-04-18 RX ADMIN — Medication 250 MILLIGRAM(S): at 05:02

## 2018-04-18 RX ADMIN — CLOPIDOGREL BISULFATE 75 MILLIGRAM(S): 75 TABLET, FILM COATED ORAL at 11:16

## 2018-04-18 RX ADMIN — Medication 650 MILLIGRAM(S): at 09:45

## 2018-04-18 RX ADMIN — Medication 250 MILLIGRAM(S): at 21:11

## 2018-04-18 RX ADMIN — Medication 81 MILLIGRAM(S): at 11:16

## 2018-04-18 RX ADMIN — Medication 1 APPLICATION(S): at 11:16

## 2018-04-18 RX ADMIN — Medication 40 MILLIEQUIVALENT(S): at 09:15

## 2018-04-18 RX ADMIN — Medication 250 MILLIGRAM(S): at 15:57

## 2018-04-18 RX ADMIN — OXYCODONE HYDROCHLORIDE 5 MILLIGRAM(S): 5 TABLET ORAL at 06:05

## 2018-04-18 RX ADMIN — OXYCODONE HYDROCHLORIDE 5 MILLIGRAM(S): 5 TABLET ORAL at 05:35

## 2018-04-18 RX ADMIN — OXYCODONE HYDROCHLORIDE 5 MILLIGRAM(S): 5 TABLET ORAL at 20:19

## 2018-04-18 RX ADMIN — Medication 100 MILLIGRAM(S): at 05:01

## 2018-04-18 RX ADMIN — Medication 40 MILLIEQUIVALENT(S): at 10:01

## 2018-04-18 RX ADMIN — SIMETHICONE 80 MILLIGRAM(S): 80 TABLET, CHEWABLE ORAL at 05:02

## 2018-04-18 RX ADMIN — Medication 2: at 12:45

## 2018-04-18 RX ADMIN — Medication 650 MILLIGRAM(S): at 09:15

## 2018-04-18 RX ADMIN — MIRTAZAPINE 15 MILLIGRAM(S): 45 TABLET, ORALLY DISINTEGRATING ORAL at 20:41

## 2018-04-18 RX ADMIN — HUMAN INSULIN 6 UNIT(S): 100 INJECTION, SUSPENSION SUBCUTANEOUS at 06:26

## 2018-04-18 RX ADMIN — ENOXAPARIN SODIUM 40 MILLIGRAM(S): 100 INJECTION SUBCUTANEOUS at 11:16

## 2018-04-18 RX ADMIN — SIMVASTATIN 40 MILLIGRAM(S): 20 TABLET, FILM COATED ORAL at 21:10

## 2018-04-18 RX ADMIN — HUMAN INSULIN 6 UNIT(S): 100 INJECTION, SUSPENSION SUBCUTANEOUS at 17:42

## 2018-04-18 RX ADMIN — OXYCODONE HYDROCHLORIDE 5 MILLIGRAM(S): 5 TABLET ORAL at 11:33

## 2018-04-18 RX ADMIN — Medication 1 TABLET(S): at 05:01

## 2018-04-18 RX ADMIN — QUETIAPINE FUMARATE 25 MILLIGRAM(S): 200 TABLET, FILM COATED ORAL at 21:10

## 2018-04-18 RX ADMIN — SIMETHICONE 80 MILLIGRAM(S): 80 TABLET, CHEWABLE ORAL at 14:00

## 2018-04-18 RX ADMIN — LIDOCAINE 1 PATCH: 4 CREAM TOPICAL at 23:00

## 2018-04-18 RX ADMIN — Medication 40 MILLIEQUIVALENT(S): at 11:35

## 2018-04-18 RX ADMIN — FAMOTIDINE 20 MILLIGRAM(S): 10 INJECTION INTRAVENOUS at 05:03

## 2018-04-18 RX ADMIN — SIMETHICONE 80 MILLIGRAM(S): 80 TABLET, CHEWABLE ORAL at 21:10

## 2018-04-18 RX ADMIN — Medication 100 MILLIGRAM(S): at 17:30

## 2018-04-18 RX ADMIN — FAMOTIDINE 20 MILLIGRAM(S): 10 INJECTION INTRAVENOUS at 17:30

## 2018-04-18 RX ADMIN — Medication 1 TABLET(S): at 17:30

## 2018-04-18 NOTE — PROGRESS NOTE ADULT - PROBLEM SELECTOR PLAN 1
+ Acute left 4th-8th rib fractures  + Left superior ramus fracture with a large extraperitoneal hematoma    + Left inferior pubic ramus fracture / right superior pubic ramus fracture  + Left L5 lamina & hemisacrum fractures  + Several spleen lacerations/  Grade 2 left kidney laceration  Patient S/P IR  glue and coil embolization  CT Scan 4/14: Extraperitoneal hematoma slightly increased in size compared to prior imaging with increasing organization   Trauma Sx called and notified, Follow up Requested   Will repeat CBC this afternoon and monitor HD   Continue Lidocaine patch and Oxy PRN Severe Pain  Will decrease PRN Oxy to 5 mg given possible Ileus + Acute left 4th-8th rib fractures  + Left superior ramus fracture with a large extraperitoneal hematoma    + Left inferior pubic ramus fracture / right superior pubic ramus fracture  + Left L5 lamina & hemisacrum fractures  + Several spleen lacerations/  Grade 2 left kidney laceration  Patient S/P IR  glue and coil embolization  CT Scan 4/14: Extraperitoneal hematoma slightly increased in size compared to prior imaging with increasing organization   Trauma Sx called and notified, nothing to do this represents redistribution of hematoma.

## 2018-04-18 NOTE — PROGRESS NOTE ADULT - PROBLEM SELECTOR PLAN 4
Known illeus, however patient is tolerating tube feed and having bowel movements. Will continue to monitor at this time Known illeus, however patient is tolerating tube feed and having bowel movements. will add low dose reglan

## 2018-04-18 NOTE — PROGRESS NOTE ADULT - PROBLEM SELECTOR PLAN 2
Patient S/p tracheostomy by trauma SX on 4/5  Patient tolerating TC atc   Patient downsized to #  7 Uncuffed Trach on 4/13 by ENT  CXR 4/13: Bilateral Pleural effusions with Pulmonary edema   IVP Lasix held to prevent dehydration as pt remains NPO as per    Continue Chest PT and Suctioning PRN Patient S/p tracheostomy by trauma SX on 4/5  Patient tolerating TC atc   Patient downsized to #  7 Uncuffed Trach on 4/13 by ENT  CXR 4/13: Bilateral Pleural effusions with Pulmonary edema   Continue Chest PT and Suctioning PRN

## 2018-04-18 NOTE — PROGRESS NOTE ADULT - PROBLEM SELECTOR PLAN 7
Venous Duplex 4/1: Acute DVT of Rt and Lft Soleal Vein   Venous Duplex 4/10: Patient with persistent Soleal DVT b/l w/o propagation   Patient to have repeat LE duplex performed on 4/17 Venous Duplex 4/1: Acute DVT of Rt and Lft Soleal Vein   Venous Duplex 4/10: Patient with persistent Soleal DVT b/l w/o propagation   Patient to have repeat LE duplex performed on or around 4/17-ordered

## 2018-04-18 NOTE — PROGRESS NOTE ADULT - SUBJECTIVE AND OBJECTIVE BOX
INTERVAL HPI/OVERNIGHT EVENTS:  small loose BM yesterday x1 and overnight x2  yesterday afternoon noted to have significant abdominal distension - bolus TF discontinued and G tube connected to gravity drainage ~400 cc drained over 4 hour period  also had bustamante placed with ~500 cc urine return as per RCU team  Then started on trickle feeds; converted to intermittent TF (over 18 hour period at 85 cc/hr)      MEDICATIONS  (STANDING):  aspirin  chewable 81 milliGRAM(s) Oral daily  BACItracin   Ointment 1 Application(s) Topical daily  clopidogrel Tablet 75 milliGRAM(s) Oral daily  doxazosin 1 milliGRAM(s) Oral at bedtime  enalapril 10 milliGRAM(s) Oral every 12 hours  enoxaparin Injectable 40 milliGRAM(s) SubCutaneous daily  famotidine    Tablet 20 milliGRAM(s) Oral two times a day  furosemide   Injectable 20 milliGRAM(s) IV Push two times a day  insulin lispro (HumaLOG) corrective regimen sliding scale   SubCutaneous every 6 hours  insulin NPH human recombinant 6 Unit(s) SubCutaneous every 6 hours  lactobacillus acidophilus 1 Tablet(s) Oral every 12 hours  lidocaine   Patch 1 Patch Transdermal daily  lidocaine   Patch 1 Patch Transdermal daily  metoclopramide Injectable 5 milliGRAM(s) IV Push every 8 hours  metoprolol tartrate 100 milliGRAM(s) Oral every 12 hours  mirtazapine 15 milliGRAM(s) Oral <User Schedule>  QUEtiapine 25 milliGRAM(s) Oral at bedtime  simethicone 80 milliGRAM(s) Chew every 8 hours  simvastatin 40 milliGRAM(s) Oral at bedtime  sodium chloride 0.45%. 1000 milliLiter(s) (50 mL/Hr) IV Continuous <Continuous>  vancomycin  IVPB 1000 milliGRAM(s) IV Intermittent every 8 hours    MEDICATIONS  (PRN):  acetaminophen    Suspension. 650 milliGRAM(s) Oral every 6 hours PRN Mild Pain (1 - 3)  haloperidol    Injectable 1 milliGRAM(s) IV Push every 4 hours PRN agitation  melatonin 3 milliGRAM(s) Oral at bedtime PRN Insomnia  oxyCODONE    Solution 5 milliGRAM(s) Oral every 6 hours PRN Severe Pain (7 - 10)      Allergies  No Known Allergies      Vital Signs Last 24 Hrs  T(C): 36.4 (18 Apr 2018 04:33), Max: 37.1 (18 Apr 2018 01:20)  T(F): 97.6 (18 Apr 2018 04:33), Max: 98.8 (18 Apr 2018 01:20)  HR: 84 (18 Apr 2018 08:12) (71 - 92)  BP: 163/55 (18 Apr 2018 04:33) (158/52 - 188/72)  BP(mean): --  RR: 20 (18 Apr 2018 08:12) (16 - 20)  SpO2: 99% (18 Apr 2018 08:12) (95% - 100%)    PHYSICAL EXAM:  Constitutional: +trach no bleeding, some vocalizations  Neck: +trach  Respiratory: Rhonchi, no wheeze  Cardiovascular: S1 and S2, RRR  Gastrointestinal: +abdominal binder BS+, softly distended,  healing ecchymosis LLQ +PEg site clean and dry +binder  Extremities: trace edema, neg clubbing, cyanosis  Vascular: 2+ peripheral pulses  Neurological: no focal asymmetry, occ confused  Skin: anicteric    LABS:                        10.1   12.1  )-----------( 380      ( 18 Apr 2018 06:43 )             31.2     04-18    141  |  102  |  16  ----------------------------<  119<H>  3.4<L>   |  29  |  0.59    Ca    8.3<L>      18 Apr 2018 06:43  Phos  2.0     04-18  Mg     1.8     04-18      RADIOLOGY & ADDITIONAL TESTS:
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
## 2018-04-18 NOTE — PROGRESS NOTE ADULT - PROBLEM SELECTOR PLAN 5
Patient currently Remains NPO   Continue current regimen  Continue Insulin sliding scale Continue current regimen  Continue Insulin sliding scale
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
## 2018-04-18 NOTE — PROGRESS NOTE ADULT - PROBLEM SELECTOR PLAN 3
Sputum cx 4/13: Moderate Staph Aureus ( Sensitivity pending)   Continue IV Vanco d/c zosyn   IV Vanco increased to 1 gram q 8 hrs based on morning level Sputum cx 4/13: Moderate Staph Aureus ( Sensitivity pending)   Continue IV Vanco day 6/7  IV Vanco increased to 1 gram q 8 hrs based on morning level

## 2018-04-19 DIAGNOSIS — F43.25 ADJUSTMENT DISORDER WITH MIXED DISTURBANCE OF EMOTIONS AND CONDUCT: ICD-10-CM

## 2018-04-19 LAB
ANION GAP SERPL CALC-SCNC: 7 MMOL/L — SIGNIFICANT CHANGE UP (ref 5–17)
BUN SERPL-MCNC: 17 MG/DL — SIGNIFICANT CHANGE UP (ref 7–23)
CALCIUM SERPL-MCNC: 8.9 MG/DL — SIGNIFICANT CHANGE UP (ref 8.4–10.5)
CHLORIDE SERPL-SCNC: 102 MMOL/L — SIGNIFICANT CHANGE UP (ref 96–108)
CO2 SERPL-SCNC: 32 MMOL/L — HIGH (ref 22–31)
CREAT SERPL-MCNC: 0.65 MG/DL — SIGNIFICANT CHANGE UP (ref 0.5–1.3)
GLUCOSE SERPL-MCNC: 119 MG/DL — HIGH (ref 70–99)
HCT VFR BLD CALC: 33.5 % — LOW (ref 39–50)
HGB BLD-MCNC: 10.7 G/DL — LOW (ref 13–17)
MAGNESIUM SERPL-MCNC: 1.8 MG/DL — SIGNIFICANT CHANGE UP (ref 1.6–2.6)
MCHC RBC-ENTMCNC: 32 GM/DL — SIGNIFICANT CHANGE UP (ref 32–36)
MCHC RBC-ENTMCNC: 32.1 PG — SIGNIFICANT CHANGE UP (ref 27–34)
MCV RBC AUTO: 101 FL — HIGH (ref 80–100)
PHOSPHATE SERPL-MCNC: 2.2 MG/DL — LOW (ref 2.5–4.5)
PLATELET # BLD AUTO: 349 K/UL — SIGNIFICANT CHANGE UP (ref 150–400)
POTASSIUM SERPL-MCNC: 3.8 MMOL/L — SIGNIFICANT CHANGE UP (ref 3.5–5.3)
POTASSIUM SERPL-SCNC: 3.8 MMOL/L — SIGNIFICANT CHANGE UP (ref 3.5–5.3)
RBC # BLD: 3.33 M/UL — LOW (ref 4.2–5.8)
RBC # FLD: 16.4 % — HIGH (ref 10.3–14.5)
SODIUM SERPL-SCNC: 141 MMOL/L — SIGNIFICANT CHANGE UP (ref 135–145)
VANCOMYCIN TROUGH SERPL-MCNC: 20.1 UG/ML — HIGH (ref 10–20)
WBC # BLD: 13.4 K/UL — HIGH (ref 3.8–10.5)
WBC # FLD AUTO: 13.4 K/UL — HIGH (ref 3.8–10.5)

## 2018-04-19 PROCEDURE — 99232 SBSQ HOSP IP/OBS MODERATE 35: CPT | Mod: GC

## 2018-04-19 PROCEDURE — 99291 CRITICAL CARE FIRST HOUR: CPT

## 2018-04-19 RX ORDER — HYDRALAZINE HCL 50 MG
25 TABLET ORAL EVERY 8 HOURS
Qty: 0 | Refills: 0 | Status: DISCONTINUED | OUTPATIENT
Start: 2018-04-19 | End: 2018-04-27

## 2018-04-19 RX ADMIN — SIMETHICONE 80 MILLIGRAM(S): 80 TABLET, CHEWABLE ORAL at 21:16

## 2018-04-19 RX ADMIN — OXYCODONE HYDROCHLORIDE 5 MILLIGRAM(S): 5 TABLET ORAL at 11:31

## 2018-04-19 RX ADMIN — SIMETHICONE 80 MILLIGRAM(S): 80 TABLET, CHEWABLE ORAL at 05:05

## 2018-04-19 RX ADMIN — FAMOTIDINE 20 MILLIGRAM(S): 10 INJECTION INTRAVENOUS at 17:24

## 2018-04-19 RX ADMIN — LIDOCAINE 1 PATCH: 4 CREAM TOPICAL at 23:30

## 2018-04-19 RX ADMIN — Medication 250 MILLIGRAM(S): at 13:38

## 2018-04-19 RX ADMIN — Medication 2: at 23:46

## 2018-04-19 RX ADMIN — HUMAN INSULIN 6 UNIT(S): 100 INJECTION, SUSPENSION SUBCUTANEOUS at 18:07

## 2018-04-19 RX ADMIN — Medication 25 MILLIGRAM(S): at 11:13

## 2018-04-19 RX ADMIN — HALOPERIDOL DECANOATE 1 MILLIGRAM(S): 100 INJECTION INTRAMUSCULAR at 16:41

## 2018-04-19 RX ADMIN — QUETIAPINE FUMARATE 25 MILLIGRAM(S): 200 TABLET, FILM COATED ORAL at 21:16

## 2018-04-19 RX ADMIN — Medication 81 MILLIGRAM(S): at 11:08

## 2018-04-19 RX ADMIN — Medication 5 MILLIGRAM(S): at 05:22

## 2018-04-19 RX ADMIN — Medication 250 MILLIGRAM(S): at 05:22

## 2018-04-19 RX ADMIN — OXYCODONE HYDROCHLORIDE 5 MILLIGRAM(S): 5 TABLET ORAL at 11:01

## 2018-04-19 RX ADMIN — HUMAN INSULIN 6 UNIT(S): 100 INJECTION, SUSPENSION SUBCUTANEOUS at 23:46

## 2018-04-19 RX ADMIN — HUMAN INSULIN 6 UNIT(S): 100 INJECTION, SUSPENSION SUBCUTANEOUS at 00:11

## 2018-04-19 RX ADMIN — LIDOCAINE 1 PATCH: 4 CREAM TOPICAL at 11:09

## 2018-04-19 RX ADMIN — Medication 250 MILLIGRAM(S): at 21:26

## 2018-04-19 RX ADMIN — Medication 1 TABLET(S): at 05:05

## 2018-04-19 RX ADMIN — SIMETHICONE 80 MILLIGRAM(S): 80 TABLET, CHEWABLE ORAL at 12:44

## 2018-04-19 RX ADMIN — Medication 2: at 06:25

## 2018-04-19 RX ADMIN — HUMAN INSULIN 6 UNIT(S): 100 INJECTION, SUSPENSION SUBCUTANEOUS at 06:25

## 2018-04-19 RX ADMIN — Medication 1 TABLET(S): at 17:24

## 2018-04-19 RX ADMIN — MIRTAZAPINE 15 MILLIGRAM(S): 45 TABLET, ORALLY DISINTEGRATING ORAL at 20:29

## 2018-04-19 RX ADMIN — FAMOTIDINE 20 MILLIGRAM(S): 10 INJECTION INTRAVENOUS at 05:05

## 2018-04-19 RX ADMIN — Medication 10 MILLIGRAM(S): at 17:24

## 2018-04-19 RX ADMIN — Medication 5 MILLIGRAM(S): at 21:16

## 2018-04-19 RX ADMIN — HALOPERIDOL DECANOATE 1 MILLIGRAM(S): 100 INJECTION INTRAMUSCULAR at 07:28

## 2018-04-19 RX ADMIN — SIMVASTATIN 40 MILLIGRAM(S): 20 TABLET, FILM COATED ORAL at 21:34

## 2018-04-19 RX ADMIN — Medication 5 MILLIGRAM(S): at 12:44

## 2018-04-19 RX ADMIN — ENOXAPARIN SODIUM 40 MILLIGRAM(S): 100 INJECTION SUBCUTANEOUS at 12:03

## 2018-04-19 RX ADMIN — Medication 25 MILLIGRAM(S): at 20:29

## 2018-04-19 RX ADMIN — Medication 100 MILLIGRAM(S): at 18:06

## 2018-04-19 RX ADMIN — CLOPIDOGREL BISULFATE 75 MILLIGRAM(S): 75 TABLET, FILM COATED ORAL at 11:08

## 2018-04-19 RX ADMIN — Medication 2: at 00:11

## 2018-04-19 RX ADMIN — Medication 3 MILLIGRAM(S): at 21:28

## 2018-04-19 RX ADMIN — Medication 2: at 18:06

## 2018-04-19 RX ADMIN — Medication 10 MILLIGRAM(S): at 05:05

## 2018-04-19 RX ADMIN — Medication 100 MILLIGRAM(S): at 05:05

## 2018-04-19 RX ADMIN — Medication 20 MILLIGRAM(S): at 05:22

## 2018-04-19 RX ADMIN — HUMAN INSULIN 6 UNIT(S): 100 INJECTION, SUSPENSION SUBCUTANEOUS at 12:40

## 2018-04-19 RX ADMIN — Medication 1 MILLIGRAM(S): at 21:16
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
## 2018-04-19 NOTE — PROGRESS NOTE ADULT - PROBLEM SELECTOR PLAN 3
Sputum cx 4/13: Moderate Staph Aureus ( Sensitivity pending)   Continue IV Vanco day 7/7 Last dose tonight at 10pm
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
## 2018-04-19 NOTE — PROGRESS NOTE ADULT - PROBLEM SELECTOR PLAN 2
Patient S/p tracheostomy by trauma SX on 4/5  Patient tolerating TC atc   Patient downsized to #  7 Uncuffed Trach on 4/13 by ENT  CXR 4/13: Bilateral Pleural effusions with Pulmonary edema   Continue Chest PT and Suctioning PRN

## 2018-04-19 NOTE — PROGRESS NOTE ADULT - PROBLEM SELECTOR PLAN 1
+ Acute left 4th-8th rib fractures  + Left superior ramus fracture with a large extraperitoneal hematoma    + Left inferior pubic ramus fracture / right superior pubic ramus fracture  + Left L5 lamina & hemisacrum fractures  + Several spleen lacerations/  Grade 2 left kidney laceration  Patient S/P IR  glue and coil embolization  CT Scan 4/14: Extraperitoneal hematoma slightly increased in size compared to prior imaging with increasing organization   Trauma Sx called and notified, nothing to do this represents redistribution of hematoma.

## 2018-04-19 NOTE — PROGRESS NOTE ADULT - SUBJECTIVE AND OBJECTIVE BOX
INTERVAL HPI/OVERNIGHT EVENTS:  Tolerated TF's overnight    MEDICATIONS  (STANDING):  aspirin  chewable 81 milliGRAM(s) Oral daily  BACItracin   Ointment 1 Application(s) Topical daily  clopidogrel Tablet 75 milliGRAM(s) Oral daily  doxazosin 1 milliGRAM(s) Oral at bedtime  enalapril 10 milliGRAM(s) Oral every 12 hours  enoxaparin Injectable 40 milliGRAM(s) SubCutaneous daily  famotidine    Tablet 20 milliGRAM(s) Oral two times a day  insulin lispro (HumaLOG) corrective regimen sliding scale   SubCutaneous every 6 hours  insulin NPH human recombinant 6 Unit(s) SubCutaneous every 6 hours  lactobacillus acidophilus 1 Tablet(s) Oral every 12 hours  lidocaine   Patch 1 Patch Transdermal daily  lidocaine   Patch 1 Patch Transdermal daily  metoclopramide Injectable 5 milliGRAM(s) IV Push every 8 hours  metoprolol tartrate 100 milliGRAM(s) Oral every 12 hours  mirtazapine 15 milliGRAM(s) Oral <User Schedule>  QUEtiapine 25 milliGRAM(s) Oral at bedtime  simethicone 80 milliGRAM(s) Chew every 8 hours  simvastatin 40 milliGRAM(s) Oral at bedtime  sodium chloride 0.45%. 1000 milliLiter(s) (50 mL/Hr) IV Continuous <Continuous>  vancomycin  IVPB 1000 milliGRAM(s) IV Intermittent every 8 hours    MEDICATIONS  (PRN):  acetaminophen    Suspension. 650 milliGRAM(s) Oral every 6 hours PRN Mild Pain (1 - 3)  haloperidol    Injectable 1 milliGRAM(s) IV Push every 4 hours PRN agitation  melatonin 3 milliGRAM(s) Oral at bedtime PRN Insomnia  oxyCODONE    Solution 5 milliGRAM(s) Oral every 6 hours PRN Severe Pain (7 - 10)    Allergies  No Known Allergies    T(F): 98.4 (04-19-18 @ 04:00), Max: 98.4 (04-19-18 @ 04:00)  HR: 94 (04-19-18 @ 05:09) (84 - 94)  BP: 163/63 (04-19-18 @ 04:00) (160/62 - 176/65)  RR: 22 (04-19-18 @ 05:09) (18 - 22)  SpO2: 98% (04-19-18 @ 05:09) (98% - 100%)  Wt(kg): --  ,   I&O's Summary    18 Apr 2018 07:01  -  19 Apr 2018 07:00  --------------------------------------------------------  IN: 1275 mL / OUT: 450 mL / NET: 825 mL    PHYSICAL EXAM:  Constitutional: +trach no bleeding, some vocalizations  Neck: +trach  Respiratory: Rhonchi, no wheeze  Cardiovascular: S1 and S2, RRR  Gastrointestinal: +abdominal binder BS+, softly distended,  healing ecchymosis LLQ +PEg site clean and dry +binder  Extremities: trace edema, neg clubbing, cyanosis  Vascular: 2+ peripheral pulses  Neurological: no focal asymmetry, occ confused  Skin: anicteric    LABS:                        10.1   12.1  )-----------( 380      ( 18 Apr 2018 06:43 )             31.2     04-18    141  |  102  |  16  ----------------------------<  119<H>  3.4<L>   |  29  |  0.59    Ca    8.3<L>      18 Apr 2018 06:43  Phos  2.0     04-18  Mg     1.8     04-18      RADIOLOGY & ADDITIONAL TESTS:

## 2018-04-19 NOTE — PROGRESS NOTE ADULT - PROBLEM SELECTOR PLAN 4
Known illeus, however patient is tolerating tube feed and having bowel movements. Continue low dose reglan
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
## 2018-04-19 NOTE — PROGRESS NOTE ADULT - PROBLEM SELECTOR PLAN 7
Venous Duplex 4/1: Acute DVT of Rt and Lft Soleal Vein   Venous Duplex 4/10: Patient with persistent Soleal DVT b/l w/o propagation   Venous Duplex 4/18: Patient with New left peroneal DVT with persistent soleal DVT
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
## 2018-04-19 NOTE — PROGRESS NOTE ADULT - PROBLEM SELECTOR PLAN 8
Patient seen by Psych   Continue Seroquel 25 mg qhs and Haldol  Continue Melatonin prn Insomnia and Increase IVP Haldol 1 mg q 4 hrs PRN  Continue Mirtazapine

## 2018-04-20 DIAGNOSIS — Z02.9 ENCOUNTER FOR ADMINISTRATIVE EXAMINATIONS, UNSPECIFIED: ICD-10-CM

## 2018-04-20 LAB
ANION GAP SERPL CALC-SCNC: 8 MMOL/L — SIGNIFICANT CHANGE UP (ref 5–17)
BUN SERPL-MCNC: 24 MG/DL — HIGH (ref 7–23)
CALCIUM SERPL-MCNC: 8.8 MG/DL — SIGNIFICANT CHANGE UP (ref 8.4–10.5)
CHLORIDE SERPL-SCNC: 101 MMOL/L — SIGNIFICANT CHANGE UP (ref 96–108)
CO2 SERPL-SCNC: 30 MMOL/L — SIGNIFICANT CHANGE UP (ref 22–31)
CREAT SERPL-MCNC: 0.74 MG/DL — SIGNIFICANT CHANGE UP (ref 0.5–1.3)
GLUCOSE SERPL-MCNC: 130 MG/DL — HIGH (ref 70–99)
HCT VFR BLD CALC: 30.5 % — LOW (ref 39–50)
HGB BLD-MCNC: 9.8 G/DL — LOW (ref 13–17)
MAGNESIUM SERPL-MCNC: 1.7 MG/DL — SIGNIFICANT CHANGE UP (ref 1.6–2.6)
MCHC RBC-ENTMCNC: 32.1 GM/DL — SIGNIFICANT CHANGE UP (ref 32–36)
MCHC RBC-ENTMCNC: 32.3 PG — SIGNIFICANT CHANGE UP (ref 27–34)
MCV RBC AUTO: 100 FL — SIGNIFICANT CHANGE UP (ref 80–100)
PHOSPHATE SERPL-MCNC: 2.9 MG/DL — SIGNIFICANT CHANGE UP (ref 2.5–4.5)
PLATELET # BLD AUTO: 287 K/UL — SIGNIFICANT CHANGE UP (ref 150–400)
POTASSIUM SERPL-MCNC: 4 MMOL/L — SIGNIFICANT CHANGE UP (ref 3.5–5.3)
POTASSIUM SERPL-SCNC: 4 MMOL/L — SIGNIFICANT CHANGE UP (ref 3.5–5.3)
RBC # BLD: 3.04 M/UL — LOW (ref 4.2–5.8)
RBC # FLD: 16.4 % — HIGH (ref 10.3–14.5)
SODIUM SERPL-SCNC: 139 MMOL/L — SIGNIFICANT CHANGE UP (ref 135–145)
WBC # BLD: 11.9 K/UL — HIGH (ref 3.8–10.5)
WBC # FLD AUTO: 11.9 K/UL — HIGH (ref 3.8–10.5)

## 2018-04-20 PROCEDURE — 99233 SBSQ HOSP IP/OBS HIGH 50: CPT | Mod: GC

## 2018-04-20 RX ORDER — NYSTATIN 500MM UNIT
500000 POWDER (EA) MISCELLANEOUS
Qty: 0 | Refills: 0 | Status: DISCONTINUED | OUTPATIENT
Start: 2018-04-20 | End: 2018-05-05

## 2018-04-20 RX ORDER — QUETIAPINE FUMARATE 200 MG/1
12.5 TABLET, FILM COATED ORAL EVERY 6 HOURS
Qty: 0 | Refills: 0 | Status: DISCONTINUED | OUTPATIENT
Start: 2018-04-20 | End: 2018-05-03

## 2018-04-20 RX ORDER — HALOPERIDOL DECANOATE 100 MG/ML
1 INJECTION INTRAMUSCULAR EVERY 8 HOURS
Qty: 0 | Refills: 0 | Status: DISCONTINUED | OUTPATIENT
Start: 2018-04-20 | End: 2018-04-27

## 2018-04-20 RX ORDER — VALPROIC ACID (AS SODIUM SALT) 250 MG/5ML
125 SOLUTION, ORAL ORAL AT BEDTIME
Qty: 0 | Refills: 0 | Status: DISCONTINUED | OUTPATIENT
Start: 2018-04-20 | End: 2018-04-20

## 2018-04-20 RX ORDER — VALPROIC ACID (AS SODIUM SALT) 250 MG/5ML
125 SOLUTION, ORAL ORAL AT BEDTIME
Qty: 0 | Refills: 0 | Status: DISCONTINUED | OUTPATIENT
Start: 2018-04-20 | End: 2018-04-26

## 2018-04-20 RX ADMIN — Medication 5 MILLIGRAM(S): at 05:08

## 2018-04-20 RX ADMIN — Medication 500000 UNIT(S): at 17:28

## 2018-04-20 RX ADMIN — Medication 25 MILLIGRAM(S): at 05:14

## 2018-04-20 RX ADMIN — Medication 5 MILLIGRAM(S): at 22:06

## 2018-04-20 RX ADMIN — OXYCODONE HYDROCHLORIDE 5 MILLIGRAM(S): 5 TABLET ORAL at 00:34

## 2018-04-20 RX ADMIN — Medication 125 MILLIGRAM(S): at 22:07

## 2018-04-20 RX ADMIN — HUMAN INSULIN 6 UNIT(S): 100 INJECTION, SUSPENSION SUBCUTANEOUS at 18:05

## 2018-04-20 RX ADMIN — SIMETHICONE 80 MILLIGRAM(S): 80 TABLET, CHEWABLE ORAL at 14:38

## 2018-04-20 RX ADMIN — Medication 1 TABLET(S): at 17:21

## 2018-04-20 RX ADMIN — Medication 25 MILLIGRAM(S): at 22:06

## 2018-04-20 RX ADMIN — OXYCODONE HYDROCHLORIDE 5 MILLIGRAM(S): 5 TABLET ORAL at 00:04

## 2018-04-20 RX ADMIN — SIMETHICONE 80 MILLIGRAM(S): 80 TABLET, CHEWABLE ORAL at 05:07

## 2018-04-20 RX ADMIN — ENOXAPARIN SODIUM 40 MILLIGRAM(S): 100 INJECTION SUBCUTANEOUS at 12:11

## 2018-04-20 RX ADMIN — Medication 25 MILLIGRAM(S): at 14:37

## 2018-04-20 RX ADMIN — Medication 100 MILLIGRAM(S): at 17:20

## 2018-04-20 RX ADMIN — Medication 2: at 12:14

## 2018-04-20 RX ADMIN — MIRTAZAPINE 15 MILLIGRAM(S): 45 TABLET, ORALLY DISINTEGRATING ORAL at 22:06

## 2018-04-20 RX ADMIN — SIMVASTATIN 40 MILLIGRAM(S): 20 TABLET, FILM COATED ORAL at 22:07

## 2018-04-20 RX ADMIN — QUETIAPINE FUMARATE 25 MILLIGRAM(S): 200 TABLET, FILM COATED ORAL at 22:07

## 2018-04-20 RX ADMIN — Medication 81 MILLIGRAM(S): at 12:11

## 2018-04-20 RX ADMIN — HALOPERIDOL DECANOATE 1 MILLIGRAM(S): 100 INJECTION INTRAMUSCULAR at 05:08

## 2018-04-20 RX ADMIN — Medication 1 MILLIGRAM(S): at 22:06

## 2018-04-20 RX ADMIN — CLOPIDOGREL BISULFATE 75 MILLIGRAM(S): 75 TABLET, FILM COATED ORAL at 12:11

## 2018-04-20 RX ADMIN — FAMOTIDINE 20 MILLIGRAM(S): 10 INJECTION INTRAVENOUS at 05:07

## 2018-04-20 RX ADMIN — Medication 1 TABLET(S): at 05:07

## 2018-04-20 RX ADMIN — FAMOTIDINE 20 MILLIGRAM(S): 10 INJECTION INTRAVENOUS at 17:21

## 2018-04-20 RX ADMIN — Medication 5 MILLIGRAM(S): at 14:37

## 2018-04-20 RX ADMIN — Medication 10 MILLIGRAM(S): at 17:20

## 2018-04-20 RX ADMIN — Medication 100 MILLIGRAM(S): at 05:07

## 2018-04-20 RX ADMIN — HUMAN INSULIN 6 UNIT(S): 100 INJECTION, SUSPENSION SUBCUTANEOUS at 12:15

## 2018-04-20 RX ADMIN — HALOPERIDOL DECANOATE 1 MILLIGRAM(S): 100 INJECTION INTRAMUSCULAR at 20:22

## 2018-04-20 RX ADMIN — Medication 1 APPLICATION(S): at 12:13

## 2018-04-20 RX ADMIN — LIDOCAINE 1 PATCH: 4 CREAM TOPICAL at 12:11

## 2018-04-20 RX ADMIN — Medication 2: at 18:06

## 2018-04-20 RX ADMIN — SIMETHICONE 80 MILLIGRAM(S): 80 TABLET, CHEWABLE ORAL at 22:07

## 2018-04-20 RX ADMIN — Medication 10 MILLIGRAM(S): at 05:07

## 2018-04-20 RX ADMIN — HUMAN INSULIN 6 UNIT(S): 100 INJECTION, SUSPENSION SUBCUTANEOUS at 06:49

## 2018-04-20 NOTE — PROGRESS NOTE ADULT - PROBLEM SELECTOR PLAN 2
Patient S/p tracheostomy by trauma SX on 4/5  Patient tolerating TC atc   Patient downsized to #  7 Uncuffed Trach on 4/13 by ENT  CXR 4/13: Bilateral Pleural effusions with Pulmonary edema   Continue Chest PT and Suctioning PRN Pt S/p tracheostomy by trauma SX on 4/5  Pt tolerating TC atc   Pt downsized to #  7 Uncuffed Trach on 4/13 by ENT. Pt pulled out trach & replaced same on 4/19  CXR 4/13: Bilateral Pleural effusions with Pulmonary edema   Continue Chest PT and Suctioning PRN

## 2018-04-20 NOTE — PROGRESS NOTE ADULT - SUBJECTIVE AND OBJECTIVE BOX
INTERVAL HPI/OVERNIGHT EVENTS:  Tolerating TF's    MEDICATIONS  (STANDING):  aspirin  chewable 81 milliGRAM(s) Oral daily  BACItracin   Ointment 1 Application(s) Topical daily  clopidogrel Tablet 75 milliGRAM(s) Oral daily  doxazosin 1 milliGRAM(s) Oral at bedtime  enalapril 10 milliGRAM(s) Oral every 12 hours  enoxaparin Injectable 40 milliGRAM(s) SubCutaneous daily  famotidine    Tablet 20 milliGRAM(s) Oral two times a day  hydrALAZINE 25 milliGRAM(s) Oral every 8 hours  insulin lispro (HumaLOG) corrective regimen sliding scale   SubCutaneous every 6 hours  insulin NPH human recombinant 6 Unit(s) SubCutaneous every 6 hours  lactobacillus acidophilus 1 Tablet(s) Oral every 12 hours  lidocaine   Patch 1 Patch Transdermal daily  lidocaine   Patch 1 Patch Transdermal daily  metoclopramide Injectable 5 milliGRAM(s) IV Push every 8 hours  metoprolol tartrate 100 milliGRAM(s) Oral every 12 hours  mirtazapine 15 milliGRAM(s) Oral <User Schedule>  QUEtiapine 25 milliGRAM(s) Oral at bedtime  simethicone 80 milliGRAM(s) Chew every 8 hours  simvastatin 40 milliGRAM(s) Oral at bedtime  sodium chloride 0.45%. 1000 milliLiter(s) (50 mL/Hr) IV Continuous <Continuous>    MEDICATIONS  (PRN):  acetaminophen    Suspension. 650 milliGRAM(s) Oral every 6 hours PRN Mild Pain (1 - 3)  haloperidol    Injectable 1 milliGRAM(s) IV Push every 4 hours PRN agitation  melatonin 3 milliGRAM(s) Oral at bedtime PRN Insomnia  oxyCODONE    Solution 5 milliGRAM(s) Oral every 6 hours PRN Severe Pain (7 - 10)    Allergies  No Known Allergies    T(F): 98.7 (04-20-18 @ 04:10), Max: 99 (04-19-18 @ 17:34)  HR: 67 (04-20-18 @ 07:41) (67 - 99)  BP: 133/79 (04-20-18 @ 04:10) (133/79 - 185/68)  RR: 18 (04-20-18 @ 07:41) (18 - 20)  SpO2: 100% (04-20-18 @ 07:41) (97% - 100%)  Wt(kg): --  ,   I&O's Summary    19 Apr 2018 07:01  -  20 Apr 2018 07:00  --------------------------------------------------------  IN: 2170 mL / OUT: 1175 mL / NET: 995 mL    PHYSICAL EXAM:  Constitutional: +trach no bleeding, some vocalizations  Neck: +trach  Respiratory: Rhonchi, no wheeze  Cardiovascular: S1 and S2, RRR  Gastrointestinal: +abdominal binder BS+, softly distended,  healing ecchymosis LLQ +PEg site clean and dry +binder  Extremities: trace edema, neg clubbing, cyanosis  Vascular: 2+ peripheral pulses  Neurological: no focal asymmetry, occ confused  Skin: anicteric    LABS:                      9.8    11.9  )-----------( 287      ( 20 Apr 2018 07:09 )             30.5               04-20    139  |  101  |  24<H>  ----------------------------<  130<H>  4.0   |  30  |  0.74    Ca    8.8      20 Apr 2018 07:09  Phos  2.9     04-20  Mg     1.7     04-20    RADIOLOGY & ADDITIONAL TESTS:
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
## 2018-04-20 NOTE — PROGRESS NOTE ADULT - PROBLEM SELECTOR PLAN 8
Patient seen by Psych   Continue Seroquel 25 mg qhs and Haldol  Continue Melatonin prn Insomnia and Increase IVP Haldol 1 mg q 4 hrs PRN  Continue Mirtazapine Pt seen by Psych   Continue Seroquel 25 mg qhs and Haldol  Continue Melatonin prn Insomnia and Increase IVP Haldol 1 mg q 4 hrs PRN  Continue Mirtazapine

## 2018-04-20 NOTE — PROGRESS NOTE ADULT - PROBLEM SELECTOR PLAN 6
Continue asa plavix   Continue Simvastatin   Continue Vasotec and Lopressor for HTN  Continue to monitor BP and HR Continue asa Plavix   Continue Simvastatin   Continue Vasotec and Lopressor for HTN  Continue to monitor BP and HR
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
## 2018-04-20 NOTE — PROGRESS NOTE ADULT - SUBJECTIVE AND OBJECTIVE BOX
Patient is a 77y old  Male who presents with a chief complaint of pelvic fx with active extrav (27 Mar 2018 12:01)      Interval Events:    REVIEW OF SYSTEMS:  [ ] Positive  [ ] All other systems negative  [ ] Unable to assess ROS because ________    Vital Signs Last 24 Hrs  T(C): 37.1 (04-20-18 @ 04:10), Max: 37.2 (04-19-18 @ 17:34)  T(F): 98.7 (04-20-18 @ 04:10), Max: 99 (04-19-18 @ 17:34)  HR: 67 (04-20-18 @ 07:41) (67 - 99)  BP: 133/79 (04-20-18 @ 04:10) (133/79 - 185/68)  RR: 18 (04-20-18 @ 07:41) (18 - 20)  SpO2: 100% (04-20-18 @ 07:41) (97% - 100%)    PHYSICAL EXAM:  HEENT:   [ ]Tracheostomy:  [ ]Pupils equal  [ ]No oral lesions  [ ]Abnormal    SKIN  [ ]No Rash  [ ] Abnormal  [ ] pressure    CARDIAC  [ ]Regular  [ ]Abnormal    PULMONARY  [ ]Bilateral Clear Breath Sounds  [ ]Normal Excursion  [ ]Abnormal    GI  [ ]PEG      [ ] +BS		              [ ]Soft, nondistended, nontender	  [ ]Abnormal    MUSCULOSKELETAL                                   [ ]Bedbound                 [ ]Abnormal    [ ]Ambulatory/OOB to chair                           EXTREMITIES                                         [ ]Normal  [ ]Edema                           NEUROLOGIC  [ ] Normal, non focal  [ ] Focal findings:    PSYCHIATRIC  [ ]Alert and appropriate  [ ] Sedated	 [ ]Agitated    :  Ybarra: [ ] YES, if yes: Date of Placement                        [  ] NO      LINES: TLC [ ]YES, if yes: Date of Placement                    [ ] No    HOSPITAL MEDICATIONS:  MEDICATIONS  (STANDING):  aspirin  chewable 81 milliGRAM(s) Oral daily  BACItracin   Ointment 1 Application(s) Topical daily  clopidogrel Tablet 75 milliGRAM(s) Oral daily  doxazosin 1 milliGRAM(s) Oral at bedtime  enalapril 10 milliGRAM(s) Oral every 12 hours  enoxaparin Injectable 40 milliGRAM(s) SubCutaneous daily  famotidine    Tablet 20 milliGRAM(s) Oral two times a day  hydrALAZINE 25 milliGRAM(s) Oral every 8 hours  insulin lispro (HumaLOG) corrective regimen sliding scale   SubCutaneous every 6 hours  insulin NPH human recombinant 6 Unit(s) SubCutaneous every 6 hours  lactobacillus acidophilus 1 Tablet(s) Oral every 12 hours  lidocaine   Patch 1 Patch Transdermal daily  lidocaine   Patch 1 Patch Transdermal daily  metoclopramide Injectable 5 milliGRAM(s) IV Push every 8 hours  metoprolol tartrate 100 milliGRAM(s) Oral every 12 hours  mirtazapine 15 milliGRAM(s) Oral <User Schedule>  QUEtiapine 25 milliGRAM(s) Oral at bedtime  simethicone 80 milliGRAM(s) Chew every 8 hours  simvastatin 40 milliGRAM(s) Oral at bedtime  sodium chloride 0.45%. 1000 milliLiter(s) (50 mL/Hr) IV Continuous <Continuous>    MEDICATIONS  (PRN):  acetaminophen    Suspension. 650 milliGRAM(s) Oral every 6 hours PRN Mild Pain (1 - 3)  haloperidol    Injectable 1 milliGRAM(s) IV Push every 4 hours PRN agitation  melatonin 3 milliGRAM(s) Oral at bedtime PRN Insomnia  oxyCODONE    Solution 5 milliGRAM(s) Oral every 6 hours PRN Severe Pain (7 - 10)      LABS:                        9.8    11.9  )-----------( 287      ( 20 Apr 2018 07:09 )             30.5     04-20    139  |  101  |  24<H>  ----------------------------<  130<H>  4.0   |  30  |  0.74    Ca    8.8      20 Apr 2018 07:09  Phos  2.9     04-20  Mg     1.7     04-20              CAPILLARY BLOOD GLUCOSE    MICROBIOLOGY:     RADIOLOGY:  [ ] Reviewed and interpreted by me Patient is a 77y old  Male who presents with a chief complaint of pelvic fx with active extrav (27 Mar 2018 12:01)      Interval Events: none     REVIEW OF SYSTEMS:  [ ] Positive  [x ] All  systems negative  [ ] Unable to assess ROS because ________    Vital Signs Last 24 Hrs  T(C): 37.1 (04-20-18 @ 04:10), Max: 37.2 (04-19-18 @ 17:34)  T(F): 98.7 (04-20-18 @ 04:10), Max: 99 (04-19-18 @ 17:34)  HR: 67 (04-20-18 @ 07:41) (67 - 99)  BP: 133/79 (04-20-18 @ 04:10) (133/79 - 185/68)  RR: 18 (04-20-18 @ 07:41) (18 - 20)  SpO2: 100% (04-20-18 @ 07:41) (97% - 100%)    PHYSICAL EXAM:  HEENT:   [x ]Tracheostomy:  [ ]Pupils equal  [ ]No oral lesions  [x ]Abnormal for tongue coating     SKIN  [x ]No Rash  [ ] Abnormal  [ ] pressure    CARDIAC  [x ]Regular  [ ]Abnormal    PULMONARY  [x ]Bilateral Clear Breath Sounds  [ ]Normal Excursion  [ ]Abnormal    GI  [x]PEG      [x ] +BS		              [x ]large, distended, nontender	  [ ]Abnormal    MUSCULOSKELETAL                                   [ ]Bedbound                 [ ]Abnormal    [x ]OOB to chair   with assist                        EXTREMITIES                                         [ ]Normal  [ x]Edema      LUE>RUE                    NEUROLOGIC  [ ] Normal, non focal  [ ] Focal findings:  eyes open spontaneously, follows commands and moves all extremities   PSYCHIATRIC  [x ]Alert and period of restless   [ ] Sedated	 [ ]Agitated    :  Ybarra: [ ] YES, if yes: Date of Placement                        [ x ] NO      LINES: TLC [ ]YES, if yes: Date of Placement                    [ x] No    HOSPITAL MEDICATIONS:  MEDICATIONS  (STANDING):  aspirin  chewable 81 milliGRAM(s) Oral daily  BACItracin   Ointment 1 Application(s) Topical daily  clopidogrel Tablet 75 milliGRAM(s) Oral daily  doxazosin 1 milliGRAM(s) Oral at bedtime  enalapril 10 milliGRAM(s) Oral every 12 hours  enoxaparin Injectable 40 milliGRAM(s) SubCutaneous daily  famotidine    Tablet 20 milliGRAM(s) Oral two times a day  hydrALAZINE 25 milliGRAM(s) Oral every 8 hours  insulin lispro (HumaLOG) corrective regimen sliding scale   SubCutaneous every 6 hours  insulin NPH human recombinant 6 Unit(s) SubCutaneous every 6 hours  lactobacillus acidophilus 1 Tablet(s) Oral every 12 hours  lidocaine   Patch 1 Patch Transdermal daily  lidocaine   Patch 1 Patch Transdermal daily  metoclopramide Injectable 5 milliGRAM(s) IV Push every 8 hours  metoprolol tartrate 100 milliGRAM(s) Oral every 12 hours  mirtazapine 15 milliGRAM(s) Oral <User Schedule>  QUEtiapine 25 milliGRAM(s) Oral at bedtime  simethicone 80 milliGRAM(s) Chew every 8 hours  simvastatin 40 milliGRAM(s) Oral at bedtime  sodium chloride 0.45%. 1000 milliLiter(s) (50 mL/Hr) IV Continuous <Continuous>    MEDICATIONS  (PRN):  acetaminophen    Suspension. 650 milliGRAM(s) Oral every 6 hours PRN Mild Pain (1 - 3)  haloperidol    Injectable 1 milliGRAM(s) IV Push every 4 hours PRN agitation  melatonin 3 milliGRAM(s) Oral at bedtime PRN Insomnia  oxyCODONE    Solution 5 milliGRAM(s) Oral every 6 hours PRN Severe Pain (7 - 10)      LABS:                        9.8    11.9  )-----------( 287      ( 20 Apr 2018 07:09 )             30.5     04-20    139  |  101  |  24<H>  ----------------------------<  130<H>  4.0   |  30  |  0.74    Ca    8.8      20 Apr 2018 07:09  Phos  2.9     04-20  Mg     1.7     04-20              CAPILLARY BLOOD GLUCOSE    MICROBIOLOGY:     RADIOLOGY:  [ ] Reviewed and interpreted by me

## 2018-04-20 NOTE — PROGRESS NOTE ADULT - PROBLEM SELECTOR PLAN 4
Known illeus, however patient is tolerating tube feed and having bowel movements. Continue low dose reglan Known illeus, however pt is tolerating tube feed and having bowel movements. Continue low dose reglan

## 2018-04-20 NOTE — PROGRESS NOTE ADULT - PROBLEM SELECTOR PLAN 9
Continue Lovenox Continue Lovenox  Pepcid GI prophylaxis  Pt being straight cath few times per day, consider Ybarra Cath    Continue Cardura

## 2018-04-20 NOTE — PROGRESS NOTE ADULT - PROBLEM SELECTOR PLAN 3
Sputum cx 4/13: Moderate Staph Aureus ( Sensitivity pending)   Continue IV Vanco day 7/7 Last dose tonight at 10pm Sputum cx 4/13: Moderate Staph Aureus ( Sensitivity pending)   Completed IV Vanco last night

## 2018-04-21 DIAGNOSIS — R33.9 RETENTION OF URINE, UNSPECIFIED: ICD-10-CM

## 2018-04-21 LAB
ANION GAP SERPL CALC-SCNC: 7 MMOL/L — SIGNIFICANT CHANGE UP (ref 5–17)
BUN SERPL-MCNC: 28 MG/DL — HIGH (ref 7–23)
CALCIUM SERPL-MCNC: 8.6 MG/DL — SIGNIFICANT CHANGE UP (ref 8.4–10.5)
CHLORIDE SERPL-SCNC: 103 MMOL/L — SIGNIFICANT CHANGE UP (ref 96–108)
CO2 SERPL-SCNC: 32 MMOL/L — HIGH (ref 22–31)
CREAT SERPL-MCNC: 0.79 MG/DL — SIGNIFICANT CHANGE UP (ref 0.5–1.3)
GLUCOSE SERPL-MCNC: 137 MG/DL — HIGH (ref 70–99)
HCT VFR BLD CALC: 29.3 % — LOW (ref 39–50)
HGB BLD-MCNC: 9.3 G/DL — LOW (ref 13–17)
MAGNESIUM SERPL-MCNC: 1.8 MG/DL — SIGNIFICANT CHANGE UP (ref 1.6–2.6)
MCHC RBC-ENTMCNC: 31.7 GM/DL — LOW (ref 32–36)
MCHC RBC-ENTMCNC: 31.9 PG — SIGNIFICANT CHANGE UP (ref 27–34)
MCV RBC AUTO: 101 FL — HIGH (ref 80–100)
PHOSPHATE SERPL-MCNC: 3.2 MG/DL — SIGNIFICANT CHANGE UP (ref 2.5–4.5)
PLATELET # BLD AUTO: 250 K/UL — SIGNIFICANT CHANGE UP (ref 150–400)
POTASSIUM SERPL-MCNC: 3.8 MMOL/L — SIGNIFICANT CHANGE UP (ref 3.5–5.3)
POTASSIUM SERPL-SCNC: 3.8 MMOL/L — SIGNIFICANT CHANGE UP (ref 3.5–5.3)
RBC # BLD: 2.92 M/UL — LOW (ref 4.2–5.8)
RBC # FLD: 16.5 % — HIGH (ref 10.3–14.5)
SODIUM SERPL-SCNC: 142 MMOL/L — SIGNIFICANT CHANGE UP (ref 135–145)
WBC # BLD: 11.8 K/UL — HIGH (ref 3.8–10.5)
WBC # FLD AUTO: 11.8 K/UL — HIGH (ref 3.8–10.5)

## 2018-04-21 PROCEDURE — 99233 SBSQ HOSP IP/OBS HIGH 50: CPT | Mod: GC

## 2018-04-21 PROCEDURE — 74018 RADEX ABDOMEN 1 VIEW: CPT | Mod: 26

## 2018-04-21 PROCEDURE — 93010 ELECTROCARDIOGRAM REPORT: CPT

## 2018-04-21 RX ORDER — ACETAMINOPHEN 500 MG
650 TABLET ORAL EVERY 6 HOURS
Qty: 0 | Refills: 0 | Status: DISCONTINUED | OUTPATIENT
Start: 2018-04-21 | End: 2018-05-18

## 2018-04-21 RX ORDER — OXYCODONE HYDROCHLORIDE 5 MG/1
5 TABLET ORAL EVERY 8 HOURS
Qty: 0 | Refills: 0 | Status: DISCONTINUED | OUTPATIENT
Start: 2018-04-21 | End: 2018-04-28

## 2018-04-21 RX ORDER — DEXTROSE MONOHYDRATE, SODIUM CHLORIDE, AND POTASSIUM CHLORIDE 50; .745; 4.5 G/1000ML; G/1000ML; G/1000ML
1000 INJECTION, SOLUTION INTRAVENOUS
Qty: 0 | Refills: 0 | Status: DISCONTINUED | OUTPATIENT
Start: 2018-04-21 | End: 2018-04-23

## 2018-04-21 RX ORDER — DOXAZOSIN MESYLATE 4 MG
2 TABLET ORAL AT BEDTIME
Qty: 0 | Refills: 0 | Status: DISCONTINUED | OUTPATIENT
Start: 2018-04-21 | End: 2018-05-18

## 2018-04-21 RX ORDER — TAMSULOSIN HYDROCHLORIDE 0.4 MG/1
0.4 CAPSULE ORAL AT BEDTIME
Qty: 0 | Refills: 0 | Status: DISCONTINUED | OUTPATIENT
Start: 2018-04-21 | End: 2018-04-21

## 2018-04-21 RX ADMIN — Medication 3 MILLIGRAM(S): at 01:10

## 2018-04-21 RX ADMIN — LIDOCAINE 1 PATCH: 4 CREAM TOPICAL at 13:14

## 2018-04-21 RX ADMIN — QUETIAPINE FUMARATE 12.5 MILLIGRAM(S): 200 TABLET, FILM COATED ORAL at 01:10

## 2018-04-21 RX ADMIN — HUMAN INSULIN 6 UNIT(S): 100 INJECTION, SUSPENSION SUBCUTANEOUS at 18:33

## 2018-04-21 RX ADMIN — Medication 1 APPLICATION(S): at 12:36

## 2018-04-21 RX ADMIN — Medication 100 MILLIGRAM(S): at 17:39

## 2018-04-21 RX ADMIN — Medication 5 MILLIGRAM(S): at 21:17

## 2018-04-21 RX ADMIN — Medication 1 TABLET(S): at 17:38

## 2018-04-21 RX ADMIN — Medication 500000 UNIT(S): at 12:35

## 2018-04-21 RX ADMIN — Medication 5 MILLIGRAM(S): at 13:15

## 2018-04-21 RX ADMIN — Medication 125 MILLIGRAM(S): at 21:16

## 2018-04-21 RX ADMIN — Medication 10 MILLIGRAM(S): at 06:29

## 2018-04-21 RX ADMIN — HUMAN INSULIN 6 UNIT(S): 100 INJECTION, SUSPENSION SUBCUTANEOUS at 23:41

## 2018-04-21 RX ADMIN — Medication 10 MILLIGRAM(S): at 17:38

## 2018-04-21 RX ADMIN — Medication 25 MILLIGRAM(S): at 13:15

## 2018-04-21 RX ADMIN — Medication 100 MILLIGRAM(S): at 06:29

## 2018-04-21 RX ADMIN — Medication 25 MILLIGRAM(S): at 06:29

## 2018-04-21 RX ADMIN — SIMETHICONE 80 MILLIGRAM(S): 80 TABLET, CHEWABLE ORAL at 06:30

## 2018-04-21 RX ADMIN — QUETIAPINE FUMARATE 12.5 MILLIGRAM(S): 200 TABLET, FILM COATED ORAL at 17:47

## 2018-04-21 RX ADMIN — HUMAN INSULIN 6 UNIT(S): 100 INJECTION, SUSPENSION SUBCUTANEOUS at 00:23

## 2018-04-21 RX ADMIN — LIDOCAINE 1 PATCH: 4 CREAM TOPICAL at 00:11

## 2018-04-21 RX ADMIN — QUETIAPINE FUMARATE 25 MILLIGRAM(S): 200 TABLET, FILM COATED ORAL at 21:16

## 2018-04-21 RX ADMIN — Medication 500000 UNIT(S): at 23:41

## 2018-04-21 RX ADMIN — HALOPERIDOL DECANOATE 1 MILLIGRAM(S): 100 INJECTION INTRAMUSCULAR at 13:14

## 2018-04-21 RX ADMIN — HUMAN INSULIN 6 UNIT(S): 100 INJECTION, SUSPENSION SUBCUTANEOUS at 12:54

## 2018-04-21 RX ADMIN — Medication 500000 UNIT(S): at 17:39

## 2018-04-21 RX ADMIN — MIRTAZAPINE 15 MILLIGRAM(S): 45 TABLET, ORALLY DISINTEGRATING ORAL at 21:14

## 2018-04-21 RX ADMIN — Medication 2: at 18:33

## 2018-04-21 RX ADMIN — CLOPIDOGREL BISULFATE 75 MILLIGRAM(S): 75 TABLET, FILM COATED ORAL at 12:36

## 2018-04-21 RX ADMIN — SIMVASTATIN 40 MILLIGRAM(S): 20 TABLET, FILM COATED ORAL at 21:13

## 2018-04-21 RX ADMIN — DEXTROSE MONOHYDRATE, SODIUM CHLORIDE, AND POTASSIUM CHLORIDE 50 MILLILITER(S): 50; .745; 4.5 INJECTION, SOLUTION INTRAVENOUS at 10:54

## 2018-04-21 RX ADMIN — SIMETHICONE 80 MILLIGRAM(S): 80 TABLET, CHEWABLE ORAL at 21:15

## 2018-04-21 RX ADMIN — LIDOCAINE 1 PATCH: 4 CREAM TOPICAL at 23:43

## 2018-04-21 RX ADMIN — Medication 81 MILLIGRAM(S): at 12:36

## 2018-04-21 RX ADMIN — LIDOCAINE 1 PATCH: 4 CREAM TOPICAL at 12:36

## 2018-04-21 RX ADMIN — Medication 500000 UNIT(S): at 06:29

## 2018-04-21 RX ADMIN — ENOXAPARIN SODIUM 40 MILLIGRAM(S): 100 INJECTION SUBCUTANEOUS at 12:36

## 2018-04-21 RX ADMIN — HUMAN INSULIN 6 UNIT(S): 100 INJECTION, SUSPENSION SUBCUTANEOUS at 06:30

## 2018-04-21 RX ADMIN — Medication 5 MILLIGRAM(S): at 06:29

## 2018-04-21 RX ADMIN — SIMETHICONE 80 MILLIGRAM(S): 80 TABLET, CHEWABLE ORAL at 13:16

## 2018-04-21 RX ADMIN — Medication 2: at 23:41

## 2018-04-21 RX ADMIN — FAMOTIDINE 20 MILLIGRAM(S): 10 INJECTION INTRAVENOUS at 06:29

## 2018-04-21 RX ADMIN — Medication 2 MILLIGRAM(S): at 21:55

## 2018-04-21 RX ADMIN — FAMOTIDINE 20 MILLIGRAM(S): 10 INJECTION INTRAVENOUS at 17:39

## 2018-04-21 RX ADMIN — Medication 500000 UNIT(S): at 00:24

## 2018-04-21 RX ADMIN — Medication 25 MILLIGRAM(S): at 21:14

## 2018-04-21 RX ADMIN — Medication 1 TABLET(S): at 06:30

## 2018-04-21 NOTE — PROGRESS NOTE ADULT - PROBLEM SELECTOR PLAN 7
Venous Duplex 4/1: Acute DVT of Rt and Lft Soleal Vein   Venous Duplex 4/10: Patient with persistent Soleal DVT b/l w/o propagation   Venous Duplex 4/18: Patient with New left peroneal DVT with persistent soleal DVT Added valproic acid 125 mg qhs per psych  Continue Seroquel 25 mg qhs   Continue Melatonin prn Insomnia; Seroquel prn agitation; Haldol 1 mg iv q 8 hrs PRN more severe agitation     on 4/21  Continue Mirtazapine

## 2018-04-21 NOTE — PROGRESS NOTE ADULT - PROBLEM SELECTOR PLAN 5
Continue current regimen  Continue Insulin sliding scale Continue asa Plavix   Continue Simvastatin   Continue Vasotec and Lopressor for HTN  Continue to monitor BP and HR

## 2018-04-21 NOTE — PROGRESS NOTE ADULT - PROBLEM SELECTOR PROBLEM 4
Abdominal distention Type 2 diabetes mellitus without complication, without long-term current use of insulin

## 2018-04-21 NOTE — PROGRESS NOTE ADULT - ATTENDING COMMENTS
stable on TC  agitation, depakote added, with improvement  urinary retention, on cardura, add flomax  will likely need bustamante  d/c planning stable on TC  agitation, depakote added, with improvement  urinary retention, on cardura, add flomax  will likely need bustamante  try to titrate down opiates to help gi motility  d/c planning

## 2018-04-21 NOTE — PROGRESS NOTE ADULT - PROBLEM SELECTOR PLAN 10
acute rehab when medically cleared Urinary retention despite Cardura on board  Pt was straight cath 4 x in the past 24 hrs, largest volume 600 ml  Will add Flomax and insert Ybarra  Voiding trial in a few days

## 2018-04-21 NOTE — PROGRESS NOTE ADULT - PROBLEM SELECTOR PLAN 9
Continue Lovenox  Pepcid GI prophylaxis  Pt being straight cath few times per day, consider Ybarra Cath    Continue Cardura acute rehab when medically cleared

## 2018-04-21 NOTE — PROGRESS NOTE ADULT - PROBLEM SELECTOR PLAN 8
Pt seen by Psych   Continue Seroquel 25 mg qhs and Haldol  Continue Melatonin prn Insomnia and Increase IVP Haldol 1 mg q 4 hrs PRN  Continue Mirtazapine Added valproic acid 125 mg qhs per psych  Continue Seroquel 25 mg qhs   Continue Melatonin prn Insomnia; Seroquel prn agitation; Haldol 1 mg iv q 8 hrs PRN more severe agitation     on 4/21  Continue Mirtazapine Continue Lovenox  Pepcid GI prophylaxis  Pt being straight cath few times per day, consider Ybarra Cath    Continue Cardura

## 2018-04-21 NOTE — PROGRESS NOTE ADULT - SUBJECTIVE AND OBJECTIVE BOX
Patient is a 77y old  Male who presents with a chief complaint of pelvic fx with active extrav (27 Mar 2018 12:01)      Interval Events:    REVIEW OF SYSTEMS:  [ ] Positive  [ ] All other systems negative  [ ] Unable to assess ROS because ________    Vital Signs Last 24 Hrs  T(C): 36.8 (04-21-18 @ 05:40), Max: 37.8 (04-20-18 @ 20:48)  T(F): 98.2 (04-21-18 @ 05:40), Max: 100 (04-20-18 @ 20:48)  HR: 83 (04-21-18 @ 05:40) (67 - 105)  BP: 141/70 (04-21-18 @ 05:40) (141/70 - 179/69)  RR: 20 (04-21-18 @ 05:40) (18 - 20)  SpO2: 96% (04-21-18 @ 05:40) (96% - 100%)    PHYSICAL EXAM:  HEENT:   [ ]Tracheostomy:  [ ]Pupils equal  [ ]No oral lesions  [ ]Abnormal    SKIN  [ ]No Rash  [ ] Abnormal  [ ] pressure    CARDIAC  [ ]Regular  [ ]Abnormal    PULMONARY  [ ]Bilateral Clear Breath Sounds  [ ]Normal Excursion  [ ]Abnormal    GI  [ ]PEG      [ ] +BS		              [ ]Soft, nondistended, nontender	  [ ]Abnormal    MUSCULOSKELETAL                                   [ ]Bedbound                 [ ]Abnormal    [ ]Ambulatory/OOB to chair                           EXTREMITIES                                         [ ]Normal  [ ]Edema                           NEUROLOGIC  [ ] Normal, non focal  [ ] Focal findings:    PSYCHIATRIC  [ ]Alert and appropriate  [ ] Sedated	 [ ]Agitated    :  Ybarra: [ ] YES, if yes: Date of Placement                        [  ] NO      LINES: TLC [ ]YES, if yes: Date of Placement                    [ ] No    HOSPITAL MEDICATIONS:  MEDICATIONS  (STANDING):  aspirin  chewable 81 milliGRAM(s) Oral daily  BACItracin   Ointment 1 Application(s) Topical daily  clopidogrel Tablet 75 milliGRAM(s) Oral daily  doxazosin 1 milliGRAM(s) Oral at bedtime  enalapril 10 milliGRAM(s) Oral every 12 hours  enoxaparin Injectable 40 milliGRAM(s) SubCutaneous daily  famotidine    Tablet 20 milliGRAM(s) Oral two times a day  hydrALAZINE 25 milliGRAM(s) Oral every 8 hours  insulin lispro (HumaLOG) corrective regimen sliding scale   SubCutaneous every 6 hours  insulin NPH human recombinant 6 Unit(s) SubCutaneous every 6 hours  lactobacillus acidophilus 1 Tablet(s) Oral every 12 hours  lidocaine   Patch 1 Patch Transdermal daily  lidocaine   Patch 1 Patch Transdermal daily  metoclopramide Injectable 5 milliGRAM(s) IV Push every 8 hours  metoprolol tartrate 100 milliGRAM(s) Oral every 12 hours  mirtazapine 15 milliGRAM(s) Oral <User Schedule>  nystatin    Suspension 374756 Unit(s) Oral four times a day  QUEtiapine 25 milliGRAM(s) Oral at bedtime  simethicone 80 milliGRAM(s) Chew every 8 hours  simvastatin 40 milliGRAM(s) Oral at bedtime  sodium chloride 0.45%. 1000 milliLiter(s) (50 mL/Hr) IV Continuous <Continuous>  valproic  acid Syrup 125 milliGRAM(s) Oral at bedtime    MEDICATIONS  (PRN):  acetaminophen    Suspension. 650 milliGRAM(s) Oral every 6 hours PRN Mild Pain (1 - 3)  haloperidol    Injectable 1 milliGRAM(s) IV Push every 8 hours PRN severe agitation  melatonin 3 milliGRAM(s) Oral at bedtime PRN Insomnia  oxyCODONE    Solution 5 milliGRAM(s) Oral every 6 hours PRN Severe Pain (7 - 10)  QUEtiapine 12.5 milliGRAM(s) Oral every 6 hours PRN agitation      LABS:                        9.8    11.9  )-----------( 287      ( 20 Apr 2018 07:09 )             30.5     04-20    139  |  101  |  24<H>  ----------------------------<  130<H>  4.0   |  30  |  0.74    Ca    8.8      20 Apr 2018 07:09  Phos  2.9     04-20  Mg     1.7     04-20              CAPILLARY BLOOD GLUCOSE    MICROBIOLOGY:     RADIOLOGY:  [ ] Reviewed and interpreted by me Patient is a 77y old  Male who presents with a chief complaint of pelvic fx with active extrav (27 Mar 2018 12:01)      Interval Events:    REVIEW OF SYSTEMS:  [ ] Positive  [x ] All systems negative  [ ] Unable to assess ROS because ________    Vital Signs Last 24 Hrs  T(C): 36.8 (04-21-18 @ 05:40), Max: 37.8 (04-20-18 @ 20:48)  T(F): 98.2 (04-21-18 @ 05:40), Max: 100 (04-20-18 @ 20:48)  HR: 83 (04-21-18 @ 05:40) (67 - 105)  BP: 141/70 (04-21-18 @ 05:40) (141/70 - 179/69)  RR: 20 (04-21-18 @ 05:40) (18 - 20)  SpO2: 96% (04-21-18 @ 05:40) (96% - 100%)    PHYSICAL EXAM:  HEENT:   [ x]Tracheostomy:  [ ]Pupils equal  [ ]No oral lesions  [ ]Abnormal    SKIN  [ x]No Rash  [ ] Abnormal  [ ] pressure    CARDIAC  [x ]Regular  [ ]Abnormal    PULMONARY  [x ]Bilateral expiratory wheezing   [ ]Normal Excursion  [ ]Abnormal    GI  [x ]PEG      [x ] +tympanic BS 		              [x ] mildly distended, nontender. 	  [ ]Abnormal    MUSCULOSKELETAL                                   [ ]Bedbound                 [ ]Abnormal    [x ]OOB to chair    with assist                       EXTREMITIES                                         [ ]Normal  [x ]Edema      UE and LUE                     NEUROLOGIC  [ ] Normal, non focal  [ ] Focal findings:  eyes open to speech, follows simple commands, moves extremities   PSYCHIATRIC  [x ] mildly lethargic   [ ] Sedated	 [ ]Agitated    :  Ybarra: [ ] YES, if yes: Date of Placement                        [ x ] NO      LINES: TLC [ ]YES, if yes: Date of Placement                    [ x] No    HOSPITAL MEDICATIONS:  MEDICATIONS  (STANDING):  aspirin  chewable 81 milliGRAM(s) Oral daily  BACItracin   Ointment 1 Application(s) Topical daily  clopidogrel Tablet 75 milliGRAM(s) Oral daily  doxazosin 1 milliGRAM(s) Oral at bedtime  enalapril 10 milliGRAM(s) Oral every 12 hours  enoxaparin Injectable 40 milliGRAM(s) SubCutaneous daily  famotidine    Tablet 20 milliGRAM(s) Oral two times a day  hydrALAZINE 25 milliGRAM(s) Oral every 8 hours  insulin lispro (HumaLOG) corrective regimen sliding scale   SubCutaneous every 6 hours  insulin NPH human recombinant 6 Unit(s) SubCutaneous every 6 hours  lactobacillus acidophilus 1 Tablet(s) Oral every 12 hours  lidocaine   Patch 1 Patch Transdermal daily  lidocaine   Patch 1 Patch Transdermal daily  metoclopramide Injectable 5 milliGRAM(s) IV Push every 8 hours  metoprolol tartrate 100 milliGRAM(s) Oral every 12 hours  mirtazapine 15 milliGRAM(s) Oral <User Schedule>  nystatin    Suspension [id number] Unit(s) Oral four times a day  QUEtiapine 25 milliGRAM(s) Oral at bedtime  simethicone 80 milliGRAM(s) Chew every 8 hours  simvastatin 40 milliGRAM(s) Oral at bedtime  sodium chloride 0.45%. 1000 milliLiter(s) (50 mL/Hr) IV Continuous <Continuous>  valproic  acid Syrup 125 milliGRAM(s) Oral at bedtime    MEDICATIONS  (PRN):  acetaminophen    Suspension. 650 milliGRAM(s) Oral every 6 hours PRN Mild Pain (1 - 3)  haloperidol    Injectable 1 milliGRAM(s) IV Push every 8 hours PRN severe agitation  melatonin 3 milliGRAM(s) Oral at bedtime PRN Insomnia  oxyCODONE    Solution 5 milliGRAM(s) Oral every 6 hours PRN Severe Pain (7 - 10)  QUEtiapine 12.5 milliGRAM(s) Oral every 6 hours PRN agitation      LABS:                        9.8    11.9  )-----------( 287      ( 20 Apr 2018 07:09 )             30.5     04-20    139  |  101  |  24<H>  ----------------------------<  130<H>  4.0   |  30  |  0.74    Ca    8.8      20 Apr 2018 07:09  Phos  2.9     04-20  Mg     1.7     04-20              CAPILLARY BLOOD GLUCOSE    MICROBIOLOGY:     RADIOLOGY:  [ ] Reviewed and interpreted by me

## 2018-04-21 NOTE — PROGRESS NOTE ADULT - PROBLEM SELECTOR PLAN 2
Pt S/p tracheostomy by trauma SX on 4/5  Pt tolerating TC atc   Pt downsized to #  7 Uncuffed Trach on 4/13 by ENT. Pt pulled out trach & replaced same on 4/19  CXR 4/13: Bilateral Pleural effusions with Pulmonary edema   Continue Chest PT and Suctioning PRN

## 2018-04-21 NOTE — PROGRESS NOTE ADULT - PROBLEM SELECTOR PLAN 4
Known illeus, however pt is tolerating tube feed and having bowel movements. Continue low dose reglan Known illeus, however pt is tolerating tube feed   Pt still has abd distention and tympanic bowel sound  Will obtain AXR and peg top gravity x 4 hrs   Continue low dose reglan Continue current regimen  Continue Insulin sliding scale

## 2018-04-21 NOTE — PROGRESS NOTE ADULT - PROBLEM SELECTOR PROBLEM 5
Type 2 diabetes mellitus without complication, without long-term current use of insulin CAD (coronary artery disease)

## 2018-04-21 NOTE — PROGRESS NOTE ADULT - PROBLEM SELECTOR PLAN 6
Continue asa Plavix   Continue Simvastatin   Continue Vasotec and Lopressor for HTN  Continue to monitor BP and HR Venous Duplex 4/1: Acute DVT of Rt and Lft Soleal Vein   Venous Duplex 4/10: Patient with persistent Soleal DVT b/l w/o propagation   Venous Duplex 4/18: Patient with New left peroneal DVT with persistent soleal DVT

## 2018-04-21 NOTE — PROGRESS NOTE ADULT - PROBLEM SELECTOR PLAN 3
Sputum cx 4/13: Moderate Staph Aureus ( Sensitivity pending)   Completed IV Vanco last night Sputum cx 4/13: Moderate Staph Aureus ( Sensitivity pending)   Completed IV Vanco 4/20 Known illeus, however pt is tolerating tube feed   Pt still has abd distention and tympanic bowel sound  Peg to gravity x 4 hrs  then resume feed as tolerated   AXR Persistent dilatation of loops of small bowel and large bowel - stable   c/w 4/16/18 study.          Continue low dose reglan

## 2018-04-22 LAB
ANION GAP SERPL CALC-SCNC: 8 MMOL/L — SIGNIFICANT CHANGE UP (ref 5–17)
BUN SERPL-MCNC: 32 MG/DL — HIGH (ref 7–23)
CALCIUM SERPL-MCNC: 8.9 MG/DL — SIGNIFICANT CHANGE UP (ref 8.4–10.5)
CHLORIDE SERPL-SCNC: 103 MMOL/L — SIGNIFICANT CHANGE UP (ref 96–108)
CO2 SERPL-SCNC: 32 MMOL/L — HIGH (ref 22–31)
CREAT SERPL-MCNC: 0.81 MG/DL — SIGNIFICANT CHANGE UP (ref 0.5–1.3)
GLUCOSE SERPL-MCNC: 116 MG/DL — HIGH (ref 70–99)
HCT VFR BLD CALC: 29.7 % — LOW (ref 39–50)
HGB BLD-MCNC: 9.4 G/DL — LOW (ref 13–17)
MAGNESIUM SERPL-MCNC: 1.9 MG/DL — SIGNIFICANT CHANGE UP (ref 1.6–2.6)
MCHC RBC-ENTMCNC: 31.6 GM/DL — LOW (ref 32–36)
MCHC RBC-ENTMCNC: 32.3 PG — SIGNIFICANT CHANGE UP (ref 27–34)
MCV RBC AUTO: 102 FL — HIGH (ref 80–100)
PHOSPHATE SERPL-MCNC: 3.4 MG/DL — SIGNIFICANT CHANGE UP (ref 2.5–4.5)
PLATELET # BLD AUTO: 243 K/UL — SIGNIFICANT CHANGE UP (ref 150–400)
POTASSIUM SERPL-MCNC: 4 MMOL/L — SIGNIFICANT CHANGE UP (ref 3.5–5.3)
POTASSIUM SERPL-SCNC: 4 MMOL/L — SIGNIFICANT CHANGE UP (ref 3.5–5.3)
RBC # BLD: 2.91 M/UL — LOW (ref 4.2–5.8)
RBC # FLD: 16.5 % — HIGH (ref 10.3–14.5)
SODIUM SERPL-SCNC: 143 MMOL/L — SIGNIFICANT CHANGE UP (ref 135–145)
WBC # BLD: 13.1 K/UL — HIGH (ref 3.8–10.5)
WBC # FLD AUTO: 13.1 K/UL — HIGH (ref 3.8–10.5)

## 2018-04-22 PROCEDURE — 99233 SBSQ HOSP IP/OBS HIGH 50: CPT | Mod: GC

## 2018-04-22 RX ORDER — BACITRACIN ZINC 500 UNIT/G
1 OINTMENT IN PACKET (EA) TOPICAL
Qty: 0 | Refills: 0 | Status: COMPLETED | OUTPATIENT
Start: 2018-04-22 | End: 2018-04-29

## 2018-04-22 RX ADMIN — Medication 1 APPLICATION(S): at 17:45

## 2018-04-22 RX ADMIN — SIMETHICONE 80 MILLIGRAM(S): 80 TABLET, CHEWABLE ORAL at 22:13

## 2018-04-22 RX ADMIN — Medication 1 TABLET(S): at 05:39

## 2018-04-22 RX ADMIN — SIMETHICONE 80 MILLIGRAM(S): 80 TABLET, CHEWABLE ORAL at 05:39

## 2018-04-22 RX ADMIN — Medication 25 MILLIGRAM(S): at 13:24

## 2018-04-22 RX ADMIN — HUMAN INSULIN 6 UNIT(S): 100 INJECTION, SUSPENSION SUBCUTANEOUS at 05:43

## 2018-04-22 RX ADMIN — Medication 10 MILLIGRAM(S): at 17:44

## 2018-04-22 RX ADMIN — HUMAN INSULIN 6 UNIT(S): 100 INJECTION, SUSPENSION SUBCUTANEOUS at 23:26

## 2018-04-22 RX ADMIN — Medication 1 APPLICATION(S): at 23:24

## 2018-04-22 RX ADMIN — ENOXAPARIN SODIUM 40 MILLIGRAM(S): 100 INJECTION SUBCUTANEOUS at 11:08

## 2018-04-22 RX ADMIN — Medication 5 MILLIGRAM(S): at 05:40

## 2018-04-22 RX ADMIN — Medication 125 MILLIGRAM(S): at 22:15

## 2018-04-22 RX ADMIN — SIMETHICONE 80 MILLIGRAM(S): 80 TABLET, CHEWABLE ORAL at 13:23

## 2018-04-22 RX ADMIN — LIDOCAINE 1 PATCH: 4 CREAM TOPICAL at 10:56

## 2018-04-22 RX ADMIN — Medication 25 MILLIGRAM(S): at 22:13

## 2018-04-22 RX ADMIN — QUETIAPINE FUMARATE 25 MILLIGRAM(S): 200 TABLET, FILM COATED ORAL at 22:13

## 2018-04-22 RX ADMIN — HUMAN INSULIN 6 UNIT(S): 100 INJECTION, SUSPENSION SUBCUTANEOUS at 12:35

## 2018-04-22 RX ADMIN — Medication 1 TABLET(S): at 17:44

## 2018-04-22 RX ADMIN — HUMAN INSULIN 6 UNIT(S): 100 INJECTION, SUSPENSION SUBCUTANEOUS at 18:13

## 2018-04-22 RX ADMIN — Medication 2: at 23:29

## 2018-04-22 RX ADMIN — Medication 500000 UNIT(S): at 05:39

## 2018-04-22 RX ADMIN — Medication 5 MILLIGRAM(S): at 13:23

## 2018-04-22 RX ADMIN — MIRTAZAPINE 15 MILLIGRAM(S): 45 TABLET, ORALLY DISINTEGRATING ORAL at 22:13

## 2018-04-22 RX ADMIN — CLOPIDOGREL BISULFATE 75 MILLIGRAM(S): 75 TABLET, FILM COATED ORAL at 11:08

## 2018-04-22 RX ADMIN — FAMOTIDINE 20 MILLIGRAM(S): 10 INJECTION INTRAVENOUS at 17:44

## 2018-04-22 RX ADMIN — Medication 81 MILLIGRAM(S): at 11:08

## 2018-04-22 RX ADMIN — LIDOCAINE 1 PATCH: 4 CREAM TOPICAL at 01:00

## 2018-04-22 RX ADMIN — Medication 500000 UNIT(S): at 11:08

## 2018-04-22 RX ADMIN — Medication 100 MILLIGRAM(S): at 17:44

## 2018-04-22 RX ADMIN — LIDOCAINE 1 PATCH: 4 CREAM TOPICAL at 22:14

## 2018-04-22 RX ADMIN — Medication 500000 UNIT(S): at 23:24

## 2018-04-22 RX ADMIN — Medication 2 MILLIGRAM(S): at 22:13

## 2018-04-22 RX ADMIN — Medication 5 MILLIGRAM(S): at 22:15

## 2018-04-22 RX ADMIN — Medication 25 MILLIGRAM(S): at 05:39

## 2018-04-22 RX ADMIN — Medication 2: at 12:35

## 2018-04-22 RX ADMIN — OXYCODONE HYDROCHLORIDE 5 MILLIGRAM(S): 5 TABLET ORAL at 10:31

## 2018-04-22 RX ADMIN — Medication 100 MILLIGRAM(S): at 05:40

## 2018-04-22 RX ADMIN — Medication 10 MILLIGRAM(S): at 05:39

## 2018-04-22 RX ADMIN — Medication 500000 UNIT(S): at 17:44

## 2018-04-22 RX ADMIN — FAMOTIDINE 20 MILLIGRAM(S): 10 INJECTION INTRAVENOUS at 05:39

## 2018-04-22 RX ADMIN — OXYCODONE HYDROCHLORIDE 5 MILLIGRAM(S): 5 TABLET ORAL at 11:00

## 2018-04-22 RX ADMIN — LIDOCAINE 1 PATCH: 4 CREAM TOPICAL at 10:55

## 2018-04-22 RX ADMIN — SIMVASTATIN 40 MILLIGRAM(S): 20 TABLET, FILM COATED ORAL at 22:13

## 2018-04-22 RX ADMIN — Medication 650 MILLIGRAM(S): at 01:15

## 2018-04-22 RX ADMIN — Medication 2: at 18:13

## 2018-04-22 RX ADMIN — Medication 650 MILLIGRAM(S): at 12:44

## 2018-04-22 RX ADMIN — Medication 1 APPLICATION(S): at 11:08

## 2018-04-22 NOTE — PROGRESS NOTE ADULT - PROBLEM SELECTOR PLAN 7
Added valproic acid 125 mg qhs per psych  Continue Seroquel 25 mg qhs   Continue Melatonin prn Insomnia; Seroquel prn agitation; Haldol 1 mg iv q 8 hrs PRN more severe agitation     on 4/21  Continue Mirtazapine

## 2018-04-22 NOTE — PROGRESS NOTE ADULT - PROBLEM SELECTOR PLAN 10
Urinary retention despite Cardura on board  Pt was straight cath 4 x in the past 24 hrs, largest volume 600 ml  Will add Flomax and insert Ybarra  Voiding trial in a few days Urinary retention, Cardura increased on 4/21  bladder scan q6-8h and straight cath prn

## 2018-04-22 NOTE — PROGRESS NOTE ADULT - ATTENDING COMMENTS
stable on TC  agitation, depakote added, with improvement  urinary retention, on cardura, add flomax  will likely need bustamante  try to titrate down opiates to help gi motility  d/c planning stable on TC  agitation, depakote added, with improvement  urinary retention, increased cardura  being straight cathed  decrease opiates for gastric motility  d/c planning

## 2018-04-22 NOTE — PROGRESS NOTE ADULT - SUBJECTIVE AND OBJECTIVE BOX
Patient is a 77y old  Male who presents with a chief complaint of pelvic fx with active extrav (27 Mar 2018 12:01)      Interval Events:    REVIEW OF SYSTEMS:  [ ] Positive  [ ] All other systems negative  [ ] Unable to assess ROS because ________    Vital Signs Last 24 Hrs  T(C): 36.6 (04-22-18 @ 05:31), Max: 37.1 (04-21-18 @ 13:18)  T(F): 97.8 (04-22-18 @ 05:31), Max: 98.8 (04-21-18 @ 13:18)  HR: 76 (04-22-18 @ 05:31) (65 - 89)  BP: 119/59 (04-22-18 @ 05:31) (108/52 - 133/57)  RR: 20 (04-22-18 @ 05:31) (18 - 22)  SpO2: 100% (04-22-18 @ 05:31) (97% - 100%)    PHYSICAL EXAM:  HEENT:   [ ]Tracheostomy:  [ ]Pupils equal  [ ]No oral lesions  [ ]Abnormal    SKIN  [ ]No Rash  [ ] Abnormal  [ ] pressure    CARDIAC  [ ]Regular  [ ]Abnormal    PULMONARY  [ ]Bilateral Clear Breath Sounds  [ ]Normal Excursion  [ ]Abnormal    GI  [ ]PEG      [ ] +BS		              [ ]Soft, nondistended, nontender	  [ ]Abnormal    MUSCULOSKELETAL                                   [ ]Bedbound                 [ ]Abnormal    [ ]Ambulatory/OOB to chair                           EXTREMITIES                                         [ ]Normal  [ ]Edema                           NEUROLOGIC  [ ] Normal, non focal  [ ] Focal findings:    PSYCHIATRIC  [ ]Alert and appropriate  [ ] Sedated	 [ ]Agitated    :  Ybarra: [ ] YES, if yes: Date of Placement                        [  ] NO      LINES: TLC [ ]YES, if yes: Date of Placement                    [ ] No    HOSPITAL MEDICATIONS:  MEDICATIONS  (STANDING):  aspirin  chewable 81 milliGRAM(s) Oral daily  BACItracin   Ointment 1 Application(s) Topical daily  clopidogrel Tablet 75 milliGRAM(s) Oral daily  dextrose 5% + sodium chloride 0.45% with potassium chloride 20 mEq/L 1000 milliLiter(s) (50 mL/Hr) IV Continuous <Continuous>  doxazosin 2 milliGRAM(s) Oral at bedtime  enalapril 10 milliGRAM(s) Oral every 12 hours  enoxaparin Injectable 40 milliGRAM(s) SubCutaneous daily  famotidine    Tablet 20 milliGRAM(s) Oral two times a day  hydrALAZINE 25 milliGRAM(s) Oral every 8 hours  insulin lispro (HumaLOG) corrective regimen sliding scale   SubCutaneous every 6 hours  insulin NPH human recombinant 6 Unit(s) SubCutaneous every 6 hours  lactobacillus acidophilus 1 Tablet(s) Oral every 12 hours  lidocaine   Patch 1 Patch Transdermal daily  lidocaine   Patch 1 Patch Transdermal daily  metoclopramide Injectable 5 milliGRAM(s) IV Push every 8 hours  metoprolol tartrate 100 milliGRAM(s) Oral every 12 hours  mirtazapine 15 milliGRAM(s) Oral <User Schedule>  nystatin    Suspension 895777 Unit(s) Oral four times a day  QUEtiapine 25 milliGRAM(s) Oral at bedtime  simethicone 80 milliGRAM(s) Chew every 8 hours  simvastatin 40 milliGRAM(s) Oral at bedtime  valproic  acid Syrup 125 milliGRAM(s) Oral at bedtime    MEDICATIONS  (PRN):  acetaminophen    Suspension. 650 milliGRAM(s) Oral every 6 hours PRN Mild Pain (1 - 3)  acetaminophen    Suspension. 650 milliGRAM(s) Oral every 6 hours PRN mild to moderate pain  haloperidol    Injectable 1 milliGRAM(s) IV Push every 8 hours PRN severe agitation  melatonin 3 milliGRAM(s) Oral at bedtime PRN Insomnia  oxyCODONE    IR 5 milliGRAM(s) Oral every 8 hours PRN Moderate to severe pain  QUEtiapine 12.5 milliGRAM(s) Oral every 6 hours PRN agitation      LABS:                        9.4    13.1  )-----------( 243      ( 22 Apr 2018 07:11 )             29.7     04-22    143  |  103  |  32<H>  ----------------------------<  116<H>  4.0   |  32<H>  |  0.81    Ca    8.9      22 Apr 2018 07:12  Phos  3.4     04-22  Mg     1.9     04-22              CAPILLARY BLOOD GLUCOSE    MICROBIOLOGY:     RADIOLOGY:  [ ] Reviewed and interpreted by me Patient is a 77y old  Male who presents with a chief complaint of pelvic fx with active extrav (27 Mar 2018 12:01)      Interval Events: none    REVIEW OF SYSTEMS:  [ ] Positive  [ x] All  systems negative  [ ] Unable to assess ROS because ________    Vital Signs Last 24 Hrs  T(C): 36.6 (04-22-18 @ 05:31), Max: 37.1 (04-21-18 @ 13:18)  T(F): 97.8 (04-22-18 @ 05:31), Max: 98.8 (04-21-18 @ 13:18)  HR: 76 (04-22-18 @ 05:31) (65 - 89)  BP: 119/59 (04-22-18 @ 05:31) (108/52 - 133/57)  RR: 20 (04-22-18 @ 05:31) (18 - 22)  SpO2: 100% (04-22-18 @ 05:31) (97% - 100%)    PHYSICAL EXAM:  HEENT:   [ x]Tracheostomy:  [ ]Pupils equal  [x  oral thrush better  [ ]Abnormal    SKIN  [x ]No Rash  [x ] Abnormal for ecchymosis  [ ] pressure    CARDIAC  [x ]Regular  [ ]Abnormal    PULMONARY  [ x]Bilateral Clear Breath Sounds  [ ]Normal Excursion  [ ]Abnormal    GI  [x ]PEG      [x ] +BS		              [x ]Soft, nondistended, nontender	  [ ]Abnormal    MUSCULOSKELETAL                                   [ ]Bedbound                 [ ]Abnormal    [ x]Ambulatory/OOB to chair     with assist                      EXTREMITIES                                         [ ]Normal  [x ]Edema          LUE and trace R foot                 NEUROLOGIC  [x ] Normal, non focal  [ ] Focal findings:    PSYCHIATRIC  [x ]Alert and appropriate  [ ] Sedated	 [ ]Agitated    :  Ybarra: [ ] YES, if yes: Date of Placement                        [ x ] NO      LINES: TLC [ ]YES, if yes: Date of Placement                    [x ] No    HOSPITAL MEDICATIONS:  MEDICATIONS  (STANDING):  aspirin  chewable 81 milliGRAM(s) Oral daily  BACItracin   Ointment 1 Application(s) Topical daily  clopidogrel Tablet 75 milliGRAM(s) Oral daily  dextrose 5% + sodium chloride 0.45% with potassium chloride 20 mEq/L 1000 milliLiter(s) (50 mL/Hr) IV Continuous <Continuous>  doxazosin 2 milliGRAM(s) Oral at bedtime  enalapril 10 milliGRAM(s) Oral every 12 hours  enoxaparin Injectable 40 milliGRAM(s) SubCutaneous daily  famotidine    Tablet 20 milliGRAM(s) Oral two times a day  hydrALAZINE 25 milliGRAM(s) Oral every 8 hours  insulin lispro (HumaLOG) corrective regimen sliding scale   SubCutaneous every 6 hours  insulin NPH human recombinant 6 Unit(s) SubCutaneous every 6 hours  lactobacillus acidophilus 1 Tablet(s) Oral every 12 hours  lidocaine   Patch 1 Patch Transdermal daily  lidocaine   Patch 1 Patch Transdermal daily  metoclopramide Injectable 5 milliGRAM(s) IV Push every 8 hours  metoprolol tartrate 100 milliGRAM(s) Oral every 12 hours  mirtazapine 15 milliGRAM(s) Oral <User Schedule>  nystatin    Suspension [id number] Unit(s) Oral four times a day  QUEtiapine 25 milliGRAM(s) Oral at bedtime  simethicone 80 milliGRAM(s) Chew every 8 hours  simvastatin 40 milliGRAM(s) Oral at bedtime  valproic  acid Syrup 125 milliGRAM(s) Oral at bedtime    MEDICATIONS  (PRN):  acetaminophen    Suspension. 650 milliGRAM(s) Oral every 6 hours PRN Mild Pain (1 - 3)  acetaminophen    Suspension. 650 milliGRAM(s) Oral every 6 hours PRN mild to moderate pain  haloperidol    Injectable 1 milliGRAM(s) IV Push every 8 hours PRN severe agitation  melatonin 3 milliGRAM(s) Oral at bedtime PRN Insomnia  oxyCODONE    IR 5 milliGRAM(s) Oral every 8 hours PRN Moderate to severe pain  QUEtiapine 12.5 milliGRAM(s) Oral every 6 hours PRN agitation      LABS:                        9.4    13.1  )-----------( 243      ( 22 Apr 2018 07:11 )             29.7     04-22    143  |  103  |  32<H>  ----------------------------<  116<H>  4.0   |  32<H>  |  0.81    Ca    8.9      22 Apr 2018 07:12  Phos  3.4     04-22  Mg     1.9     04-22              CAPILLARY BLOOD GLUCOSE    MICROBIOLOGY:     RADIOLOGY:  [ ] Reviewed and interpreted by me

## 2018-04-22 NOTE — PROGRESS NOTE ADULT - PROBLEM SELECTOR PLAN 8
Continue Lovenox  Pepcid GI prophylaxis  Pt being straight cath few times per day, consider Ybarra Cath    Continue Cardura

## 2018-04-22 NOTE — PROGRESS NOTE ADULT - PROBLEM SELECTOR PLAN 3
Known illeus, however pt is tolerating tube feed   Pt still has abd distention and tympanic bowel sound  Peg to gravity x 4 hrs  then resume feed as tolerated   AXR Persistent dilatation of loops of small bowel and large bowel - stable   c/w 4/16/18 study.          Continue low dose reglan Known illeus, however pt is tolerating tube feed   Pt still has abd distention and tympanic bowel sound  Peg to gravity x 4 hrs  4/21 then resume feed as tolerated   AXR Persistent dilatation of loops of small bowel and large bowel - stable   c/w 4/16/18 study.  Bacitracin oint to peg site        Continue low dose reglan

## 2018-04-23 LAB
ALBUMIN SERPL ELPH-MCNC: 2.7 G/DL — LOW (ref 3.3–5)
ALP SERPL-CCNC: 285 U/L — HIGH (ref 40–120)
ALT FLD-CCNC: 30 U/L — SIGNIFICANT CHANGE UP (ref 10–45)
ANION GAP SERPL CALC-SCNC: 10 MMOL/L — SIGNIFICANT CHANGE UP (ref 5–17)
ANION GAP SERPL CALC-SCNC: 7 MMOL/L — SIGNIFICANT CHANGE UP (ref 5–17)
AST SERPL-CCNC: 26 U/L — SIGNIFICANT CHANGE UP (ref 10–40)
BASE EXCESS BLDA CALC-SCNC: 9.4 MMOL/L — HIGH (ref -2–2)
BILIRUB DIRECT SERPL-MCNC: 0.5 MG/DL — HIGH (ref 0–0.2)
BILIRUB INDIRECT FLD-MCNC: 1 MG/DL — SIGNIFICANT CHANGE UP (ref 0.2–1)
BILIRUB SERPL-MCNC: 1.5 MG/DL — HIGH (ref 0.2–1.2)
BUN SERPL-MCNC: 38 MG/DL — HIGH (ref 7–23)
BUN SERPL-MCNC: 39 MG/DL — HIGH (ref 7–23)
CALCIUM SERPL-MCNC: 8.7 MG/DL — SIGNIFICANT CHANGE UP (ref 8.4–10.5)
CALCIUM SERPL-MCNC: 8.8 MG/DL — SIGNIFICANT CHANGE UP (ref 8.4–10.5)
CHLORIDE SERPL-SCNC: 102 MMOL/L — SIGNIFICANT CHANGE UP (ref 96–108)
CHLORIDE SERPL-SCNC: 105 MMOL/L — SIGNIFICANT CHANGE UP (ref 96–108)
CO2 BLDA-SCNC: 37 MMOL/L — HIGH (ref 22–30)
CO2 SERPL-SCNC: 33 MMOL/L — HIGH (ref 22–31)
CO2 SERPL-SCNC: 34 MMOL/L — HIGH (ref 22–31)
CREAT SERPL-MCNC: 0.82 MG/DL — SIGNIFICANT CHANGE UP (ref 0.5–1.3)
CREAT SERPL-MCNC: 0.84 MG/DL — SIGNIFICANT CHANGE UP (ref 0.5–1.3)
GAS PNL BLDA: SIGNIFICANT CHANGE UP
GLUCOSE SERPL-MCNC: 102 MG/DL — HIGH (ref 70–99)
GLUCOSE SERPL-MCNC: 138 MG/DL — HIGH (ref 70–99)
HCO3 BLDA-SCNC: 36 MMOL/L — HIGH (ref 21–29)
HCT VFR BLD CALC: 29.7 % — LOW (ref 39–50)
HGB BLD-MCNC: 9.4 G/DL — LOW (ref 13–17)
HOROWITZ INDEX BLDA+IHG-RTO: 40 — SIGNIFICANT CHANGE UP
MAGNESIUM SERPL-MCNC: 2.2 MG/DL — SIGNIFICANT CHANGE UP (ref 1.6–2.6)
MCHC RBC-ENTMCNC: 31.7 GM/DL — LOW (ref 32–36)
MCHC RBC-ENTMCNC: 32.7 PG — SIGNIFICANT CHANGE UP (ref 27–34)
MCV RBC AUTO: 103 FL — HIGH (ref 80–100)
PCO2 BLDA: 61 MMHG — HIGH (ref 32–46)
PH BLDA: 7.39 — SIGNIFICANT CHANGE UP (ref 7.35–7.45)
PHOSPHATE SERPL-MCNC: 2.8 MG/DL — SIGNIFICANT CHANGE UP (ref 2.5–4.5)
PLATELET # BLD AUTO: 220 K/UL — SIGNIFICANT CHANGE UP (ref 150–400)
PO2 BLDA: 71 MMHG — LOW (ref 74–108)
POTASSIUM SERPL-MCNC: 4.6 MMOL/L — SIGNIFICANT CHANGE UP (ref 3.5–5.3)
POTASSIUM SERPL-MCNC: 4.6 MMOL/L — SIGNIFICANT CHANGE UP (ref 3.5–5.3)
POTASSIUM SERPL-SCNC: 4.6 MMOL/L — SIGNIFICANT CHANGE UP (ref 3.5–5.3)
POTASSIUM SERPL-SCNC: 4.6 MMOL/L — SIGNIFICANT CHANGE UP (ref 3.5–5.3)
PROT SERPL-MCNC: 6.3 G/DL — SIGNIFICANT CHANGE UP (ref 6–8.3)
RBC # BLD: 2.88 M/UL — LOW (ref 4.2–5.8)
RBC # FLD: 16.7 % — HIGH (ref 10.3–14.5)
SAO2 % BLDA: 94 % — SIGNIFICANT CHANGE UP (ref 92–96)
SODIUM SERPL-SCNC: 145 MMOL/L — SIGNIFICANT CHANGE UP (ref 135–145)
SODIUM SERPL-SCNC: 146 MMOL/L — HIGH (ref 135–145)
WBC # BLD: 11.9 K/UL — HIGH (ref 3.8–10.5)
WBC # FLD AUTO: 11.9 K/UL — HIGH (ref 3.8–10.5)

## 2018-04-23 PROCEDURE — 99233 SBSQ HOSP IP/OBS HIGH 50: CPT | Mod: GC

## 2018-04-23 PROCEDURE — 71045 X-RAY EXAM CHEST 1 VIEW: CPT | Mod: 26

## 2018-04-23 RX ORDER — IPRATROPIUM/ALBUTEROL SULFATE 18-103MCG
3 AEROSOL WITH ADAPTER (GRAM) INHALATION ONCE
Qty: 0 | Refills: 0 | Status: COMPLETED | OUTPATIENT
Start: 2018-04-23 | End: 2018-04-23

## 2018-04-23 RX ORDER — FUROSEMIDE 40 MG
40 TABLET ORAL ONCE
Qty: 0 | Refills: 0 | Status: COMPLETED | OUTPATIENT
Start: 2018-04-23 | End: 2018-04-23

## 2018-04-23 RX ADMIN — SIMETHICONE 80 MILLIGRAM(S): 80 TABLET, CHEWABLE ORAL at 13:03

## 2018-04-23 RX ADMIN — Medication 100 MILLIGRAM(S): at 05:09

## 2018-04-23 RX ADMIN — SIMVASTATIN 40 MILLIGRAM(S): 20 TABLET, FILM COATED ORAL at 22:20

## 2018-04-23 RX ADMIN — CLOPIDOGREL BISULFATE 75 MILLIGRAM(S): 75 TABLET, FILM COATED ORAL at 11:05

## 2018-04-23 RX ADMIN — Medication 500000 UNIT(S): at 11:05

## 2018-04-23 RX ADMIN — HUMAN INSULIN 6 UNIT(S): 100 INJECTION, SUSPENSION SUBCUTANEOUS at 13:06

## 2018-04-23 RX ADMIN — LIDOCAINE 1 PATCH: 4 CREAM TOPICAL at 23:10

## 2018-04-23 RX ADMIN — SIMETHICONE 80 MILLIGRAM(S): 80 TABLET, CHEWABLE ORAL at 05:09

## 2018-04-23 RX ADMIN — LIDOCAINE 1 PATCH: 4 CREAM TOPICAL at 11:04

## 2018-04-23 RX ADMIN — Medication 2: at 13:06

## 2018-04-23 RX ADMIN — Medication 125 MILLIGRAM(S): at 22:20

## 2018-04-23 RX ADMIN — Medication 2: at 18:15

## 2018-04-23 RX ADMIN — Medication 1 APPLICATION(S): at 11:15

## 2018-04-23 RX ADMIN — Medication 1 TABLET(S): at 05:09

## 2018-04-23 RX ADMIN — Medication 10 MILLIGRAM(S): at 18:14

## 2018-04-23 RX ADMIN — Medication 1 APPLICATION(S): at 05:09

## 2018-04-23 RX ADMIN — Medication 40 MILLIGRAM(S): at 12:33

## 2018-04-23 RX ADMIN — SIMETHICONE 80 MILLIGRAM(S): 80 TABLET, CHEWABLE ORAL at 22:19

## 2018-04-23 RX ADMIN — Medication 81 MILLIGRAM(S): at 11:05

## 2018-04-23 RX ADMIN — Medication 25 MILLIGRAM(S): at 22:20

## 2018-04-23 RX ADMIN — Medication 500000 UNIT(S): at 05:09

## 2018-04-23 RX ADMIN — MIRTAZAPINE 15 MILLIGRAM(S): 45 TABLET, ORALLY DISINTEGRATING ORAL at 22:19

## 2018-04-23 RX ADMIN — HUMAN INSULIN 6 UNIT(S): 100 INJECTION, SUSPENSION SUBCUTANEOUS at 06:31

## 2018-04-23 RX ADMIN — Medication 1 APPLICATION(S): at 18:14

## 2018-04-23 RX ADMIN — Medication 3 MILLILITER(S): at 23:25

## 2018-04-23 RX ADMIN — Medication 5 MILLIGRAM(S): at 13:03

## 2018-04-23 RX ADMIN — Medication 500000 UNIT(S): at 18:14

## 2018-04-23 RX ADMIN — Medication 5 MILLIGRAM(S): at 22:19

## 2018-04-23 RX ADMIN — FAMOTIDINE 20 MILLIGRAM(S): 10 INJECTION INTRAVENOUS at 05:13

## 2018-04-23 RX ADMIN — ENOXAPARIN SODIUM 40 MILLIGRAM(S): 100 INJECTION SUBCUTANEOUS at 11:04

## 2018-04-23 RX ADMIN — Medication 10 MILLIGRAM(S): at 05:09

## 2018-04-23 RX ADMIN — Medication 1 TABLET(S): at 18:14

## 2018-04-23 RX ADMIN — Medication 25 MILLIGRAM(S): at 13:03

## 2018-04-23 RX ADMIN — Medication 100 MILLIGRAM(S): at 18:14

## 2018-04-23 RX ADMIN — Medication 2 MILLIGRAM(S): at 22:20

## 2018-04-23 RX ADMIN — Medication 25 MILLIGRAM(S): at 05:09

## 2018-04-23 RX ADMIN — HUMAN INSULIN 6 UNIT(S): 100 INJECTION, SUSPENSION SUBCUTANEOUS at 18:15

## 2018-04-23 RX ADMIN — FAMOTIDINE 20 MILLIGRAM(S): 10 INJECTION INTRAVENOUS at 18:14

## 2018-04-23 NOTE — PROGRESS NOTE ADULT - PROBLEM SELECTOR PLAN 2
Pt S/p tracheostomy by trauma SX on 4/5  Pt tolerating TC atc   Pt downsized to #  7 Uncuffed Trach on 4/13 by ENT. Pt pulled out trach & replaced same on 4/19  CXR 4/13: Bilateral Pleural effusions with Pulmonary edema   Continue Chest PT and Suctioning PRN  repeat cxr today-4/23

## 2018-04-23 NOTE — PROGRESS NOTE ADULT - SUBJECTIVE AND OBJECTIVE BOX
Patient is a 77y old  Male who presents with a chief complaint of pelvic fx with active extrav (27 Mar 2018 12:01)      Interval Events:    REVIEW OF SYSTEMS:  [ ] Positive  [ ] All other systems negative  [ ] Unable to assess ROS because ________    Vital Signs Last 24 Hrs  T(C): 36.6 (04-23-18 @ 05:00), Max: 37 (04-22-18 @ 21:42)  T(F): 97.9 (04-23-18 @ 05:00), Max: 98.6 (04-22-18 @ 21:42)  HR: 100 (04-23-18 @ 08:58) (71 - 100)  BP: 142/82 (04-23-18 @ 05:00) (118/67 - 168/72)  RR: 18 (04-23-18 @ 08:58) (18 - 209)  SpO2: 94% (04-23-18 @ 08:58) (94% - 99%)    PHYSICAL EXAM:  HEENT:   [x ]Tracheostomy: 7 portex uncuffed  [ ]Pupils equal  [ ]No oral lesions  [ ]Abnormal    SKIN  [x ]No Rash  [ ] Abnormal  [ ] pressure    CARDIAC  [x ]Regular  [ ]Abnormal    PULMONARY  [x ]Bilateral Clear Breath Sounds  [ ]Normal Excursion  [ ]Abnormal    GI  [x ]PEG      [x ] +BS		              [x ]Soft, nondistended, nontender	  [ ]Abnormal    MUSCULOSKELETAL                                   [ ]Bedbound                 [ ]Abnormal    [x ]Ambulatory/OOB to chair                           EXTREMITIES                                         [ ]Normal  [ x]Edema  2-3+                         NEUROLOGIC  [ ] Normal, non focal  [x ] Focal findings:    PSYCHIATRIC  [x ]Alert and appropriate-at times  [ ] Sedated	 [ ]Agitated    :  Ybarra: [ ] Yes, if yes: Date of Placement:                   [ x ] No    LINES: Central Lines [ ] Yes, if yes: Date of Placement                                     [ x ] No    HOSPITAL MEDICATIONS:  MEDICATIONS  (STANDING):  aspirin  chewable 81 milliGRAM(s) Oral daily  BACItracin   Ointment 1 Application(s) Topical four times a day  BACItracin   Ointment 1 Application(s) Topical daily  clopidogrel Tablet 75 milliGRAM(s) Oral daily  doxazosin 2 milliGRAM(s) Oral at bedtime  enalapril 10 milliGRAM(s) Oral every 12 hours  enoxaparin Injectable 40 milliGRAM(s) SubCutaneous daily  famotidine    Tablet 20 milliGRAM(s) Oral two times a day  hydrALAZINE 25 milliGRAM(s) Oral every 8 hours  insulin lispro (HumaLOG) corrective regimen sliding scale   SubCutaneous every 6 hours  insulin NPH human recombinant 6 Unit(s) SubCutaneous every 6 hours  lactobacillus acidophilus 1 Tablet(s) Oral every 12 hours  lidocaine   Patch 1 Patch Transdermal daily  lidocaine   Patch 1 Patch Transdermal daily  metoclopramide Injectable 5 milliGRAM(s) IV Push every 8 hours  metoprolol tartrate 100 milliGRAM(s) Oral every 12 hours  mirtazapine 15 milliGRAM(s) Oral <User Schedule>  nystatin    Suspension [id number] Unit(s) Oral four times a day  QUEtiapine 25 milliGRAM(s) Oral at bedtime  simethicone 80 milliGRAM(s) Chew every 8 hours  simvastatin 40 milliGRAM(s) Oral at bedtime  valproic  acid Syrup 125 milliGRAM(s) Oral at bedtime    MEDICATIONS  (PRN):  acetaminophen    Suspension. 650 milliGRAM(s) Oral every 6 hours PRN Mild Pain (1 - 3)  acetaminophen    Suspension. 650 milliGRAM(s) Oral every 6 hours PRN mild to moderate pain  haloperidol    Injectable 1 milliGRAM(s) IV Push every 8 hours PRN severe agitation  melatonin 3 milliGRAM(s) Oral at bedtime PRN Insomnia  oxyCODONE    IR 5 milliGRAM(s) Oral every 8 hours PRN Moderate to severe pain  QUEtiapine 12.5 milliGRAM(s) Oral every 6 hours PRN agitation      LABS:                        9.4    11.9  )-----------( 220      ( 23 Apr 2018 06:58 )             29.7     04-23    146<H>  |  105  |  38<H>  ----------------------------<  102<H>  4.6   |  34<H>  |  0.82    Ca    8.7      23 Apr 2018 06:58  Phos  2.8     04-23  Mg     2.2     04-23    TPro  6.3  /  Alb  2.7<L>  /  TBili  1.5<H>  /  DBili  0.5<H>  /  AST  26  /  ALT  30  /  AlkPhos  285<H>  04-23            CAPILLARY BLOOD GLUCOSE    MICROBIOLOGY:     RADIOLOGY:  [ ] Reviewed and interpreted by me Patient is a 77y old  Male who presents with a chief complaint of pelvic fx with active extrav (27 Mar 2018 12:01)      Interval Events: Complaining of shortness of breath this am.     REVIEW OF SYSTEMS:  [ ] Positive  [x ] All other systems negative  [ ] Unable to assess ROS because ________    Vital Signs Last 24 Hrs  T(C): 36.6 (04-23-18 @ 05:00), Max: 37 (04-22-18 @ 21:42)  T(F): 97.9 (04-23-18 @ 05:00), Max: 98.6 (04-22-18 @ 21:42)  HR: 100 (04-23-18 @ 08:58) (71 - 100)  BP: 142/82 (04-23-18 @ 05:00) (118/67 - 168/72)  RR: 18 (04-23-18 @ 08:58) (18 - 209)  SpO2: 94% (04-23-18 @ 08:58) (94% - 99%)    PHYSICAL EXAM:  HEENT:   [x ]Tracheostomy: 7 portex uncuffed  [x ]Pupils equal  [x ]No oral lesions  [ ]Abnormal    SKIN  [x ]No Rash  [ ] Abnormal  [ ] pressure    CARDIAC  [x ]Regular  [ ]Abnormal    PULMONARY  [x ]Bilateral Coarse Breath Sounds with wheezing  [ ]Normal Excursion  [ ]Abnormal    GI  [x ]PEG      [x ] +BS		              [x ]Soft, nondistended, nontender	  [ ]Abnormal    MUSCULOSKELETAL                                   [ ]Bedbound                 [ ]Abnormal    [x ]Ambulatory/OOB to chair                           EXTREMITIES                                         [ ]Normal  [ x]Edema  2-3+                         NEUROLOGIC  [ ] Normal, non focal  [x ] Focal findings:    PSYCHIATRIC  [x ]Alert and appropriate-at times  [ ] Sedated	 [ ]Agitated    :  Ybarra: [ ] Yes, if yes: Date of Placement:                   [ x ] No    LINES: Central Lines [ ] Yes, if yes: Date of Placement                                     [ x ] No    HOSPITAL MEDICATIONS:  MEDICATIONS  (STANDING):  aspirin  chewable 81 milliGRAM(s) Oral daily  BACItracin   Ointment 1 Application(s) Topical four times a day  BACItracin   Ointment 1 Application(s) Topical daily  clopidogrel Tablet 75 milliGRAM(s) Oral daily  doxazosin 2 milliGRAM(s) Oral at bedtime  enalapril 10 milliGRAM(s) Oral every 12 hours  enoxaparin Injectable 40 milliGRAM(s) SubCutaneous daily  famotidine    Tablet 20 milliGRAM(s) Oral two times a day  hydrALAZINE 25 milliGRAM(s) Oral every 8 hours  insulin lispro (HumaLOG) corrective regimen sliding scale   SubCutaneous every 6 hours  insulin NPH human recombinant 6 Unit(s) SubCutaneous every 6 hours  lactobacillus acidophilus 1 Tablet(s) Oral every 12 hours  lidocaine   Patch 1 Patch Transdermal daily  lidocaine   Patch 1 Patch Transdermal daily  metoclopramide Injectable 5 milliGRAM(s) IV Push every 8 hours  metoprolol tartrate 100 milliGRAM(s) Oral every 12 hours  mirtazapine 15 milliGRAM(s) Oral <User Schedule>  nystatin    Suspension [id number] Unit(s) Oral four times a day  QUEtiapine 25 milliGRAM(s) Oral at bedtime  simethicone 80 milliGRAM(s) Chew every 8 hours  simvastatin 40 milliGRAM(s) Oral at bedtime  valproic  acid Syrup 125 milliGRAM(s) Oral at bedtime    MEDICATIONS  (PRN):  acetaminophen    Suspension. 650 milliGRAM(s) Oral every 6 hours PRN Mild Pain (1 - 3)  acetaminophen    Suspension. 650 milliGRAM(s) Oral every 6 hours PRN mild to moderate pain  haloperidol    Injectable 1 milliGRAM(s) IV Push every 8 hours PRN severe agitation  melatonin 3 milliGRAM(s) Oral at bedtime PRN Insomnia  oxyCODONE    IR 5 milliGRAM(s) Oral every 8 hours PRN Moderate to severe pain  QUEtiapine 12.5 milliGRAM(s) Oral every 6 hours PRN agitation      LABS:                        9.4    11.9  )-----------( 220      ( 23 Apr 2018 06:58 )             29.7     04-23    146<H>  |  105  |  38<H>  ----------------------------<  102<H>  4.6   |  34<H>  |  0.82    Ca    8.7      23 Apr 2018 06:58  Phos  2.8     04-23  Mg     2.2     04-23    TPro  6.3  /  Alb  2.7<L>  /  TBili  1.5<H>  /  DBili  0.5<H>  /  AST  26  /  ALT  30  /  AlkPhos  285<H>  04-23            CAPILLARY BLOOD GLUCOSE    MICROBIOLOGY:     RADIOLOGY:  [ ] Reviewed and interpreted by me

## 2018-04-23 NOTE — PROGRESS NOTE ADULT - ASSESSMENT
77 male with respiratory  failure, had trach, on trach collar, dysphagia who had G tube replaced by IR on 4/16/18 tolerating G tube feeds at 85cc/hr . G tibe site is dry , no erythema , No bleed at G tube site . Hgb and Hct stable with LFTs improved. Total bilirubin trended down     Plan:   Monitor labs and trends as ordered   Continue G tube feeds at goal  Maintain aspiration precautions  Keep abdominal binder over site to minimize trauma     GLEN [facility]  [address] [place] [address]  [id number] [phone]

## 2018-04-23 NOTE — PROGRESS NOTE ADULT - PROBLEM SELECTOR PLAN 6
Venous Duplex 4/1: Acute DVT of Rt and Lft Soleal Vein   Venous Duplex 4/10: Patient with persistent Soleal DVT b/l w/o propagation   Venous Duplex 4/18: Patient with New left peroneal DVT with persistent soleal DVT.  Next serial 4/25.

## 2018-04-23 NOTE — PROGRESS NOTE ADULT - PROBLEM SELECTOR PLAN 3
Known illeus, however pt is tolerating tube feed and passing stools  KNOWN ILLEUS. HE is tolerating tube feeds and is having bowel movement. IF ANY CHANGE to this then he will warrant an intervention.  Bacitracin oint to peg site  Continue low dose reglan

## 2018-04-23 NOTE — PROGRESS NOTE ADULT - ATTENDING COMMENTS
Pt seen and examined.   1. Acute resp failure w/ hypoxia: Tachypneic on TC, CXR w/ fluid overload. Diuresis with IV lasix 40 mg. Monitor WBC/ temp curve  2. Rib fractures: cont to optimize pain control to prevent splinting  3. Agitation: Cont Valproic acid, Seroquel and haldol prn. Follow QTc  4. Oropharyngeal dysphagia: cont tube feeds

## 2018-04-23 NOTE — CHART NOTE - NSCHARTNOTEFT_GEN_A_CORE
Notified by RN about pt desaturating on trach collar upto the 70s. Pt seen and examined at the bedside. As per RN, pt is lethargic. Pt non verbal; VSS: BP: 168/70 HR: 83 O2: 92. On exam pt is lethargic, non verbal, awake; wheezing heard b/l, S1, S2 RRR. STAT ABG and albuterol x 1 given. ABG shows PCO2 of 61 ph: 7.39 HCO3: 36. Trach collar upto 50%. Will continue to monitor. Will endorse to primary team in AM.    Mckayla MAZA  #[id number]

## 2018-04-23 NOTE — PROGRESS NOTE ADULT - SUBJECTIVE AND OBJECTIVE BOX
INTERVAL HPI/OVERNIGHT EVENTS:  patient positioned in bed for comfort and safety.  He was minimally aroused during assessment. Tolerating the G-tube feeds at 85 cc/ hr.   BM yesterday .    MEDICATIONS  (STANDING):  aspirin  chewable 81 milliGRAM(s) Oral daily  BACItracin   Ointment 1 Application(s) Topical four times a day  BACItracin   Ointment 1 Application(s) Topical daily  clopidogrel Tablet 75 milliGRAM(s) Oral daily  doxazosin 2 milliGRAM(s) Oral at bedtime  enalapril 10 milliGRAM(s) Oral every 12 hours  enoxaparin Injectable 40 milliGRAM(s) SubCutaneous daily  famotidine    Tablet 20 milliGRAM(s) Oral two times a day  hydrALAZINE 25 milliGRAM(s) Oral every 8 hours  insulin lispro (HumaLOG) corrective regimen sliding scale   SubCutaneous every 6 hours  insulin NPH human recombinant 6 Unit(s) SubCutaneous every 6 hours  lactobacillus acidophilus 1 Tablet(s) Oral every 12 hours  lidocaine   Patch 1 Patch Transdermal daily  lidocaine   Patch 1 Patch Transdermal daily  metoclopramide Injectable 5 milliGRAM(s) IV Push every 8 hours  metoprolol tartrate 100 milliGRAM(s) Oral every 12 hours  mirtazapine 15 milliGRAM(s) Oral <User Schedule>  nystatin    Suspension 115118 Unit(s) Oral four times a day  QUEtiapine 25 milliGRAM(s) Oral at bedtime  simethicone 80 milliGRAM(s) Chew every 8 hours  simvastatin 40 milliGRAM(s) Oral at bedtime  valproic  acid Syrup 125 milliGRAM(s) Oral at bedtime    MEDICATIONS  (PRN):  acetaminophen    Suspension. 650 milliGRAM(s) Oral every 6 hours PRN Mild Pain (1 - 3)  acetaminophen    Suspension. 650 milliGRAM(s) Oral every 6 hours PRN mild to moderate pain  haloperidol    Injectable 1 milliGRAM(s) IV Push every 8 hours PRN severe agitation  melatonin 3 milliGRAM(s) Oral at bedtime PRN Insomnia  oxyCODONE    IR 5 milliGRAM(s) Oral every 8 hours PRN Moderate to severe pain  QUEtiapine 12.5 milliGRAM(s) Oral every 6 hours PRN agitation      Allergies    No Known Allergies    Intolerances        Review of Systems: per report    General:  No fever or chills reported    ENT:  trach/ trach collar.  CV:  No reported chest pain   Resp:  trach collar , no audible wheeze  GI: tolerating Gtube feeds at 85cc/hr, BM yesterday       Vital Signs Last 24 Hrs  T(C): 36.6 (23 Apr 2018 05:00), Max: 37 (22 Apr 2018 21:42)  T(F): 97.9 (23 Apr 2018 05:00), Max: 98.6 (22 Apr 2018 21:42)  HR: 76 (23 Apr 2018 12:28) (71 - 100)  BP: 153/62 (23 Apr 2018 12:28) (118/67 - 168/72)  BP(mean): --  RR: 22 (23 Apr 2018 12:15) (18 - 209)  SpO2: 97% (23 Apr 2018 12:15) (94% - 99%)    PHYSICAL EXAM:    Constitutional: lying in bed, non toxic and in NAD   Respiratory: trach collar   Cardiovascular: S1 and S2, RRR, no murmur has PPM  Gastrointestinal: hypoactive bowel sounds, G tube site dry, abdominal binder over site   Ext: warm, no ankle edema   Skin: No visible rashes or lesions     LABS:                        9.4    11.9  )-----------( 220      ( 23 Apr 2018 06:58 )             29.7     04-23    146<H>  |  105  |  38<H>  ----------------------------<  102<H>  4.6   |  34<H>  |  0.82    Ca    8.7      23 Apr 2018 06:58  Phos  2.8     04-23  Mg     2.2     04-23    TPro  6.3  /  Alb  2.7<L>  /  TBili  1.5<H>  /  DBili  0.5<H>  /  AST  26  /  ALT  30  /  AlkPhos  285<H>  04-23        LIVER FUNCTIONS - ( 23 Apr 2018 06:58 )  Alb: 2.7 g/dL / Pro: 6.3 g/dL / ALK PHOS: 285 U/L / ALT: 30 U/L / AST: 26 U/L / GGT: x         Hemoglobin: 9.4 g/dL <L> [13.0 - 17.0] (04-23 @ 06:58)  Hemoglobin: 9.4 g/dL <L> [13.0 - 17.0] (04-22 @ 07:11)  Hemoglobin: 9.3 g/dL <L> [13.0 - 17.0] (04-21 @ 08:01)    Albumin, Serum: 2.7 g/dL <L> [3.3 - 5.0] (04-23 @ 06:58)  Aspartate Aminotransferase (AST/SGOT): 26 U/L [10 - 40] (04-23 @ 06:58)  Alanine Aminotransferase (ALT/SGPT): 30 U/L [10 - 45] (04-23 @ 06:58)  Hepatic Function Panel (04.23.18 @ 06:58)    Indirect Reacting Bilirubin: 1.0 mg/dL    Bilirubin Total, Serum: 1.5 mg/dL    Bilirubin Direct, Serum: 0.5 mg/dL      RADIOLOGY & ADDITIONAL TESTS:  Xray Chest 1 View- PORTABLE-Urgent (04.23.18 @ 10:43) >  EXAM:  XR CHEST PORTABLE URGENT 1V                            PROCEDURE DATE:  04/23/2018      INTERPRETATION:    CLINICAL INDICATION: Shortness of breath with acute desaturation. Status   post tracheostomy.    TECHNIQUE: Portable frontal view of the chest.    COMPARISON: Chest radiograph 4/13/2018.    FINDINGS:   Tracheostomy with distal tip above the rafia.  Left chest wall pacemaker.    The heart is enlarged.  Bilateral patchy opacities increased compared to the prior study,   specifically in the right upper lobe  Bilateral pleural effusions, left greater than right.  Redemonstration of mildly displaced fractures of the left lateral third,   fourth, and sixth ribs..    IMPRESSION:   Bilateral patchy opacities increased compared tothe prior study,   specifically in the right upper lobe represent CHF and/or pneumonia.    Mildly displaced left third, fourth, and sixth rib fractures.     US Abdomen Upper Quadrant Right (04.03.18 @ 15:00) >  EXAM:  US ABDOMEN RT UPR QUADRANT                            PROCEDURE DATE:  04/03/2018        INTERPRETATION:  CLINICAL INFORMATION: Hyperbilirubinemia assess for   cholelithiasis or biliary ductal dilatation.    COMPARISON: Correlation is made with prior abdominal CT dated 3/30/2018.    TECHNIQUE: Portable Sonography of the right upper quadrant.     FINDINGS:    Liver: Enlarged, measuring 18.8 cm in length.    Bile ducts: Normal caliber. Common bile duct measures 2 mm.     Gallbladder: Filled with sludge and stones.    Normal wall thickness.    Pancreas: Limited evaluation.    Right kidney: 12.3 cm. No hydronephrosis.     Ascites: None.    Aorta and IVC: Visualized portions are within normal limits.    Miscellaneous: Right pleural effusion.    IMPRESSION:     Hepatomegaly.    Gallbladder is filled with stones and sludge.    Right pleural effusion.

## 2018-04-24 LAB
ANION GAP SERPL CALC-SCNC: 9 MMOL/L — SIGNIFICANT CHANGE UP (ref 5–17)
APPEARANCE UR: CLEAR — SIGNIFICANT CHANGE UP
BASE EXCESS BLDA CALC-SCNC: 9.9 MMOL/L — HIGH (ref -2–2)
BILIRUB UR-MCNC: NEGATIVE — SIGNIFICANT CHANGE UP
BUN SERPL-MCNC: 42 MG/DL — HIGH (ref 7–23)
CALCIUM SERPL-MCNC: 9.4 MG/DL — SIGNIFICANT CHANGE UP (ref 8.4–10.5)
CHLORIDE SERPL-SCNC: 102 MMOL/L — SIGNIFICANT CHANGE UP (ref 96–108)
CO2 BLDA-SCNC: 38 MMOL/L — HIGH (ref 22–30)
CO2 SERPL-SCNC: 34 MMOL/L — HIGH (ref 22–31)
COLOR SPEC: YELLOW — SIGNIFICANT CHANGE UP
CREAT SERPL-MCNC: 0.81 MG/DL — SIGNIFICANT CHANGE UP (ref 0.5–1.3)
DIFF PNL FLD: NEGATIVE — SIGNIFICANT CHANGE UP
GAS PNL BLDA: SIGNIFICANT CHANGE UP
GLUCOSE SERPL-MCNC: 55 MG/DL — LOW (ref 70–99)
GLUCOSE UR QL: NEGATIVE MG/DL — SIGNIFICANT CHANGE UP
HCO3 BLDA-SCNC: 36 MMOL/L — HIGH (ref 21–29)
HCT VFR BLD CALC: 30.8 % — LOW (ref 39–50)
HGB BLD-MCNC: 9.9 G/DL — LOW (ref 13–17)
HOROWITZ INDEX BLDA+IHG-RTO: 50 — SIGNIFICANT CHANGE UP
KETONES UR-MCNC: NEGATIVE — SIGNIFICANT CHANGE UP
LEUKOCYTE ESTERASE UR-ACNC: NEGATIVE — SIGNIFICANT CHANGE UP
MAGNESIUM SERPL-MCNC: 2.2 MG/DL — SIGNIFICANT CHANGE UP (ref 1.6–2.6)
MCHC RBC-ENTMCNC: 32 GM/DL — SIGNIFICANT CHANGE UP (ref 32–36)
MCHC RBC-ENTMCNC: 33.1 PG — SIGNIFICANT CHANGE UP (ref 27–34)
MCV RBC AUTO: 104 FL — HIGH (ref 80–100)
NITRITE UR-MCNC: NEGATIVE — SIGNIFICANT CHANGE UP
PCO2 BLDA: 60 MMHG — HIGH (ref 32–46)
PH BLDA: 7.4 — SIGNIFICANT CHANGE UP (ref 7.35–7.45)
PH UR: 5.5 — SIGNIFICANT CHANGE UP (ref 5–8)
PHOSPHATE SERPL-MCNC: 3.1 MG/DL — SIGNIFICANT CHANGE UP (ref 2.5–4.5)
PLATELET # BLD AUTO: 209 K/UL — SIGNIFICANT CHANGE UP (ref 150–400)
PO2 BLDA: 74 MMHG — SIGNIFICANT CHANGE UP (ref 74–108)
POTASSIUM SERPL-MCNC: 4.7 MMOL/L — SIGNIFICANT CHANGE UP (ref 3.5–5.3)
POTASSIUM SERPL-SCNC: 4.7 MMOL/L — SIGNIFICANT CHANGE UP (ref 3.5–5.3)
PROT UR-MCNC: NEGATIVE MG/DL — SIGNIFICANT CHANGE UP
RBC # BLD: 2.97 M/UL — LOW (ref 4.2–5.8)
RBC # FLD: 16.5 % — HIGH (ref 10.3–14.5)
SAO2 % BLDA: 95 % — SIGNIFICANT CHANGE UP (ref 92–96)
SODIUM SERPL-SCNC: 145 MMOL/L — SIGNIFICANT CHANGE UP (ref 135–145)
SP GR SPEC: 1.02 — SIGNIFICANT CHANGE UP (ref 1.01–1.02)
UROBILINOGEN FLD QL: 1 MG/DL — SIGNIFICANT CHANGE UP
WBC # BLD: 13.6 K/UL — HIGH (ref 3.8–10.5)
WBC # FLD AUTO: 13.6 K/UL — HIGH (ref 3.8–10.5)

## 2018-04-24 PROCEDURE — 99497 ADVNCD CARE PLAN 30 MIN: CPT

## 2018-04-24 PROCEDURE — 99233 SBSQ HOSP IP/OBS HIGH 50: CPT | Mod: 25,GC

## 2018-04-24 PROCEDURE — 93970 EXTREMITY STUDY: CPT | Mod: 26

## 2018-04-24 RX ORDER — VANCOMYCIN HCL 1 G
1000 VIAL (EA) INTRAVENOUS EVERY 12 HOURS
Qty: 0 | Refills: 0 | Status: DISCONTINUED | OUTPATIENT
Start: 2018-04-24 | End: 2018-04-27

## 2018-04-24 RX ORDER — IPRATROPIUM/ALBUTEROL SULFATE 18-103MCG
3 AEROSOL WITH ADAPTER (GRAM) INHALATION EVERY 6 HOURS
Qty: 0 | Refills: 0 | Status: DISCONTINUED | OUTPATIENT
Start: 2018-04-24 | End: 2018-05-18

## 2018-04-24 RX ORDER — FUROSEMIDE 40 MG
40 TABLET ORAL ONCE
Qty: 0 | Refills: 0 | Status: COMPLETED | OUTPATIENT
Start: 2018-04-24 | End: 2018-04-24

## 2018-04-24 RX ORDER — PIPERACILLIN AND TAZOBACTAM 4; .5 G/20ML; G/20ML
3.38 INJECTION, POWDER, LYOPHILIZED, FOR SOLUTION INTRAVENOUS EVERY 8 HOURS
Qty: 0 | Refills: 0 | Status: DISCONTINUED | OUTPATIENT
Start: 2018-04-24 | End: 2018-04-30

## 2018-04-24 RX ADMIN — Medication 1 APPLICATION(S): at 12:02

## 2018-04-24 RX ADMIN — Medication 500000 UNIT(S): at 23:28

## 2018-04-24 RX ADMIN — Medication 5 MILLIGRAM(S): at 05:37

## 2018-04-24 RX ADMIN — Medication 2: at 23:28

## 2018-04-24 RX ADMIN — HUMAN INSULIN 6 UNIT(S): 100 INJECTION, SUSPENSION SUBCUTANEOUS at 18:13

## 2018-04-24 RX ADMIN — Medication 2 MILLIGRAM(S): at 21:05

## 2018-04-24 RX ADMIN — Medication 5 MILLIGRAM(S): at 21:04

## 2018-04-24 RX ADMIN — Medication 100 MILLIGRAM(S): at 05:36

## 2018-04-24 RX ADMIN — Medication 3 MILLILITER(S): at 11:32

## 2018-04-24 RX ADMIN — HUMAN INSULIN 6 UNIT(S): 100 INJECTION, SUSPENSION SUBCUTANEOUS at 12:15

## 2018-04-24 RX ADMIN — Medication 1 TABLET(S): at 05:36

## 2018-04-24 RX ADMIN — Medication 1 TABLET(S): at 18:12

## 2018-04-24 RX ADMIN — HUMAN INSULIN 6 UNIT(S): 100 INJECTION, SUSPENSION SUBCUTANEOUS at 23:29

## 2018-04-24 RX ADMIN — Medication 25 MILLIGRAM(S): at 21:05

## 2018-04-24 RX ADMIN — LIDOCAINE 1 PATCH: 4 CREAM TOPICAL at 12:02

## 2018-04-24 RX ADMIN — SIMVASTATIN 40 MILLIGRAM(S): 20 TABLET, FILM COATED ORAL at 21:05

## 2018-04-24 RX ADMIN — Medication 2: at 18:12

## 2018-04-24 RX ADMIN — HUMAN INSULIN 6 UNIT(S): 100 INJECTION, SUSPENSION SUBCUTANEOUS at 00:32

## 2018-04-24 RX ADMIN — Medication 1 APPLICATION(S): at 00:33

## 2018-04-24 RX ADMIN — Medication 40 MILLIGRAM(S): at 12:01

## 2018-04-24 RX ADMIN — FAMOTIDINE 20 MILLIGRAM(S): 10 INJECTION INTRAVENOUS at 05:36

## 2018-04-24 RX ADMIN — Medication 25 MILLIGRAM(S): at 05:36

## 2018-04-24 RX ADMIN — Medication 1 APPLICATION(S): at 05:37

## 2018-04-24 RX ADMIN — Medication 25 MILLIGRAM(S): at 15:04

## 2018-04-24 RX ADMIN — HALOPERIDOL DECANOATE 1 MILLIGRAM(S): 100 INJECTION INTRAMUSCULAR at 08:36

## 2018-04-24 RX ADMIN — Medication 500000 UNIT(S): at 05:36

## 2018-04-24 RX ADMIN — Medication 5 MILLIGRAM(S): at 15:05

## 2018-04-24 RX ADMIN — CLOPIDOGREL BISULFATE 75 MILLIGRAM(S): 75 TABLET, FILM COATED ORAL at 12:02

## 2018-04-24 RX ADMIN — Medication 250 MILLIGRAM(S): at 18:12

## 2018-04-24 RX ADMIN — Medication 500000 UNIT(S): at 12:01

## 2018-04-24 RX ADMIN — Medication 500000 UNIT(S): at 00:32

## 2018-04-24 RX ADMIN — Medication 3 MILLILITER(S): at 17:25

## 2018-04-24 RX ADMIN — Medication 1 APPLICATION(S): at 18:12

## 2018-04-24 RX ADMIN — FAMOTIDINE 20 MILLIGRAM(S): 10 INJECTION INTRAVENOUS at 18:12

## 2018-04-24 RX ADMIN — Medication 3 MILLILITER(S): at 23:11

## 2018-04-24 RX ADMIN — Medication 10 MILLIGRAM(S): at 18:12

## 2018-04-24 RX ADMIN — QUETIAPINE FUMARATE 25 MILLIGRAM(S): 200 TABLET, FILM COATED ORAL at 21:05

## 2018-04-24 RX ADMIN — QUETIAPINE FUMARATE 12.5 MILLIGRAM(S): 200 TABLET, FILM COATED ORAL at 15:25

## 2018-04-24 RX ADMIN — PIPERACILLIN AND TAZOBACTAM 25 GRAM(S): 4; .5 INJECTION, POWDER, LYOPHILIZED, FOR SOLUTION INTRAVENOUS at 21:05

## 2018-04-24 RX ADMIN — Medication 2: at 00:32

## 2018-04-24 RX ADMIN — Medication 100 MILLIGRAM(S): at 18:12

## 2018-04-24 RX ADMIN — MIRTAZAPINE 15 MILLIGRAM(S): 45 TABLET, ORALLY DISINTEGRATING ORAL at 21:05

## 2018-04-24 RX ADMIN — Medication 125 MILLIGRAM(S): at 21:04

## 2018-04-24 RX ADMIN — SIMETHICONE 80 MILLIGRAM(S): 80 TABLET, CHEWABLE ORAL at 21:05

## 2018-04-24 RX ADMIN — Medication 81 MILLIGRAM(S): at 12:02

## 2018-04-24 RX ADMIN — SIMETHICONE 80 MILLIGRAM(S): 80 TABLET, CHEWABLE ORAL at 05:36

## 2018-04-24 RX ADMIN — ENOXAPARIN SODIUM 40 MILLIGRAM(S): 100 INJECTION SUBCUTANEOUS at 12:02

## 2018-04-24 RX ADMIN — HUMAN INSULIN 6 UNIT(S): 100 INJECTION, SUSPENSION SUBCUTANEOUS at 05:37

## 2018-04-24 RX ADMIN — SIMETHICONE 80 MILLIGRAM(S): 80 TABLET, CHEWABLE ORAL at 15:04

## 2018-04-24 RX ADMIN — Medication 500000 UNIT(S): at 18:12

## 2018-04-24 RX ADMIN — Medication 10 MILLIGRAM(S): at 05:36

## 2018-04-24 RX ADMIN — Medication 1 APPLICATION(S): at 23:29
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
## 2018-04-24 NOTE — PROGRESS NOTE ADULT - ATTENDING COMMENTS
Pt seen and examined.   1. Acute resp failure w/ hypoxia: Tachypneic on TC, CXR w/ fluid overload. Diuresis with IV lasix 40 mg again today. Monitor WBC/ temp curve. Patient with increased secretions - will check cultures (blood and sputum). If spikes will start empiric antibiotics. Patient looks worse today  2. Rib fractures: cont to optimize pain control to prevent splinting  3. Agitation: Cont Valproic acid, Seroquel and haldol prn. Follow QTc  4. Oropharyngeal dysphagia: cont tube feeds    Advanced Care Planning 20 minutes-  discussion with wife at bedside about overall care and plan. Patient remains full code with hope for recovery. She understands that he is doing clinically worse today. He had an episode of desaturation overnight during suctioning requiring ambu/lavage
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
## 2018-04-24 NOTE — PROGRESS NOTE ADULT - SUBJECTIVE AND OBJECTIVE BOX
INTERVAL HPI/OVERNIGHT EVENTS:  tolerating PEG feeds  +BMs- formed/soft  no nausea/vomiting    MEDICATIONS  (STANDING):  ALBUTerol/ipratropium for Nebulization 3 milliLiter(s) Nebulizer every 6 hours  aspirin  chewable 81 milliGRAM(s) Oral daily  BACItracin   Ointment 1 Application(s) Topical four times a day  BACItracin   Ointment 1 Application(s) Topical daily  clopidogrel Tablet 75 milliGRAM(s) Oral daily  doxazosin 2 milliGRAM(s) Oral at bedtime  enalapril 10 milliGRAM(s) Oral every 12 hours  enoxaparin Injectable 40 milliGRAM(s) SubCutaneous daily  famotidine    Tablet 20 milliGRAM(s) Oral two times a day  hydrALAZINE 25 milliGRAM(s) Oral every 8 hours  insulin lispro (HumaLOG) corrective regimen sliding scale   SubCutaneous every 6 hours  insulin NPH human recombinant 6 Unit(s) SubCutaneous every 6 hours  lactobacillus acidophilus 1 Tablet(s) Oral every 12 hours  lidocaine   Patch 1 Patch Transdermal daily  lidocaine   Patch 1 Patch Transdermal daily  metoclopramide Injectable 5 milliGRAM(s) IV Push every 8 hours  metoprolol tartrate 100 milliGRAM(s) Oral every 12 hours  mirtazapine 15 milliGRAM(s) Oral <User Schedule>  nystatin    Suspension 431076 Unit(s) Oral four times a day  QUEtiapine 25 milliGRAM(s) Oral at bedtime  simethicone 80 milliGRAM(s) Chew every 8 hours  simvastatin 40 milliGRAM(s) Oral at bedtime  valproic  acid Syrup 125 milliGRAM(s) Oral at bedtime    MEDICATIONS  (PRN):  acetaminophen    Suspension. 650 milliGRAM(s) Oral every 6 hours PRN Mild Pain (1 - 3)  acetaminophen    Suspension. 650 milliGRAM(s) Oral every 6 hours PRN mild to moderate pain  haloperidol    Injectable 1 milliGRAM(s) IV Push every 8 hours PRN severe agitation  melatonin 3 milliGRAM(s) Oral at bedtime PRN Insomnia  oxyCODONE    IR 5 milliGRAM(s) Oral every 8 hours PRN Moderate to severe pain  QUEtiapine 12.5 milliGRAM(s) Oral every 6 hours PRN agitation      Allergies  No Known Allergies      Vital Signs Last 24 Hrs  T(C): 37 (24 Apr 2018 10:15), Max: 37.5 (23 Apr 2018 21:00)  T(F): 98.6 (24 Apr 2018 10:15), Max: 99.5 (23 Apr 2018 21:00)  HR: 75 (24 Apr 2018 11:34) (65 - 89)  BP: 129/64 (24 Apr 2018 10:15) (129/52 - 168/70)  BP(mean): --  RR: 20 (24 Apr 2018 11:28) (18 - 22)  SpO2: 96% (24 Apr 2018 11:34) (91% - 100%)    PHYSICAL EXAM:  Constitutional: drowsy +significant trach secretions  Neck: +trach- without bleeding  Respiratory: Rhonchi, no wheeze  Cardiovascular: S1 and S2, RRR  Gastrointestinal: +abdominal binder BS+, softly distended,  healing ecchymosis LLQ +PEg site clean and dry +binder  Extremities: trace edema, neg clubbing, cyanosis  Vascular: 2+ peripheral pulses  Neurological: no focal asymmetry, drowsy  Skin: anicteric    LABS:                        9.9    13.6  )-----------( 209      ( 24 Apr 2018 07:19 )             30.8     04-24    145  |  102  |  42<H>  ----------------------------<  55<L>  4.7   |  34<H>  |  0.81    Ca    9.4      24 Apr 2018 07:14  Phos  3.1     04-24  Mg     2.2     04-24    TPro  6.3  /  Alb  2.7<L>  /  TBili  1.5<H>  /  DBili  0.5<H>  /  AST  26  /  ALT  30  /  AlkPhos  285<H>  04-23      RADIOLOGY & ADDITIONAL TESTS: INTERVAL HPI/OVERNIGHT EVENTS:  tolerating PEG feeds  +BMs- formed/soft  no nausea/vomiting    MEDICATIONS  (STANDING):  ALBUTerol/ipratropium for Nebulization 3 milliLiter(s) Nebulizer every 6 hours  aspirin  chewable 81 milliGRAM(s) Oral daily  BACItracin   Ointment 1 Application(s) Topical four times a day  BACItracin   Ointment 1 Application(s) Topical daily  clopidogrel Tablet 75 milliGRAM(s) Oral daily  doxazosin 2 milliGRAM(s) Oral at bedtime  enalapril 10 milliGRAM(s) Oral every 12 hours  enoxaparin Injectable 40 milliGRAM(s) SubCutaneous daily  famotidine    Tablet 20 milliGRAM(s) Oral two times a day  hydrALAZINE 25 milliGRAM(s) Oral every 8 hours  insulin lispro (HumaLOG) corrective regimen sliding scale   SubCutaneous every 6 hours  insulin NPH human recombinant 6 Unit(s) SubCutaneous every 6 hours  lactobacillus acidophilus 1 Tablet(s) Oral every 12 hours  lidocaine   Patch 1 Patch Transdermal daily  lidocaine   Patch 1 Patch Transdermal daily  metoclopramide Injectable 5 milliGRAM(s) IV Push every 8 hours  metoprolol tartrate 100 milliGRAM(s) Oral every 12 hours  mirtazapine 15 milliGRAM(s) Oral <User Schedule>  nystatin    Suspension 615676 Unit(s) Oral four times a day  QUEtiapine 25 milliGRAM(s) Oral at bedtime  simethicone 80 milliGRAM(s) Chew every 8 hours  simvastatin 40 milliGRAM(s) Oral at bedtime  valproic  acid Syrup 125 milliGRAM(s) Oral at bedtime    MEDICATIONS  (PRN):  acetaminophen    Suspension. 650 milliGRAM(s) Oral every 6 hours PRN Mild Pain (1 - 3)  acetaminophen    Suspension. 650 milliGRAM(s) Oral every 6 hours PRN mild to moderate pain  haloperidol    Injectable 1 milliGRAM(s) IV Push every 8 hours PRN severe agitation  melatonin 3 milliGRAM(s) Oral at bedtime PRN Insomnia  oxyCODONE    IR 5 milliGRAM(s) Oral every 8 hours PRN Moderate to severe pain  QUEtiapine 12.5 milliGRAM(s) Oral every 6 hours PRN agitation    Allergies  No Known Allergies    Vital Signs Last 24 Hrs  T(C): 37 (24 Apr 2018 10:15), Max: 37.5 (23 Apr 2018 21:00)  T(F): 98.6 (24 Apr 2018 10:15), Max: 99.5 (23 Apr 2018 21:00)  HR: 75 (24 Apr 2018 11:34) (65 - 89)  BP: 129/64 (24 Apr 2018 10:15) (129/52 - 168/70)  BP(mean): --  RR: 20 (24 Apr 2018 11:28) (18 - 22)  SpO2: 96% (24 Apr 2018 11:34) (91% - 100%)    PHYSICAL EXAM:  Constitutional: drowsy +significant trach secretions  Neck: +trach- without bleeding  Respiratory: Rhonchi, no wheeze  Cardiovascular: S1 and S2, RRR  Gastrointestinal: +abdominal binder BS+, softly distended,  healing ecchymosis LLQ +PEg site clean and dry +binder  Extremities: trace edema, neg clubbing, cyanosis  Vascular: 2+ peripheral pulses  Neurological: no focal asymmetry, drowsy  Skin: anicteric    LABS:                      9.9    13.6  )-----------( 209      ( 24 Apr 2018 07:19 )             30.8     04-24    145  |  102  |  42<H>  ----------------------------<  55<L>  4.7   |  34<H>  |  0.81    Ca    9.4      24 Apr 2018 07:14  Phos  3.1     04-24  Mg     2.2     04-24    TPro  6.3  /  Alb  2.7<L>  /  TBili  1.5<H>  /  DBili  0.5<H>  /  AST  26  /  ALT  30  /  AlkPhos  285<H>  04-23    RADIOLOGY & ADDITIONAL TESTS:

## 2018-04-25 LAB
ANION GAP SERPL CALC-SCNC: 9 MMOL/L — SIGNIFICANT CHANGE UP (ref 5–17)
BUN SERPL-MCNC: 46 MG/DL — HIGH (ref 7–23)
CALCIUM SERPL-MCNC: 8.7 MG/DL — SIGNIFICANT CHANGE UP (ref 8.4–10.5)
CHLORIDE SERPL-SCNC: 101 MMOL/L — SIGNIFICANT CHANGE UP (ref 96–108)
CO2 SERPL-SCNC: 33 MMOL/L — HIGH (ref 22–31)
CREAT SERPL-MCNC: 0.85 MG/DL — SIGNIFICANT CHANGE UP (ref 0.5–1.3)
CULTURE RESULTS: NO GROWTH — SIGNIFICANT CHANGE UP
GLUCOSE SERPL-MCNC: 181 MG/DL — HIGH (ref 70–99)
GRAM STN FLD: SIGNIFICANT CHANGE UP
HCT VFR BLD CALC: 30.3 % — LOW (ref 39–50)
HGB BLD-MCNC: 9.4 G/DL — LOW (ref 13–17)
MAGNESIUM SERPL-MCNC: 2.1 MG/DL — SIGNIFICANT CHANGE UP (ref 1.6–2.6)
MCHC RBC-ENTMCNC: 31.2 GM/DL — LOW (ref 32–36)
MCHC RBC-ENTMCNC: 32.3 PG — SIGNIFICANT CHANGE UP (ref 27–34)
MCV RBC AUTO: 103 FL — HIGH (ref 80–100)
PHOSPHATE SERPL-MCNC: 2.9 MG/DL — SIGNIFICANT CHANGE UP (ref 2.5–4.5)
PLATELET # BLD AUTO: 204 K/UL — SIGNIFICANT CHANGE UP (ref 150–400)
POTASSIUM SERPL-MCNC: 4.3 MMOL/L — SIGNIFICANT CHANGE UP (ref 3.5–5.3)
POTASSIUM SERPL-SCNC: 4.3 MMOL/L — SIGNIFICANT CHANGE UP (ref 3.5–5.3)
RBC # BLD: 2.93 M/UL — LOW (ref 4.2–5.8)
RBC # FLD: 16.5 % — HIGH (ref 10.3–14.5)
SODIUM SERPL-SCNC: 143 MMOL/L — SIGNIFICANT CHANGE UP (ref 135–145)
SPECIMEN SOURCE: SIGNIFICANT CHANGE UP
SPECIMEN SOURCE: SIGNIFICANT CHANGE UP
WBC # BLD: 13.7 K/UL — HIGH (ref 3.8–10.5)
WBC # FLD AUTO: 13.7 K/UL — HIGH (ref 3.8–10.5)

## 2018-04-25 PROCEDURE — 99233 SBSQ HOSP IP/OBS HIGH 50: CPT | Mod: GC

## 2018-04-25 PROCEDURE — 71250 CT THORAX DX C-: CPT | Mod: 26

## 2018-04-25 RX ORDER — SODIUM CHLORIDE 9 MG/ML
3 INJECTION INTRAMUSCULAR; INTRAVENOUS; SUBCUTANEOUS EVERY 12 HOURS
Qty: 0 | Refills: 0 | Status: DISCONTINUED | OUTPATIENT
Start: 2018-04-25 | End: 2018-05-04

## 2018-04-25 RX ORDER — FUROSEMIDE 40 MG
40 TABLET ORAL ONCE
Qty: 0 | Refills: 0 | Status: COMPLETED | OUTPATIENT
Start: 2018-04-25 | End: 2018-04-25

## 2018-04-25 RX ADMIN — Medication 2: at 06:34

## 2018-04-25 RX ADMIN — LIDOCAINE 1 PATCH: 4 CREAM TOPICAL at 14:00

## 2018-04-25 RX ADMIN — HUMAN INSULIN 6 UNIT(S): 100 INJECTION, SUSPENSION SUBCUTANEOUS at 06:34

## 2018-04-25 RX ADMIN — Medication 100 MILLIGRAM(S): at 18:30

## 2018-04-25 RX ADMIN — HUMAN INSULIN 6 UNIT(S): 100 INJECTION, SUSPENSION SUBCUTANEOUS at 18:28

## 2018-04-25 RX ADMIN — Medication 2: at 18:26

## 2018-04-25 RX ADMIN — Medication 4: at 12:51

## 2018-04-25 RX ADMIN — SIMVASTATIN 40 MILLIGRAM(S): 20 TABLET, FILM COATED ORAL at 21:28

## 2018-04-25 RX ADMIN — SIMETHICONE 80 MILLIGRAM(S): 80 TABLET, CHEWABLE ORAL at 14:08

## 2018-04-25 RX ADMIN — Medication 1 TABLET(S): at 18:29

## 2018-04-25 RX ADMIN — HALOPERIDOL DECANOATE 1 MILLIGRAM(S): 100 INJECTION INTRAMUSCULAR at 08:00

## 2018-04-25 RX ADMIN — Medication 10 MILLIGRAM(S): at 05:02

## 2018-04-25 RX ADMIN — Medication 250 MILLIGRAM(S): at 05:04

## 2018-04-25 RX ADMIN — Medication 3 MILLILITER(S): at 13:13

## 2018-04-25 RX ADMIN — Medication 5 MILLIGRAM(S): at 14:07

## 2018-04-25 RX ADMIN — Medication 500000 UNIT(S): at 18:31

## 2018-04-25 RX ADMIN — SODIUM CHLORIDE 3 MILLILITER(S): 9 INJECTION INTRAMUSCULAR; INTRAVENOUS; SUBCUTANEOUS at 17:13

## 2018-04-25 RX ADMIN — LIDOCAINE 1 PATCH: 4 CREAM TOPICAL at 12:00

## 2018-04-25 RX ADMIN — Medication 10 MILLIGRAM(S): at 18:23

## 2018-04-25 RX ADMIN — Medication 500000 UNIT(S): at 05:03

## 2018-04-25 RX ADMIN — Medication 3 MILLILITER(S): at 23:38

## 2018-04-25 RX ADMIN — Medication 25 MILLIGRAM(S): at 05:02

## 2018-04-25 RX ADMIN — Medication 25 MILLIGRAM(S): at 21:28

## 2018-04-25 RX ADMIN — Medication 25 MILLIGRAM(S): at 13:54

## 2018-04-25 RX ADMIN — Medication 3 MILLILITER(S): at 17:13

## 2018-04-25 RX ADMIN — QUETIAPINE FUMARATE 12.5 MILLIGRAM(S): 200 TABLET, FILM COATED ORAL at 12:00

## 2018-04-25 RX ADMIN — Medication 3 MILLILITER(S): at 06:48

## 2018-04-25 RX ADMIN — Medication 2 MILLIGRAM(S): at 21:28

## 2018-04-25 RX ADMIN — Medication 1 APPLICATION(S): at 05:03

## 2018-04-25 RX ADMIN — Medication 5 MILLIGRAM(S): at 21:27

## 2018-04-25 RX ADMIN — MIRTAZAPINE 15 MILLIGRAM(S): 45 TABLET, ORALLY DISINTEGRATING ORAL at 21:28

## 2018-04-25 RX ADMIN — SIMETHICONE 80 MILLIGRAM(S): 80 TABLET, CHEWABLE ORAL at 21:28

## 2018-04-25 RX ADMIN — Medication 250 MILLIGRAM(S): at 18:10

## 2018-04-25 RX ADMIN — Medication 1 APPLICATION(S): at 12:44

## 2018-04-25 RX ADMIN — FAMOTIDINE 20 MILLIGRAM(S): 10 INJECTION INTRAVENOUS at 05:02

## 2018-04-25 RX ADMIN — CLOPIDOGREL BISULFATE 75 MILLIGRAM(S): 75 TABLET, FILM COATED ORAL at 12:00

## 2018-04-25 RX ADMIN — Medication 1 TABLET(S): at 05:02

## 2018-04-25 RX ADMIN — PIPERACILLIN AND TAZOBACTAM 25 GRAM(S): 4; .5 INJECTION, POWDER, LYOPHILIZED, FOR SOLUTION INTRAVENOUS at 05:04

## 2018-04-25 RX ADMIN — SIMETHICONE 80 MILLIGRAM(S): 80 TABLET, CHEWABLE ORAL at 05:05

## 2018-04-25 RX ADMIN — PIPERACILLIN AND TAZOBACTAM 25 GRAM(S): 4; .5 INJECTION, POWDER, LYOPHILIZED, FOR SOLUTION INTRAVENOUS at 14:05

## 2018-04-25 RX ADMIN — Medication 1 APPLICATION(S): at 18:23

## 2018-04-25 RX ADMIN — Medication 500000 UNIT(S): at 14:06

## 2018-04-25 RX ADMIN — Medication 100 MILLIGRAM(S): at 05:03

## 2018-04-25 RX ADMIN — Medication 125 MILLIGRAM(S): at 21:27

## 2018-04-25 RX ADMIN — PIPERACILLIN AND TAZOBACTAM 25 GRAM(S): 4; .5 INJECTION, POWDER, LYOPHILIZED, FOR SOLUTION INTRAVENOUS at 21:27

## 2018-04-25 RX ADMIN — QUETIAPINE FUMARATE 25 MILLIGRAM(S): 200 TABLET, FILM COATED ORAL at 21:28

## 2018-04-25 RX ADMIN — Medication 5 MILLIGRAM(S): at 05:03

## 2018-04-25 RX ADMIN — HUMAN INSULIN 6 UNIT(S): 100 INJECTION, SUSPENSION SUBCUTANEOUS at 12:51

## 2018-04-25 RX ADMIN — FAMOTIDINE 20 MILLIGRAM(S): 10 INJECTION INTRAVENOUS at 18:24

## 2018-04-25 RX ADMIN — ENOXAPARIN SODIUM 40 MILLIGRAM(S): 100 INJECTION SUBCUTANEOUS at 11:59

## 2018-04-25 RX ADMIN — Medication 40 MILLIGRAM(S): at 17:12

## 2018-04-25 RX ADMIN — LIDOCAINE 1 PATCH: 4 CREAM TOPICAL at 00:04

## 2018-04-25 RX ADMIN — Medication 81 MILLIGRAM(S): at 12:00

## 2018-04-25 NOTE — PROGRESS NOTE ADULT - PROBLEM SELECTOR PLAN 2
Pt S/p tracheostomy by trauma SX on 4/5  Pt tolerating TC atc   Pt downsized to #  7 Uncuffed Trach on 4/13 by ENT. Pt pulled out trach & replaced same on 4/19  CXR 4/13: Bilateral Pleural effusions with Pulmonary edema   Continue Chest PT and Suctioning PRN  lasix today 40 once

## 2018-04-25 NOTE — PROGRESS NOTE ADULT - SUBJECTIVE AND OBJECTIVE BOX
Patient is a 77y old  Male who presents with a chief complaint of pelvic fx with active extrav (27 Mar 2018 12:01)      Interval Events:    REVIEW OF SYSTEMS:  [ ] Positive  [ ] All other systems negative  [ ] Unable to assess ROS because ________    Vital Signs Last 24 Hrs  T(C): 36.4 (18 @ 13:38), Max: 37.1 (18 @ 18:27)  T(F): 97.5 (18 @ 13:38), Max: 98.7 (18 @ 18:27)  HR: 70 (18 @ 13:38) (69 - 104)  BP: 146/62 (18 @ 13:38) (112/59 - 152/64)  RR: 20 (18 @ 13:38) (18 - 20)  SpO2: 97% (18 @ 13:38) (92% - 99%)    PHYSICAL EXAM:  HEENT:   [ x]Tracheostomy: 7 portex uncuffed  [ ]Pupils equal  [ ]No oral lesions  [ ]Abnormal    SKIN  [ ]No Rash  [x ] Abnormal  [ ] pressure    CARDIAC  [x ]Regular  [ ]Abnormal    PULMONARY  [ x]Bilateral Clear Breath Sounds  [ ]Normal Excursion  [ ]Abnormal    GI  [x ]PEG      [x ] +BS		              [ x]Soft, nondistended, nontender	  [ ]Abnormal    MUSCULOSKELETAL                                   [ ]Bedbound                 [ ]Abnormal    [x ]Ambulatory/OOB to chair                           EXTREMITIES                                         [ ]Normal  [x ]Edema                           NEUROLOGIC  [ ] Normal, non focal- confused at times  [ ] Focal findings:    PSYCHIATRIC  [ ]Alert and appropriate  [ ] Sedated	 [x ]Agitated-at times    :  Ybarra: [ ] Yes, if yes: Date of Placement:                   [x  ] No    LINES: Central Lines [ ] Yes, if yes: Date of Placement                                     [  x] No    HOSPITAL MEDICATIONS:  MEDICATIONS  (STANDING):  ALBUTerol/ipratropium for Nebulization 3 milliLiter(s) Nebulizer every 6 hours  aspirin  chewable 81 milliGRAM(s) Oral daily  BACItracin   Ointment 1 Application(s) Topical four times a day  BACItracin   Ointment 1 Application(s) Topical daily  clopidogrel Tablet 75 milliGRAM(s) Oral daily  doxazosin 2 milliGRAM(s) Oral at bedtime  enalapril 10 milliGRAM(s) Oral every 12 hours  enoxaparin Injectable 40 milliGRAM(s) SubCutaneous daily  famotidine    Tablet 20 milliGRAM(s) Oral two times a day  hydrALAZINE 25 milliGRAM(s) Oral every 8 hours  insulin lispro (HumaLOG) corrective regimen sliding scale   SubCutaneous every 6 hours  insulin NPH human recombinant 6 Unit(s) SubCutaneous every 6 hours  lactobacillus acidophilus 1 Tablet(s) Oral every 12 hours  lidocaine   Patch 1 Patch Transdermal daily  lidocaine   Patch 1 Patch Transdermal daily  metoclopramide Injectable 5 milliGRAM(s) IV Push every 8 hours  metoprolol tartrate 100 milliGRAM(s) Oral every 12 hours  mirtazapine 15 milliGRAM(s) Oral <User Schedule>  nystatin    Suspension 468915 Unit(s) Oral four times a day  piperacillin/tazobactam IVPB. 3.375 Gram(s) IV Intermittent every 8 hours  QUEtiapine 25 milliGRAM(s) Oral at bedtime  simethicone 80 milliGRAM(s) Chew every 8 hours  simvastatin 40 milliGRAM(s) Oral at bedtime  sodium chloride 7% Inhalation 3 milliLiter(s) Inhalation every 12 hours  valproic  acid Syrup 125 milliGRAM(s) Oral at bedtime  vancomycin  IVPB 1000 milliGRAM(s) IV Intermittent every 12 hours    MEDICATIONS  (PRN):  acetaminophen    Suspension. 650 milliGRAM(s) Oral every 6 hours PRN Mild Pain (1 - 3)  acetaminophen    Suspension. 650 milliGRAM(s) Oral every 6 hours PRN mild to moderate pain  haloperidol    Injectable 1 milliGRAM(s) IV Push every 8 hours PRN severe agitation  melatonin 3 milliGRAM(s) Oral at bedtime PRN Insomnia  oxyCODONE    IR 5 milliGRAM(s) Oral every 8 hours PRN Moderate to severe pain  QUEtiapine 12.5 milliGRAM(s) Oral every 6 hours PRN agitation      LABS:                        9.4    13.7  )-----------( 204      ( 2018 07:05 )             30.3     04    143  |  101  |  46<H>  ----------------------------<  181<H>  4.3   |  33<H>  |  0.85    Ca    8.7      2018 07:05  Phos  2.9       Mg     2.1             Urinalysis Basic - ( 2018 15:37 )    Color: Yellow / Appearance: Clear / S.017 / pH: x  Gluc: x / Ketone: Negative  / Bili: Negative / Urobili: 1.0 mg/dL   Blood: x / Protein: Negative mg/dL / Nitrite: Negative   Leuk Esterase: Negative / RBC: x / WBC x   Sq Epi: x / Non Sq Epi: x / Bacteria: x      Arterial Blood Gas:   @ 11:39  7.40/60/74/36/95/9.9  ABG lactate: --  Arterial Blood Gas:   @ 22:33  7.39/61/71/36/94/9.4  ABG lactate: --      CAPILLARY BLOOD GLUCOSE    MICROBIOLOGY:     RADIOLOGY:  [ ] Reviewed and interpreted by me Patient is a 77y old  Male who presents with a chief complaint of pelvic fx with active extrav (27 Mar 2018 12:01)      Interval Events: Ongoing copious secretions. Remains afebrile.     REVIEW OF SYSTEMS:  [ ] Positive  [ ] All other systems negative  [ x] Unable to assess ROS because __pt with trach______    Vital Signs Last 24 Hrs  T(C): 36.4 (-18 @ 13:38), Max: 37.1 (2418 @ 18:27)  T(F): 97.5 (18 @ 13:38), Max: 98.7 (24-18 @ 18:27)  HR: 70 (18 @ 13:38) (69 - 104)  BP: 146/62 (--18 @ 13:38) (112/59 - 152/64)  RR: 20 (18 @ 13:38) (18 - 20)  SpO2: 97% (18 @ 13:38) (92% - 99%)    PHYSICAL EXAM:  HEENT:   [ x]Tracheostomy: 7 portex uncuffed  [x ]Pupils equal  [x ]No oral lesions  [ ]Abnormal    SKIN  [ ]No Rash  [x ] Abnormal  [ ] pressure    CARDIAC  [x ]Regular  [ ]Abnormal    PULMONARY  [ x]Bilateral Clear Breath Sounds  [ ]Normal Excursion  [ ]Abnormal    GI  [x ]PEG      [x ] +BS		              [ x]Soft, nondistended, nontender	  [ ]Abnormal    MUSCULOSKELETAL                                   [ ]Bedbound                 [ ]Abnormal    [x ]Ambulatory/OOB to chair                           EXTREMITIES                                         [ ]Normal  [x ]Edema                           NEUROLOGIC  [ ] Normal, non focal- confused at times  [ ] Focal findings:    PSYCHIATRIC  [ ]Alert and appropriate  [ ] Sedated	 [x ]Agitated-at times    :  Ybarra: [ ] Yes, if yes: Date of Placement:                   [x  ] No    LINES: Central Lines [ ] Yes, if yes: Date of Placement                                     [  x] No    HOSPITAL MEDICATIONS:  MEDICATIONS  (STANDING):  ALBUTerol/ipratropium for Nebulization 3 milliLiter(s) Nebulizer every 6 hours  aspirin  chewable 81 milliGRAM(s) Oral daily  BACItracin   Ointment 1 Application(s) Topical four times a day  BACItracin   Ointment 1 Application(s) Topical daily  clopidogrel Tablet 75 milliGRAM(s) Oral daily  doxazosin 2 milliGRAM(s) Oral at bedtime  enalapril 10 milliGRAM(s) Oral every 12 hours  enoxaparin Injectable 40 milliGRAM(s) SubCutaneous daily  famotidine    Tablet 20 milliGRAM(s) Oral two times a day  hydrALAZINE 25 milliGRAM(s) Oral every 8 hours  insulin lispro (HumaLOG) corrective regimen sliding scale   SubCutaneous every 6 hours  insulin NPH human recombinant 6 Unit(s) SubCutaneous every 6 hours  lactobacillus acidophilus 1 Tablet(s) Oral every 12 hours  lidocaine   Patch 1 Patch Transdermal daily  lidocaine   Patch 1 Patch Transdermal daily  metoclopramide Injectable 5 milliGRAM(s) IV Push every 8 hours  metoprolol tartrate 100 milliGRAM(s) Oral every 12 hours  mirtazapine 15 milliGRAM(s) Oral <User Schedule>  nystatin    Suspension [id number] Unit(s) Oral four times a day  piperacillin/tazobactam IVPB. 3.375 Gram(s) IV Intermittent every 8 hours  QUEtiapine 25 milliGRAM(s) Oral at bedtime  simethicone 80 milliGRAM(s) Chew every 8 hours  simvastatin 40 milliGRAM(s) Oral at bedtime  sodium chloride 7% Inhalation 3 milliLiter(s) Inhalation every 12 hours  valproic  acid Syrup 125 milliGRAM(s) Oral at bedtime  vancomycin  IVPB 1000 milliGRAM(s) IV Intermittent every 12 hours    MEDICATIONS  (PRN):  acetaminophen    Suspension. 650 milliGRAM(s) Oral every 6 hours PRN Mild Pain (1 - 3)  acetaminophen    Suspension. 650 milliGRAM(s) Oral every 6 hours PRN mild to moderate pain  haloperidol    Injectable 1 milliGRAM(s) IV Push every 8 hours PRN severe agitation  melatonin 3 milliGRAM(s) Oral at bedtime PRN Insomnia  oxyCODONE    IR 5 milliGRAM(s) Oral every 8 hours PRN Moderate to severe pain  QUEtiapine 12.5 milliGRAM(s) Oral every 6 hours PRN agitation      LABS:                        9.4    13.7  )-----------( 204      ( 2018 07:05 )             30.3     04-25    143  |  101  |  46<H>  ----------------------------<  181<H>  4.3   |  33<H>  |  0.85    Ca    8.7      2018 07:05  Phos  2.9       Mg     2.1             Urinalysis Basic - ( 2018 15:37 )    Color: Yellow / Appearance: Clear / S.017 / pH: x  Gluc: x / Ketone: Negative  / Bili: Negative / Urobili: 1.0 mg/dL   Blood: x / Protein: Negative mg/dL / Nitrite: Negative   Leuk Esterase: Negative / RBC: x / WBC x   Sq Epi: x / Non Sq Epi: x / Bacteria: x      Arterial Blood Gas:   @ 11:39  7.40/60/74/36/95/9.9  ABG lactate: --  Arterial Blood Gas:   @ 22:33  7.39/61/71/36/94/9.4  ABG lactate: --      CAPILLARY BLOOD GLUCOSE    MICROBIOLOGY:     RADIOLOGY:  [ ] Reviewed and interpreted by me

## 2018-04-25 NOTE — PROGRESS NOTE ADULT - ATTENDING COMMENTS
Pt seen and examined.   1. Acute resp failure w/ hypoxia: Tolerating TC. Still with copious secretions, cont pulm toilet. Add nebulized 3 % saline BID preceded by Duonebs. Cont gentle Diuresis to keep overall negative fluid balance  2. Bacterial pneumonia vs tracheobronchitis: On empiric Vanc/ Zosyn. FUP cxs. Check CT chest w/o cont  2. Rib fractures: cont to optimize pain control to prevent splinting  3. Agitation: Cont Valproic acid, Seroquel and haldol prn. Follow QTc  4. Oropharyngeal dysphagia: cont tube feeds  5. GOC: Guarded prognosis

## 2018-04-25 NOTE — PROGRESS NOTE ADULT - SUBJECTIVE AND OBJECTIVE BOX
INTERVAL HPI/OVERNIGHT EVENTS:  tolerating PEG feeds  soft BMs reported    MEDICATIONS  (STANDING):  ALBUTerol/ipratropium for Nebulization 3 milliLiter(s) Nebulizer every 6 hours  aspirin  chewable 81 milliGRAM(s) Oral daily  BACItracin   Ointment 1 Application(s) Topical four times a day  BACItracin   Ointment 1 Application(s) Topical daily  clopidogrel Tablet 75 milliGRAM(s) Oral daily  doxazosin 2 milliGRAM(s) Oral at bedtime  enalapril 10 milliGRAM(s) Oral every 12 hours  enoxaparin Injectable 40 milliGRAM(s) SubCutaneous daily  famotidine    Tablet 20 milliGRAM(s) Oral two times a day  hydrALAZINE 25 milliGRAM(s) Oral every 8 hours  insulin lispro (HumaLOG) corrective regimen sliding scale   SubCutaneous every 6 hours  insulin NPH human recombinant 6 Unit(s) SubCutaneous every 6 hours  lactobacillus acidophilus 1 Tablet(s) Oral every 12 hours  lidocaine   Patch 1 Patch Transdermal daily  lidocaine   Patch 1 Patch Transdermal daily  metoclopramide Injectable 5 milliGRAM(s) IV Push every 8 hours  metoprolol tartrate 100 milliGRAM(s) Oral every 12 hours  mirtazapine 15 milliGRAM(s) Oral <User Schedule>  nystatin    Suspension 339921 Unit(s) Oral four times a day  piperacillin/tazobactam IVPB. 3.375 Gram(s) IV Intermittent every 8 hours  QUEtiapine 25 milliGRAM(s) Oral at bedtime  simethicone 80 milliGRAM(s) Chew every 8 hours  simvastatin 40 milliGRAM(s) Oral at bedtime  sodium chloride 7% Inhalation 3 milliLiter(s) Inhalation every 12 hours  valproic  acid Syrup 125 milliGRAM(s) Oral at bedtime  vancomycin  IVPB 1000 milliGRAM(s) IV Intermittent every 12 hours    MEDICATIONS  (PRN):  acetaminophen    Suspension. 650 milliGRAM(s) Oral every 6 hours PRN Mild Pain (1 - 3)  acetaminophen    Suspension. 650 milliGRAM(s) Oral every 6 hours PRN mild to moderate pain  haloperidol    Injectable 1 milliGRAM(s) IV Push every 8 hours PRN severe agitation  melatonin 3 milliGRAM(s) Oral at bedtime PRN Insomnia  oxyCODONE    IR 5 milliGRAM(s) Oral every 8 hours PRN Moderate to severe pain  QUEtiapine 12.5 milliGRAM(s) Oral every 6 hours PRN agitation      Allergies  No Known Allergies    Vital Signs Last 24 Hrs  T(C): 36.4 (2018 13:38), Max: 37.1 (2018 18:27)  T(F): 97.5 (2018 13:38), Max: 98.7 (2018 18:27)  HR: 70 (2018 13:38) (69 - 104)  BP: 146/62 (2018 13:38) (112/59 - 152/64)  BP(mean): --  RR: 20 (2018 13:38) (18 - 20)  SpO2: 97% (2018 13:38) (92% - 99%)    PHYSICAL EXAM:  Constitutional: drowsy +significant trach secretions  Neck: +trach- without bleeding  Respiratory: Rhonchi, no wheeze  Cardiovascular: S1 and S2, RRR  Gastrointestinal: +abdominal binder BS+, softly distended,  healing ecchymosis LLQ +PEg site clean and dry   Extremities: trace edema, neg clubbing, cyanosis  Vascular: 2+ peripheral pulses  Neurological: no focal asymmetry, drowsy  Skin: anicteric    LABS:                        9.4    13.7  )-----------( 204      ( 2018 07:05 )             30.3     04-25    143  |  101  |  46<H>  ----------------------------<  181<H>  4.3   |  33<H>  |  0.85    Ca    8.7      2018 07:05  Phos  2.9     04-25  Mg     2.1     04-25        Urinalysis Basic - ( 2018 15:37 )    Color: Yellow / Appearance: Clear / S.017 / pH: x  Gluc: x / Ketone: Negative  / Bili: Negative / Urobili: 1.0 mg/dL   Blood: x / Protein: Negative mg/dL / Nitrite: Negative   Leuk Esterase: Negative / RBC: x / WBC x   Sq Epi: x / Non Sq Epi: x / Bacteria: x      LIVER FUNCTIONS - ( 2018 06:58 )  Alb: 2.7 g/dL / Pro: 6.3 g/dL / ALK PHOS: 285 U/L / ALT: 30 U/L / AST: 26 U/L / GGT: x             RADIOLOGY & ADDITIONAL TESTS:
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
## 2018-04-26 LAB
-  AMPICILLIN/SULBACTAM: SIGNIFICANT CHANGE UP
-  CEFAZOLIN: SIGNIFICANT CHANGE UP
-  CIPROFLOXACIN: SIGNIFICANT CHANGE UP
-  CLINDAMYCIN: SIGNIFICANT CHANGE UP
-  ERYTHROMYCIN: SIGNIFICANT CHANGE UP
-  GENTAMICIN: SIGNIFICANT CHANGE UP
-  LEVOFLOXACIN: SIGNIFICANT CHANGE UP
-  MOXIFLOXACIN(AEROBIC): SIGNIFICANT CHANGE UP
-  OXACILLIN: SIGNIFICANT CHANGE UP
-  PENICILLIN: SIGNIFICANT CHANGE UP
-  RIFAMPIN: SIGNIFICANT CHANGE UP
-  TETRACYCLINE: SIGNIFICANT CHANGE UP
-  TRIMETHOPRIM/SULFAMETHOXAZOLE: SIGNIFICANT CHANGE UP
-  VANCOMYCIN: SIGNIFICANT CHANGE UP
ANION GAP SERPL CALC-SCNC: 8 MMOL/L — SIGNIFICANT CHANGE UP (ref 5–17)
BUN SERPL-MCNC: 45 MG/DL — HIGH (ref 7–23)
CALCIUM SERPL-MCNC: 8.6 MG/DL — SIGNIFICANT CHANGE UP (ref 8.4–10.5)
CHLORIDE SERPL-SCNC: 102 MMOL/L — SIGNIFICANT CHANGE UP (ref 96–108)
CO2 SERPL-SCNC: 37 MMOL/L — HIGH (ref 22–31)
CREAT SERPL-MCNC: 0.86 MG/DL — SIGNIFICANT CHANGE UP (ref 0.5–1.3)
CULTURE RESULTS: SIGNIFICANT CHANGE UP
GLUCOSE SERPL-MCNC: 56 MG/DL — LOW (ref 70–99)
HCT VFR BLD CALC: 28.9 % — LOW (ref 39–50)
HGB BLD-MCNC: 9 G/DL — LOW (ref 13–17)
MAGNESIUM SERPL-MCNC: 2.2 MG/DL — SIGNIFICANT CHANGE UP (ref 1.6–2.6)
MCHC RBC-ENTMCNC: 31 GM/DL — LOW (ref 32–36)
MCHC RBC-ENTMCNC: 32.2 PG — SIGNIFICANT CHANGE UP (ref 27–34)
MCV RBC AUTO: 104 FL — HIGH (ref 80–100)
METHOD TYPE: SIGNIFICANT CHANGE UP
ORGANISM # SPEC MICROSCOPIC CNT: SIGNIFICANT CHANGE UP
ORGANISM # SPEC MICROSCOPIC CNT: SIGNIFICANT CHANGE UP
PHOSPHATE SERPL-MCNC: 3.5 MG/DL — SIGNIFICANT CHANGE UP (ref 2.5–4.5)
PLATELET # BLD AUTO: 203 K/UL — SIGNIFICANT CHANGE UP (ref 150–400)
POTASSIUM SERPL-MCNC: 3.9 MMOL/L — SIGNIFICANT CHANGE UP (ref 3.5–5.3)
POTASSIUM SERPL-SCNC: 3.9 MMOL/L — SIGNIFICANT CHANGE UP (ref 3.5–5.3)
RBC # BLD: 2.78 M/UL — LOW (ref 4.2–5.8)
RBC # FLD: 16.3 % — HIGH (ref 10.3–14.5)
SODIUM SERPL-SCNC: 147 MMOL/L — HIGH (ref 135–145)
SPECIMEN SOURCE: SIGNIFICANT CHANGE UP
VANCOMYCIN TROUGH SERPL-MCNC: 14 UG/ML — SIGNIFICANT CHANGE UP (ref 10–20)
WBC # BLD: 13.1 K/UL — HIGH (ref 3.8–10.5)
WBC # FLD AUTO: 13.1 K/UL — HIGH (ref 3.8–10.5)

## 2018-04-26 PROCEDURE — 99497 ADVNCD CARE PLAN 30 MIN: CPT | Mod: GC

## 2018-04-26 PROCEDURE — 99233 SBSQ HOSP IP/OBS HIGH 50: CPT | Mod: 25,GC

## 2018-04-26 RX ORDER — QUETIAPINE FUMARATE 200 MG/1
12.5 TABLET, FILM COATED ORAL
Qty: 0 | Refills: 0 | Status: DISCONTINUED | OUTPATIENT
Start: 2018-04-26 | End: 2018-04-28

## 2018-04-26 RX ORDER — VALPROIC ACID (AS SODIUM SALT) 250 MG/5ML
125 SOLUTION, ORAL ORAL AT BEDTIME
Qty: 0 | Refills: 0 | Status: DISCONTINUED | OUTPATIENT
Start: 2018-04-26 | End: 2018-04-29

## 2018-04-26 RX ORDER — HUMAN INSULIN 100 [IU]/ML
6 INJECTION, SUSPENSION SUBCUTANEOUS
Qty: 0 | Refills: 0 | Status: DISCONTINUED | OUTPATIENT
Start: 2018-04-26 | End: 2018-05-14

## 2018-04-26 RX ORDER — VALPROIC ACID (AS SODIUM SALT) 250 MG/5ML
125 SOLUTION, ORAL ORAL THREE TIMES A DAY
Qty: 0 | Refills: 0 | Status: DISCONTINUED | OUTPATIENT
Start: 2018-04-26 | End: 2018-04-26

## 2018-04-26 RX ORDER — VANCOMYCIN HCL 1 G
125 VIAL (EA) INTRAVENOUS EVERY 6 HOURS
Qty: 0 | Refills: 0 | Status: DISCONTINUED | OUTPATIENT
Start: 2018-04-26 | End: 2018-05-04

## 2018-04-26 RX ORDER — METRONIDAZOLE 500 MG
500 TABLET ORAL EVERY 8 HOURS
Qty: 0 | Refills: 0 | Status: DISCONTINUED | OUTPATIENT
Start: 2018-04-26 | End: 2018-04-27

## 2018-04-26 RX ORDER — HUMAN INSULIN 100 [IU]/ML
3 INJECTION, SUSPENSION SUBCUTANEOUS
Qty: 0 | Refills: 0 | Status: DISCONTINUED | OUTPATIENT
Start: 2018-04-26 | End: 2018-05-12

## 2018-04-26 RX ADMIN — Medication 1 APPLICATION(S): at 05:54

## 2018-04-26 RX ADMIN — Medication 81 MILLIGRAM(S): at 11:26

## 2018-04-26 RX ADMIN — Medication 250 MILLIGRAM(S): at 21:57

## 2018-04-26 RX ADMIN — Medication 2: at 17:54

## 2018-04-26 RX ADMIN — Medication 125 MILLIGRAM(S): at 17:19

## 2018-04-26 RX ADMIN — PIPERACILLIN AND TAZOBACTAM 25 GRAM(S): 4; .5 INJECTION, POWDER, LYOPHILIZED, FOR SOLUTION INTRAVENOUS at 06:04

## 2018-04-26 RX ADMIN — Medication 500000 UNIT(S): at 00:18

## 2018-04-26 RX ADMIN — Medication 25 MILLIGRAM(S): at 05:54

## 2018-04-26 RX ADMIN — SODIUM CHLORIDE 3 MILLILITER(S): 9 INJECTION INTRAMUSCULAR; INTRAVENOUS; SUBCUTANEOUS at 05:24

## 2018-04-26 RX ADMIN — SIMETHICONE 80 MILLIGRAM(S): 80 TABLET, CHEWABLE ORAL at 05:53

## 2018-04-26 RX ADMIN — Medication 500000 UNIT(S): at 23:29

## 2018-04-26 RX ADMIN — SIMETHICONE 80 MILLIGRAM(S): 80 TABLET, CHEWABLE ORAL at 21:57

## 2018-04-26 RX ADMIN — QUETIAPINE FUMARATE 25 MILLIGRAM(S): 200 TABLET, FILM COATED ORAL at 21:57

## 2018-04-26 RX ADMIN — FAMOTIDINE 20 MILLIGRAM(S): 10 INJECTION INTRAVENOUS at 05:54

## 2018-04-26 RX ADMIN — ENOXAPARIN SODIUM 40 MILLIGRAM(S): 100 INJECTION SUBCUTANEOUS at 11:26

## 2018-04-26 RX ADMIN — Medication 4: at 23:26

## 2018-04-26 RX ADMIN — Medication 3 MILLILITER(S): at 17:31

## 2018-04-26 RX ADMIN — PIPERACILLIN AND TAZOBACTAM 25 GRAM(S): 4; .5 INJECTION, POWDER, LYOPHILIZED, FOR SOLUTION INTRAVENOUS at 13:19

## 2018-04-26 RX ADMIN — Medication 2 MILLIGRAM(S): at 21:58

## 2018-04-26 RX ADMIN — Medication 125 MILLIGRAM(S): at 12:02

## 2018-04-26 RX ADMIN — Medication 100 MILLIGRAM(S): at 17:07

## 2018-04-26 RX ADMIN — Medication 250 MILLIGRAM(S): at 10:08

## 2018-04-26 RX ADMIN — Medication 5 MILLIGRAM(S): at 21:58

## 2018-04-26 RX ADMIN — Medication 1 APPLICATION(S): at 11:27

## 2018-04-26 RX ADMIN — Medication 500 MILLIGRAM(S): at 13:19

## 2018-04-26 RX ADMIN — LIDOCAINE 1 PATCH: 4 CREAM TOPICAL at 00:16

## 2018-04-26 RX ADMIN — HUMAN INSULIN 6 UNIT(S): 100 INJECTION, SUSPENSION SUBCUTANEOUS at 00:18

## 2018-04-26 RX ADMIN — FAMOTIDINE 20 MILLIGRAM(S): 10 INJECTION INTRAVENOUS at 17:06

## 2018-04-26 RX ADMIN — Medication 125 MILLIGRAM(S): at 23:30

## 2018-04-26 RX ADMIN — Medication 3 MILLILITER(S): at 15:04

## 2018-04-26 RX ADMIN — Medication 3 MILLILITER(S): at 23:29

## 2018-04-26 RX ADMIN — LIDOCAINE 1 PATCH: 4 CREAM TOPICAL at 11:27

## 2018-04-26 RX ADMIN — Medication 1 TABLET(S): at 05:54

## 2018-04-26 RX ADMIN — PIPERACILLIN AND TAZOBACTAM 25 GRAM(S): 4; .5 INJECTION, POWDER, LYOPHILIZED, FOR SOLUTION INTRAVENOUS at 23:18

## 2018-04-26 RX ADMIN — OXYCODONE HYDROCHLORIDE 5 MILLIGRAM(S): 5 TABLET ORAL at 13:19

## 2018-04-26 RX ADMIN — Medication 10 MILLIGRAM(S): at 05:54

## 2018-04-26 RX ADMIN — HUMAN INSULIN 6 UNIT(S): 100 INJECTION, SUSPENSION SUBCUTANEOUS at 17:54

## 2018-04-26 RX ADMIN — Medication 4: at 12:03

## 2018-04-26 RX ADMIN — Medication 500000 UNIT(S): at 11:27

## 2018-04-26 RX ADMIN — SODIUM CHLORIDE 3 MILLILITER(S): 9 INJECTION INTRAMUSCULAR; INTRAVENOUS; SUBCUTANEOUS at 17:31

## 2018-04-26 RX ADMIN — Medication 100 MILLIGRAM(S): at 05:53

## 2018-04-26 RX ADMIN — HALOPERIDOL DECANOATE 1 MILLIGRAM(S): 100 INJECTION INTRAMUSCULAR at 17:29

## 2018-04-26 RX ADMIN — Medication 3 MILLILITER(S): at 05:24

## 2018-04-26 RX ADMIN — Medication 25 MILLIGRAM(S): at 13:18

## 2018-04-26 RX ADMIN — HALOPERIDOL DECANOATE 1 MILLIGRAM(S): 100 INJECTION INTRAMUSCULAR at 09:06

## 2018-04-26 RX ADMIN — LIDOCAINE 1 PATCH: 4 CREAM TOPICAL at 23:30

## 2018-04-26 RX ADMIN — Medication 5 MILLIGRAM(S): at 13:19

## 2018-04-26 RX ADMIN — HUMAN INSULIN 6 UNIT(S): 100 INJECTION, SUSPENSION SUBCUTANEOUS at 12:03

## 2018-04-26 RX ADMIN — Medication 1 APPLICATION(S): at 17:07

## 2018-04-26 RX ADMIN — CLOPIDOGREL BISULFATE 75 MILLIGRAM(S): 75 TABLET, FILM COATED ORAL at 11:26

## 2018-04-26 RX ADMIN — Medication 1 APPLICATION(S): at 00:18

## 2018-04-26 RX ADMIN — Medication 10 MILLIGRAM(S): at 17:18

## 2018-04-26 RX ADMIN — SIMVASTATIN 40 MILLIGRAM(S): 20 TABLET, FILM COATED ORAL at 21:58

## 2018-04-26 RX ADMIN — LIDOCAINE 1 PATCH: 4 CREAM TOPICAL at 02:58

## 2018-04-26 RX ADMIN — Medication 25 MILLIGRAM(S): at 21:58

## 2018-04-26 RX ADMIN — Medication 500 MILLIGRAM(S): at 21:58

## 2018-04-26 RX ADMIN — HUMAN INSULIN 3 UNIT(S): 100 INJECTION, SUSPENSION SUBCUTANEOUS at 23:26

## 2018-04-26 RX ADMIN — Medication 125 MILLIGRAM(S): at 21:58

## 2018-04-26 RX ADMIN — Medication 1 TABLET(S): at 17:06

## 2018-04-26 RX ADMIN — Medication 500000 UNIT(S): at 17:07

## 2018-04-26 RX ADMIN — MIRTAZAPINE 15 MILLIGRAM(S): 45 TABLET, ORALLY DISINTEGRATING ORAL at 21:57

## 2018-04-26 RX ADMIN — Medication 500000 UNIT(S): at 05:54

## 2018-04-26 RX ADMIN — Medication 5 MILLIGRAM(S): at 05:53

## 2018-04-26 RX ADMIN — OXYCODONE HYDROCHLORIDE 5 MILLIGRAM(S): 5 TABLET ORAL at 12:26

## 2018-04-26 RX ADMIN — Medication 1 APPLICATION(S): at 23:30

## 2018-04-26 RX ADMIN — SIMETHICONE 80 MILLIGRAM(S): 80 TABLET, CHEWABLE ORAL at 13:18

## 2018-04-26 RX ADMIN — Medication 2: at 00:17

## 2018-04-26 NOTE — PROGRESS NOTE ADULT - SUBJECTIVE AND OBJECTIVE BOX
Patient is a 77y old  Male who presents with a chief complaint of pelvic fx with active extrav (27 Mar 2018 12:01)      Interval Events:    REVIEW OF SYSTEMS:  [ ] Positive  [ ] All other systems negative  [ ] Unable to assess ROS because ________    Vital Signs Last 24 Hrs  T(C): 36.8 (18 @ 04:56), Max: 37.2 (18 @ 18:18)  T(F): 98.3 (18 @ 04:56), Max: 99 (18 @ 18:18)  HR: 68 (18 @ 05:25) (64 - 96)  BP: 122/61 (18 @ 04:56) (115/63 - 146/62)  RR: 19 (18 @ 08:03) (18 - 22)  SpO2: 97% (18 @ 08:03) (93% - 100%)    PHYSICAL EXAM:  HEENT:   [x ]Tracheostomy: 7 portex uncuffed  [ ]Pupils equal  [ ]No oral lesions  [ ]Abnormal    SKIN  [ ]No Rash  [x ] Abnormal-IAD  [ ] pressure    CARDIAC  [x ]Regular  [ ]Abnormal    PULMONARY  [x ]Bilateral Clear Breath Sounds  [ ]Normal Excursion  [ ]Abnormal    GI  [x ]PEG      [ x] +BS		              [x ]Soft, nondistended, nontender	  [ ]Abnormal    MUSCULOSKELETAL                                   [ ]Bedbound                 [ ]Abnormal    [x ]Ambulatory/OOB to chair                           EXTREMITIES                                         [ ]Normal  [x ]Edema                           NEUROLOGIC  [ ] Normal, non focal  [ x] Focal findings:    PSYCHIATRIC  [ ]Alert and appropriate  [ ] Sedated	 [ ]Agitated    :  Ybarra: [ ] Yes, if yes: Date of Placement:                   [ x ] No    LINES: Central Lines [ ] Yes, if yes: Date of Placement                                     [ x ] No    HOSPITAL MEDICATIONS:  MEDICATIONS  (STANDING):  ALBUTerol/ipratropium for Nebulization 3 milliLiter(s) Nebulizer every 6 hours  aspirin  chewable 81 milliGRAM(s) Oral daily  BACItracin   Ointment 1 Application(s) Topical four times a day  BACItracin   Ointment 1 Application(s) Topical daily  clopidogrel Tablet 75 milliGRAM(s) Oral daily  doxazosin 2 milliGRAM(s) Oral at bedtime  enalapril 10 milliGRAM(s) Oral every 12 hours  enoxaparin Injectable 40 milliGRAM(s) SubCutaneous daily  famotidine    Tablet 20 milliGRAM(s) Oral two times a day  hydrALAZINE 25 milliGRAM(s) Oral every 8 hours  insulin lispro (HumaLOG) corrective regimen sliding scale   SubCutaneous every 6 hours  insulin NPH human recombinant 6 Unit(s) SubCutaneous every 6 hours  lactobacillus acidophilus 1 Tablet(s) Oral every 12 hours  lidocaine   Patch 1 Patch Transdermal daily  lidocaine   Patch 1 Patch Transdermal daily  metoclopramide Injectable 5 milliGRAM(s) IV Push every 8 hours  metoprolol tartrate 100 milliGRAM(s) Oral every 12 hours  mirtazapine 15 milliGRAM(s) Oral <User Schedule>  nystatin    Suspension 273536 Unit(s) Oral four times a day  piperacillin/tazobactam IVPB. 3.375 Gram(s) IV Intermittent every 8 hours  QUEtiapine 25 milliGRAM(s) Oral at bedtime  simethicone 80 milliGRAM(s) Chew every 8 hours  simvastatin 40 milliGRAM(s) Oral at bedtime  sodium chloride 7% Inhalation 3 milliLiter(s) Inhalation every 12 hours  valproic  acid Syrup 125 milliGRAM(s) Oral at bedtime  vancomycin  IVPB 1000 milliGRAM(s) IV Intermittent every 12 hours    MEDICATIONS  (PRN):  acetaminophen    Suspension. 650 milliGRAM(s) Oral every 6 hours PRN Mild Pain (1 - 3)  acetaminophen    Suspension. 650 milliGRAM(s) Oral every 6 hours PRN mild to moderate pain  haloperidol    Injectable 1 milliGRAM(s) IV Push every 8 hours PRN severe agitation  melatonin 3 milliGRAM(s) Oral at bedtime PRN Insomnia  oxyCODONE    IR 5 milliGRAM(s) Oral every 8 hours PRN Moderate to severe pain  QUEtiapine 12.5 milliGRAM(s) Oral every 6 hours PRN agitation      LABS:                        9.0    13.1  )-----------( 203      ( 2018 07:09 )             28.9     04-    147<H>  |  102  |  45<H>  ----------------------------<  56<L>  3.9   |  37<H>  |  0.86    Ca    8.6      2018 07:09  Phos  3.5       Mg     2.2             Urinalysis Basic - ( 2018 15:37 )    Color: Yellow / Appearance: Clear / S.017 / pH: x  Gluc: x / Ketone: Negative  / Bili: Negative / Urobili: 1.0 mg/dL   Blood: x / Protein: Negative mg/dL / Nitrite: Negative   Leuk Esterase: Negative / RBC: x / WBC x   Sq Epi: x / Non Sq Epi: x / Bacteria: x      Arterial Blood Gas:   @ 11:39  7.40/60/74/36/95/9.9  ABG lactate: --      CAPILLARY BLOOD GLUCOSE    MICROBIOLOGY:     RADIOLOGY:  [ ] Reviewed and interpreted by me Patient is a 77y old  Male who presents with a chief complaint of pelvic fx with active extrav (27 Mar 2018 12:01)      Interval Events: No acute events overnight. Agitated this am, required haldol    REVIEW OF SYSTEMS:  [ ] Positive  [ ] All other systems negative  [x ] Unable to assess ROS because _pt with trach_______    Vital Signs Last 24 Hrs  T(C): 36.8 (18 @ 04:56), Max: 37.2 (18 @ 18:18)  T(F): 98.3 (18 @ 04:56), Max: 99 (18 @ 18:18)  HR: 68 (18 @ 05:25) (64 - 96)  BP: 122/61 (18 @ 04:56) (115/63 - 146/62)  RR: 19 (18 @ 08:03) (18 - 22)  SpO2: 97% (18 @ 08:03) (93% - 100%)    PHYSICAL EXAM:  HEENT:   [x ]Tracheostomy: 7 portex uncuffed  [x ]Pupils equal  [x ]No oral lesions  [ ]Abnormal    SKIN  [ ]No Rash  [x ] Abnormal-IAD  [ ] pressure    CARDIAC  [x ]Regular  [ ]Abnormal    PULMONARY  [x ]Bilateral Clear Breath Sounds  [ ]Normal Excursion  [ ]Abnormal    GI  [x ]PEG      [ x] +BS		              [x ]Soft, nondistended, nontender	  [ ]Abnormal    MUSCULOSKELETAL                                   [ ]Bedbound                 [ ]Abnormal    [x ]Ambulatory/OOB to chair                           EXTREMITIES                                         [ ]Normal  [x ]Edema                           NEUROLOGIC  [ ] Normal, non focal  [ x] Focal findings:    PSYCHIATRIC  [ ]Alert and appropriate  [ ] Sedated	 [ ]Agitated    :  Ybarra: [ ] Yes, if yes: Date of Placement:                   [ x ] No    LINES: Central Lines [ ] Yes, if yes: Date of Placement                                     [ x ] No    HOSPITAL MEDICATIONS:  MEDICATIONS  (STANDING):  ALBUTerol/ipratropium for Nebulization 3 milliLiter(s) Nebulizer every 6 hours  aspirin  chewable 81 milliGRAM(s) Oral daily  BACItracin   Ointment 1 Application(s) Topical four times a day  BACItracin   Ointment 1 Application(s) Topical daily  clopidogrel Tablet 75 milliGRAM(s) Oral daily  doxazosin 2 milliGRAM(s) Oral at bedtime  enalapril 10 milliGRAM(s) Oral every 12 hours  enoxaparin Injectable 40 milliGRAM(s) SubCutaneous daily  famotidine    Tablet 20 milliGRAM(s) Oral two times a day  hydrALAZINE 25 milliGRAM(s) Oral every 8 hours  insulin lispro (HumaLOG) corrective regimen sliding scale   SubCutaneous every 6 hours  insulin NPH human recombinant 6 Unit(s) SubCutaneous every 6 hours  lactobacillus acidophilus 1 Tablet(s) Oral every 12 hours  lidocaine   Patch 1 Patch Transdermal daily  lidocaine   Patch 1 Patch Transdermal daily  metoclopramide Injectable 5 milliGRAM(s) IV Push every 8 hours  metoprolol tartrate 100 milliGRAM(s) Oral every 12 hours  mirtazapine 15 milliGRAM(s) Oral <User Schedule>  nystatin    Suspension 488480 Unit(s) Oral four times a day  piperacillin/tazobactam IVPB. 3.375 Gram(s) IV Intermittent every 8 hours  QUEtiapine 25 milliGRAM(s) Oral at bedtime  simethicone 80 milliGRAM(s) Chew every 8 hours  simvastatin 40 milliGRAM(s) Oral at bedtime  sodium chloride 7% Inhalation 3 milliLiter(s) Inhalation every 12 hours  valproic  acid Syrup 125 milliGRAM(s) Oral at bedtime  vancomycin  IVPB 1000 milliGRAM(s) IV Intermittent every 12 hours    MEDICATIONS  (PRN):  acetaminophen    Suspension. 650 milliGRAM(s) Oral every 6 hours PRN Mild Pain (1 - 3)  acetaminophen    Suspension. 650 milliGRAM(s) Oral every 6 hours PRN mild to moderate pain  haloperidol    Injectable 1 milliGRAM(s) IV Push every 8 hours PRN severe agitation  melatonin 3 milliGRAM(s) Oral at bedtime PRN Insomnia  oxyCODONE    IR 5 milliGRAM(s) Oral every 8 hours PRN Moderate to severe pain  QUEtiapine 12.5 milliGRAM(s) Oral every 6 hours PRN agitation      LABS:                        9.0    13.1  )-----------( 203      ( 2018 07:09 )             28.9     04-26    147<H>  |  102  |  45<H>  ----------------------------<  56<L>  3.9   |  37<H>  |  0.86    Ca    8.6      2018 07:09  Phos  3.5       Mg     2.2             Urinalysis Basic - ( 2018 15:37 )    Color: Yellow / Appearance: Clear / S.017 / pH: x  Gluc: x / Ketone: Negative  / Bili: Negative / Urobili: 1.0 mg/dL   Blood: x / Protein: Negative mg/dL / Nitrite: Negative   Leuk Esterase: Negative / RBC: x / WBC x   Sq Epi: x / Non Sq Epi: x / Bacteria: x      Arterial Blood Gas:   @ 11:39  7.40/60/74/36/95/9.9  ABG lactate: --      CAPILLARY BLOOD GLUCOSE    MICROBIOLOGY:   Sputum Cx:   Gram Stain:   No polymorphonuclear leukocytes per low power field  No Squamous epithelial cells per low power field  Numerous Gram Positive Cocci in Clusters per oil power field (18 @ 17:13)      RADIOLOGY:  < from: CT Chest No Cont (18 @ 17:56) >  Interval increase in size of bilateral pleural effusions, now moderate.    Interval development of groundglass opacities and consolidation within   the right upper lobe along with consolidation in the left lower lobe   which may reflect infection.    [ ] Reviewed and interpreted by me

## 2018-04-26 NOTE — PROGRESS NOTE ADULT - PROBLEM SELECTOR PLAN 6
Venous Duplex 4/1: Acute DVT of Rt and Lft Soleal Vein   Venous Duplex 4/10: Patient with persistent Soleal DVT b/l w/o propagation   Venous Duplex 4/18: Patient with New left peroneal DVT with persistent soleal DVT.  Next serial 4/24. Persistent b/l soleal dvt, peroneal dvt not identified  next serial 5/1.
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
## 2018-04-26 NOTE — PROGRESS NOTE ADULT - ATTENDING COMMENTS
Pt seen and examined.   1. Acute resp failure w/ hypoxia: Tolerating TC. Resp status somewhat improved. Still with copious secretions, cont pulm toilet, nebulized 3 % saline BID preceded by Duonebs.   2. Bacterial pneumonia: Ct chest with increased GGOs and consolidation pattern in RUL and LLL. Cont IV empiric Vanc/ Zosyn. FUP cxs. Will complete a 7 day course of antibiotics.   2. Rib fractures: cont to optimize pain control to prevent splinting  3. Agitation: Cont Valproic acid, Seroquel and haldol prn. Follow QTc  4. Oropharyngeal dysphagia: cont tube feeds  5. Contraction alkalosis: Will hold diuretic today. Cont to monitor serum bicarb. If worsens will check ABG.   5. GOC: Guarded prognosis. Wife Tiana updated at bedside. Remains full code. Advanced care planning time spent: 30 mins .

## 2018-04-26 NOTE — CHART NOTE - NSCHARTNOTEFT_GEN_A_CORE
Informed by RN of pt's FS this AM to be 76. Pt due for Humulin N 6 units this AM.  Per RN, Gastrostomy tube feeds have been held until 6 AM today.   Will hold 6 AM Humulin N dose, & RN to recheck FS @ 8:00 AM, & dose Insulin based on FS at that time.    -Will endorse to day team    SUNI CasasC  #[id number] Informed by RN of pt's FS this AM to be 76. Pt due for Humulin N 6 units this AM.  Per RN, Gastrostomy tube feeds have been held until 6 AM today.   Will hold 6 AM Humulin N dose, & RN to recheck FS @ 8:00 AM, & dose Insulin based on FS at that time.  Pt appeared in NAD.    -Will endorse to day team    Darby Pierce PA-C  #[id number]

## 2018-04-26 NOTE — PROGRESS NOTE ADULT - PROBLEM SELECTOR PLAN 2
Pt S/p tracheostomy by trauma SX on 4/5  Pt tolerating TC atc   Pt downsized to #  7 Uncuffed Trach on 4/13 by ENT. Pt pulled out trach & replaced same on 4/19  CXR 4/13: Bilateral Pleural effusions with Pulmonary edema   Continue Chest PT and Suctioning PRN  ct scan yesterday reviewed and noted.

## 2018-04-27 DIAGNOSIS — J18.9 PNEUMONIA, UNSPECIFIED ORGANISM: ICD-10-CM

## 2018-04-27 DIAGNOSIS — Z93.0 TRACHEOSTOMY STATUS: ICD-10-CM

## 2018-04-27 LAB
ANION GAP SERPL CALC-SCNC: 7 MMOL/L — SIGNIFICANT CHANGE UP (ref 5–17)
BASE EXCESS BLDA CALC-SCNC: 9.1 MMOL/L — HIGH (ref -2–2)
BASE EXCESS BLDA CALC-SCNC: 9.5 MMOL/L — HIGH (ref -2–2)
BUN SERPL-MCNC: 49 MG/DL — HIGH (ref 7–23)
CALCIUM SERPL-MCNC: 9 MG/DL — SIGNIFICANT CHANGE UP (ref 8.4–10.5)
CHLORIDE SERPL-SCNC: 99 MMOL/L — SIGNIFICANT CHANGE UP (ref 96–108)
CO2 BLDA-SCNC: 35 MMOL/L — HIGH (ref 22–30)
CO2 BLDA-SCNC: 38 MMOL/L — HIGH (ref 22–30)
CO2 SERPL-SCNC: 37 MMOL/L — HIGH (ref 22–31)
CREAT SERPL-MCNC: 0.93 MG/DL — SIGNIFICANT CHANGE UP (ref 0.5–1.3)
GAS PNL BLDA: SIGNIFICANT CHANGE UP
GLUCOSE SERPL-MCNC: 116 MG/DL — HIGH (ref 70–99)
HCO3 BLDA-SCNC: 34 MMOL/L — HIGH (ref 21–29)
HCO3 BLDA-SCNC: 36 MMOL/L — HIGH (ref 21–29)
HCT VFR BLD CALC: 31.6 % — LOW (ref 39–50)
HGB BLD-MCNC: 9.8 G/DL — LOW (ref 13–17)
HOROWITZ INDEX BLDA+IHG-RTO: 40 — SIGNIFICANT CHANGE UP
HOROWITZ INDEX BLDA+IHG-RTO: 50 — SIGNIFICANT CHANGE UP
MAGNESIUM SERPL-MCNC: 2.2 MG/DL — SIGNIFICANT CHANGE UP (ref 1.6–2.6)
MCHC RBC-ENTMCNC: 30.9 GM/DL — LOW (ref 32–36)
MCHC RBC-ENTMCNC: 32.1 PG — SIGNIFICANT CHANGE UP (ref 27–34)
MCV RBC AUTO: 104 FL — HIGH (ref 80–100)
PCO2 BLDA: 48 MMHG — HIGH (ref 32–46)
PCO2 BLDA: 63 MMHG — HIGH (ref 32–46)
PH BLDA: 7.37 — SIGNIFICANT CHANGE UP (ref 7.35–7.45)
PH BLDA: 7.46 — HIGH (ref 7.35–7.45)
PHOSPHATE SERPL-MCNC: 3.7 MG/DL — SIGNIFICANT CHANGE UP (ref 2.5–4.5)
PLATELET # BLD AUTO: 202 K/UL — SIGNIFICANT CHANGE UP (ref 150–400)
PO2 BLDA: 120 MMHG — HIGH (ref 74–108)
PO2 BLDA: 78 MMHG — SIGNIFICANT CHANGE UP (ref 74–108)
POTASSIUM SERPL-MCNC: 4.4 MMOL/L — SIGNIFICANT CHANGE UP (ref 3.5–5.3)
POTASSIUM SERPL-SCNC: 4.4 MMOL/L — SIGNIFICANT CHANGE UP (ref 3.5–5.3)
RBC # BLD: 3.04 M/UL — LOW (ref 4.2–5.8)
RBC # FLD: 15.9 % — HIGH (ref 10.3–14.5)
SAO2 % BLDA: 100 % — HIGH (ref 92–96)
SAO2 % BLDA: 95 % — SIGNIFICANT CHANGE UP (ref 92–96)
SODIUM SERPL-SCNC: 143 MMOL/L — SIGNIFICANT CHANGE UP (ref 135–145)
VANCOMYCIN TROUGH SERPL-MCNC: 21 UG/ML — HIGH (ref 10–20)
WBC # BLD: 13.4 K/UL — HIGH (ref 3.8–10.5)
WBC # FLD AUTO: 13.4 K/UL — HIGH (ref 3.8–10.5)

## 2018-04-27 PROCEDURE — 99233 SBSQ HOSP IP/OBS HIGH 50: CPT | Mod: GC

## 2018-04-27 PROCEDURE — 99232 SBSQ HOSP IP/OBS MODERATE 35: CPT | Mod: 25

## 2018-04-27 PROCEDURE — 71045 X-RAY EXAM CHEST 1 VIEW: CPT | Mod: 26

## 2018-04-27 PROCEDURE — 31615 TRCHEOBRNCHSC EST TRACHS INC: CPT

## 2018-04-27 RX ORDER — MIDODRINE HYDROCHLORIDE 2.5 MG/1
10 TABLET ORAL EVERY 8 HOURS
Qty: 0 | Refills: 0 | Status: DISCONTINUED | OUTPATIENT
Start: 2018-04-27 | End: 2018-04-29

## 2018-04-27 RX ORDER — MIDODRINE HYDROCHLORIDE 2.5 MG/1
15 TABLET ORAL ONCE
Qty: 0 | Refills: 0 | Status: COMPLETED | OUTPATIENT
Start: 2018-04-27 | End: 2018-04-27

## 2018-04-27 RX ORDER — SODIUM CHLORIDE 9 MG/ML
500 INJECTION INTRAMUSCULAR; INTRAVENOUS; SUBCUTANEOUS ONCE
Qty: 0 | Refills: 0 | Status: COMPLETED | OUTPATIENT
Start: 2018-04-27 | End: 2018-04-27

## 2018-04-27 RX ORDER — HYDRALAZINE HCL 50 MG
25 TABLET ORAL EVERY 8 HOURS
Qty: 0 | Refills: 0 | Status: DISCONTINUED | OUTPATIENT
Start: 2018-04-27 | End: 2018-04-27

## 2018-04-27 RX ORDER — VANCOMYCIN HCL 1 G
750 VIAL (EA) INTRAVENOUS EVERY 12 HOURS
Qty: 0 | Refills: 0 | Status: DISCONTINUED | OUTPATIENT
Start: 2018-04-27 | End: 2018-04-30

## 2018-04-27 RX ORDER — HYDROMORPHONE HYDROCHLORIDE 2 MG/ML
0.5 INJECTION INTRAMUSCULAR; INTRAVENOUS; SUBCUTANEOUS ONCE
Qty: 0 | Refills: 0 | Status: DISCONTINUED | OUTPATIENT
Start: 2018-04-27 | End: 2018-04-27

## 2018-04-27 RX ORDER — FUROSEMIDE 40 MG
20 TABLET ORAL ONCE
Qty: 0 | Refills: 0 | Status: COMPLETED | OUTPATIENT
Start: 2018-04-27 | End: 2018-04-27

## 2018-04-27 RX ORDER — METOPROLOL TARTRATE 50 MG
100 TABLET ORAL EVERY 12 HOURS
Qty: 0 | Refills: 0 | Status: DISCONTINUED | OUTPATIENT
Start: 2018-04-27 | End: 2018-04-27

## 2018-04-27 RX ADMIN — SODIUM CHLORIDE 3 MILLILITER(S): 9 INJECTION INTRAMUSCULAR; INTRAVENOUS; SUBCUTANEOUS at 06:02

## 2018-04-27 RX ADMIN — Medication 125 MILLIGRAM(S): at 11:01

## 2018-04-27 RX ADMIN — SODIUM CHLORIDE 3 MILLILITER(S): 9 INJECTION INTRAMUSCULAR; INTRAVENOUS; SUBCUTANEOUS at 17:40

## 2018-04-27 RX ADMIN — Medication 125 MILLIGRAM(S): at 23:06

## 2018-04-27 RX ADMIN — HUMAN INSULIN 6 UNIT(S): 100 INJECTION, SUSPENSION SUBCUTANEOUS at 06:24

## 2018-04-27 RX ADMIN — Medication 3 MILLILITER(S): at 23:07

## 2018-04-27 RX ADMIN — HYDROMORPHONE HYDROCHLORIDE 0.5 MILLIGRAM(S): 2 INJECTION INTRAMUSCULAR; INTRAVENOUS; SUBCUTANEOUS at 12:25

## 2018-04-27 RX ADMIN — Medication 1 TABLET(S): at 17:01

## 2018-04-27 RX ADMIN — PIPERACILLIN AND TAZOBACTAM 25 GRAM(S): 4; .5 INJECTION, POWDER, LYOPHILIZED, FOR SOLUTION INTRAVENOUS at 13:43

## 2018-04-27 RX ADMIN — QUETIAPINE FUMARATE 12.5 MILLIGRAM(S): 200 TABLET, FILM COATED ORAL at 08:18

## 2018-04-27 RX ADMIN — Medication 500000 UNIT(S): at 17:01

## 2018-04-27 RX ADMIN — HALOPERIDOL DECANOATE 1 MILLIGRAM(S): 100 INJECTION INTRAMUSCULAR at 09:50

## 2018-04-27 RX ADMIN — Medication 125 MILLIGRAM(S): at 17:01

## 2018-04-27 RX ADMIN — FAMOTIDINE 20 MILLIGRAM(S): 10 INJECTION INTRAVENOUS at 17:05

## 2018-04-27 RX ADMIN — Medication 3 MILLILITER(S): at 17:38

## 2018-04-27 RX ADMIN — HUMAN INSULIN 3 UNIT(S): 100 INJECTION, SUSPENSION SUBCUTANEOUS at 23:55

## 2018-04-27 RX ADMIN — Medication 81 MILLIGRAM(S): at 11:01

## 2018-04-27 RX ADMIN — Medication 2: at 17:58

## 2018-04-27 RX ADMIN — Medication 1 MILLIGRAM(S): at 23:30

## 2018-04-27 RX ADMIN — MIDODRINE HYDROCHLORIDE 15 MILLIGRAM(S): 2.5 TABLET ORAL at 13:38

## 2018-04-27 RX ADMIN — SODIUM CHLORIDE 1000 MILLILITER(S): 9 INJECTION INTRAMUSCULAR; INTRAVENOUS; SUBCUTANEOUS at 13:50

## 2018-04-27 RX ADMIN — Medication 1 TABLET(S): at 06:22

## 2018-04-27 RX ADMIN — Medication 20 MILLIGRAM(S): at 23:23

## 2018-04-27 RX ADMIN — Medication 125 MILLIGRAM(S): at 23:13

## 2018-04-27 RX ADMIN — HALOPERIDOL DECANOATE 1 MILLIGRAM(S): 100 INJECTION INTRAMUSCULAR at 02:36

## 2018-04-27 RX ADMIN — HUMAN INSULIN 6 UNIT(S): 100 INJECTION, SUSPENSION SUBCUTANEOUS at 12:26

## 2018-04-27 RX ADMIN — Medication 500000 UNIT(S): at 11:02

## 2018-04-27 RX ADMIN — Medication 250 MILLIGRAM(S): at 10:56

## 2018-04-27 RX ADMIN — Medication 5 MILLIGRAM(S): at 13:41

## 2018-04-27 RX ADMIN — Medication 3 MILLILITER(S): at 06:01

## 2018-04-27 RX ADMIN — SODIUM CHLORIDE 1000 MILLILITER(S): 9 INJECTION INTRAMUSCULAR; INTRAVENOUS; SUBCUTANEOUS at 13:39

## 2018-04-27 RX ADMIN — Medication 2: at 23:55

## 2018-04-27 RX ADMIN — Medication 500000 UNIT(S): at 06:22

## 2018-04-27 RX ADMIN — SIMVASTATIN 40 MILLIGRAM(S): 20 TABLET, FILM COATED ORAL at 23:12

## 2018-04-27 RX ADMIN — Medication 1 APPLICATION(S): at 17:17

## 2018-04-27 RX ADMIN — Medication 25 MILLIGRAM(S): at 06:22

## 2018-04-27 RX ADMIN — Medication 1 MILLIGRAM(S): at 14:14

## 2018-04-27 RX ADMIN — Medication 100 MILLIGRAM(S): at 06:22

## 2018-04-27 RX ADMIN — Medication 1 APPLICATION(S): at 23:25

## 2018-04-27 RX ADMIN — QUETIAPINE FUMARATE 25 MILLIGRAM(S): 200 TABLET, FILM COATED ORAL at 23:08

## 2018-04-27 RX ADMIN — Medication 20 MILLIGRAM(S): at 13:51

## 2018-04-27 RX ADMIN — Medication 125 MILLIGRAM(S): at 06:23

## 2018-04-27 RX ADMIN — SIMETHICONE 80 MILLIGRAM(S): 80 TABLET, CHEWABLE ORAL at 13:42

## 2018-04-27 RX ADMIN — Medication 5 MILLIGRAM(S): at 06:23

## 2018-04-27 RX ADMIN — Medication 5 MILLIGRAM(S): at 23:10

## 2018-04-27 RX ADMIN — ENOXAPARIN SODIUM 40 MILLIGRAM(S): 100 INJECTION SUBCUTANEOUS at 11:01

## 2018-04-27 RX ADMIN — FAMOTIDINE 20 MILLIGRAM(S): 10 INJECTION INTRAVENOUS at 06:22

## 2018-04-27 RX ADMIN — PIPERACILLIN AND TAZOBACTAM 25 GRAM(S): 4; .5 INJECTION, POWDER, LYOPHILIZED, FOR SOLUTION INTRAVENOUS at 06:23

## 2018-04-27 RX ADMIN — HALOPERIDOL DECANOATE 1 MILLIGRAM(S): 100 INJECTION INTRAMUSCULAR at 17:10

## 2018-04-27 RX ADMIN — MIDODRINE HYDROCHLORIDE 10 MILLIGRAM(S): 2.5 TABLET ORAL at 23:02

## 2018-04-27 RX ADMIN — Medication 500 MILLIGRAM(S): at 06:22

## 2018-04-27 RX ADMIN — Medication 4: at 12:26

## 2018-04-27 RX ADMIN — MIRTAZAPINE 15 MILLIGRAM(S): 45 TABLET, ORALLY DISINTEGRATING ORAL at 23:23

## 2018-04-27 RX ADMIN — Medication 20 MILLIGRAM(S): at 12:25

## 2018-04-27 RX ADMIN — Medication 10 MILLIGRAM(S): at 06:22

## 2018-04-27 RX ADMIN — Medication 1 MILLIGRAM(S): at 18:04

## 2018-04-27 RX ADMIN — LIDOCAINE 1 PATCH: 4 CREAM TOPICAL at 11:01

## 2018-04-27 RX ADMIN — Medication 1 APPLICATION(S): at 06:22

## 2018-04-27 RX ADMIN — HUMAN INSULIN 6 UNIT(S): 100 INJECTION, SUSPENSION SUBCUTANEOUS at 17:58

## 2018-04-27 RX ADMIN — Medication 2 MILLIGRAM(S): at 23:03

## 2018-04-27 RX ADMIN — SIMETHICONE 80 MILLIGRAM(S): 80 TABLET, CHEWABLE ORAL at 23:11

## 2018-04-27 RX ADMIN — Medication 1 APPLICATION(S): at 11:01

## 2018-04-27 RX ADMIN — Medication 3 MILLILITER(S): at 12:15

## 2018-04-27 RX ADMIN — SIMETHICONE 80 MILLIGRAM(S): 80 TABLET, CHEWABLE ORAL at 06:22

## 2018-04-27 RX ADMIN — PIPERACILLIN AND TAZOBACTAM 25 GRAM(S): 4; .5 INJECTION, POWDER, LYOPHILIZED, FOR SOLUTION INTRAVENOUS at 23:24

## 2018-04-27 RX ADMIN — CLOPIDOGREL BISULFATE 75 MILLIGRAM(S): 75 TABLET, FILM COATED ORAL at 11:01

## 2018-04-27 RX ADMIN — Medication 500000 UNIT(S): at 23:05

## 2018-04-27 NOTE — PROGRESS NOTE ADULT - ASSESSMENT
77y s/p trach change from #7 uncuffed to cuffed Portex for pt to be placed on vent secondary to respiratory distress. Tracheal endoscopy reveals thick purulent secretions with no signs of erythema or bleeding.  Pt had minimal oozing from stoma after trach change, resolving with applied pressure. Drain sponge put in place with no further oozing.

## 2018-04-27 NOTE — PROGRESS NOTE ADULT - PROBLEM SELECTOR PLAN 9
acute rehab when medically cleared Continue Lovenox  Pepcid GI prophylaxis  Pt being straight cath for retention   Continue Cardura Continue Cardura   Continue bladder scan q 6-8hr and straight cath prn

## 2018-04-27 NOTE — PROGRESS NOTE ADULT - PROBLEM SELECTOR PLAN 7
Added valproic acid 125 mg qhs per psych  Continue Seroquel 25 mg qhs   Continue Melatonin prn Insomnia; Seroquel prn agitation; Haldol 1 mg iv q 8 hrs PRN more severe agitation     on 4/21  Continue Mirtazapine Venous Duplex 4/1: Acute DVT of Rt and Lft Soleal Vein   Venous Duplex 4/10: Patient with persistent Soleal DVT b/l w/o propagation   Venous Duplex 4/18: Patient with New left peroneal DVT with persistent soleal DVT.  Venous Duplex 4/24: Persistent b/l soleal dvt, peroneal dvt not identified  Repeat Surveillance Duplex to be performed on 5/1

## 2018-04-27 NOTE — PROGRESS NOTE ADULT - SUBJECTIVE AND OBJECTIVE BOX
Patient is a 77y old  Male who presents with a chief complaint of pelvic fx with active extrav (27 Mar 2018 12:01)      Interval Events:    REVIEW OF SYSTEMS:  [ ] Positive  [ ] All other systems negative  [ ] Unable to assess ROS because ________    Vital Signs Last 24 Hrs  T(C): 36.7 (04-27-18 @ 04:17), Max: 37.3 (04-26-18 @ 10:03)  T(F): 98 (04-27-18 @ 04:17), Max: 99.1 (04-26-18 @ 10:03)  HR: 60 (04-27-18 @ 09:21) (60 - 86)  BP: 137/69 (04-27-18 @ 04:17) (111/61 - 155/62)  RR: 20 (04-27-18 @ 09:21) (16 - 21)  SpO2: 98% (04-27-18 @ 09:21) (96% - 100%)PHYSICAL EXAM:  HEENT:   [ ]Tracheostomy:  [ ]Pupils equal  [ ]No oral lesions  [ ]Abnormal        SKIN  [ ]No Rash  [ ] Abnormal  [ ] pressure    CARDIAC  [ ]Regular  [ ]Abnormal    PULMONARY  [ ]Bilateral Clear Breath Sounds  [ ]Normal Excursion  [ ]Abnormal    GI  [ ]PEG      [ ] +BS		              [ ]Soft, nondistended, nontender	  [ ]Abnormal    MUSCULOSKELETAL                                   [ ]Bedbound                 [ ]Abnormal    [ ]Ambulatory/OOB to chair                           EXTREMITIES                                         [ ]Normal  [ ]Edema                           NEUROLOGIC  [ ] Normal, non focal  [ ] Focal findings:    PSYCHIATRIC  [ ]Alert and appropriate  [ ] Sedated	 [ ]Agitated    :  Ybarra: [ ] Yes, if yes: Date of Placement:                   [  ] No    LINES: Central Lines [ ] Yes, if yes: Date of Placement                                     [  ] No    HOSPITAL MEDICATIONS:  MEDICATIONS  (STANDING):  ALBUTerol/ipratropium for Nebulization 3 milliLiter(s) Nebulizer every 6 hours  aspirin  chewable 81 milliGRAM(s) Oral daily  BACItracin   Ointment 1 Application(s) Topical four times a day  clopidogrel Tablet 75 milliGRAM(s) Oral daily  doxazosin 2 milliGRAM(s) Oral at bedtime  enalapril 10 milliGRAM(s) Oral every 12 hours  enoxaparin Injectable 40 milliGRAM(s) SubCutaneous daily  famotidine    Tablet 20 milliGRAM(s) Oral two times a day  hydrALAZINE 25 milliGRAM(s) Oral every 8 hours  insulin lispro (HumaLOG) corrective regimen sliding scale   SubCutaneous every 6 hours  insulin NPH human recombinant 6 Unit(s) SubCutaneous <User Schedule>  insulin NPH human recombinant 3 Unit(s) SubCutaneous <User Schedule>  lactobacillus acidophilus 1 Tablet(s) Oral every 12 hours  lidocaine   Patch 1 Patch Transdermal daily  lidocaine   Patch 1 Patch Transdermal daily  metoclopramide Injectable 5 milliGRAM(s) IV Push every 8 hours  metoprolol tartrate 100 milliGRAM(s) Oral every 12 hours  metroNIDAZOLE    Tablet 500 milliGRAM(s) Oral every 8 hours  mirtazapine 15 milliGRAM(s) Oral <User Schedule>  nystatin    Suspension 408933 Unit(s) Oral four times a day  piperacillin/tazobactam IVPB. 3.375 Gram(s) IV Intermittent every 8 hours  QUEtiapine 25 milliGRAM(s) Oral at bedtime  QUEtiapine 12.5 milliGRAM(s) Oral <User Schedule>  simethicone 80 milliGRAM(s) Chew every 8 hours  simvastatin 40 milliGRAM(s) Oral at bedtime  sodium chloride 7% Inhalation 3 milliLiter(s) Inhalation every 12 hours  valproic  acid Syrup 125 milliGRAM(s) Oral at bedtime  vancomycin    Solution 125 milliGRAM(s) Oral every 6 hours  vancomycin  IVPB 1000 milliGRAM(s) IV Intermittent every 12 hours    MEDICATIONS  (PRN):  acetaminophen    Suspension. 650 milliGRAM(s) Oral every 6 hours PRN Mild Pain (1 - 3)  acetaminophen    Suspension. 650 milliGRAM(s) Oral every 6 hours PRN mild to moderate pain  haloperidol    Injectable 1 milliGRAM(s) IV Push every 8 hours PRN severe agitation  melatonin 3 milliGRAM(s) Oral at bedtime PRN Insomnia  oxyCODONE    IR 5 milliGRAM(s) Oral every 8 hours PRN Moderate to severe pain  QUEtiapine 12.5 milliGRAM(s) Oral every 6 hours PRN agitation      LABS:                        9.8    13.4  )-----------( 202      ( 27 Apr 2018 06:41 )             31.6     04-27    143  |  99  |  49<H>  ----------------------------<  116<H>  4.4   |  37<H>  |  0.93    Ca    9.0      27 Apr 2018 06:41  Phos  3.7     04-27  Mg     2.2     04-27              CAPILLARY BLOOD GLUCOSE    MICROBIOLOGY:     RADIOLOGY:  [ ] Reviewed and interpreted by me Patient is a 77y old  Male who presents with a chief complaint of pelvic fx with active extrav (27 Mar 2018 12:01)    Interval Events: Patient with thick respiratory secretions this morning requiring Ambu and lavage with acute desaturation      REVIEW OF SYSTEMS:  [ ] Positive  [X] All other systems negative  [ ] Unable to assess ROS because ________    Vital Signs Last 24 Hrs  T(C): 36.7 (04-27-18 @ 04:17), Max: 37.3 (04-26-18 @ 10:03)  T(F): 98 (04-27-18 @ 04:17), Max: 99.1 (04-26-18 @ 10:03)  HR: 60 (04-27-18 @ 09:21) (60 - 86)  BP: 137/69 (04-27-18 @ 04:17) (111/61 - 155/62)  RR: 20 (04-27-18 @ 09:21) (16 - 21)  SpO2: 98% (04-27-18 @ 09:21) (96% - 100%)    PHYSICAL EXAM:  HEENT:   [X]Tracheostomy: + Copious Purulent Secretions   [ ]Pupils equal  [ ]No oral lesions  [ ]Abnormal    SKIN  [x]No Rash  [ ] Abnormal  [ ] pressure    CARDIAC  [x]Regular  [ ]Abnormal    PULMONARY  [ ]Bilateral Clear Breath Sounds  [ ]Normal Excursion  [x]Abnormal: + Decreased breath sounds at bases bilaterally, slight expiratory wheeze     GI  [x]PEG      [x] +BS		              [x] Protuberant, Nontender, No rebound guarding 	  [ ]Abnormal    MUSCULOSKELETAL                                   [ ]Bedbound                 [ ]Abnormal    [x]OOB to chair                           EXTREMITIES                                         [ ]Normal  [x]Edema: + Pedal Edema bilaterally                          NEUROLOGIC  [x] Normal, non focal  [ ] Focal findings:    PSYCHIATRIC  [ ]Alert and appropriate  [ ] Sedated	 [x]Agitated / Restless     :  Ybarra: [ ] Yes, if yes: Date of Placement:                   [x] No: Patient requiring intermittent straight catheretization      LINES: Central Lines [ ] Yes, if yes: Date of Placement                                     [x] No    HOSPITAL MEDICATIONS:  MEDICATIONS  (STANDING):  ALBUTerol/ipratropium for Nebulization 3 milliLiter(s) Nebulizer every 6 hours  aspirin  chewable 81 milliGRAM(s) Oral daily  BACItracin   Ointment 1 Application(s) Topical four times a day  clopidogrel Tablet 75 milliGRAM(s) Oral daily  doxazosin 2 milliGRAM(s) Oral at bedtime  enalapril 10 milliGRAM(s) Oral every 12 hours  enoxaparin Injectable 40 milliGRAM(s) SubCutaneous daily  famotidine    Tablet 20 milliGRAM(s) Oral two times a day  hydrALAZINE 25 milliGRAM(s) Oral every 8 hours  insulin lispro (HumaLOG) corrective regimen sliding scale   SubCutaneous every 6 hours  insulin NPH human recombinant 6 Unit(s) SubCutaneous <User Schedule>  insulin NPH human recombinant 3 Unit(s) SubCutaneous <User Schedule>  lactobacillus acidophilus 1 Tablet(s) Oral every 12 hours  lidocaine   Patch 1 Patch Transdermal daily  lidocaine   Patch 1 Patch Transdermal daily  metoclopramide Injectable 5 milliGRAM(s) IV Push every 8 hours  metoprolol tartrate 100 milliGRAM(s) Oral every 12 hours  metroNIDAZOLE    Tablet 500 milliGRAM(s) Oral every 8 hours  mirtazapine 15 milliGRAM(s) Oral <User Schedule>  nystatin    Suspension [id number] Unit(s) Oral four times a day  piperacillin/tazobactam IVPB. 3.375 Gram(s) IV Intermittent every 8 hours  QUEtiapine 25 milliGRAM(s) Oral at bedtime  QUEtiapine 12.5 milliGRAM(s) Oral <User Schedule>  simethicone 80 milliGRAM(s) Chew every 8 hours  simvastatin 40 milliGRAM(s) Oral at bedtime  sodium chloride 7% Inhalation 3 milliLiter(s) Inhalation every 12 hours  valproic  acid Syrup 125 milliGRAM(s) Oral at bedtime  vancomycin    Solution 125 milliGRAM(s) Oral every 6 hours  vancomycin  IVPB 1000 milliGRAM(s) IV Intermittent every 12 hours    MEDICATIONS  (PRN):  acetaminophen    Suspension. 650 milliGRAM(s) Oral every 6 hours PRN Mild Pain (1 - 3)  acetaminophen    Suspension. 650 milliGRAM(s) Oral every 6 hours PRN mild to moderate pain  haloperidol    Injectable 1 milliGRAM(s) IV Push every 8 hours PRN severe agitation  melatonin 3 milliGRAM(s) Oral at bedtime PRN Insomnia  oxyCODONE    IR 5 milliGRAM(s) Oral every 8 hours PRN Moderate to severe pain  QUEtiapine 12.5 milliGRAM(s) Oral every 6 hours PRN agitation      LABS:                        9.8    13.4  )-----------( 202      ( 27 Apr 2018 06:41 )             31.6     04-27    143  |  99  |  49<H>  ----------------------------<  116<H>  4.4   |  37<H>  |  0.93    Ca    9.0      27 Apr 2018 06:41  Phos  3.7     04-27  Mg     2.2     04-27              CAPILLARY BLOOD GLUCOSE    MICROBIOLOGY:     RADIOLOGY:  [ ] Reviewed and interpreted by me Patient is a 77y old  Male who presents with a chief complaint of pelvic fx with active extrav (27 Mar 2018 12:01)    Interval Events: Patient with thick respiratory secretions this morning requiring Ambu and lavage with acute desaturation      REVIEW OF SYSTEMS:  [ ] Positive  [X] All other systems negative  [ ] Unable to assess ROS because ________    Vital Signs Last 24 Hrs  T(C): 36.7 (04-27-18 @ 04:17), Max: 37.3 (04-26-18 @ 10:03)  T(F): 98 (04-27-18 @ 04:17), Max: 99.1 (04-26-18 @ 10:03)  HR: 60 (04-27-18 @ 09:21) (60 - 86)  BP: 137/69 (04-27-18 @ 04:17) (111/61 - 155/62)  RR: 20 (04-27-18 @ 09:21) (16 - 21)  SpO2: 98% (04-27-18 @ 09:21) (96% - 100%)    PHYSICAL EXAM:  HEENT:   [X]Tracheostomy: + Copious Purulent Secretions   [x ]Pupils equal  [x ]No oral lesions  [ ]Abnormal    SKIN  [x]No Rash  [ ] Abnormal  [ ] pressure    CARDIAC  [x]Regular  [ ]Abnormal    PULMONARY  [ ]Bilateral Clear Breath Sounds  [ ]Normal Excursion  [x]Abnormal: + Decreased breath sounds at bases bilaterally, slight expiratory wheeze     GI  [x]PEG      [x] +BS		              [x] Protuberant, Nontender, No rebound guarding 	  [ ]Abnormal    MUSCULOSKELETAL                                   [ ]Bedbound                 [ ]Abnormal    [x]OOB to chair                           EXTREMITIES                                         [ ]Normal  [x]Edema: + Pedal Edema bilaterally                          NEUROLOGIC  [x] Normal, non focal  [ ] Focal findings:    PSYCHIATRIC  [ ]Alert and appropriate  [ ] Sedated	 [x]Agitated / Restless     :  Ybarra: [ ] Yes, if yes: Date of Placement:                   [x] No: Patient requiring intermittent straight catheretization      LINES: Central Lines [ ] Yes, if yes: Date of Placement                                     [x] No    HOSPITAL MEDICATIONS:  MEDICATIONS  (STANDING):  ALBUTerol/ipratropium for Nebulization 3 milliLiter(s) Nebulizer every 6 hours  aspirin  chewable 81 milliGRAM(s) Oral daily  BACItracin   Ointment 1 Application(s) Topical four times a day  clopidogrel Tablet 75 milliGRAM(s) Oral daily  doxazosin 2 milliGRAM(s) Oral at bedtime  enalapril 10 milliGRAM(s) Oral every 12 hours  enoxaparin Injectable 40 milliGRAM(s) SubCutaneous daily  famotidine    Tablet 20 milliGRAM(s) Oral two times a day  hydrALAZINE 25 milliGRAM(s) Oral every 8 hours  insulin lispro (HumaLOG) corrective regimen sliding scale   SubCutaneous every 6 hours  insulin NPH human recombinant 6 Unit(s) SubCutaneous <User Schedule>  insulin NPH human recombinant 3 Unit(s) SubCutaneous <User Schedule>  lactobacillus acidophilus 1 Tablet(s) Oral every 12 hours  lidocaine   Patch 1 Patch Transdermal daily  lidocaine   Patch 1 Patch Transdermal daily  metoclopramide Injectable 5 milliGRAM(s) IV Push every 8 hours  metoprolol tartrate 100 milliGRAM(s) Oral every 12 hours  metroNIDAZOLE    Tablet 500 milliGRAM(s) Oral every 8 hours  mirtazapine 15 milliGRAM(s) Oral <User Schedule>  nystatin    Suspension [id number] Unit(s) Oral four times a day  piperacillin/tazobactam IVPB. 3.375 Gram(s) IV Intermittent every 8 hours  QUEtiapine 25 milliGRAM(s) Oral at bedtime  QUEtiapine 12.5 milliGRAM(s) Oral <User Schedule>  simethicone 80 milliGRAM(s) Chew every 8 hours  simvastatin 40 milliGRAM(s) Oral at bedtime  sodium chloride 7% Inhalation 3 milliLiter(s) Inhalation every 12 hours  valproic  acid Syrup 125 milliGRAM(s) Oral at bedtime  vancomycin    Solution 125 milliGRAM(s) Oral every 6 hours  vancomycin  IVPB 1000 milliGRAM(s) IV Intermittent every 12 hours    MEDICATIONS  (PRN):  acetaminophen    Suspension. 650 milliGRAM(s) Oral every 6 hours PRN Mild Pain (1 - 3)  acetaminophen    Suspension. 650 milliGRAM(s) Oral every 6 hours PRN mild to moderate pain  haloperidol    Injectable 1 milliGRAM(s) IV Push every 8 hours PRN severe agitation  melatonin 3 milliGRAM(s) Oral at bedtime PRN Insomnia  oxyCODONE    IR 5 milliGRAM(s) Oral every 8 hours PRN Moderate to severe pain  QUEtiapine 12.5 milliGRAM(s) Oral every 6 hours PRN agitation      LABS:                        9.8    13.4  )-----------( 202      ( 27 Apr 2018 06:41 )             31.6     04-27    143  |  99  |  49<H>  ----------------------------<  116<H>  4.4   |  37<H>  |  0.93    Ca    9.0      27 Apr 2018 06:41  Phos  3.7     04-27  Mg     2.2     04-27              CAPILLARY BLOOD GLUCOSE    MICROBIOLOGY:     RADIOLOGY:  [ ] Reviewed and interpreted by me

## 2018-04-27 NOTE — PROGRESS NOTE ADULT - PROBLEM SELECTOR PLAN 4
Continue current regimen  Continue Insulin sliding scale Patient with Known ileus, however pt is tolerating tube feed and passing stools  Continue to monitor Abdominal exam  If change in abd exam or no Bms consider repeat imaging    Continue low dose Reglan

## 2018-04-27 NOTE — PROGRESS NOTE ADULT - PROBLEM SELECTOR PLAN 3
Patient with Known ileus, however pt is tolerating tube feed and passing stools  Continue to monitor Abdominal exam  If change in abd exam or no Bms consider repeat imaging    Continue low dose Reglan Sputum cx 4/25: Staph Aureus   Continue Vanco and Zosyn Sputum cx 4/25: Staph Aureus   Continue Vanco and Zosyn  Patient placed on PO Vanco empirically as wife reports hx of C.diff with abx use

## 2018-04-27 NOTE — PROGRESS NOTE ADULT - PROBLEM SELECTOR PLAN 6
Venous Duplex 4/1: Acute DVT of Rt and Lft Soleal Vein   Venous Duplex 4/10: Patient with persistent Soleal DVT b/l w/o propagation   Venous Duplex 4/18: Patient with New left peroneal DVT with persistent soleal DVT.  Next serial 4/24. Persistent b/l soleal dvt, peroneal dvt not identified  Repeat Duplex to be performed on 5/1 Continue asa Plavix   Continue Simvastatin   Patient Hypotensive this afternoon likely from Positive Pressure Ventilation as well as sedation given for dysnchrony with the ventilator   Hold Parameters placed for Hydralazine, Vasotec and Lopressor to hold for SBP<140, given recent hypotension   Continue to monitor BP and HR

## 2018-04-27 NOTE — PROGRESS NOTE ADULT - PROBLEM SELECTOR PLAN 5
Continue asa Plavix   Continue Simvastatin   Continue Vasotec and Lopressor for HTN  Continue to monitor BP and HR Continue current regimen  Continue Insulin sliding scale

## 2018-04-27 NOTE — PROGRESS NOTE ADULT - PROBLEM SELECTOR PLAN 1
+ Acute left 4th-8th rib fractures  + Left superior ramus fracture with a large extraperitoneal hematoma    + Left inferior pubic ramus fracture / right superior pubic ramus fracture  + Left L5 lamina & hemisacrum fractures  + Several spleen lacerations/  Grade 2 left kidney laceration  Patient S/P IR Glue and Coil embolization  CT Scan 4/14: Extraperitoneal hematoma slightly increased in size compared to prior imaging with increasing organization   Trauma Sx Emmett enlarging hematoma likely represents redistribution of hematoma, no intervention performed
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
## 2018-04-27 NOTE — PROGRESS NOTE ADULT - ATTENDING COMMENTS
Pt seen and examined.   1. Acute resp failure w/ hypoxia: ABG showing hypercapnia, increased work of breathing on exam with copious thick secretions. Will place back on mech ventilation  2. Bacterial pneumonia: Ct chest with increased GGOs and consolidation pattern in RUL and LLL. Cont IV empiric Vanc/ Zosyn. FUP cxs. Will complete a 7 day course of antibiotics.   2. Rib fractures: cont to optimize pain control to prevent splinting  3. Agitation: Cont Valproic acid, Seroquel and haldol prn. Follow QTc  4. Oropharyngeal dysphagia: cont tube feeds  6. Fluid overload:  Diuresis with Lasix 20 mg IV  5. GOC: Guarded prognosis.

## 2018-04-27 NOTE — PROGRESS NOTE ADULT - PROBLEM SELECTOR PROBLEM 4
Type 2 diabetes mellitus without complication, without long-term current use of insulin Abdominal distention

## 2018-04-27 NOTE — PROGRESS NOTE ADULT - PROBLEM SELECTOR PLAN 2
Pt S/p tracheostomy by trauma SX on 4/5  CXR 4/27: Lung Opacities bilaterally Infection vs Pulmonary Edema   Pt now requiring Mechanical ventilation, Trach changed to # 7 Cuffed Portex ( 4/27)   Continue Chest PT and Suctioning PRN

## 2018-04-27 NOTE — PROGRESS NOTE ADULT - PROBLEM SELECTOR PLAN 8
Continue Lovenox  Pepcid GI prophylaxis  Pt being straight cath for retention   Continue Cardura Added valproic acid 125 mg qhs per psych  Continue Seroquel 25 mg qhs   Continue Melatonin prn Insomnia; Seroquel prn agitation; Haldol 1 mg iv q 8 hrs PRN more severe agitation     on 4/21  Continue Mirtazapine Continue Valproic acid 125 mg qhs   Continue Seroquel 12.5 mg q am and 25 mg qhs    Continue PRN Seroquel for breakthrough agitation    EKG:  on 4/21  Continue Haldol 1 mg IVP Prn Severe Agitation   Continue Melatonin prn Insomnia    Continue Mirtazapine

## 2018-04-27 NOTE — CHART NOTE - NSCHARTNOTEFT_GEN_A_CORE
Patient still remained extremely Anxious/Agitated this evening with significant Vent Dysnchorny. Case d/w Dr. Briggs will d/c IVP haldol and change to standing Ativan 1 mg q 4 hrs. Anti -htn regimen discontinued and patient started on Midodrine 10 mg q 8 hrs, will monitor BP and hold for SPB> 140.

## 2018-04-27 NOTE — PROGRESS NOTE ADULT - PROBLEM SELECTOR PLAN 1
- continue aggressive suctioning  - head of bed elevation  - continue with RCU care   - Call ENT for any further issues.

## 2018-04-27 NOTE — PROGRESS NOTE ADULT - PROBLEM SELECTOR PLAN 10
Urinary retention, Cardura increased on 4/21  bladder scan q6-8h and straight cath prn Continue Lovenox  Pepcid GI prophylaxis
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
## 2018-04-27 NOTE — PROVIDER CONTACT NOTE (MEDICATION) - ACTION/TREATMENT ORDERED:
EFRAIN Valerio stated ok to give Haldol early. Med given with positive relief of agitation. Haldol now D/C'ed and Ativan 1mg IVP every 4 hours started.

## 2018-04-28 LAB
ANION GAP SERPL CALC-SCNC: 9 MMOL/L — SIGNIFICANT CHANGE UP (ref 5–17)
BASE EXCESS BLDA CALC-SCNC: 9.7 MMOL/L — HIGH (ref -2–2)
BUN SERPL-MCNC: 59 MG/DL — HIGH (ref 7–23)
CALCIUM SERPL-MCNC: 8.7 MG/DL — SIGNIFICANT CHANGE UP (ref 8.4–10.5)
CHLORIDE SERPL-SCNC: 102 MMOL/L — SIGNIFICANT CHANGE UP (ref 96–108)
CO2 BLDA-SCNC: 35 MMOL/L — HIGH (ref 22–30)
CO2 SERPL-SCNC: 33 MMOL/L — HIGH (ref 22–31)
CREAT SERPL-MCNC: 1.03 MG/DL — SIGNIFICANT CHANGE UP (ref 0.5–1.3)
GAS PNL BLDA: SIGNIFICANT CHANGE UP
GLUCOSE SERPL-MCNC: 94 MG/DL — SIGNIFICANT CHANGE UP (ref 70–99)
HCO3 BLDA-SCNC: 34 MMOL/L — HIGH (ref 21–29)
HCT VFR BLD CALC: 26.6 % — LOW (ref 39–50)
HGB BLD-MCNC: 8.7 G/DL — LOW (ref 13–17)
HOROWITZ INDEX BLDA+IHG-RTO: 40 — SIGNIFICANT CHANGE UP
MAGNESIUM SERPL-MCNC: 2.2 MG/DL — SIGNIFICANT CHANGE UP (ref 1.6–2.6)
MCHC RBC-ENTMCNC: 32.6 GM/DL — SIGNIFICANT CHANGE UP (ref 32–36)
MCHC RBC-ENTMCNC: 33.2 PG — SIGNIFICANT CHANGE UP (ref 27–34)
MCV RBC AUTO: 102 FL — HIGH (ref 80–100)
PCO2 BLDA: 47 MMHG — HIGH (ref 32–46)
PH BLDA: 7.48 — HIGH (ref 7.35–7.45)
PHOSPHATE SERPL-MCNC: 2.8 MG/DL — SIGNIFICANT CHANGE UP (ref 2.5–4.5)
PLATELET # BLD AUTO: 214 K/UL — SIGNIFICANT CHANGE UP (ref 150–400)
PO2 BLDA: 91 MMHG — SIGNIFICANT CHANGE UP (ref 74–108)
POTASSIUM SERPL-MCNC: 4.2 MMOL/L — SIGNIFICANT CHANGE UP (ref 3.5–5.3)
POTASSIUM SERPL-SCNC: 4.2 MMOL/L — SIGNIFICANT CHANGE UP (ref 3.5–5.3)
RBC # BLD: 2.61 M/UL — LOW (ref 4.2–5.8)
RBC # FLD: 16.4 % — HIGH (ref 10.3–14.5)
SAO2 % BLDA: 98 % — HIGH (ref 92–96)
SODIUM SERPL-SCNC: 144 MMOL/L — SIGNIFICANT CHANGE UP (ref 135–145)
WBC # BLD: 12.1 K/UL — HIGH (ref 3.8–10.5)
WBC # FLD AUTO: 12.1 K/UL — HIGH (ref 3.8–10.5)

## 2018-04-28 PROCEDURE — 99233 SBSQ HOSP IP/OBS HIGH 50: CPT | Mod: GC

## 2018-04-28 RX ORDER — QUETIAPINE FUMARATE 200 MG/1
25 TABLET, FILM COATED ORAL EVERY 12 HOURS
Qty: 0 | Refills: 0 | Status: DISCONTINUED | OUTPATIENT
Start: 2018-04-28 | End: 2018-05-03

## 2018-04-28 RX ADMIN — Medication 125 MILLIGRAM(S): at 13:43

## 2018-04-28 RX ADMIN — Medication 1 MILLIGRAM(S): at 11:45

## 2018-04-28 RX ADMIN — Medication 1 APPLICATION(S): at 11:47

## 2018-04-28 RX ADMIN — Medication 500000 UNIT(S): at 11:47

## 2018-04-28 RX ADMIN — Medication 1 MILLIGRAM(S): at 08:40

## 2018-04-28 RX ADMIN — Medication 150 MILLIGRAM(S): at 00:01

## 2018-04-28 RX ADMIN — PIPERACILLIN AND TAZOBACTAM 25 GRAM(S): 4; .5 INJECTION, POWDER, LYOPHILIZED, FOR SOLUTION INTRAVENOUS at 14:48

## 2018-04-28 RX ADMIN — LIDOCAINE 1 PATCH: 4 CREAM TOPICAL at 00:01

## 2018-04-28 RX ADMIN — Medication 5 MILLIGRAM(S): at 22:25

## 2018-04-28 RX ADMIN — Medication 3 MILLILITER(S): at 17:13

## 2018-04-28 RX ADMIN — FAMOTIDINE 20 MILLIGRAM(S): 10 INJECTION INTRAVENOUS at 18:28

## 2018-04-28 RX ADMIN — PIPERACILLIN AND TAZOBACTAM 25 GRAM(S): 4; .5 INJECTION, POWDER, LYOPHILIZED, FOR SOLUTION INTRAVENOUS at 22:30

## 2018-04-28 RX ADMIN — OXYCODONE HYDROCHLORIDE 5 MILLIGRAM(S): 5 TABLET ORAL at 20:31

## 2018-04-28 RX ADMIN — HUMAN INSULIN 6 UNIT(S): 100 INJECTION, SUSPENSION SUBCUTANEOUS at 07:33

## 2018-04-28 RX ADMIN — Medication 1 APPLICATION(S): at 18:29

## 2018-04-28 RX ADMIN — SODIUM CHLORIDE 3 MILLILITER(S): 9 INJECTION INTRAMUSCULAR; INTRAVENOUS; SUBCUTANEOUS at 17:14

## 2018-04-28 RX ADMIN — HUMAN INSULIN 6 UNIT(S): 100 INJECTION, SUSPENSION SUBCUTANEOUS at 18:30

## 2018-04-28 RX ADMIN — Medication 150 MILLIGRAM(S): at 11:47

## 2018-04-28 RX ADMIN — MIDODRINE HYDROCHLORIDE 10 MILLIGRAM(S): 2.5 TABLET ORAL at 07:35

## 2018-04-28 RX ADMIN — Medication 3 MILLILITER(S): at 05:13

## 2018-04-28 RX ADMIN — ENOXAPARIN SODIUM 40 MILLIGRAM(S): 100 INJECTION SUBCUTANEOUS at 11:47

## 2018-04-28 RX ADMIN — Medication 1 MILLIGRAM(S): at 22:24

## 2018-04-28 RX ADMIN — HUMAN INSULIN 6 UNIT(S): 100 INJECTION, SUSPENSION SUBCUTANEOUS at 13:46

## 2018-04-28 RX ADMIN — QUETIAPINE FUMARATE 12.5 MILLIGRAM(S): 200 TABLET, FILM COATED ORAL at 13:48

## 2018-04-28 RX ADMIN — QUETIAPINE FUMARATE 12.5 MILLIGRAM(S): 200 TABLET, FILM COATED ORAL at 07:37

## 2018-04-28 RX ADMIN — MIDODRINE HYDROCHLORIDE 10 MILLIGRAM(S): 2.5 TABLET ORAL at 22:29

## 2018-04-28 RX ADMIN — Medication 150 MILLIGRAM(S): at 22:24

## 2018-04-28 RX ADMIN — Medication 20 MILLIGRAM(S): at 13:48

## 2018-04-28 RX ADMIN — SIMETHICONE 80 MILLIGRAM(S): 80 TABLET, CHEWABLE ORAL at 13:44

## 2018-04-28 RX ADMIN — Medication 5 MILLIGRAM(S): at 13:44

## 2018-04-28 RX ADMIN — LIDOCAINE 1 PATCH: 4 CREAM TOPICAL at 11:48

## 2018-04-28 RX ADMIN — Medication 125 MILLIGRAM(S): at 22:23

## 2018-04-28 RX ADMIN — Medication 1 TABLET(S): at 07:32

## 2018-04-28 RX ADMIN — Medication 1 TABLET(S): at 18:28

## 2018-04-28 RX ADMIN — Medication 3 MILLILITER(S): at 11:46

## 2018-04-28 RX ADMIN — Medication 20 MILLIGRAM(S): at 07:34

## 2018-04-28 RX ADMIN — SIMETHICONE 80 MILLIGRAM(S): 80 TABLET, CHEWABLE ORAL at 22:32

## 2018-04-28 RX ADMIN — Medication 81 MILLIGRAM(S): at 11:47

## 2018-04-28 RX ADMIN — FAMOTIDINE 20 MILLIGRAM(S): 10 INJECTION INTRAVENOUS at 07:33

## 2018-04-28 RX ADMIN — SIMETHICONE 80 MILLIGRAM(S): 80 TABLET, CHEWABLE ORAL at 07:32

## 2018-04-28 RX ADMIN — QUETIAPINE FUMARATE 25 MILLIGRAM(S): 200 TABLET, FILM COATED ORAL at 18:28

## 2018-04-28 RX ADMIN — SIMVASTATIN 40 MILLIGRAM(S): 20 TABLET, FILM COATED ORAL at 22:29

## 2018-04-28 RX ADMIN — Medication 500000 UNIT(S): at 07:29

## 2018-04-28 RX ADMIN — Medication 1 MILLIGRAM(S): at 02:12

## 2018-04-28 RX ADMIN — Medication 1 APPLICATION(S): at 07:31

## 2018-04-28 RX ADMIN — Medication 3 MILLILITER(S): at 23:55

## 2018-04-28 RX ADMIN — MIDODRINE HYDROCHLORIDE 10 MILLIGRAM(S): 2.5 TABLET ORAL at 13:44

## 2018-04-28 RX ADMIN — Medication 1 MILLIGRAM(S): at 15:47

## 2018-04-28 RX ADMIN — Medication 500000 UNIT(S): at 18:28

## 2018-04-28 RX ADMIN — Medication 125 MILLIGRAM(S): at 18:29

## 2018-04-28 RX ADMIN — CLOPIDOGREL BISULFATE 75 MILLIGRAM(S): 75 TABLET, FILM COATED ORAL at 11:47

## 2018-04-28 RX ADMIN — Medication 2 MILLIGRAM(S): at 22:25

## 2018-04-28 RX ADMIN — Medication 1 MILLIGRAM(S): at 18:29

## 2018-04-28 RX ADMIN — Medication 2: at 13:46

## 2018-04-28 RX ADMIN — Medication 2: at 18:30

## 2018-04-28 RX ADMIN — PIPERACILLIN AND TAZOBACTAM 25 GRAM(S): 4; .5 INJECTION, POWDER, LYOPHILIZED, FOR SOLUTION INTRAVENOUS at 08:37

## 2018-04-28 RX ADMIN — OXYCODONE HYDROCHLORIDE 5 MILLIGRAM(S): 5 TABLET ORAL at 21:15

## 2018-04-28 RX ADMIN — MIRTAZAPINE 15 MILLIGRAM(S): 45 TABLET, ORALLY DISINTEGRATING ORAL at 22:23

## 2018-04-28 RX ADMIN — Medication 5 MILLIGRAM(S): at 07:30

## 2018-04-28 RX ADMIN — Medication 125 MILLIGRAM(S): at 07:36

## 2018-04-28 RX ADMIN — SODIUM CHLORIDE 3 MILLILITER(S): 9 INJECTION INTRAMUSCULAR; INTRAVENOUS; SUBCUTANEOUS at 05:13

## 2018-04-28 RX ADMIN — Medication 20 MILLIGRAM(S): at 22:25
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
## 2018-04-28 NOTE — PROGRESS NOTE ADULT - PROBLEM SELECTOR PLAN 1
+ Acute left 4th-8th rib fractures  + Left superior ramus fracture with a large extraperitoneal hematoma    + Left inferior pubic ramus fracture / right superior pubic ramus fracture  + Left L5 lamina & hemisacrum fractures  + Several spleen lacerations/  Grade 2 left kidney laceration  Patient S/P IR Glue and Coil embolization  CT Scan 4/14: Extraperitoneal hematoma slightly increased in size compared to prior imaging with increasing organization   Trauma Sx [place] enlarging hematoma likely represents redistribution of hematoma, no intervention performed

## 2018-04-28 NOTE — PROGRESS NOTE ADULT - PROBLEM SELECTOR PLAN 7
Venous Duplex 4/1: Acute DVT of Rt and Lft Soleal Vein   Venous Duplex 4/10: Patient with persistent Soleal DVT b/l w/o propagation   Venous Duplex 4/18: Patient with New left peroneal DVT with persistent soleal DVT.  Venous Duplex 4/24: Persistent b/l soleal dvt, peroneal dvt not identified  Repeat Surveillance Duplex to be performed on 5/1

## 2018-04-28 NOTE — PROGRESS NOTE ADULT - PROBLEM SELECTOR PLAN 6
Continue asa Plavix   Continue Simvastatin   Patient Hypotensive yesterday   Hydralazine, Vasotec and Lopressor currently held   Continue Midodrine 10 mg q 8 hrs   Continue to monitor BP and HR

## 2018-04-28 NOTE — PROGRESS NOTE ADULT - PROBLEM SELECTOR PLAN 3
Sputum cx 4/25: Staph Aureus   Continue Vanco and Zosyn  Patient placed on PO Vanco empirically as wife reports hx of C.diff with abx use

## 2018-04-28 NOTE — PROGRESS NOTE ADULT - PROBLEM SELECTOR PLAN 8
Continue Valproic acid 125 mg qhs   Seroquel increased to 25 mg q am and 25 mg qhs    Continue PRN Seroquel for breakthrough agitation    EKG:  on 4/21, Repeat EKG ordered   Continue Haldol 1 mg IVP Prn Severe Agitation   Continue Melatonin prn Insomnia    Continue Mirtazapine
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
## 2018-04-28 NOTE — PROGRESS NOTE ADULT - ATTENDING COMMENTS
Pt seen and examined.   1. Acute resp failure w/ hypoxia: 4/27 with ABG showing hypercapnia, increased work of breathing on exam with copious thick secretions. Was placed back on mech ventilation and appears much more comfortable.  2. Bacterial pneumonia: Ct chest with increased GGOs and consolidation pattern in RUL and LLL. Cont IV empiric Vanc/ Zosyn. FUP cxs. Will complete a 7 day course of antibiotics.   2. Rib fractures: cont to optimize pain control to prevent splinting  3. Agitation: Cont Valproic acid, Seroquel and haldol prn. Follow QTc. Ativan PRN. Will increased morning Seroquel dose.  4. Oropharyngeal dysphagia: cont tube feeds  6. Fluid overload:  Diuresis with Lasix 20 mg IV  5. GOC: Guarded prognosis.  Remains Full code    I was physically present for the key portions of the evaluation and management (E/M) service provided.  I agree with the above history, physical, and plan which I have reviewed and edited where appropriate.     Plan discussed with RCU team.

## 2018-04-28 NOTE — PROGRESS NOTE ADULT - PROBLEM SELECTOR PLAN 2
Pt S/p tracheostomy by trauma SX on 4/5  CXR 4/27: Lung Opacities bilaterally Infection vs Pulmonary Edema   Pt now requiring Mechanical ventilation, Trach changed to # 7 Cuffed Portex (4/27)   Continue Solumedrol 20 mg q 8 hrs   Continue Chest PT and Suctioning PRN

## 2018-04-28 NOTE — PROGRESS NOTE ADULT - PROBLEM SELECTOR PLAN 4
Patient with Known ileus, however pt is tolerating tube feed and passing stools  Continue to monitor Abdominal exam  If change in abd exam or no Bms consider repeat imaging    Continue low dose Reglan

## 2018-04-29 LAB
ANION GAP SERPL CALC-SCNC: 7 MMOL/L — SIGNIFICANT CHANGE UP (ref 5–17)
BUN SERPL-MCNC: 46 MG/DL — HIGH (ref 7–23)
CALCIUM SERPL-MCNC: 8.8 MG/DL — SIGNIFICANT CHANGE UP (ref 8.4–10.5)
CHLORIDE SERPL-SCNC: 103 MMOL/L — SIGNIFICANT CHANGE UP (ref 96–108)
CO2 SERPL-SCNC: 34 MMOL/L — HIGH (ref 22–31)
CREAT SERPL-MCNC: 0.83 MG/DL — SIGNIFICANT CHANGE UP (ref 0.5–1.3)
CULTURE RESULTS: SIGNIFICANT CHANGE UP
GLUCOSE SERPL-MCNC: 116 MG/DL — HIGH (ref 70–99)
HCT VFR BLD CALC: 26.7 % — LOW (ref 39–50)
HGB BLD-MCNC: 8.5 G/DL — LOW (ref 13–17)
MAGNESIUM SERPL-MCNC: 2.3 MG/DL — SIGNIFICANT CHANGE UP (ref 1.6–2.6)
MCHC RBC-ENTMCNC: 31.9 GM/DL — LOW (ref 32–36)
MCHC RBC-ENTMCNC: 32.6 PG — SIGNIFICANT CHANGE UP (ref 27–34)
MCV RBC AUTO: 102 FL — HIGH (ref 80–100)
PHOSPHATE SERPL-MCNC: 2.5 MG/DL — SIGNIFICANT CHANGE UP (ref 2.5–4.5)
PLATELET # BLD AUTO: 233 K/UL — SIGNIFICANT CHANGE UP (ref 150–400)
POTASSIUM SERPL-MCNC: 4 MMOL/L — SIGNIFICANT CHANGE UP (ref 3.5–5.3)
POTASSIUM SERPL-SCNC: 4 MMOL/L — SIGNIFICANT CHANGE UP (ref 3.5–5.3)
RBC # BLD: 2.61 M/UL — LOW (ref 4.2–5.8)
RBC # FLD: 16.5 % — HIGH (ref 10.3–14.5)
SODIUM SERPL-SCNC: 144 MMOL/L — SIGNIFICANT CHANGE UP (ref 135–145)
SPECIMEN SOURCE: SIGNIFICANT CHANGE UP
VANCOMYCIN TROUGH SERPL-MCNC: 16.8 UG/ML — SIGNIFICANT CHANGE UP (ref 10–20)
WBC # BLD: 11.7 K/UL — HIGH (ref 3.8–10.5)
WBC # FLD AUTO: 11.7 K/UL — HIGH (ref 3.8–10.5)

## 2018-04-29 PROCEDURE — 99233 SBSQ HOSP IP/OBS HIGH 50: CPT | Mod: GC

## 2018-04-29 PROCEDURE — 93010 ELECTROCARDIOGRAM REPORT: CPT

## 2018-04-29 RX ORDER — VALPROIC ACID (AS SODIUM SALT) 250 MG/5ML
125 SOLUTION, ORAL ORAL AT BEDTIME
Qty: 0 | Refills: 0 | Status: DISCONTINUED | OUTPATIENT
Start: 2018-04-29 | End: 2018-05-04

## 2018-04-29 RX ORDER — VALPROIC ACID (AS SODIUM SALT) 250 MG/5ML
125 SOLUTION, ORAL ORAL ONCE
Qty: 0 | Refills: 0 | Status: COMPLETED | OUTPATIENT
Start: 2018-04-29 | End: 2018-04-29

## 2018-04-29 RX ORDER — VALPROIC ACID (AS SODIUM SALT) 250 MG/5ML
125 SOLUTION, ORAL ORAL DAILY
Qty: 0 | Refills: 0 | Status: DISCONTINUED | OUTPATIENT
Start: 2018-04-29 | End: 2018-05-04

## 2018-04-29 RX ADMIN — Medication 125 MILLIGRAM(S): at 05:14

## 2018-04-29 RX ADMIN — FAMOTIDINE 20 MILLIGRAM(S): 10 INJECTION INTRAVENOUS at 17:28

## 2018-04-29 RX ADMIN — Medication 1 APPLICATION(S): at 00:29

## 2018-04-29 RX ADMIN — LIDOCAINE 1 PATCH: 4 CREAM TOPICAL at 15:22

## 2018-04-29 RX ADMIN — Medication 500000 UNIT(S): at 17:29

## 2018-04-29 RX ADMIN — Medication 125 MILLIGRAM(S): at 00:35

## 2018-04-29 RX ADMIN — QUETIAPINE FUMARATE 25 MILLIGRAM(S): 200 TABLET, FILM COATED ORAL at 17:27

## 2018-04-29 RX ADMIN — Medication 20 MILLIGRAM(S): at 05:12

## 2018-04-29 RX ADMIN — QUETIAPINE FUMARATE 12.5 MILLIGRAM(S): 200 TABLET, FILM COATED ORAL at 15:27

## 2018-04-29 RX ADMIN — Medication 3 MILLILITER(S): at 05:40

## 2018-04-29 RX ADMIN — Medication 125 MILLIGRAM(S): at 23:12

## 2018-04-29 RX ADMIN — HUMAN INSULIN 6 UNIT(S): 100 INJECTION, SUSPENSION SUBCUTANEOUS at 18:07

## 2018-04-29 RX ADMIN — PIPERACILLIN AND TAZOBACTAM 25 GRAM(S): 4; .5 INJECTION, POWDER, LYOPHILIZED, FOR SOLUTION INTRAVENOUS at 05:15

## 2018-04-29 RX ADMIN — Medication 1 MILLIGRAM(S): at 10:38

## 2018-04-29 RX ADMIN — Medication 3 MILLILITER(S): at 11:49

## 2018-04-29 RX ADMIN — PIPERACILLIN AND TAZOBACTAM 25 GRAM(S): 4; .5 INJECTION, POWDER, LYOPHILIZED, FOR SOLUTION INTRAVENOUS at 22:01

## 2018-04-29 RX ADMIN — Medication 125 MILLIGRAM(S): at 11:29

## 2018-04-29 RX ADMIN — QUETIAPINE FUMARATE 25 MILLIGRAM(S): 200 TABLET, FILM COATED ORAL at 05:13

## 2018-04-29 RX ADMIN — SODIUM CHLORIDE 3 MILLILITER(S): 9 INJECTION INTRAMUSCULAR; INTRAVENOUS; SUBCUTANEOUS at 17:43

## 2018-04-29 RX ADMIN — Medication 125 MILLIGRAM(S): at 17:27

## 2018-04-29 RX ADMIN — CLOPIDOGREL BISULFATE 75 MILLIGRAM(S): 75 TABLET, FILM COATED ORAL at 11:29

## 2018-04-29 RX ADMIN — SODIUM CHLORIDE 3 MILLILITER(S): 9 INJECTION INTRAMUSCULAR; INTRAVENOUS; SUBCUTANEOUS at 05:40

## 2018-04-29 RX ADMIN — Medication 1 MILLIGRAM(S): at 13:57

## 2018-04-29 RX ADMIN — HUMAN INSULIN 3 UNIT(S): 100 INJECTION, SUSPENSION SUBCUTANEOUS at 23:11

## 2018-04-29 RX ADMIN — Medication 125 MILLIGRAM(S): at 14:22

## 2018-04-29 RX ADMIN — MIRTAZAPINE 15 MILLIGRAM(S): 45 TABLET, ORALLY DISINTEGRATING ORAL at 22:02

## 2018-04-29 RX ADMIN — Medication 1 APPLICATION(S): at 05:11

## 2018-04-29 RX ADMIN — PIPERACILLIN AND TAZOBACTAM 25 GRAM(S): 4; .5 INJECTION, POWDER, LYOPHILIZED, FOR SOLUTION INTRAVENOUS at 13:59

## 2018-04-29 RX ADMIN — HUMAN INSULIN 3 UNIT(S): 100 INJECTION, SUSPENSION SUBCUTANEOUS at 00:30

## 2018-04-29 RX ADMIN — Medication 500000 UNIT(S): at 05:11

## 2018-04-29 RX ADMIN — Medication 20 MILLIGRAM(S): at 17:28

## 2018-04-29 RX ADMIN — Medication 500000 UNIT(S): at 23:12

## 2018-04-29 RX ADMIN — Medication 1 MILLIGRAM(S): at 07:00

## 2018-04-29 RX ADMIN — Medication 4: at 23:11

## 2018-04-29 RX ADMIN — Medication 1 MILLIGRAM(S): at 02:04

## 2018-04-29 RX ADMIN — Medication 4: at 00:30

## 2018-04-29 RX ADMIN — Medication 5 MILLIGRAM(S): at 22:09

## 2018-04-29 RX ADMIN — SIMETHICONE 80 MILLIGRAM(S): 80 TABLET, CHEWABLE ORAL at 22:02

## 2018-04-29 RX ADMIN — Medication 5 MILLIGRAM(S): at 05:13

## 2018-04-29 RX ADMIN — Medication 1 TABLET(S): at 17:28

## 2018-04-29 RX ADMIN — Medication 2 MILLIGRAM(S): at 22:02

## 2018-04-29 RX ADMIN — ENOXAPARIN SODIUM 40 MILLIGRAM(S): 100 INJECTION SUBCUTANEOUS at 11:28

## 2018-04-29 RX ADMIN — Medication 1 TABLET(S): at 05:10

## 2018-04-29 RX ADMIN — Medication 500000 UNIT(S): at 11:29

## 2018-04-29 RX ADMIN — HUMAN INSULIN 6 UNIT(S): 100 INJECTION, SUSPENSION SUBCUTANEOUS at 13:00

## 2018-04-29 RX ADMIN — Medication 3 MILLILITER(S): at 17:43

## 2018-04-29 RX ADMIN — FAMOTIDINE 20 MILLIGRAM(S): 10 INJECTION INTRAVENOUS at 05:12

## 2018-04-29 RX ADMIN — Medication 1 MILLIGRAM(S): at 22:08

## 2018-04-29 RX ADMIN — SIMETHICONE 80 MILLIGRAM(S): 80 TABLET, CHEWABLE ORAL at 05:14

## 2018-04-29 RX ADMIN — Medication 81 MILLIGRAM(S): at 11:28

## 2018-04-29 RX ADMIN — Medication 2: at 13:00

## 2018-04-29 RX ADMIN — Medication 150 MILLIGRAM(S): at 10:32

## 2018-04-29 RX ADMIN — Medication 150 MILLIGRAM(S): at 22:01

## 2018-04-29 RX ADMIN — LIDOCAINE 1 PATCH: 4 CREAM TOPICAL at 00:25

## 2018-04-29 RX ADMIN — HUMAN INSULIN 6 UNIT(S): 100 INJECTION, SUSPENSION SUBCUTANEOUS at 06:50

## 2018-04-29 RX ADMIN — Medication 1 APPLICATION(S): at 11:29

## 2018-04-29 RX ADMIN — QUETIAPINE FUMARATE 12.5 MILLIGRAM(S): 200 TABLET, FILM COATED ORAL at 00:03

## 2018-04-29 RX ADMIN — Medication 125 MILLIGRAM(S): at 22:09

## 2018-04-29 RX ADMIN — SIMVASTATIN 40 MILLIGRAM(S): 20 TABLET, FILM COATED ORAL at 22:02

## 2018-04-29 RX ADMIN — Medication 500000 UNIT(S): at 00:34

## 2018-04-29 RX ADMIN — Medication 3 MILLILITER(S): at 23:12

## 2018-04-29 RX ADMIN — Medication 125 MILLIGRAM(S): at 13:00

## 2018-04-29 RX ADMIN — Medication 5 MILLIGRAM(S): at 14:03

## 2018-04-29 RX ADMIN — SIMETHICONE 80 MILLIGRAM(S): 80 TABLET, CHEWABLE ORAL at 14:01

## 2018-04-29 RX ADMIN — Medication 1 MILLIGRAM(S): at 17:31

## 2018-04-29 RX ADMIN — MIDODRINE HYDROCHLORIDE 10 MILLIGRAM(S): 2.5 TABLET ORAL at 05:13
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
## 2018-04-29 NOTE — PROGRESS NOTE ADULT - ATTENDING COMMENTS
Pt seen and examined.   1. Acute resp failure w/ hypoxia: 4/27 with ABG showing hypercapnia, increased work of breathing on exam with copious thick secretions. Was placed back on mech ventilation and appears much more comfortable.  2. Bacterial pneumonia: Ct chest with increased GGOs and consolidation pattern in RUL and LLL. Cont IV empiric Vanc/ Zosyn. FUP cxs. Will complete a 7 day course of antibiotics.   2. Rib fractures: cont to optimize pain control to prevent splinting  3. Agitation: Cont to have periods of agitation throughout the day and night. On Valproic acid QHS, Seroquel BID and haldol prn. EKG- normal QTc. Ativan PRN. WIll add a second dose of Valproic acid in the morning.   4. Oropharyngeal dysphagia: cont tube feeds  6. Fluid overload:  c/w Diuresis  5. GOC: Guarded prognosis.  Remains Full code    I was physically present for the key portions of the evaluation and management (E/M) service provided.  I agree with the above history, physical, and plan which I have reviewed and edited where appropriate.     Plan discussed with RCU team.
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
## 2018-04-29 NOTE — PROGRESS NOTE ADULT - PROBLEM SELECTOR PLAN 6
Continue Asa, Plavix   Continue Simvastatin   Hypotension resolved, Will d/cd Midodrine   Hydralazine, Vasotec and Lopressor currently held , if bp remains stable will restart  Continue to monitor BP and HR

## 2018-04-29 NOTE — PROGRESS NOTE ADULT - PROBLEM SELECTOR PLAN 1
+ Acute left 4th-8th rib fractures  + Left superior ramus fracture with a large extraperitoneal hematoma    + Left inferior pubic ramus fracture / right superior pubic ramus fracture  + Left L5 lamina & hemisacrum fractures  + Several spleen lacerations/  Grade 2 left kidney laceration  Patient S/P IR Glue and Coil embolization  CT Scan 4/14: Extraperitoneal hematoma slightly increased in size compared to prior imaging with increasing organization   Trauma Sx [place] enlarging hematoma likely represents redistribution of hematoma, no intervention performed

## 2018-04-29 NOTE — PROGRESS NOTE ADULT - PROBLEM SELECTOR PLAN 2
Pt S/p tracheostomy by trauma SX on 4/5  CXR 4/27: Lung Opacities bilaterally Infection vs Pulmonary Edema   Pt now requiring Mechanical ventilation, Trach changed to # 7 Cuffed [place] (4/27)   Patient tolerated some weaning today but became restless and had to go back on full support   Solumedrol tapered to 20 mg q 12 hrs   Continue Chest PT and Suctioning PRN

## 2018-04-29 NOTE — PROGRESS NOTE ADULT - PROBLEM SELECTOR PLAN 8
Valproic acid increased to  125 mg q am and qhs   Seroquel increased to 25 mg q am and 25 mg qhs    Continue PRN Seroquel for breakthrough agitation    EKG:  on 4/29, Continue to monitor Qtc   Continue Ativan 1 mg q 4 hrs   Currently with Bilateral mittens, Continue to monitor for opportunity to remove restraints   Continue Melatonin prn Insomnia    Continue Mirtazapine  Psych called for follow up this afternoon

## 2018-04-30 LAB
ANION GAP SERPL CALC-SCNC: 10 MMOL/L — SIGNIFICANT CHANGE UP (ref 5–17)
BUN SERPL-MCNC: 39 MG/DL — HIGH (ref 7–23)
CALCIUM SERPL-MCNC: 8.7 MG/DL — SIGNIFICANT CHANGE UP (ref 8.4–10.5)
CHLORIDE SERPL-SCNC: 103 MMOL/L — SIGNIFICANT CHANGE UP (ref 96–108)
CO2 SERPL-SCNC: 32 MMOL/L — HIGH (ref 22–31)
CREAT SERPL-MCNC: 0.81 MG/DL — SIGNIFICANT CHANGE UP (ref 0.5–1.3)
GLUCOSE SERPL-MCNC: 109 MG/DL — HIGH (ref 70–99)
HCT VFR BLD CALC: 26 % — LOW (ref 39–50)
HGB BLD-MCNC: 8.1 G/DL — LOW (ref 13–17)
MAGNESIUM SERPL-MCNC: 2.3 MG/DL — SIGNIFICANT CHANGE UP (ref 1.6–2.6)
MCHC RBC-ENTMCNC: 31.3 GM/DL — LOW (ref 32–36)
MCHC RBC-ENTMCNC: 32.2 PG — SIGNIFICANT CHANGE UP (ref 27–34)
MCV RBC AUTO: 103 FL — HIGH (ref 80–100)
PHOSPHATE SERPL-MCNC: 2.9 MG/DL — SIGNIFICANT CHANGE UP (ref 2.5–4.5)
PLATELET # BLD AUTO: 238 K/UL — SIGNIFICANT CHANGE UP (ref 150–400)
POTASSIUM SERPL-MCNC: 4.1 MMOL/L — SIGNIFICANT CHANGE UP (ref 3.5–5.3)
POTASSIUM SERPL-SCNC: 4.1 MMOL/L — SIGNIFICANT CHANGE UP (ref 3.5–5.3)
RBC # BLD: 2.53 M/UL — LOW (ref 4.2–5.8)
RBC # FLD: 16.4 % — HIGH (ref 10.3–14.5)
SODIUM SERPL-SCNC: 145 MMOL/L — SIGNIFICANT CHANGE UP (ref 135–145)
WBC # BLD: 15.9 K/UL — HIGH (ref 3.8–10.5)
WBC # FLD AUTO: 15.9 K/UL — HIGH (ref 3.8–10.5)

## 2018-04-30 PROCEDURE — 99232 SBSQ HOSP IP/OBS MODERATE 35: CPT

## 2018-04-30 PROCEDURE — 93010 ELECTROCARDIOGRAM REPORT: CPT

## 2018-04-30 RX ORDER — CEFAZOLIN SODIUM 1 G
VIAL (EA) INJECTION
Qty: 0 | Refills: 0 | Status: DISCONTINUED | OUTPATIENT
Start: 2018-04-30 | End: 2018-05-04

## 2018-04-30 RX ORDER — QUETIAPINE FUMARATE 200 MG/1
25 TABLET, FILM COATED ORAL EVERY 6 HOURS
Qty: 0 | Refills: 0 | Status: DISCONTINUED | OUTPATIENT
Start: 2018-04-30 | End: 2018-05-03

## 2018-04-30 RX ORDER — METOPROLOL TARTRATE 50 MG
100 TABLET ORAL EVERY 12 HOURS
Qty: 0 | Refills: 0 | Status: DISCONTINUED | OUTPATIENT
Start: 2018-04-30 | End: 2018-05-18

## 2018-04-30 RX ORDER — CEFAZOLIN SODIUM 1 G
1000 VIAL (EA) INJECTION ONCE
Qty: 0 | Refills: 0 | Status: COMPLETED | OUTPATIENT
Start: 2018-04-30 | End: 2018-04-30

## 2018-04-30 RX ORDER — METOPROLOL TARTRATE 50 MG
100 TABLET ORAL ONCE
Qty: 0 | Refills: 0 | Status: COMPLETED | OUTPATIENT
Start: 2018-04-30 | End: 2018-04-30

## 2018-04-30 RX ORDER — CEFAZOLIN SODIUM 1 G
1000 VIAL (EA) INJECTION EVERY 8 HOURS
Qty: 0 | Refills: 0 | Status: DISCONTINUED | OUTPATIENT
Start: 2018-04-30 | End: 2018-05-04

## 2018-04-30 RX ORDER — HALOPERIDOL DECANOATE 100 MG/ML
1 INJECTION INTRAMUSCULAR EVERY 6 HOURS
Qty: 0 | Refills: 0 | Status: DISCONTINUED | OUTPATIENT
Start: 2018-04-30 | End: 2018-05-02

## 2018-04-30 RX ADMIN — Medication 125 MILLIGRAM(S): at 23:34

## 2018-04-30 RX ADMIN — Medication 125 MILLIGRAM(S): at 05:06

## 2018-04-30 RX ADMIN — Medication 500000 UNIT(S): at 05:06

## 2018-04-30 RX ADMIN — PIPERACILLIN AND TAZOBACTAM 25 GRAM(S): 4; .5 INJECTION, POWDER, LYOPHILIZED, FOR SOLUTION INTRAVENOUS at 05:06

## 2018-04-30 RX ADMIN — LIDOCAINE 1 PATCH: 4 CREAM TOPICAL at 01:18

## 2018-04-30 RX ADMIN — Medication 125 MILLIGRAM(S): at 21:47

## 2018-04-30 RX ADMIN — Medication 150 MILLIGRAM(S): at 09:29

## 2018-04-30 RX ADMIN — Medication 1 MILLIGRAM(S): at 02:20

## 2018-04-30 RX ADMIN — SIMETHICONE 80 MILLIGRAM(S): 80 TABLET, CHEWABLE ORAL at 05:06

## 2018-04-30 RX ADMIN — Medication 3 MILLILITER(S): at 12:47

## 2018-04-30 RX ADMIN — HUMAN INSULIN 6 UNIT(S): 100 INJECTION, SUSPENSION SUBCUTANEOUS at 17:22

## 2018-04-30 RX ADMIN — Medication 100 MILLIGRAM(S): at 13:51

## 2018-04-30 RX ADMIN — Medication 81 MILLIGRAM(S): at 11:18

## 2018-04-30 RX ADMIN — FAMOTIDINE 20 MILLIGRAM(S): 10 INJECTION INTRAVENOUS at 05:06

## 2018-04-30 RX ADMIN — LIDOCAINE 1 PATCH: 4 CREAM TOPICAL at 23:20

## 2018-04-30 RX ADMIN — Medication 4: at 12:25

## 2018-04-30 RX ADMIN — Medication 3 MILLILITER(S): at 18:55

## 2018-04-30 RX ADMIN — Medication 2 MILLIGRAM(S): at 21:47

## 2018-04-30 RX ADMIN — CLOPIDOGREL BISULFATE 75 MILLIGRAM(S): 75 TABLET, FILM COATED ORAL at 11:18

## 2018-04-30 RX ADMIN — Medication 1 TABLET(S): at 05:06

## 2018-04-30 RX ADMIN — Medication 125 MILLIGRAM(S): at 11:18

## 2018-04-30 RX ADMIN — Medication 100 MILLIGRAM(S): at 21:46

## 2018-04-30 RX ADMIN — Medication 100 MILLIGRAM(S): at 00:42

## 2018-04-30 RX ADMIN — Medication 5 MILLIGRAM(S): at 05:06

## 2018-04-30 RX ADMIN — Medication 1 TABLET(S): at 17:03

## 2018-04-30 RX ADMIN — SIMETHICONE 80 MILLIGRAM(S): 80 TABLET, CHEWABLE ORAL at 21:47

## 2018-04-30 RX ADMIN — Medication 5 MILLIGRAM(S): at 13:50

## 2018-04-30 RX ADMIN — LIDOCAINE 1 PATCH: 4 CREAM TOPICAL at 11:19

## 2018-04-30 RX ADMIN — SIMETHICONE 80 MILLIGRAM(S): 80 TABLET, CHEWABLE ORAL at 13:50

## 2018-04-30 RX ADMIN — Medication 500000 UNIT(S): at 17:03

## 2018-04-30 RX ADMIN — Medication 100 MILLIGRAM(S): at 23:36

## 2018-04-30 RX ADMIN — QUETIAPINE FUMARATE 25 MILLIGRAM(S): 200 TABLET, FILM COATED ORAL at 17:03

## 2018-04-30 RX ADMIN — Medication 20 MILLIGRAM(S): at 05:06

## 2018-04-30 RX ADMIN — SODIUM CHLORIDE 3 MILLILITER(S): 9 INJECTION INTRAMUSCULAR; INTRAVENOUS; SUBCUTANEOUS at 05:26

## 2018-04-30 RX ADMIN — Medication 3 MILLILITER(S): at 05:26

## 2018-04-30 RX ADMIN — ENOXAPARIN SODIUM 40 MILLIGRAM(S): 100 INJECTION SUBCUTANEOUS at 11:19

## 2018-04-30 RX ADMIN — FAMOTIDINE 20 MILLIGRAM(S): 10 INJECTION INTRAVENOUS at 17:03

## 2018-04-30 RX ADMIN — Medication 1 MILLIGRAM(S): at 05:56

## 2018-04-30 RX ADMIN — SODIUM CHLORIDE 3 MILLILITER(S): 9 INJECTION INTRAMUSCULAR; INTRAVENOUS; SUBCUTANEOUS at 18:56

## 2018-04-30 RX ADMIN — SIMVASTATIN 40 MILLIGRAM(S): 20 TABLET, FILM COATED ORAL at 21:47

## 2018-04-30 RX ADMIN — Medication 500000 UNIT(S): at 11:19

## 2018-04-30 RX ADMIN — QUETIAPINE FUMARATE 25 MILLIGRAM(S): 200 TABLET, FILM COATED ORAL at 05:06

## 2018-04-30 RX ADMIN — HUMAN INSULIN 3 UNIT(S): 100 INJECTION, SUSPENSION SUBCUTANEOUS at 23:35

## 2018-04-30 RX ADMIN — LIDOCAINE 1 PATCH: 4 CREAM TOPICAL at 11:20

## 2018-04-30 RX ADMIN — Medication 100 MILLIGRAM(S): at 11:18

## 2018-04-30 RX ADMIN — Medication 2: at 23:36

## 2018-04-30 RX ADMIN — Medication 125 MILLIGRAM(S): at 17:03

## 2018-04-30 RX ADMIN — HUMAN INSULIN 6 UNIT(S): 100 INJECTION, SUSPENSION SUBCUTANEOUS at 12:25

## 2018-04-30 RX ADMIN — Medication 5 MILLIGRAM(S): at 21:47

## 2018-04-30 RX ADMIN — Medication 500000 UNIT(S): at 23:35

## 2018-04-30 RX ADMIN — HUMAN INSULIN 6 UNIT(S): 100 INJECTION, SUSPENSION SUBCUTANEOUS at 05:57

## 2018-04-30 NOTE — PROGRESS NOTE ADULT - PROBLEM SELECTOR PLAN 2
Pt S/p tracheostomy by trauma SX on 4/5  CXR 4/27: Lung Opacities bilaterally Infection vs Pulmonary Edema   Pt now requiring Mechanical ventilation, Trach changed to # 7 Cuffed [place] (4/27)   Patient tolerated some weaning today but became restless and had to go back on full support   Solumedrol tapered to 20 mg q 12 hrs   Continue Chest PT and Suctioning PRN Pt S/p tracheostomy by trauma SX on 4/5  CXR 4/27: Lung Opacities bilaterally Infection vs Pulmonary Edema   Pt now requiring Mechanical ventilation, Trach changed to # 7 Cuffed [place] (4/27)   Patient tolerated some weaning 4/29 but became restless and had to go back on full support   Solumedrol tapered to 20 mg q 12 hrs   Continue Chest PT and Suctioning PRN Pt S/p tracheostomy by trauma SX on 4/5  CXR 4/27: Lung Opacities bilaterally Infection vs Pulmonary Edema   Pt now requiring Mechanical ventilation, Trach changed to # 7 Cuffed [place] (4/27)   Patient tolerated some weaning 4/29 but became restless and had to go back on full support   Solumedrol tapered to off today  Continue Chest PT and Suctioning PRN
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
## 2018-04-30 NOTE — PROGRESS NOTE ADULT - PROBLEM SELECTOR PLAN 1
+ Acute left 4th-8th rib fractures  + Left superior ramus fracture with a large extraperitoneal hematoma    + Left inferior pubic ramus fracture / right superior pubic ramus fracture  + Left L5 lamina & hemisacrum fractures  + Several spleen lacerations/  Grade 2 left kidney laceration  Patient S/P IR Glue and Coil embolization  CT Scan 4/14: Extraperitoneal hematoma slightly increased in size compared to prior imaging with increasing organization   Trauma Sx [place] enlarging hematoma likely represents redistribution of hematoma, no intervention performed

## 2018-04-30 NOTE — PROGRESS NOTE ADULT - PROBLEM SELECTOR PLAN 3
Sputum cx 4/25: Staph Aureus   Continue Vanco and Zosyn  Patient placed on PO Vanco empirically as wife reports hx of C.diff with abx use Sputum cx 4/25: MSSA, will dc Vanco and Zosyn. Will start Cefazolin   Patient placed on PO Vanco empirically as wife reports hx of C.diff with abx use

## 2018-04-30 NOTE — PROGRESS NOTE ADULT - PROBLEM SELECTOR PLAN 8
Valproic acid increased to  125 mg q am and qhs   Seroquel increased to 25 mg q am and 25 mg qhs    Continue PRN Seroquel for breakthrough agitation    EKG:  on 4/29, Continue to monitor Qtc   Continue Ativan 1 mg q 4 hrs   Currently with Bilateral mittens, Continue to monitor for opportunity to remove restraints   Continue Melatonin prn Insomnia    Continue Mirtazapine  Psych called for follow up this afternoon Valproic acid increased to  125 mg q am and qhs   Seroquel increased to 25 mg q am and 25 mg qhs    Continue PRN Seroquel for breakthrough agitation    EKG:  on 4/29, Continue to monitor Qtc    Pt was sedated this am, will change Ativan prn use only. Seroquel prn and Haldol prn severe agitation   Continue Melatonin prn Insomnia    Continue Mirtazapine  Psych called for follow up this afternoon

## 2018-04-30 NOTE — PROGRESS NOTE ADULT - SUBJECTIVE AND OBJECTIVE BOX
Patient is a 77y old  Male who presents with a chief complaint of pelvic fx with active extrav (27 Mar 2018 12:01)      Interval Events:    REVIEW OF SYSTEMS:  [ ] Positive  [ ] All other systems negative  [ ] Unable to assess ROS because ________    Vital Signs Last 24 Hrs  T(C): 36.8 (04-30-18 @ 04:52), Max: 37.6 (04-29-18 @ 23:52)  T(F): 98.3 (04-30-18 @ 04:52), Max: 99.6 (04-29-18 @ 23:52)  HR: 68 (04-30-18 @ 05:27) (63 - 122)  BP: 135/56 (04-30-18 @ 04:52) (110/70 - 153/67)  RR: 19 (04-30-18 @ 04:52) (16 - 22)  SpO2: 98% (04-30-18 @ 05:27) (96% - 100%)    PHYSICAL EXAM:  HEENT:   [ ]Tracheostomy:  [ ]Pupils equal  [ ]No oral lesions  [ ]Abnormal    SKIN  [ ]No Rash  [ ] Abnormal  [ ] pressure    CARDIAC  [ ]Regular  [ ]Abnormal    PULMONARY  [ ]Bilateral Clear Breath Sounds  [ ]Normal Excursion  [ ]Abnormal    GI  [ ]PEG      [ ] +BS		              [ ]Soft, nondistended, nontender	  [ ]Abnormal    MUSCULOSKELETAL                                   [ ]Bedbound                 [ ]Abnormal    [ ]Ambulatory/OOB to chair                           EXTREMITIES                                         [ ]Normal  [ ]Edema                           NEUROLOGIC  [ ] Normal, non focal  [ ] Focal findings:    PSYCHIATRIC  [ ]Alert and appropriate  [ ] Sedated	 [ ]Agitated    :  Ybarra: [ ] YES, if yes: Date of Placement                        [  ] NO      LINES: TLC [ ]YES, if yes: Date of Placement                    [ ] No    HOSPITAL MEDICATIONS:  MEDICATIONS  (STANDING):  ALBUTerol/ipratropium for Nebulization 3 milliLiter(s) Nebulizer every 6 hours  aspirin  chewable 81 milliGRAM(s) Oral daily  clopidogrel Tablet 75 milliGRAM(s) Oral daily  doxazosin 2 milliGRAM(s) Oral at bedtime  enoxaparin Injectable 40 milliGRAM(s) SubCutaneous daily  famotidine    Tablet 20 milliGRAM(s) Oral two times a day  insulin lispro (HumaLOG) corrective regimen sliding scale   SubCutaneous every 6 hours  insulin NPH human recombinant 6 Unit(s) SubCutaneous <User Schedule>  insulin NPH human recombinant 3 Unit(s) SubCutaneous <User Schedule>  lactobacillus acidophilus 1 Tablet(s) Oral every 12 hours  lidocaine   Patch 1 Patch Transdermal daily  lidocaine   Patch 1 Patch Transdermal daily  LORazepam   Injectable 1 milliGRAM(s) IV Push every 4 hours  methylPREDNISolone sodium succinate Injectable 20 milliGRAM(s) IV Push every 12 hours  metoclopramide Injectable 5 milliGRAM(s) IV Push every 8 hours  metoprolol tartrate 100 milliGRAM(s) Oral every 12 hours  mirtazapine 15 milliGRAM(s) Oral <User Schedule>  nystatin    Suspension 717163 Unit(s) Oral four times a day  piperacillin/tazobactam IVPB. 3.375 Gram(s) IV Intermittent every 8 hours  QUEtiapine 25 milliGRAM(s) Oral every 12 hours  simethicone 80 milliGRAM(s) Chew every 8 hours  simvastatin 40 milliGRAM(s) Oral at bedtime  sodium chloride 7% Inhalation 3 milliLiter(s) Inhalation every 12 hours  valproic  acid Syrup 125 milliGRAM(s) Oral at bedtime  valproic  acid Syrup 125 milliGRAM(s) Oral daily  vancomycin    Solution 125 milliGRAM(s) Oral every 6 hours  vancomycin  IVPB 750 milliGRAM(s) IV Intermittent every 12 hours    MEDICATIONS  (PRN):  acetaminophen    Suspension. 650 milliGRAM(s) Oral every 6 hours PRN Mild Pain (1 - 3)  acetaminophen    Suspension. 650 milliGRAM(s) Oral every 6 hours PRN mild to moderate pain  melatonin 3 milliGRAM(s) Oral at bedtime PRN Insomnia  QUEtiapine 12.5 milliGRAM(s) Oral every 6 hours PRN agitation      LABS:                        8.1    15.9  )-----------( 238      ( 30 Apr 2018 06:43 )             26.0     04-30    145  |  103  |  39<H>  ----------------------------<  109<H>  4.1   |  32<H>  |  0.81    Ca    8.7      30 Apr 2018 06:50  Phos  2.9     04-30  Mg     2.3     04-30              CAPILLARY BLOOD GLUCOSE    MICROBIOLOGY:     RADIOLOGY:  [ ] Reviewed and interpreted by me    Mode: AC/ CMV (Assist Control/ Continuous Mandatory Ventilation)  RR (machine): 18  TV (machine): 550  FiO2: 40  PEEP: 5  ITime: 1  MAP: 10  PIP: 26 Patient is a 77y old  Male who presents with a chief complaint of pelvic fx with active extrav (27 Mar 2018 12:01)      Interval Events: none    REVIEW OF SYSTEMS:  [ ] Positive  [ ] All other systems negative  [x ] Unable to assess ROS because pt minimally communicative     Vital Signs Last 24 Hrs  T(C): 36.8 (04-30-18 @ 04:52), Max: 37.6 (04-29-18 @ 23:52)  T(F): 98.3 (04-30-18 @ 04:52), Max: 99.6 (04-29-18 @ 23:52)  HR: 68 (04-30-18 @ 05:27) (63 - 122)  BP: 135/56 (04-30-18 @ 04:52) (110/70 - 153/67)  RR: 19 (04-30-18 @ 04:52) (16 - 22)  SpO2: 98% (04-30-18 @ 05:27) (96% - 100%)    PHYSICAL EXAM:  HEENT:   [x ]Tracheostomy:  [ ]Pupils equal  [ ]No oral lesions  [ ]Abnormal    SKIN  [x ]No Rash  [ ] Abnormal  [ ] pressure    CARDIAC  [x ]Regular  [ ]Abnormal    PULMONARY  [x ]Bilateral Clear Breath Sounds  [ ]Normal Excursion  [ ]Abnormal    GI  [x ]PEG      [x ] +BS		              [x ]Soft, nondistended, nontender	  [ ]Abnormal    MUSCULOSKELETAL                                   [ ]Bedbound                 [ ]Abnormal    [ x]Ambulatory/OOB to chair         with assist                  EXTREMITIES                                         [ ]Normal  [x ]Edema                           NEUROLOGIC  [ ] Normal, non focal  [ ] Focal findings:  eyes open spontaneously, follows very simple commands, moves extremities  PSYCHIATRIC  [x ]Alert now. Was asleep or sedated early in am  [ ] Sedated	 [ ]Agitated    :  Ybarra: [ ] YES, if yes: Date of Placement                        [x  ] NO      LINES: TLC [ ]YES, if yes: Date of Placement                    [x ] No    HOSPITAL MEDICATIONS:  MEDICATIONS  (STANDING):  ALBUTerol/ipratropium for Nebulization 3 milliLiter(s) Nebulizer every 6 hours  aspirin  chewable 81 milliGRAM(s) Oral daily  clopidogrel Tablet 75 milliGRAM(s) Oral daily  doxazosin 2 milliGRAM(s) Oral at bedtime  enoxaparin Injectable 40 milliGRAM(s) SubCutaneous daily  famotidine    Tablet 20 milliGRAM(s) Oral two times a day  insulin lispro (HumaLOG) corrective regimen sliding scale   SubCutaneous every 6 hours  insulin NPH human recombinant 6 Unit(s) SubCutaneous <User Schedule>  insulin NPH human recombinant 3 Unit(s) SubCutaneous <User Schedule>  lactobacillus acidophilus 1 Tablet(s) Oral every 12 hours  lidocaine   Patch 1 Patch Transdermal daily  lidocaine   Patch 1 Patch Transdermal daily  LORazepam   Injectable 1 milliGRAM(s) IV Push every 4 hours  methylPREDNISolone sodium succinate Injectable 20 milliGRAM(s) IV Push every 12 hours  metoclopramide Injectable 5 milliGRAM(s) IV Push every 8 hours  metoprolol tartrate 100 milliGRAM(s) Oral every 12 hours  mirtazapine 15 milliGRAM(s) Oral <User Schedule>  nystatin    Suspension [id number] Unit(s) Oral four times a day  piperacillin/tazobactam IVPB. 3.375 Gram(s) IV Intermittent every 8 hours  QUEtiapine 25 milliGRAM(s) Oral every 12 hours  simethicone 80 milliGRAM(s) Chew every 8 hours  simvastatin 40 milliGRAM(s) Oral at bedtime  sodium chloride 7% Inhalation 3 milliLiter(s) Inhalation every 12 hours  valproic  acid Syrup 125 milliGRAM(s) Oral at bedtime  valproic  acid Syrup 125 milliGRAM(s) Oral daily  vancomycin    Solution 125 milliGRAM(s) Oral every 6 hours  vancomycin  IVPB 750 milliGRAM(s) IV Intermittent every 12 hours    MEDICATIONS  (PRN):  acetaminophen    Suspension. 650 milliGRAM(s) Oral every 6 hours PRN Mild Pain (1 - 3)  acetaminophen    Suspension. 650 milliGRAM(s) Oral every 6 hours PRN mild to moderate pain  melatonin 3 milliGRAM(s) Oral at bedtime PRN Insomnia  QUEtiapine 12.5 milliGRAM(s) Oral every 6 hours PRN agitation      LABS:                        8.1    15.9  )-----------( 238      ( 30 Apr 2018 06:43 )             26.0     04-30    145  |  103  |  39<H>  ----------------------------<  109<H>  4.1   |  32<H>  |  0.81    Ca    8.7      30 Apr 2018 06:50  Phos  2.9     04-30  Mg     2.3     04-30              CAPILLARY BLOOD GLUCOSE    MICROBIOLOGY:     RADIOLOGY:  [ ] Reviewed and interpreted by me    Mode: AC/ CMV (Assist Control/ Continuous Mandatory Ventilation)  RR (machine): 18  TV (machine): 550  FiO2: 40  PEEP: 5  ITime: 1  MAP: 10  PIP: 26
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
## 2018-05-01 LAB
ANION GAP SERPL CALC-SCNC: 10 MMOL/L — SIGNIFICANT CHANGE UP (ref 5–17)
BUN SERPL-MCNC: 44 MG/DL — HIGH (ref 7–23)
CALCIUM SERPL-MCNC: 8.8 MG/DL — SIGNIFICANT CHANGE UP (ref 8.4–10.5)
CHLORIDE SERPL-SCNC: 102 MMOL/L — SIGNIFICANT CHANGE UP (ref 96–108)
CO2 SERPL-SCNC: 31 MMOL/L — SIGNIFICANT CHANGE UP (ref 22–31)
CREAT SERPL-MCNC: 0.79 MG/DL — SIGNIFICANT CHANGE UP (ref 0.5–1.3)
GLUCOSE SERPL-MCNC: 93 MG/DL — SIGNIFICANT CHANGE UP (ref 70–99)
HCT VFR BLD CALC: 29.6 % — LOW (ref 39–50)
HGB BLD-MCNC: 9.5 G/DL — LOW (ref 13–17)
MAGNESIUM SERPL-MCNC: 2.4 MG/DL — SIGNIFICANT CHANGE UP (ref 1.6–2.6)
MCHC RBC-ENTMCNC: 32.1 GM/DL — SIGNIFICANT CHANGE UP (ref 32–36)
MCHC RBC-ENTMCNC: 33.2 PG — SIGNIFICANT CHANGE UP (ref 27–34)
MCV RBC AUTO: 104 FL — HIGH (ref 80–100)
PHOSPHATE SERPL-MCNC: 2.6 MG/DL — SIGNIFICANT CHANGE UP (ref 2.5–4.5)
PLATELET # BLD AUTO: 230 K/UL — SIGNIFICANT CHANGE UP (ref 150–400)
POTASSIUM SERPL-MCNC: 4.5 MMOL/L — SIGNIFICANT CHANGE UP (ref 3.5–5.3)
POTASSIUM SERPL-SCNC: 4.5 MMOL/L — SIGNIFICANT CHANGE UP (ref 3.5–5.3)
RBC # BLD: 2.85 M/UL — LOW (ref 4.2–5.8)
RBC # FLD: 16.5 % — HIGH (ref 10.3–14.5)
SODIUM SERPL-SCNC: 143 MMOL/L — SIGNIFICANT CHANGE UP (ref 135–145)
WBC # BLD: 11.2 K/UL — HIGH (ref 3.8–10.5)
WBC # FLD AUTO: 11.2 K/UL — HIGH (ref 3.8–10.5)

## 2018-05-01 PROCEDURE — 93970 EXTREMITY STUDY: CPT | Mod: 26

## 2018-05-01 PROCEDURE — 99232 SBSQ HOSP IP/OBS MODERATE 35: CPT

## 2018-05-01 PROCEDURE — 71045 X-RAY EXAM CHEST 1 VIEW: CPT | Mod: 26

## 2018-05-01 RX ORDER — HALOPERIDOL DECANOATE 100 MG/ML
1 INJECTION INTRAMUSCULAR ONCE
Qty: 0 | Refills: 0 | Status: COMPLETED | OUTPATIENT
Start: 2018-05-01 | End: 2018-05-01

## 2018-05-01 RX ORDER — FUROSEMIDE 40 MG
20 TABLET ORAL ONCE
Qty: 0 | Refills: 0 | Status: COMPLETED | OUTPATIENT
Start: 2018-05-01 | End: 2018-05-01

## 2018-05-01 RX ADMIN — Medication 2 MILLIGRAM(S): at 21:32

## 2018-05-01 RX ADMIN — Medication 125 MILLIGRAM(S): at 12:07

## 2018-05-01 RX ADMIN — SODIUM CHLORIDE 3 MILLILITER(S): 9 INJECTION INTRAMUSCULAR; INTRAVENOUS; SUBCUTANEOUS at 17:33

## 2018-05-01 RX ADMIN — Medication 5 MILLIGRAM(S): at 21:31

## 2018-05-01 RX ADMIN — SODIUM CHLORIDE 3 MILLILITER(S): 9 INJECTION INTRAMUSCULAR; INTRAVENOUS; SUBCUTANEOUS at 05:01

## 2018-05-01 RX ADMIN — SIMETHICONE 80 MILLIGRAM(S): 80 TABLET, CHEWABLE ORAL at 21:30

## 2018-05-01 RX ADMIN — HUMAN INSULIN 6 UNIT(S): 100 INJECTION, SUSPENSION SUBCUTANEOUS at 13:53

## 2018-05-01 RX ADMIN — Medication 1 TABLET(S): at 17:56

## 2018-05-01 RX ADMIN — Medication 2: at 13:53

## 2018-05-01 RX ADMIN — Medication 3 MILLILITER(S): at 12:42

## 2018-05-01 RX ADMIN — QUETIAPINE FUMARATE 25 MILLIGRAM(S): 200 TABLET, FILM COATED ORAL at 21:19

## 2018-05-01 RX ADMIN — SIMETHICONE 80 MILLIGRAM(S): 80 TABLET, CHEWABLE ORAL at 05:19

## 2018-05-01 RX ADMIN — HALOPERIDOL DECANOATE 1 MILLIGRAM(S): 100 INJECTION INTRAMUSCULAR at 10:38

## 2018-05-01 RX ADMIN — ENOXAPARIN SODIUM 40 MILLIGRAM(S): 100 INJECTION SUBCUTANEOUS at 12:08

## 2018-05-01 RX ADMIN — CLOPIDOGREL BISULFATE 75 MILLIGRAM(S): 75 TABLET, FILM COATED ORAL at 12:08

## 2018-05-01 RX ADMIN — Medication 81 MILLIGRAM(S): at 12:08

## 2018-05-01 RX ADMIN — QUETIAPINE FUMARATE 25 MILLIGRAM(S): 200 TABLET, FILM COATED ORAL at 17:56

## 2018-05-01 RX ADMIN — SIMETHICONE 80 MILLIGRAM(S): 80 TABLET, CHEWABLE ORAL at 13:53

## 2018-05-01 RX ADMIN — Medication 3 MILLILITER(S): at 05:01

## 2018-05-01 RX ADMIN — Medication 500000 UNIT(S): at 12:07

## 2018-05-01 RX ADMIN — Medication 1 TABLET(S): at 05:19

## 2018-05-01 RX ADMIN — Medication 3 MILLILITER(S): at 00:05

## 2018-05-01 RX ADMIN — SIMVASTATIN 40 MILLIGRAM(S): 20 TABLET, FILM COATED ORAL at 21:32

## 2018-05-01 RX ADMIN — HUMAN INSULIN 6 UNIT(S): 100 INJECTION, SUSPENSION SUBCUTANEOUS at 05:47

## 2018-05-01 RX ADMIN — Medication 100 MILLIGRAM(S): at 13:52

## 2018-05-01 RX ADMIN — Medication 500000 UNIT(S): at 17:56

## 2018-05-01 RX ADMIN — Medication 3 MILLILITER(S): at 17:31

## 2018-05-01 RX ADMIN — Medication 500000 UNIT(S): at 05:19

## 2018-05-01 RX ADMIN — Medication 100 MILLIGRAM(S): at 12:07

## 2018-05-01 RX ADMIN — FAMOTIDINE 20 MILLIGRAM(S): 10 INJECTION INTRAVENOUS at 05:19

## 2018-05-01 RX ADMIN — Medication 125 MILLIGRAM(S): at 21:32

## 2018-05-01 RX ADMIN — Medication 100 MILLIGRAM(S): at 21:30

## 2018-05-01 RX ADMIN — Medication 5 MILLIGRAM(S): at 05:19

## 2018-05-01 RX ADMIN — HALOPERIDOL DECANOATE 1 MILLIGRAM(S): 100 INJECTION INTRAMUSCULAR at 19:57

## 2018-05-01 RX ADMIN — Medication 3 MILLILITER(S): at 23:53

## 2018-05-01 RX ADMIN — LIDOCAINE 1 PATCH: 4 CREAM TOPICAL at 12:08

## 2018-05-01 RX ADMIN — Medication 125 MILLIGRAM(S): at 17:56

## 2018-05-01 RX ADMIN — Medication 5 MILLIGRAM(S): at 13:52

## 2018-05-01 RX ADMIN — HUMAN INSULIN 6 UNIT(S): 100 INJECTION, SUSPENSION SUBCUTANEOUS at 17:56

## 2018-05-01 RX ADMIN — Medication 125 MILLIGRAM(S): at 13:52

## 2018-05-01 RX ADMIN — Medication 20 MILLIGRAM(S): at 14:50

## 2018-05-01 RX ADMIN — LIDOCAINE 1 PATCH: 4 CREAM TOPICAL at 12:07

## 2018-05-01 RX ADMIN — FAMOTIDINE 20 MILLIGRAM(S): 10 INJECTION INTRAVENOUS at 17:56

## 2018-05-01 RX ADMIN — HALOPERIDOL DECANOATE 1 MILLIGRAM(S): 100 INJECTION INTRAMUSCULAR at 23:25

## 2018-05-01 RX ADMIN — Medication 100 MILLIGRAM(S): at 05:20

## 2018-05-01 RX ADMIN — HALOPERIDOL DECANOATE 1 MILLIGRAM(S): 100 INJECTION INTRAMUSCULAR at 01:22

## 2018-05-01 RX ADMIN — QUETIAPINE FUMARATE 25 MILLIGRAM(S): 200 TABLET, FILM COATED ORAL at 05:19

## 2018-05-01 NOTE — PROGRESS NOTE ADULT - PROBLEM SELECTOR PLAN 8
Valproic acid increased to  125 mg q am and qhs   Seroquel increased to 25 mg q am and 25 mg qhs    Continue PRN Seroquel for breakthrough agitation    EKG:  on 4/29, Continue to monitor Qtc    Pt was sedated this am, will change Ativan prn use only. Seroquel prn and Haldol prn severe agitation   Continue Melatonin prn Insomnia    Continue Mirtazapine

## 2018-05-01 NOTE — PROGRESS NOTE ADULT - ATTENDING COMMENTS
Agree with above.  Patient on vent via trache.  MSSA in sputum 4/24, now on cefazolin day 8 of 10.  Last CXR with vascular congestion, fluid in fissure. Was diuresed. Still some edema on exam, will diurese again for day and re-evaluate.  Psupport trials.  Known B/L below knee DVTs with questionable propagation. Checking today. If no propagation, then can stop serial duplex.  Episodes of agitation, on Seroquel and valproic acid.    Code status: Full code

## 2018-05-01 NOTE — PROGRESS NOTE ADULT - PROBLEM SELECTOR PLAN 7
Venous Duplex 4/1: Acute DVT of Rt and Lft Soleal Vein   Venous Duplex 4/10: Patient with persistent Soleal DVT b/l w/o propagation   Venous Duplex 4/18: Patient with New left peroneal DVT with persistent soleal DVT.  Venous Duplex 4/24: Persistent b/l soleal dvt, peroneal dvt not identified  Repeat Surveillance Duplex to be performed on 5/1-ordered

## 2018-05-01 NOTE — PROGRESS NOTE ADULT - PROBLEM SELECTOR PLAN 3
Sputum cx 4/25: MSSA, will dc Vanco and Zosyn. Will start Cefazolin   Patient placed on PO Vanco empirically as wife reports hx of C.diff with abx use

## 2018-05-01 NOTE — PROGRESS NOTE ADULT - PROBLEM SELECTOR PLAN 1
+ Acute left 4th-8th rib fractures  + Left superior ramus fracture with a large extraperitoneal hematoma    + Left inferior pubic ramus fracture / right superior pubic ramus fracture  + Left L5 lamina & hemisacrum fractures  + Several spleen lacerations/  Grade 2 left kidney laceration  Patient S/P IR Glue and Coil embolization  CT Scan 4/14: Extraperitoneal hematoma slightly increased in size compared to prior imaging with increasing organization   Trauma Sx [place] enlarging hematoma likely represents redistribution of hematoma, no intervention performed

## 2018-05-01 NOTE — PROGRESS NOTE ADULT - PROBLEM SELECTOR PLAN 2
Pt S/p tracheostomy by trauma SX on 4/5  CXR 4/27: Lung Opacities bilaterally Infection vs Pulmonary Edema   Pt now requiring Mechanical ventilation, Trach changed to # 7 Cuffed Portex (4/27)   Patient tolerated some weaning 4/29 but became restless and had to go back on full support   Solumedrol tapered to off   Continue Chest PT and Suctioning PRN

## 2018-05-02 LAB
ANION GAP SERPL CALC-SCNC: 8 MMOL/L — SIGNIFICANT CHANGE UP (ref 5–17)
BUN SERPL-MCNC: 40 MG/DL — HIGH (ref 7–23)
CALCIUM SERPL-MCNC: 9 MG/DL — SIGNIFICANT CHANGE UP (ref 8.4–10.5)
CHLORIDE SERPL-SCNC: 101 MMOL/L — SIGNIFICANT CHANGE UP (ref 96–108)
CO2 SERPL-SCNC: 34 MMOL/L — HIGH (ref 22–31)
CREAT SERPL-MCNC: 0.67 MG/DL — SIGNIFICANT CHANGE UP (ref 0.5–1.3)
GLUCOSE SERPL-MCNC: 74 MG/DL — SIGNIFICANT CHANGE UP (ref 70–99)
HCT VFR BLD CALC: 26.9 % — LOW (ref 39–50)
HGB BLD-MCNC: 8.7 G/DL — LOW (ref 13–17)
MAGNESIUM SERPL-MCNC: 2.2 MG/DL — SIGNIFICANT CHANGE UP (ref 1.6–2.6)
MCHC RBC-ENTMCNC: 32.5 GM/DL — SIGNIFICANT CHANGE UP (ref 32–36)
MCHC RBC-ENTMCNC: 33.5 PG — SIGNIFICANT CHANGE UP (ref 27–34)
MCV RBC AUTO: 103 FL — HIGH (ref 80–100)
PHOSPHATE SERPL-MCNC: 2.4 MG/DL — LOW (ref 2.5–4.5)
PLATELET # BLD AUTO: 227 K/UL — SIGNIFICANT CHANGE UP (ref 150–400)
POTASSIUM SERPL-MCNC: 4.1 MMOL/L — SIGNIFICANT CHANGE UP (ref 3.5–5.3)
POTASSIUM SERPL-SCNC: 4.1 MMOL/L — SIGNIFICANT CHANGE UP (ref 3.5–5.3)
RBC # BLD: 2.6 M/UL — LOW (ref 4.2–5.8)
RBC # FLD: 16.1 % — HIGH (ref 10.3–14.5)
SODIUM SERPL-SCNC: 143 MMOL/L — SIGNIFICANT CHANGE UP (ref 135–145)
WBC # BLD: 12.9 K/UL — HIGH (ref 3.8–10.5)
WBC # FLD AUTO: 12.9 K/UL — HIGH (ref 3.8–10.5)

## 2018-05-02 PROCEDURE — 99232 SBSQ HOSP IP/OBS MODERATE 35: CPT

## 2018-05-02 RX ORDER — ALBUMIN HUMAN 25 %
50 VIAL (ML) INTRAVENOUS ONCE
Qty: 0 | Refills: 0 | Status: COMPLETED | OUTPATIENT
Start: 2018-05-02 | End: 2018-05-02

## 2018-05-02 RX ORDER — HALOPERIDOL DECANOATE 100 MG/ML
2 INJECTION INTRAMUSCULAR EVERY 8 HOURS
Qty: 0 | Refills: 0 | Status: DISCONTINUED | OUTPATIENT
Start: 2018-05-02 | End: 2018-05-05

## 2018-05-02 RX ORDER — FUROSEMIDE 40 MG
40 TABLET ORAL ONCE
Qty: 0 | Refills: 0 | Status: COMPLETED | OUTPATIENT
Start: 2018-05-02 | End: 2018-05-02

## 2018-05-02 RX ORDER — HUMAN INSULIN 100 [IU]/ML
2 INJECTION, SUSPENSION SUBCUTANEOUS ONCE
Qty: 0 | Refills: 0 | Status: COMPLETED | OUTPATIENT
Start: 2018-05-02 | End: 2018-05-03

## 2018-05-02 RX ORDER — METOPROLOL TARTRATE 50 MG
5 TABLET ORAL ONCE
Qty: 0 | Refills: 0 | Status: COMPLETED | OUTPATIENT
Start: 2018-05-02 | End: 2018-05-03

## 2018-05-02 RX ADMIN — Medication 3 MILLILITER(S): at 05:09

## 2018-05-02 RX ADMIN — SIMETHICONE 80 MILLIGRAM(S): 80 TABLET, CHEWABLE ORAL at 06:37

## 2018-05-02 RX ADMIN — HUMAN INSULIN 6 UNIT(S): 100 INJECTION, SUSPENSION SUBCUTANEOUS at 13:04

## 2018-05-02 RX ADMIN — LIDOCAINE 1 PATCH: 4 CREAM TOPICAL at 23:55

## 2018-05-02 RX ADMIN — HALOPERIDOL DECANOATE 2 MILLIGRAM(S): 100 INJECTION INTRAMUSCULAR at 19:30

## 2018-05-02 RX ADMIN — CLOPIDOGREL BISULFATE 75 MILLIGRAM(S): 75 TABLET, FILM COATED ORAL at 13:04

## 2018-05-02 RX ADMIN — Medication 1 TABLET(S): at 06:34

## 2018-05-02 RX ADMIN — FAMOTIDINE 20 MILLIGRAM(S): 10 INJECTION INTRAVENOUS at 17:36

## 2018-05-02 RX ADMIN — QUETIAPINE FUMARATE 25 MILLIGRAM(S): 200 TABLET, FILM COATED ORAL at 14:51

## 2018-05-02 RX ADMIN — Medication 2: at 00:02

## 2018-05-02 RX ADMIN — Medication 125 MILLIGRAM(S): at 21:10

## 2018-05-02 RX ADMIN — SODIUM CHLORIDE 3 MILLILITER(S): 9 INJECTION INTRAMUSCULAR; INTRAVENOUS; SUBCUTANEOUS at 05:12

## 2018-05-02 RX ADMIN — Medication 100 MILLIGRAM(S): at 00:03

## 2018-05-02 RX ADMIN — Medication 500000 UNIT(S): at 17:36

## 2018-05-02 RX ADMIN — HUMAN INSULIN 6 UNIT(S): 100 INJECTION, SUSPENSION SUBCUTANEOUS at 19:38

## 2018-05-02 RX ADMIN — Medication 500000 UNIT(S): at 06:36

## 2018-05-02 RX ADMIN — QUETIAPINE FUMARATE 25 MILLIGRAM(S): 200 TABLET, FILM COATED ORAL at 17:22

## 2018-05-02 RX ADMIN — Medication 650 MILLIGRAM(S): at 09:55

## 2018-05-02 RX ADMIN — LIDOCAINE 1 PATCH: 4 CREAM TOPICAL at 00:20

## 2018-05-02 RX ADMIN — Medication 5 MILLIGRAM(S): at 13:06

## 2018-05-02 RX ADMIN — Medication 100 MILLIGRAM(S): at 21:08

## 2018-05-02 RX ADMIN — HUMAN INSULIN 6 UNIT(S): 100 INJECTION, SUSPENSION SUBCUTANEOUS at 06:37

## 2018-05-02 RX ADMIN — Medication 125 MILLIGRAM(S): at 17:37

## 2018-05-02 RX ADMIN — Medication 3 MILLIGRAM(S): at 23:14

## 2018-05-02 RX ADMIN — Medication 81 MILLIGRAM(S): at 13:04

## 2018-05-02 RX ADMIN — QUETIAPINE FUMARATE 25 MILLIGRAM(S): 200 TABLET, FILM COATED ORAL at 21:09

## 2018-05-02 RX ADMIN — SIMETHICONE 80 MILLIGRAM(S): 80 TABLET, CHEWABLE ORAL at 21:08

## 2018-05-02 RX ADMIN — SIMVASTATIN 40 MILLIGRAM(S): 20 TABLET, FILM COATED ORAL at 21:08

## 2018-05-02 RX ADMIN — QUETIAPINE FUMARATE 25 MILLIGRAM(S): 200 TABLET, FILM COATED ORAL at 06:36

## 2018-05-02 RX ADMIN — Medication 125 MILLIGRAM(S): at 13:02

## 2018-05-02 RX ADMIN — Medication 1 TABLET(S): at 17:36

## 2018-05-02 RX ADMIN — Medication 125 MILLIGRAM(S): at 00:03

## 2018-05-02 RX ADMIN — Medication 100 MILLIGRAM(S): at 06:37

## 2018-05-02 RX ADMIN — Medication 2: at 13:04

## 2018-05-02 RX ADMIN — ENOXAPARIN SODIUM 40 MILLIGRAM(S): 100 INJECTION SUBCUTANEOUS at 13:03

## 2018-05-02 RX ADMIN — Medication 125 MILLIGRAM(S): at 06:35

## 2018-05-02 RX ADMIN — Medication 2 MILLIGRAM(S): at 21:08

## 2018-05-02 RX ADMIN — Medication 5 MILLIGRAM(S): at 21:10

## 2018-05-02 RX ADMIN — Medication 3 MILLILITER(S): at 11:33

## 2018-05-02 RX ADMIN — Medication 500000 UNIT(S): at 13:02

## 2018-05-02 RX ADMIN — SODIUM CHLORIDE 3 MILLILITER(S): 9 INJECTION INTRAMUSCULAR; INTRAVENOUS; SUBCUTANEOUS at 17:30

## 2018-05-02 RX ADMIN — HUMAN INSULIN 3 UNIT(S): 100 INJECTION, SUSPENSION SUBCUTANEOUS at 00:03

## 2018-05-02 RX ADMIN — Medication 3 MILLILITER(S): at 17:30

## 2018-05-02 RX ADMIN — FAMOTIDINE 20 MILLIGRAM(S): 10 INJECTION INTRAVENOUS at 06:35

## 2018-05-02 RX ADMIN — Medication 40 MILLIGRAM(S): at 15:04

## 2018-05-02 RX ADMIN — LIDOCAINE 1 PATCH: 4 CREAM TOPICAL at 00:02

## 2018-05-02 RX ADMIN — SIMETHICONE 80 MILLIGRAM(S): 80 TABLET, CHEWABLE ORAL at 13:06

## 2018-05-02 RX ADMIN — Medication 500000 UNIT(S): at 00:03

## 2018-05-02 RX ADMIN — LIDOCAINE 1 PATCH: 4 CREAM TOPICAL at 13:05

## 2018-05-02 RX ADMIN — LIDOCAINE 1 PATCH: 4 CREAM TOPICAL at 13:04

## 2018-05-02 RX ADMIN — Medication 125 MILLIGRAM(S): at 13:03

## 2018-05-02 RX ADMIN — Medication 100 MILLIGRAM(S): at 13:03

## 2018-05-02 RX ADMIN — Medication 5 MILLIGRAM(S): at 06:35

## 2018-05-02 RX ADMIN — Medication 100 MILLIGRAM(S): at 14:25

## 2018-05-02 RX ADMIN — HALOPERIDOL DECANOATE 1 MILLIGRAM(S): 100 INJECTION INTRAMUSCULAR at 11:37

## 2018-05-02 RX ADMIN — Medication 50 MILLILITER(S): at 13:02

## 2018-05-02 RX ADMIN — QUETIAPINE FUMARATE 12.5 MILLIGRAM(S): 200 TABLET, FILM COATED ORAL at 12:17

## 2018-05-02 NOTE — PROGRESS NOTE BEHAVIORAL HEALTH - SECONDARY DX1
Adjustment disorder with mixed disturbance of emotions and conduct

## 2018-05-02 NOTE — PROGRESS NOTE ADULT - SUBJECTIVE AND OBJECTIVE BOX
Patient is a 77y old  Male who presents with a chief complaint of pelvic fx with active extrav (27 Mar 2018 12:01)      Interval Events:    REVIEW OF SYSTEMS:  [ ] Positive  [ ] All other systems negative  [ x] Unable to assess ROS because ________    Vital Signs Last 24 Hrs  T(C): 36.6 (05-02-18 @ 09:39), Max: 36.8 (05-01-18 @ 17:04)  T(F): 97.8 (05-02-18 @ 09:39), Max: 98.2 (05-01-18 @ 17:04)  HR: 80 (05-02-18 @ 09:39) (61 - 91)  BP: 137/69 (05-02-18 @ 09:39) (109/61 - 158/68)  RR: 18 (05-02-18 @ 09:39) (18 - 25)  SpO2: 95% (05-02-18 @ 09:39) (93% - 100%)    PHYSICAL EXAM:  HEENT:   [x ]Tracheostomy: 7 portex cuffed  [ ]Pupils equal  [ ]No oral lesions  [ ]Abnormal    SKIN  [ x]No Rash  [ ] Abnormal  [ ] pressure    CARDIAC  [ x]Regular  [ ]Abnormal    PULMONARY  [x ]Bilateral Clear Breath Sounds  [ ]Normal Excursion  [ ]Abnormal    GI  [x ]PEG      [x ] +BS		              [ x]Soft, nondistended, nontender	  [ ]Abnormal    MUSCULOSKELETAL                                   [ ]Bedbound                 [ ]Abnormal    [x ]Ambulatory/OOB to chair                           EXTREMITIES                                         [ ]Normal  [ ]Edema                           NEUROLOGIC  [ ] Normal, non focal  [ ] Focal findings:    PSYCHIATRIC  [x ]Alert and appropriate  [ ] Sedated	 [ ]Agitated    :  Ybarra: [ ] Yes, if yes: Date of Placement:                   [ x ] No    LINES: [place] [ ] Yes, if yes: Date of Placement                                     [x  ] No    HOSPITAL MEDICATIONS:  MEDICATIONS  (STANDING):  ALBUTerol/ipratropium for Nebulization 3 milliLiter(s) Nebulizer every 6 hours  aspirin  chewable 81 milliGRAM(s) Oral daily  ceFAZolin   IVPB      ceFAZolin   IVPB 1000 milliGRAM(s) IV Intermittent every 8 hours  clopidogrel Tablet 75 milliGRAM(s) Oral daily  doxazosin 2 milliGRAM(s) Oral at bedtime  enoxaparin Injectable 40 milliGRAM(s) SubCutaneous daily  famotidine    Tablet 20 milliGRAM(s) Oral two times a day  insulin lispro (HumaLOG) corrective regimen sliding scale   SubCutaneous every 6 hours  insulin NPH human recombinant 6 Unit(s) SubCutaneous <User Schedule>  insulin NPH human recombinant 3 Unit(s) SubCutaneous <User Schedule>  lactobacillus acidophilus 1 Tablet(s) Oral every 12 hours  lidocaine   Patch 1 Patch Transdermal daily  lidocaine   Patch 1 Patch Transdermal daily  metoclopramide Injectable 5 milliGRAM(s) IV Push every 8 hours  metoprolol tartrate 100 milliGRAM(s) Oral every 12 hours  nystatin    Suspension 260466 Unit(s) Oral four times a day  QUEtiapine 25 milliGRAM(s) Oral every 12 hours  simethicone 80 milliGRAM(s) Chew every 8 hours  simvastatin 40 milliGRAM(s) Oral at bedtime  sodium chloride 7% Inhalation 3 milliLiter(s) Inhalation every 12 hours  valproic  acid Syrup 125 milliGRAM(s) Oral at bedtime  valproic  acid Syrup 125 milliGRAM(s) Oral daily  vancomycin    Solution 125 milliGRAM(s) Oral every 6 hours    MEDICATIONS  (PRN):  acetaminophen    Suspension. 650 milliGRAM(s) Oral every 6 hours PRN Mild Pain (1 - 3)  acetaminophen    Suspension. 650 milliGRAM(s) Oral every 6 hours PRN mild to moderate pain  haloperidol    Injectable 1 milliGRAM(s) IV Push every 6 hours PRN severe agitation  melatonin 3 milliGRAM(s) Oral at bedtime PRN Insomnia  QUEtiapine 12.5 milliGRAM(s) Oral every 6 hours PRN agitation  QUEtiapine 25 milliGRAM(s) Oral every 6 hours PRN agitation      LABS:                        8.7    12.9  )-----------( 227      ( 02 May 2018 06:40 )             26.9     05-02    143  |  101  |  40<H>  ----------------------------<  74  4.1   |  34<H>  |  0.67    Ca    9.0      02 May 2018 06:40  Phos  2.4     05-02  Mg     2.2     05-02              CAPILLARY BLOOD GLUCOSE    MICROBIOLOGY:     RADIOLOGY:  [ ] Reviewed and interpreted by me    Mode: Vent Off

## 2018-05-02 NOTE — PROGRESS NOTE ADULT - PROBLEM SELECTOR PLAN 1
+ Acute left 4th-8th rib fractures  + Left superior ramus fracture with a large extraperitoneal hematoma    + Left inferior pubic ramus fracture / right superior pubic ramus fracture  + Left L5 lamina & hemisacrum fractures  + Several spleen lacerations/  Grade 2 left kidney laceration  Patient S/P IR Glue and Coil embolization  CT Scan 4/14: Extraperitoneal hematoma slightly increased in size compared to prior imaging with increasing organization   Trauma Sx [place] enlarging hematoma likely represents redistribution of hematoma, no intervention performed

## 2018-05-02 NOTE — PROGRESS NOTE ADULT - PROBLEM SELECTOR PLAN 6
Continue Asa, Plavix   Continue Simvastatin   Hypotension resolved,    Hydralazine, Vasotec and Lopressor currently held , if bp remains stable will restart  Continue to monitor BP and HR

## 2018-05-02 NOTE — PROGRESS NOTE ADULT - PROBLEM SELECTOR PLAN 7
Venous Duplex 4/1: Acute DVT of Rt and Lft Soleal Vein   Venous Duplex 4/10: Patient with persistent Soleal DVT b/l w/o propagation   Venous Duplex 4/18: Patient with New left peroneal DVT with persistent soleal DVT.  Venous Duplex 4/24: Persistent b/l soleal dvt, peroneal dvt not identified  Venous Duplex 5/1: Persistent b/l soleal dvt, no propagation noted. Next Duplex next month on or around 6/1
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
## 2018-05-02 NOTE — PROGRESS NOTE ADULT - ATTENDING COMMENTS
Agree with above.  Full vent via trache. Was on trache collar for ~6 hours yesterday.  On Ancef. PO vanco empirically.  Afebrile VSS.  Hypersal and NEBs  Small amount of edema at ankles. Will attempt some diuresis (net negative 1L). Monitor for contraction alkalosis, no evidence of chronic retention, can give Diamox if bicarb gets too high. Reduced free water boluses.    Code status: Full code.

## 2018-05-03 LAB
ANION GAP SERPL CALC-SCNC: 9 MMOL/L — SIGNIFICANT CHANGE UP (ref 5–17)
BUN SERPL-MCNC: 33 MG/DL — HIGH (ref 7–23)
CALCIUM SERPL-MCNC: 9 MG/DL — SIGNIFICANT CHANGE UP (ref 8.4–10.5)
CHLORIDE SERPL-SCNC: 100 MMOL/L — SIGNIFICANT CHANGE UP (ref 96–108)
CO2 SERPL-SCNC: 32 MMOL/L — HIGH (ref 22–31)
CREAT SERPL-MCNC: 0.65 MG/DL — SIGNIFICANT CHANGE UP (ref 0.5–1.3)
GLUCOSE SERPL-MCNC: 77 MG/DL — SIGNIFICANT CHANGE UP (ref 70–99)
HCT VFR BLD CALC: 28 % — LOW (ref 39–50)
HGB BLD-MCNC: 8.7 G/DL — LOW (ref 13–17)
MAGNESIUM SERPL-MCNC: 2.1 MG/DL — SIGNIFICANT CHANGE UP (ref 1.6–2.6)
MCHC RBC-ENTMCNC: 31.2 GM/DL — LOW (ref 32–36)
MCHC RBC-ENTMCNC: 32.1 PG — SIGNIFICANT CHANGE UP (ref 27–34)
MCV RBC AUTO: 103 FL — HIGH (ref 80–100)
PHOSPHATE SERPL-MCNC: 1.8 MG/DL — LOW (ref 2.5–4.5)
PLATELET # BLD AUTO: 249 K/UL — SIGNIFICANT CHANGE UP (ref 150–400)
POTASSIUM SERPL-MCNC: 3.9 MMOL/L — SIGNIFICANT CHANGE UP (ref 3.5–5.3)
POTASSIUM SERPL-SCNC: 3.9 MMOL/L — SIGNIFICANT CHANGE UP (ref 3.5–5.3)
RBC # BLD: 2.72 M/UL — LOW (ref 4.2–5.8)
RBC # FLD: 16.2 % — HIGH (ref 10.3–14.5)
SODIUM SERPL-SCNC: 141 MMOL/L — SIGNIFICANT CHANGE UP (ref 135–145)
WBC # BLD: 12.2 K/UL — HIGH (ref 3.8–10.5)
WBC # FLD AUTO: 12.2 K/UL — HIGH (ref 3.8–10.5)

## 2018-05-03 PROCEDURE — 99233 SBSQ HOSP IP/OBS HIGH 50: CPT

## 2018-05-03 PROCEDURE — 93010 ELECTROCARDIOGRAM REPORT: CPT

## 2018-05-03 RX ORDER — QUETIAPINE FUMARATE 200 MG/1
50 TABLET, FILM COATED ORAL EVERY 6 HOURS
Qty: 0 | Refills: 0 | Status: DISCONTINUED | OUTPATIENT
Start: 2018-05-03 | End: 2018-05-03

## 2018-05-03 RX ORDER — HALOPERIDOL DECANOATE 100 MG/ML
2 INJECTION INTRAMUSCULAR ONCE
Qty: 0 | Refills: 0 | Status: COMPLETED | OUTPATIENT
Start: 2018-05-03 | End: 2018-05-03

## 2018-05-03 RX ORDER — QUETIAPINE FUMARATE 200 MG/1
50 TABLET, FILM COATED ORAL EVERY 12 HOURS
Qty: 0 | Refills: 0 | Status: DISCONTINUED | OUTPATIENT
Start: 2018-05-03 | End: 2018-05-04

## 2018-05-03 RX ORDER — HUMAN INSULIN 100 [IU]/ML
2 INJECTION, SUSPENSION SUBCUTANEOUS ONCE
Qty: 0 | Refills: 0 | Status: COMPLETED | OUTPATIENT
Start: 2018-05-03 | End: 2018-05-04

## 2018-05-03 RX ORDER — HYDRALAZINE HCL 50 MG
5 TABLET ORAL EVERY 6 HOURS
Qty: 0 | Refills: 0 | Status: DISCONTINUED | OUTPATIENT
Start: 2018-05-03 | End: 2018-05-03

## 2018-05-03 RX ORDER — QUETIAPINE FUMARATE 200 MG/1
50 TABLET, FILM COATED ORAL EVERY 6 HOURS
Qty: 0 | Refills: 0 | Status: DISCONTINUED | OUTPATIENT
Start: 2018-05-03 | End: 2018-05-18

## 2018-05-03 RX ADMIN — Medication 650 MILLIGRAM(S): at 13:46

## 2018-05-03 RX ADMIN — Medication 5 MILLIGRAM(S): at 13:30

## 2018-05-03 RX ADMIN — Medication 100 MILLIGRAM(S): at 23:22

## 2018-05-03 RX ADMIN — Medication 100 MILLIGRAM(S): at 13:30

## 2018-05-03 RX ADMIN — LIDOCAINE 1 PATCH: 4 CREAM TOPICAL at 11:21

## 2018-05-03 RX ADMIN — Medication 81 MILLIGRAM(S): at 13:47

## 2018-05-03 RX ADMIN — HALOPERIDOL DECANOATE 2 MILLIGRAM(S): 100 INJECTION INTRAMUSCULAR at 03:30

## 2018-05-03 RX ADMIN — CLOPIDOGREL BISULFATE 75 MILLIGRAM(S): 75 TABLET, FILM COATED ORAL at 13:47

## 2018-05-03 RX ADMIN — Medication 3 MILLILITER(S): at 23:29

## 2018-05-03 RX ADMIN — HALOPERIDOL DECANOATE 2 MILLIGRAM(S): 100 INJECTION INTRAMUSCULAR at 20:37

## 2018-05-03 RX ADMIN — Medication 3 MILLILITER(S): at 00:06

## 2018-05-03 RX ADMIN — Medication 100 MILLIGRAM(S): at 05:16

## 2018-05-03 RX ADMIN — Medication 125 MILLIGRAM(S): at 21:24

## 2018-05-03 RX ADMIN — FAMOTIDINE 20 MILLIGRAM(S): 10 INJECTION INTRAVENOUS at 17:25

## 2018-05-03 RX ADMIN — Medication 125 MILLIGRAM(S): at 13:46

## 2018-05-03 RX ADMIN — Medication 2 MILLIGRAM(S): at 21:22

## 2018-05-03 RX ADMIN — Medication 125 MILLIGRAM(S): at 13:45

## 2018-05-03 RX ADMIN — Medication 3 MILLILITER(S): at 17:26

## 2018-05-03 RX ADMIN — QUETIAPINE FUMARATE 50 MILLIGRAM(S): 200 TABLET, FILM COATED ORAL at 18:21

## 2018-05-03 RX ADMIN — Medication 5 MILLIGRAM(S): at 00:04

## 2018-05-03 RX ADMIN — Medication 500000 UNIT(S): at 17:25

## 2018-05-03 RX ADMIN — Medication 3 MILLILITER(S): at 05:34

## 2018-05-03 RX ADMIN — Medication 3 MILLILITER(S): at 12:23

## 2018-05-03 RX ADMIN — HALOPERIDOL DECANOATE 2 MILLIGRAM(S): 100 INJECTION INTRAMUSCULAR at 05:38

## 2018-05-03 RX ADMIN — Medication 5 MILLIGRAM(S): at 11:57

## 2018-05-03 RX ADMIN — HUMAN INSULIN 6 UNIT(S): 100 INJECTION, SUSPENSION SUBCUTANEOUS at 14:08

## 2018-05-03 RX ADMIN — Medication 5 MILLIGRAM(S): at 05:11

## 2018-05-03 RX ADMIN — Medication 1 TABLET(S): at 17:25

## 2018-05-03 RX ADMIN — HUMAN INSULIN 6 UNIT(S): 100 INJECTION, SUSPENSION SUBCUTANEOUS at 18:21

## 2018-05-03 RX ADMIN — Medication 125 MILLIGRAM(S): at 23:22

## 2018-05-03 RX ADMIN — HUMAN INSULIN 2 UNIT(S): 100 INJECTION, SUSPENSION SUBCUTANEOUS at 00:02

## 2018-05-03 RX ADMIN — Medication 5 MILLIGRAM(S): at 21:23

## 2018-05-03 RX ADMIN — ENOXAPARIN SODIUM 40 MILLIGRAM(S): 100 INJECTION SUBCUTANEOUS at 11:21

## 2018-05-03 RX ADMIN — SIMETHICONE 80 MILLIGRAM(S): 80 TABLET, CHEWABLE ORAL at 13:47

## 2018-05-03 RX ADMIN — SIMVASTATIN 40 MILLIGRAM(S): 20 TABLET, FILM COATED ORAL at 21:25

## 2018-05-03 RX ADMIN — Medication 3 MILLIGRAM(S): at 21:22

## 2018-05-03 RX ADMIN — LIDOCAINE 1 PATCH: 4 CREAM TOPICAL at 23:00

## 2018-05-03 RX ADMIN — SODIUM CHLORIDE 3 MILLILITER(S): 9 INJECTION INTRAMUSCULAR; INTRAVENOUS; SUBCUTANEOUS at 05:35

## 2018-05-03 RX ADMIN — Medication 650 MILLIGRAM(S): at 14:35

## 2018-05-03 RX ADMIN — SIMETHICONE 80 MILLIGRAM(S): 80 TABLET, CHEWABLE ORAL at 21:23

## 2018-05-03 RX ADMIN — Medication 500000 UNIT(S): at 23:13

## 2018-05-03 RX ADMIN — Medication 125 MILLIGRAM(S): at 17:25

## 2018-05-03 RX ADMIN — QUETIAPINE FUMARATE 50 MILLIGRAM(S): 200 TABLET, FILM COATED ORAL at 14:59

## 2018-05-03 RX ADMIN — Medication 100 MILLIGRAM(S): at 21:22

## 2018-05-03 RX ADMIN — Medication 500000 UNIT(S): at 11:21

## 2018-05-03 RX ADMIN — SODIUM CHLORIDE 3 MILLILITER(S): 9 INJECTION INTRAMUSCULAR; INTRAVENOUS; SUBCUTANEOUS at 17:26

## 2018-05-03 NOTE — PROGRESS NOTE ADULT - PROBLEM SELECTOR PLAN 1
+ Acute left 4th-8th rib fractures  + Left superior ramus fracture with a large extraperitoneal hematoma    + Left inferior pubic ramus fracture / right superior pubic ramus fracture  + Left L5 lamina & hemisacrum fractures  + Several spleen lacerations/  Grade 2 left kidney laceration  Patient S/P IR Glue and Coil embolization  CT Scan 4/14: Extraperitoneal hematoma slightly increased in size compared to prior imaging with increasing organization   Trauma Sx [place] enlarging hematoma likely represents redistribution of hematoma, no intervention performed

## 2018-05-03 NOTE — PROGRESS NOTE ADULT - SUBJECTIVE AND OBJECTIVE BOX
Interventional Radiology Follow- Up Note      77y Male with pmhx of dysphagia s/p gastrostomy tube replacement on 4/16/18  in Interventional Radiology with Dr Berman.  IR called to evaluate patient for clogged gtube.  Pt seen and evaluated at bedside, gtube site without s/s of infection and unable to flush gtube.        No complaints offered.    Vitals: T(F): 98.1 (05-03-18 @ 10:11), Max: 99.7 (05-02-18 @ 18:19)  HR: 78 (05-03-18 @ 12:28) (56 - 81)  BP: 188/79 (05-03-18 @ 11:29) (131/73 - 188/79)  RR: 18 (05-03-18 @ 10:11) (18 - 18)  SpO2: 99% (05-03-18 @ 12:28) (95% - 100%)  Wt(kg): --    LABS:                        8.7    12.2  )-----------( 249      ( 03 May 2018 07:19 )             28.0     05-03    141  |  100  |  33<H>  ----------------------------<  77  3.9   |  32<H>  |  0.65    Ca    9.0      03 May 2018 07:19  Phos  1.8     05-03  Mg     2.1     05-03        I&O's Detail    02 May 2018 07:01  -  03 May 2018 07:00  --------------------------------------------------------  IN:    Enteral Tube Flush: 200 mL    Free Water: 150 mL    Solution: 50 mL    Vital HN: 1020 mL  Total IN: 1420 mL    OUT:    Intermittent Catheterization - Urethral: 400 mL  Total OUT: 400 mL    Total NET: 1020 mL            PHYSICAL EXAM:  General: Nontoxic, in NAD  GI: gtube site without s/s of infection, multiple attempts to flush tube with successful unclogging.   Lung:, respirations nonlabored, good inspiratory effort          Impression: 77y Male admitted with Abrasions of multiple sites now with clogged gtube, successfully unclogged at bedside.        Plan:  -gtube ok to use  -Flush tube before/after medications/feedings.       Please call IR at extension [phone] with any questions, concerns, or issues regarding above.

## 2018-05-03 NOTE — PROGRESS NOTE BEHAVIORAL HEALTH - DETAILS
B/l upper extremity tremors Possible EPS, b/l upper extremity tremor, possible dry mouth ?Dry mouth, ?itching (communication limited 2/2 trach)
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
## 2018-05-03 NOTE — PROGRESS NOTE ADULT - PROBLEM SELECTOR PLAN 2
Pt S/p tracheostomy by trauma SX on 4/5  CXR 4/27: Lung Opacities bilaterally Infection vs Pulmonary Edema   Pt now requiring Mechanical ventilation, Trach changed to # 7 Cuffed Portex (4/27)   Patient tolerated some weaning 4/29 but became restless and had to go back on full support wean as tolerated  Solumedrol tapered to off   Continue Chest PT and Suctioning PRN

## 2018-05-03 NOTE — PROGRESS NOTE ADULT - ATTENDING COMMENTS
Agree with above.  Trache to trache collar.  Intermittent episodes of agitation.  Titrating psych meds (Haldol + Seroquel + valproic acid). PEG tube was clogged, and is now fixed by IR. Will resume all psych meds.  If patient gets all meds today and overnight and is still agitated tomorrow, we will try to give a small dose of benzos in order to see if it helps with agitation. If the trial dose appears to help, we can re-instate benzos for now with a taper.  [facility]    Code status: Full code

## 2018-05-03 NOTE — PROGRESS NOTE BEHAVIORAL HEALTH - PRN MEDS
4/16 (2000): Haloperidol, IV push q4 PRN agitation  4/17 (1400): Haloperidol, IV push q4 PRN agitation  4/19 (0700): Haloperidol, IV push q4 PRN agitation
5/2 (12PM, 2PM): Seroquel 12.5mg, and 25mg PO via PEG for agitation  5/2 (7PM): Haloperidol 2mg IV push for agitation   5/3 (3AM and 5AM): Haloperidol 2mg IV push for agitation
4/19 (1600): Haloperidol IV push q4hrs, PRN agitation   4/20 (0500): Haloperidol IV push q4hrs, PRN agitation
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
## 2018-05-03 NOTE — PROGRESS NOTE ADULT - PROBLEM SELECTOR PLAN 8
worse yesterday and today psych recalled yesterday and increased the haldol. However, no significant improvement noted will recall them today.

## 2018-05-04 LAB
ALBUMIN SERPL ELPH-MCNC: 2.9 G/DL — LOW (ref 3.3–5)
ALP SERPL-CCNC: 208 U/L — HIGH (ref 40–120)
ALT FLD-CCNC: 31 U/L — SIGNIFICANT CHANGE UP (ref 10–45)
ANION GAP SERPL CALC-SCNC: 9 MMOL/L — SIGNIFICANT CHANGE UP (ref 5–17)
AST SERPL-CCNC: 32 U/L — SIGNIFICANT CHANGE UP (ref 10–40)
BILIRUB DIRECT SERPL-MCNC: 0.4 MG/DL — HIGH (ref 0–0.2)
BILIRUB INDIRECT FLD-MCNC: 1 MG/DL — SIGNIFICANT CHANGE UP (ref 0.2–1)
BILIRUB SERPL-MCNC: 1.4 MG/DL — HIGH (ref 0.2–1.2)
BUN SERPL-MCNC: 28 MG/DL — HIGH (ref 7–23)
CALCIUM SERPL-MCNC: 8.8 MG/DL — SIGNIFICANT CHANGE UP (ref 8.4–10.5)
CHLORIDE SERPL-SCNC: 101 MMOL/L — SIGNIFICANT CHANGE UP (ref 96–108)
CO2 SERPL-SCNC: 32 MMOL/L — HIGH (ref 22–31)
CREAT SERPL-MCNC: 0.64 MG/DL — SIGNIFICANT CHANGE UP (ref 0.5–1.3)
GLUCOSE SERPL-MCNC: 88 MG/DL — SIGNIFICANT CHANGE UP (ref 70–99)
HCT VFR BLD CALC: 26.4 % — LOW (ref 39–50)
HGB BLD-MCNC: 8.7 G/DL — LOW (ref 13–17)
MAGNESIUM SERPL-MCNC: 2.2 MG/DL — SIGNIFICANT CHANGE UP (ref 1.6–2.6)
MCHC RBC-ENTMCNC: 33 GM/DL — SIGNIFICANT CHANGE UP (ref 32–36)
MCHC RBC-ENTMCNC: 33.8 PG — SIGNIFICANT CHANGE UP (ref 27–34)
MCV RBC AUTO: 103 FL — HIGH (ref 80–100)
PHOSPHATE SERPL-MCNC: 2.3 MG/DL — LOW (ref 2.5–4.5)
PLATELET # BLD AUTO: 242 K/UL — SIGNIFICANT CHANGE UP (ref 150–400)
POTASSIUM SERPL-MCNC: 4.3 MMOL/L — SIGNIFICANT CHANGE UP (ref 3.5–5.3)
POTASSIUM SERPL-SCNC: 4.3 MMOL/L — SIGNIFICANT CHANGE UP (ref 3.5–5.3)
PROT SERPL-MCNC: 6.3 G/DL — SIGNIFICANT CHANGE UP (ref 6–8.3)
RBC # BLD: 2.57 M/UL — LOW (ref 4.2–5.8)
RBC # FLD: 16.1 % — HIGH (ref 10.3–14.5)
SODIUM SERPL-SCNC: 142 MMOL/L — SIGNIFICANT CHANGE UP (ref 135–145)
VALPROATE SERPL-MCNC: 16 UG/ML — LOW (ref 50–100)
WBC # BLD: 11.2 K/UL — HIGH (ref 3.8–10.5)
WBC # FLD AUTO: 11.2 K/UL — HIGH (ref 3.8–10.5)

## 2018-05-04 PROCEDURE — 99232 SBSQ HOSP IP/OBS MODERATE 35: CPT

## 2018-05-04 RX ORDER — MORPHINE SULFATE 50 MG/1
1 CAPSULE, EXTENDED RELEASE ORAL ONCE
Qty: 0 | Refills: 0 | Status: DISCONTINUED | OUTPATIENT
Start: 2018-05-04 | End: 2018-05-04

## 2018-05-04 RX ORDER — QUETIAPINE FUMARATE 200 MG/1
75 TABLET, FILM COATED ORAL AT BEDTIME
Qty: 0 | Refills: 0 | Status: DISCONTINUED | OUTPATIENT
Start: 2018-05-04 | End: 2018-05-05

## 2018-05-04 RX ORDER — HUMAN INSULIN 100 [IU]/ML
3 INJECTION, SUSPENSION SUBCUTANEOUS ONCE
Qty: 0 | Refills: 0 | Status: COMPLETED | OUTPATIENT
Start: 2018-05-04 | End: 2018-05-04

## 2018-05-04 RX ORDER — QUETIAPINE FUMARATE 200 MG/1
50 TABLET, FILM COATED ORAL DAILY
Qty: 0 | Refills: 0 | Status: DISCONTINUED | OUTPATIENT
Start: 2018-05-04 | End: 2018-05-05

## 2018-05-04 RX ORDER — SENNA PLUS 8.6 MG/1
10 TABLET ORAL AT BEDTIME
Qty: 0 | Refills: 0 | Status: DISCONTINUED | OUTPATIENT
Start: 2018-05-04 | End: 2018-05-07

## 2018-05-04 RX ADMIN — Medication 500000 UNIT(S): at 17:02

## 2018-05-04 RX ADMIN — QUETIAPINE FUMARATE 50 MILLIGRAM(S): 200 TABLET, FILM COATED ORAL at 03:22

## 2018-05-04 RX ADMIN — SODIUM CHLORIDE 3 MILLILITER(S): 9 INJECTION INTRAMUSCULAR; INTRAVENOUS; SUBCUTANEOUS at 05:04

## 2018-05-04 RX ADMIN — Medication 500000 UNIT(S): at 23:34

## 2018-05-04 RX ADMIN — Medication 5 MILLIGRAM(S): at 13:05

## 2018-05-04 RX ADMIN — HUMAN INSULIN 2 UNIT(S): 100 INJECTION, SUSPENSION SUBCUTANEOUS at 00:02

## 2018-05-04 RX ADMIN — Medication 125 MILLIGRAM(S): at 11:06

## 2018-05-04 RX ADMIN — Medication 81 MILLIGRAM(S): at 11:04

## 2018-05-04 RX ADMIN — Medication 3 MILLILITER(S): at 17:22

## 2018-05-04 RX ADMIN — HUMAN INSULIN 3 UNIT(S): 100 INJECTION, SUSPENSION SUBCUTANEOUS at 06:05

## 2018-05-04 RX ADMIN — QUETIAPINE FUMARATE 50 MILLIGRAM(S): 200 TABLET, FILM COATED ORAL at 19:48

## 2018-05-04 RX ADMIN — QUETIAPINE FUMARATE 75 MILLIGRAM(S): 200 TABLET, FILM COATED ORAL at 21:02

## 2018-05-04 RX ADMIN — SIMETHICONE 80 MILLIGRAM(S): 80 TABLET, CHEWABLE ORAL at 13:05

## 2018-05-04 RX ADMIN — SENNA PLUS 10 MILLILITER(S): 8.6 TABLET ORAL at 23:37

## 2018-05-04 RX ADMIN — LIDOCAINE 1 PATCH: 4 CREAM TOPICAL at 11:04

## 2018-05-04 RX ADMIN — Medication 100 MILLIGRAM(S): at 23:33

## 2018-05-04 RX ADMIN — HALOPERIDOL DECANOATE 2 MILLIGRAM(S): 100 INJECTION INTRAMUSCULAR at 17:48

## 2018-05-04 RX ADMIN — Medication 500000 UNIT(S): at 05:23

## 2018-05-04 RX ADMIN — Medication 2 MILLIGRAM(S): at 21:00

## 2018-05-04 RX ADMIN — HUMAN INSULIN 6 UNIT(S): 100 INJECTION, SUSPENSION SUBCUTANEOUS at 18:16

## 2018-05-04 RX ADMIN — SODIUM CHLORIDE 3 MILLILITER(S): 9 INJECTION INTRAMUSCULAR; INTRAVENOUS; SUBCUTANEOUS at 17:21

## 2018-05-04 RX ADMIN — Medication 1 MILLIGRAM(S): at 07:58

## 2018-05-04 RX ADMIN — ENOXAPARIN SODIUM 40 MILLIGRAM(S): 100 INJECTION SUBCUTANEOUS at 11:06

## 2018-05-04 RX ADMIN — Medication 3 MILLILITER(S): at 11:46

## 2018-05-04 RX ADMIN — HUMAN INSULIN 6 UNIT(S): 100 INJECTION, SUSPENSION SUBCUTANEOUS at 12:36

## 2018-05-04 RX ADMIN — CLOPIDOGREL BISULFATE 75 MILLIGRAM(S): 75 TABLET, FILM COATED ORAL at 11:04

## 2018-05-04 RX ADMIN — Medication 3 MILLILITER(S): at 05:04

## 2018-05-04 RX ADMIN — Medication 5 MILLIGRAM(S): at 21:01

## 2018-05-04 RX ADMIN — Medication 1 TABLET(S): at 05:22

## 2018-05-04 RX ADMIN — SIMETHICONE 80 MILLIGRAM(S): 80 TABLET, CHEWABLE ORAL at 21:02

## 2018-05-04 RX ADMIN — Medication 100 MILLIGRAM(S): at 11:04

## 2018-05-04 RX ADMIN — Medication 3 MILLILITER(S): at 23:49

## 2018-05-04 RX ADMIN — SIMETHICONE 80 MILLIGRAM(S): 80 TABLET, CHEWABLE ORAL at 05:20

## 2018-05-04 RX ADMIN — Medication 125 MILLIGRAM(S): at 17:03

## 2018-05-04 RX ADMIN — FAMOTIDINE 20 MILLIGRAM(S): 10 INJECTION INTRAVENOUS at 17:03

## 2018-05-04 RX ADMIN — Medication 2: at 12:36

## 2018-05-04 RX ADMIN — QUETIAPINE FUMARATE 50 MILLIGRAM(S): 200 TABLET, FILM COATED ORAL at 11:04

## 2018-05-04 RX ADMIN — Medication 1 TABLET(S): at 17:03

## 2018-05-04 RX ADMIN — Medication 5 MILLIGRAM(S): at 05:20

## 2018-05-04 RX ADMIN — Medication 3 MILLIGRAM(S): at 21:03

## 2018-05-04 RX ADMIN — QUETIAPINE FUMARATE 50 MILLIGRAM(S): 200 TABLET, FILM COATED ORAL at 05:20

## 2018-05-04 RX ADMIN — Medication 125 MILLIGRAM(S): at 05:23

## 2018-05-04 RX ADMIN — Medication 100 MILLIGRAM(S): at 05:21

## 2018-05-04 RX ADMIN — FAMOTIDINE 20 MILLIGRAM(S): 10 INJECTION INTRAVENOUS at 05:21

## 2018-05-04 RX ADMIN — Medication 500000 UNIT(S): at 11:04

## 2018-05-04 RX ADMIN — SIMVASTATIN 40 MILLIGRAM(S): 20 TABLET, FILM COATED ORAL at 21:03

## 2018-05-04 RX ADMIN — HUMAN INSULIN 3 UNIT(S): 100 INJECTION, SUSPENSION SUBCUTANEOUS at 23:33
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
## 2018-05-04 NOTE — PROGRESS NOTE ADULT - PROBLEM SELECTOR PLAN 1
+ Acute left 4th-8th rib fractures  + Left superior ramus fracture with a large extraperitoneal hematoma    + Left inferior pubic ramus fracture / right superior pubic ramus fracture  + Left L5 lamina & hemisacrum fractures  + Several spleen lacerations/  Grade 2 left kidney laceration  Patient S/P IR Glue and Coil embolization  CT Scan 4/14: Extraperitoneal hematoma slightly increased in size compared to prior imaging with increasing organization   Trauma Sx [place] enlarging hematoma likely represents redistribution of hematoma, no intervention performed

## 2018-05-04 NOTE — PROGRESS NOTE ADULT - ATTENDING COMMENTS
Agree with above.  On trache collar currently. Was on trache collar ~12 hours yesterday, then placed on vent QHS.  Can D/C Ancef (s/p treatment for MSSA in sputum for 10 days). WBC only mildly elevated, afebrile.  Remains intermittently agitated, appears to be component of delirium. Working with psych to properly medicate. They are recommending increased Seroquel and haldol PRN.    Code status: Full code

## 2018-05-04 NOTE — CHART NOTE - NSCHARTNOTEFT_GEN_A_CORE
pt has some bloody secretion via trach  RR ~26-28    Vital Signs Last 24 Hrs  T(C): 36.6 (04 May 2018 17:14), Max: 36.6 (04 May 2018 17:14)  T(F): 97.8 (04 May 2018 17:14), Max: 97.8 (04 May 2018 17:14)  HR: 80 (04 May 2018 17:22) (59 - 82)  BP: 155/74 (04 May 2018 17:14) (133/62 - 155/74)  BP(mean): --  RR: 19 (04 May 2018 17:14) (18 - 20)  SpO2: 92% (04 May 2018 17:22) (92% - 100%)    lungs Clear/coarse                        8.7    11.2  )-----------( 242      ( 04 May 2018 06:09 )             26.4   Pt on ASA, plavix and Lovenox DVT prophylactic dose   will place pt back on vent due to labored breathing   TC trial 5/5 if stable  Red rubber catheter gentle suction prn  chk CBC in am

## 2018-05-05 LAB
ANION GAP SERPL CALC-SCNC: 7 MMOL/L — SIGNIFICANT CHANGE UP (ref 5–17)
BUN SERPL-MCNC: 29 MG/DL — HIGH (ref 7–23)
CALCIUM SERPL-MCNC: 8.7 MG/DL — SIGNIFICANT CHANGE UP (ref 8.4–10.5)
CHLORIDE SERPL-SCNC: 103 MMOL/L — SIGNIFICANT CHANGE UP (ref 96–108)
CO2 SERPL-SCNC: 32 MMOL/L — HIGH (ref 22–31)
CREAT SERPL-MCNC: 0.61 MG/DL — SIGNIFICANT CHANGE UP (ref 0.5–1.3)
GLUCOSE SERPL-MCNC: 73 MG/DL — SIGNIFICANT CHANGE UP (ref 70–99)
HCT VFR BLD CALC: 28.2 % — LOW (ref 39–50)
HGB BLD-MCNC: 8.6 G/DL — LOW (ref 13–17)
MAGNESIUM SERPL-MCNC: 2.1 MG/DL — SIGNIFICANT CHANGE UP (ref 1.6–2.6)
MCHC RBC-ENTMCNC: 30.7 GM/DL — LOW (ref 32–36)
MCHC RBC-ENTMCNC: 31.9 PG — SIGNIFICANT CHANGE UP (ref 27–34)
MCV RBC AUTO: 104 FL — HIGH (ref 80–100)
PHOSPHATE SERPL-MCNC: 2.5 MG/DL — SIGNIFICANT CHANGE UP (ref 2.5–4.5)
PLATELET # BLD AUTO: 243 K/UL — SIGNIFICANT CHANGE UP (ref 150–400)
POTASSIUM SERPL-MCNC: 4 MMOL/L — SIGNIFICANT CHANGE UP (ref 3.5–5.3)
POTASSIUM SERPL-SCNC: 4 MMOL/L — SIGNIFICANT CHANGE UP (ref 3.5–5.3)
RBC # BLD: 2.71 M/UL — LOW (ref 4.2–5.8)
RBC # FLD: 16.1 % — HIGH (ref 10.3–14.5)
SODIUM SERPL-SCNC: 142 MMOL/L — SIGNIFICANT CHANGE UP (ref 135–145)
WBC # BLD: 12.7 K/UL — HIGH (ref 3.8–10.5)
WBC # FLD AUTO: 12.7 K/UL — HIGH (ref 3.8–10.5)

## 2018-05-05 PROCEDURE — 99232 SBSQ HOSP IP/OBS MODERATE 35: CPT

## 2018-05-05 PROCEDURE — 93010 ELECTROCARDIOGRAM REPORT: CPT

## 2018-05-05 RX ORDER — HYDRALAZINE HCL 50 MG
25 TABLET ORAL EVERY 8 HOURS
Qty: 0 | Refills: 0 | Status: DISCONTINUED | OUTPATIENT
Start: 2018-05-05 | End: 2018-05-05

## 2018-05-05 RX ORDER — QUETIAPINE FUMARATE 200 MG/1
50 TABLET, FILM COATED ORAL
Qty: 0 | Refills: 0 | Status: DISCONTINUED | OUTPATIENT
Start: 2018-05-05 | End: 2018-05-09

## 2018-05-05 RX ORDER — HYDRALAZINE HCL 50 MG
25 TABLET ORAL EVERY 6 HOURS
Qty: 0 | Refills: 0 | Status: DISCONTINUED | OUTPATIENT
Start: 2018-05-05 | End: 2018-05-18

## 2018-05-05 RX ORDER — QUETIAPINE FUMARATE 200 MG/1
75 TABLET, FILM COATED ORAL
Qty: 0 | Refills: 0 | Status: DISCONTINUED | OUTPATIENT
Start: 2018-05-05 | End: 2018-05-18

## 2018-05-05 RX ADMIN — HUMAN INSULIN 6 UNIT(S): 100 INJECTION, SUSPENSION SUBCUTANEOUS at 18:34

## 2018-05-05 RX ADMIN — QUETIAPINE FUMARATE 50 MILLIGRAM(S): 200 TABLET, FILM COATED ORAL at 05:03

## 2018-05-05 RX ADMIN — Medication 3 MILLILITER(S): at 06:15

## 2018-05-05 RX ADMIN — SIMVASTATIN 40 MILLIGRAM(S): 20 TABLET, FILM COATED ORAL at 21:45

## 2018-05-05 RX ADMIN — SIMETHICONE 80 MILLIGRAM(S): 80 TABLET, CHEWABLE ORAL at 14:05

## 2018-05-05 RX ADMIN — SIMETHICONE 80 MILLIGRAM(S): 80 TABLET, CHEWABLE ORAL at 21:44

## 2018-05-05 RX ADMIN — LIDOCAINE 1 PATCH: 4 CREAM TOPICAL at 11:25

## 2018-05-05 RX ADMIN — Medication 2 MILLIGRAM(S): at 21:44

## 2018-05-05 RX ADMIN — HUMAN INSULIN 6 UNIT(S): 100 INJECTION, SUSPENSION SUBCUTANEOUS at 05:01

## 2018-05-05 RX ADMIN — Medication 500000 UNIT(S): at 11:25

## 2018-05-05 RX ADMIN — Medication 5 MILLIGRAM(S): at 21:45

## 2018-05-05 RX ADMIN — Medication 3 MILLILITER(S): at 11:36

## 2018-05-05 RX ADMIN — Medication 100 MILLIGRAM(S): at 11:25

## 2018-05-05 RX ADMIN — Medication 1 TABLET(S): at 05:01

## 2018-05-05 RX ADMIN — Medication 650 MILLIGRAM(S): at 23:29

## 2018-05-05 RX ADMIN — QUETIAPINE FUMARATE 50 MILLIGRAM(S): 200 TABLET, FILM COATED ORAL at 11:26

## 2018-05-05 RX ADMIN — Medication 650 MILLIGRAM(S): at 15:30

## 2018-05-05 RX ADMIN — Medication 5 MILLIGRAM(S): at 05:02

## 2018-05-05 RX ADMIN — CLOPIDOGREL BISULFATE 75 MILLIGRAM(S): 75 TABLET, FILM COATED ORAL at 11:25

## 2018-05-05 RX ADMIN — FAMOTIDINE 20 MILLIGRAM(S): 10 INJECTION INTRAVENOUS at 05:01

## 2018-05-05 RX ADMIN — FAMOTIDINE 20 MILLIGRAM(S): 10 INJECTION INTRAVENOUS at 18:34

## 2018-05-05 RX ADMIN — Medication 500000 UNIT(S): at 05:02

## 2018-05-05 RX ADMIN — Medication 3 MILLILITER(S): at 23:17

## 2018-05-05 RX ADMIN — Medication 3 MILLILITER(S): at 17:19

## 2018-05-05 RX ADMIN — Medication 5 MILLIGRAM(S): at 14:05

## 2018-05-05 RX ADMIN — Medication 650 MILLIGRAM(S): at 14:05

## 2018-05-05 RX ADMIN — ENOXAPARIN SODIUM 40 MILLIGRAM(S): 100 INJECTION SUBCUTANEOUS at 11:26

## 2018-05-05 RX ADMIN — Medication 81 MILLIGRAM(S): at 11:26

## 2018-05-05 RX ADMIN — SIMETHICONE 80 MILLIGRAM(S): 80 TABLET, CHEWABLE ORAL at 05:03

## 2018-05-05 RX ADMIN — HUMAN INSULIN 6 UNIT(S): 100 INJECTION, SUSPENSION SUBCUTANEOUS at 11:30

## 2018-05-05 RX ADMIN — Medication 1 TABLET(S): at 18:34

## 2018-05-05 RX ADMIN — SENNA PLUS 10 MILLILITER(S): 8.6 TABLET ORAL at 21:46

## 2018-05-05 RX ADMIN — QUETIAPINE FUMARATE 75 MILLIGRAM(S): 200 TABLET, FILM COATED ORAL at 20:11

## 2018-05-05 NOTE — PROGRESS NOTE ADULT - PROBLEM SELECTOR PLAN 1
+ Acute left 4th-8th rib fractures  + Left superior ramus fracture with a large extraperitoneal hematoma    + Left inferior pubic ramus fracture / right superior pubic ramus fracture  + Left L5 lamina & hemisacrum fractures  + Several spleen lacerations/  Grade 2 left kidney laceration  Patient S/P IR Glue and Coil embolization  CT Scan 4/14: Extraperitoneal hematoma slightly increased in size compared to prior imaging with increasing organization   Trauma Sx [place] enlarging hematoma likely represents redistribution of hematoma, no intervention performed

## 2018-05-05 NOTE — PROGRESS NOTE ADULT - ATTENDING COMMENTS
Agree with above.  On trache collar currently. Was on trache collar ~12 hours yesterday, then placed on vent QHS.  Completed Ancef (s/p treatment for MSSA in sputum for 10 days). WBC only mildly elevated, afebrile.  Remains intermittently agitated, appears to be component of delirium. Working with psych to properly medicate. They are recommending increased Seroquel and haldol PRN.    Code status: Full code  Javed Neves MD-Pulmonary   [phone]
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
## 2018-05-06 LAB
ANION GAP SERPL CALC-SCNC: 10 MMOL/L — SIGNIFICANT CHANGE UP (ref 5–17)
BUN SERPL-MCNC: 26 MG/DL — HIGH (ref 7–23)
CALCIUM SERPL-MCNC: 9.2 MG/DL — SIGNIFICANT CHANGE UP (ref 8.4–10.5)
CHLORIDE SERPL-SCNC: 100 MMOL/L — SIGNIFICANT CHANGE UP (ref 96–108)
CO2 SERPL-SCNC: 30 MMOL/L — SIGNIFICANT CHANGE UP (ref 22–31)
CREAT SERPL-MCNC: 0.63 MG/DL — SIGNIFICANT CHANGE UP (ref 0.5–1.3)
GLUCOSE SERPL-MCNC: 23 MG/DL — CRITICAL LOW (ref 70–99)
HCT VFR BLD CALC: 28.2 % — LOW (ref 39–50)
HGB BLD-MCNC: 8.9 G/DL — LOW (ref 13–17)
MAGNESIUM SERPL-MCNC: 2.1 MG/DL — SIGNIFICANT CHANGE UP (ref 1.6–2.6)
MCHC RBC-ENTMCNC: 31.5 GM/DL — LOW (ref 32–36)
MCHC RBC-ENTMCNC: 32.7 PG — SIGNIFICANT CHANGE UP (ref 27–34)
MCV RBC AUTO: 104 FL — HIGH (ref 80–100)
PHOSPHATE SERPL-MCNC: 2.6 MG/DL — SIGNIFICANT CHANGE UP (ref 2.5–4.5)
PLATELET # BLD AUTO: 239 K/UL — SIGNIFICANT CHANGE UP (ref 150–400)
POTASSIUM SERPL-MCNC: 4.2 MMOL/L — SIGNIFICANT CHANGE UP (ref 3.5–5.3)
POTASSIUM SERPL-SCNC: 4.2 MMOL/L — SIGNIFICANT CHANGE UP (ref 3.5–5.3)
RBC # BLD: 2.72 M/UL — LOW (ref 4.2–5.8)
RBC # FLD: 16.1 % — HIGH (ref 10.3–14.5)
SODIUM SERPL-SCNC: 140 MMOL/L — SIGNIFICANT CHANGE UP (ref 135–145)
WBC # BLD: 9.9 K/UL — SIGNIFICANT CHANGE UP (ref 3.8–10.5)
WBC # FLD AUTO: 9.9 K/UL — SIGNIFICANT CHANGE UP (ref 3.8–10.5)

## 2018-05-06 PROCEDURE — 99232 SBSQ HOSP IP/OBS MODERATE 35: CPT

## 2018-05-06 RX ORDER — DEXTROSE 50 % IN WATER 50 %
25 SYRINGE (ML) INTRAVENOUS ONCE
Qty: 0 | Refills: 0 | Status: COMPLETED | OUTPATIENT
Start: 2018-05-06 | End: 2018-05-06

## 2018-05-06 RX ADMIN — Medication 25 MILLIGRAM(S): at 18:08

## 2018-05-06 RX ADMIN — LIDOCAINE 1 PATCH: 4 CREAM TOPICAL at 12:33

## 2018-05-06 RX ADMIN — Medication 81 MILLIGRAM(S): at 12:32

## 2018-05-06 RX ADMIN — Medication 25 MILLIGRAM(S): at 12:49

## 2018-05-06 RX ADMIN — Medication 3 MILLILITER(S): at 23:05

## 2018-05-06 RX ADMIN — Medication 3 MILLILITER(S): at 11:27

## 2018-05-06 RX ADMIN — Medication 100 MILLIGRAM(S): at 12:32

## 2018-05-06 RX ADMIN — QUETIAPINE FUMARATE 75 MILLIGRAM(S): 200 TABLET, FILM COATED ORAL at 19:52

## 2018-05-06 RX ADMIN — Medication 1 TABLET(S): at 17:23

## 2018-05-06 RX ADMIN — HUMAN INSULIN 6 UNIT(S): 100 INJECTION, SUSPENSION SUBCUTANEOUS at 07:27

## 2018-05-06 RX ADMIN — Medication 5 MILLIGRAM(S): at 06:25

## 2018-05-06 RX ADMIN — CLOPIDOGREL BISULFATE 75 MILLIGRAM(S): 75 TABLET, FILM COATED ORAL at 12:32

## 2018-05-06 RX ADMIN — Medication 1 TABLET(S): at 06:25

## 2018-05-06 RX ADMIN — LIDOCAINE 1 PATCH: 4 CREAM TOPICAL at 00:28

## 2018-05-06 RX ADMIN — Medication 3 MILLIGRAM(S): at 21:33

## 2018-05-06 RX ADMIN — HUMAN INSULIN 3 UNIT(S): 100 INJECTION, SUSPENSION SUBCUTANEOUS at 00:27

## 2018-05-06 RX ADMIN — Medication 100 MILLIGRAM(S): at 00:27

## 2018-05-06 RX ADMIN — SIMETHICONE 80 MILLIGRAM(S): 80 TABLET, CHEWABLE ORAL at 14:56

## 2018-05-06 RX ADMIN — Medication 2 MILLIGRAM(S): at 21:33

## 2018-05-06 RX ADMIN — Medication 650 MILLIGRAM(S): at 00:15

## 2018-05-06 RX ADMIN — Medication 3 MILLILITER(S): at 05:56

## 2018-05-06 RX ADMIN — FAMOTIDINE 20 MILLIGRAM(S): 10 INJECTION INTRAVENOUS at 17:23

## 2018-05-06 RX ADMIN — Medication 100 MILLIGRAM(S): at 23:42

## 2018-05-06 RX ADMIN — LIDOCAINE 1 PATCH: 4 CREAM TOPICAL at 12:34

## 2018-05-06 RX ADMIN — SIMETHICONE 80 MILLIGRAM(S): 80 TABLET, CHEWABLE ORAL at 21:33

## 2018-05-06 RX ADMIN — Medication 25 MILLILITER(S): at 06:50

## 2018-05-06 RX ADMIN — QUETIAPINE FUMARATE 50 MILLIGRAM(S): 200 TABLET, FILM COATED ORAL at 17:23

## 2018-05-06 RX ADMIN — QUETIAPINE FUMARATE 50 MILLIGRAM(S): 200 TABLET, FILM COATED ORAL at 10:50

## 2018-05-06 RX ADMIN — QUETIAPINE FUMARATE 50 MILLIGRAM(S): 200 TABLET, FILM COATED ORAL at 08:46

## 2018-05-06 RX ADMIN — SIMVASTATIN 40 MILLIGRAM(S): 20 TABLET, FILM COATED ORAL at 21:33

## 2018-05-06 RX ADMIN — LIDOCAINE 1 PATCH: 4 CREAM TOPICAL at 23:51

## 2018-05-06 RX ADMIN — FAMOTIDINE 20 MILLIGRAM(S): 10 INJECTION INTRAVENOUS at 06:25

## 2018-05-06 RX ADMIN — ENOXAPARIN SODIUM 40 MILLIGRAM(S): 100 INJECTION SUBCUTANEOUS at 12:33

## 2018-05-06 RX ADMIN — SENNA PLUS 10 MILLILITER(S): 8.6 TABLET ORAL at 21:34

## 2018-05-06 RX ADMIN — SIMETHICONE 80 MILLIGRAM(S): 80 TABLET, CHEWABLE ORAL at 06:25

## 2018-05-06 RX ADMIN — HUMAN INSULIN 6 UNIT(S): 100 INJECTION, SUSPENSION SUBCUTANEOUS at 12:32

## 2018-05-06 RX ADMIN — Medication 5 MILLIGRAM(S): at 21:34

## 2018-05-06 RX ADMIN — HUMAN INSULIN 6 UNIT(S): 100 INJECTION, SUSPENSION SUBCUTANEOUS at 18:09

## 2018-05-06 RX ADMIN — Medication 5 MILLIGRAM(S): at 14:24

## 2018-05-06 RX ADMIN — Medication 3 MILLILITER(S): at 17:16
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
## 2018-05-06 NOTE — PROGRESS NOTE ADULT - ATTENDING COMMENTS
Agree with above.  On trache collar currently. Was on trache collar ~12 hours yesterday, then placed on vent QHS.  Completed Ancef (s/p treatment for MSSA in sputum for 10 days). WBC only mildly elevated, afebrile.  Remains intermittently agitated, appears to be component of delirium. Working with psych to properly medicate. They are recommending increased Seroquel and haldol PRN.    Code status: Full code  Javed Neves MD-Pulmonary   [phone]
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
## 2018-05-07 LAB
ANION GAP SERPL CALC-SCNC: 10 MMOL/L — SIGNIFICANT CHANGE UP (ref 5–17)
BUN SERPL-MCNC: 25 MG/DL — HIGH (ref 7–23)
CALCIUM SERPL-MCNC: 9.1 MG/DL — SIGNIFICANT CHANGE UP (ref 8.4–10.5)
CHLORIDE SERPL-SCNC: 102 MMOL/L — SIGNIFICANT CHANGE UP (ref 96–108)
CO2 SERPL-SCNC: 30 MMOL/L — SIGNIFICANT CHANGE UP (ref 22–31)
CREAT SERPL-MCNC: 0.63 MG/DL — SIGNIFICANT CHANGE UP (ref 0.5–1.3)
GLUCOSE SERPL-MCNC: 81 MG/DL — SIGNIFICANT CHANGE UP (ref 70–99)
HCT VFR BLD CALC: 29.3 % — LOW (ref 39–50)
HGB BLD-MCNC: 9.3 G/DL — LOW (ref 13–17)
MAGNESIUM SERPL-MCNC: 2.1 MG/DL — SIGNIFICANT CHANGE UP (ref 1.6–2.6)
MCHC RBC-ENTMCNC: 31.6 GM/DL — LOW (ref 32–36)
MCHC RBC-ENTMCNC: 33 PG — SIGNIFICANT CHANGE UP (ref 27–34)
MCV RBC AUTO: 105 FL — HIGH (ref 80–100)
PHOSPHATE SERPL-MCNC: 3.4 MG/DL — SIGNIFICANT CHANGE UP (ref 2.5–4.5)
PLATELET # BLD AUTO: 263 K/UL — SIGNIFICANT CHANGE UP (ref 150–400)
POTASSIUM SERPL-MCNC: 4.1 MMOL/L — SIGNIFICANT CHANGE UP (ref 3.5–5.3)
POTASSIUM SERPL-SCNC: 4.1 MMOL/L — SIGNIFICANT CHANGE UP (ref 3.5–5.3)
RBC # BLD: 2.81 M/UL — LOW (ref 4.2–5.8)
RBC # FLD: 15.8 % — HIGH (ref 10.3–14.5)
SODIUM SERPL-SCNC: 142 MMOL/L — SIGNIFICANT CHANGE UP (ref 135–145)
WBC # BLD: 11 K/UL — HIGH (ref 3.8–10.5)
WBC # FLD AUTO: 11 K/UL — HIGH (ref 3.8–10.5)

## 2018-05-07 PROCEDURE — 99233 SBSQ HOSP IP/OBS HIGH 50: CPT | Mod: GC

## 2018-05-07 PROCEDURE — 99232 SBSQ HOSP IP/OBS MODERATE 35: CPT

## 2018-05-07 RX ORDER — HYDRALAZINE HCL 50 MG
25 TABLET ORAL ONCE
Qty: 0 | Refills: 0 | Status: COMPLETED | OUTPATIENT
Start: 2018-05-07 | End: 2018-05-07

## 2018-05-07 RX ORDER — CLONAZEPAM 1 MG
1 TABLET ORAL AT BEDTIME
Qty: 0 | Refills: 0 | Status: DISCONTINUED | OUTPATIENT
Start: 2018-05-07 | End: 2018-05-09

## 2018-05-07 RX ADMIN — FAMOTIDINE 20 MILLIGRAM(S): 10 INJECTION INTRAVENOUS at 17:18

## 2018-05-07 RX ADMIN — SIMETHICONE 80 MILLIGRAM(S): 80 TABLET, CHEWABLE ORAL at 13:49

## 2018-05-07 RX ADMIN — Medication 81 MILLIGRAM(S): at 11:19

## 2018-05-07 RX ADMIN — SIMETHICONE 80 MILLIGRAM(S): 80 TABLET, CHEWABLE ORAL at 22:00

## 2018-05-07 RX ADMIN — Medication 650 MILLIGRAM(S): at 17:18

## 2018-05-07 RX ADMIN — Medication 2: at 18:41

## 2018-05-07 RX ADMIN — LIDOCAINE 1 PATCH: 4 CREAM TOPICAL at 23:39

## 2018-05-07 RX ADMIN — FAMOTIDINE 20 MILLIGRAM(S): 10 INJECTION INTRAVENOUS at 05:15

## 2018-05-07 RX ADMIN — Medication 5 MILLIGRAM(S): at 05:14

## 2018-05-07 RX ADMIN — Medication 5 MILLIGRAM(S): at 13:49

## 2018-05-07 RX ADMIN — Medication 3 MILLILITER(S): at 23:17

## 2018-05-07 RX ADMIN — Medication 1 TABLET(S): at 05:14

## 2018-05-07 RX ADMIN — Medication 2 MILLIGRAM(S): at 21:59

## 2018-05-07 RX ADMIN — Medication 25 MILLIGRAM(S): at 19:34

## 2018-05-07 RX ADMIN — HUMAN INSULIN 6 UNIT(S): 100 INJECTION, SUSPENSION SUBCUTANEOUS at 12:29

## 2018-05-07 RX ADMIN — LIDOCAINE 1 PATCH: 4 CREAM TOPICAL at 11:20

## 2018-05-07 RX ADMIN — SIMVASTATIN 40 MILLIGRAM(S): 20 TABLET, FILM COATED ORAL at 22:00

## 2018-05-07 RX ADMIN — QUETIAPINE FUMARATE 75 MILLIGRAM(S): 200 TABLET, FILM COATED ORAL at 19:47

## 2018-05-07 RX ADMIN — Medication 3 MILLILITER(S): at 05:08

## 2018-05-07 RX ADMIN — Medication 25 MILLIGRAM(S): at 17:18

## 2018-05-07 RX ADMIN — Medication 1 MILLIGRAM(S): at 21:59

## 2018-05-07 RX ADMIN — ENOXAPARIN SODIUM 40 MILLIGRAM(S): 100 INJECTION SUBCUTANEOUS at 11:19

## 2018-05-07 RX ADMIN — Medication 650 MILLIGRAM(S): at 17:40

## 2018-05-07 RX ADMIN — QUETIAPINE FUMARATE 50 MILLIGRAM(S): 200 TABLET, FILM COATED ORAL at 02:50

## 2018-05-07 RX ADMIN — Medication 3 MILLILITER(S): at 17:08

## 2018-05-07 RX ADMIN — QUETIAPINE FUMARATE 50 MILLIGRAM(S): 200 TABLET, FILM COATED ORAL at 08:22

## 2018-05-07 RX ADMIN — Medication 3 MILLILITER(S): at 11:59

## 2018-05-07 RX ADMIN — Medication 100 MILLIGRAM(S): at 11:19

## 2018-05-07 RX ADMIN — Medication 1 TABLET(S): at 18:42

## 2018-05-07 RX ADMIN — SIMETHICONE 80 MILLIGRAM(S): 80 TABLET, CHEWABLE ORAL at 05:14

## 2018-05-07 RX ADMIN — Medication 5 MILLIGRAM(S): at 22:00

## 2018-05-07 RX ADMIN — HUMAN INSULIN 3 UNIT(S): 100 INJECTION, SUSPENSION SUBCUTANEOUS at 00:24

## 2018-05-07 RX ADMIN — HUMAN INSULIN 6 UNIT(S): 100 INJECTION, SUSPENSION SUBCUTANEOUS at 18:40

## 2018-05-07 RX ADMIN — CLOPIDOGREL BISULFATE 75 MILLIGRAM(S): 75 TABLET, FILM COATED ORAL at 11:19

## 2018-05-07 RX ADMIN — Medication 2: at 12:29

## 2018-05-07 RX ADMIN — HUMAN INSULIN 6 UNIT(S): 100 INJECTION, SUSPENSION SUBCUTANEOUS at 05:53
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
## 2018-05-07 NOTE — CHART NOTE - NSCHARTNOTEFT_GEN_A_CORE
called re /63 1712pm  pt labored breathing on TC-> back on Vent  per RT and RN     Vital Signs Last 24 Hrs  T(C): 36.8 (07 May 2018 17:49), Max: 36.8 (06 May 2018 21:06)  T(F): 98.2 (07 May 2018 17:49), Max: 98.2 (06 May 2018 21:06)  HR: 73 (07 May 2018 17:49) (60 - 88)  BP: 171/66 now,   BP(mean): --  RR: 18 (07 May 2018 17:49) (18 - 24)  SpO2: 98% (07 May 2018 17:49) (95% - 99%)  U incontinence 4x and BMs 3 x in the past 24 hrs, loose stool documented once in chart     resting comfortably on vent   Lungs w/o wheezing    Rest pt on vent  Hydralazine 25 mg peg x1( also received 1x yesterday around the same time  Consider Hydralazine ATC   Repeat BP in 1 hr, additional Hydralazine if SBP>140 or DBP>90  Tylenol given for possible pain and consider Tylenol BID ATC   dc senna for frequent BMs called re /63 1712pm  pt labored breathing on TC-> back on Vent  per RT and RN     Vital Signs Last 24 Hrs  T(C): 36.8 (07 May 2018 17:49), Max: 36.8 (06 May 2018 21:06)  T(F): 98.2 (07 May 2018 17:49), Max: 98.2 (06 May 2018 21:06)  HR: 73 (07 May 2018 17:49) (60 - 88)  BP: 171/66 now,   BP(mean): --  RR: 18 (07 May 2018 17:49) (18 - 24)  SpO2: 98% (07 May 2018 17:49) (95% - 99%)  U incontinence 4x and BMs 3 x in the past 24 hrs, loose stool documented once in chart     resting comfortably on vent   Lungs w/o wheezing    Rest pt on vent  Hydralazine 25 mg peg x1( also received 1x yesterday around the same time  Consider Hydralazine ATC   Repeat BP in 1 hr, additional Hydralazine if SBP>140 or DBP>90  Tylenol given for possible pain and consider Tylenol BID ATC   dc senna for frequent BMs    1906 addendum repeated /64, additional Hydralazine 25 mg peg x 1   Monitor BP closely
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
## 2018-05-07 NOTE — PROGRESS NOTE ADULT - ATTENDING COMMENTS
Agree with above.   On TC during the day up to 12 hours, vent at night  Completed Ancef (s/p treatment for MSSA in sputum for 10 days). WBC only mildly elevated, afebrile.  Remains intermittently agitated, appears to be component of delirium and possibly pain (responds to Tylenol). Appreciate Psych f/u. Will increase Seroquel to 100 mg QHS and add low dose Klonopin. Frequent reorientation and stimulation during the day- discussed with wife and she is planning to stay with the patient during the day.   Full code

## 2018-05-07 NOTE — CHART NOTE - NSCHARTNOTEFT_GEN_A_CORE
Notified by RN for bloody secretions from trach. Pt was reportedly coughing, and RN suctioned small amount of bloody secretions with clots from trach. Prior to that, pt was Pt seen and examined at bedside, in NAD. Pt Notified by RN for bloody secretions from trach. Pt was reportedly coughing, and RN suctioned small amount of bloody secretions with clots from trach. Prior to that, pt was calm, not agitated, not pulling at trach. Pt seen and examined at bedside, in NAD. On exam, pt awake and comfortable appearing in bed, trach in place connected to vent, trach dressing dry without trauma/bleeding at site, lungs CTA b/l, no wheezing/crackles, S1/S2.     Vital Signs Last 24 Hrs  T(C): 36.7 (06 May 2018 23:26), Max: 36.9 (06 May 2018 09:11)  T(F): 98.1 (06 May 2018 23:26), Max: 98.4 (06 May 2018 09:11)  HR: 63 (07 May 2018 01:26) (60 - 88)  BP: 124/67 (06 May 2018 23:26) (118/56 - 180/65)  BP(mean): --  RR: 19 (07 May 2018 01:26) (18 - 22)  SpO2: 97% (07 May 2018 01:26) (95% - 100%)    - VSS, spO2 97%. No desaturation events, will continue to monitor on continuous pulse ox.  - Continue with lovenox, plavix for DVTs.  - F/u AM CBC.  - Will continue to monitor closely.  - Will discuss with primary team in AM.    Cristiana Dockery PA-C  Department of Medicine  #[id number]

## 2018-05-07 NOTE — PROGRESS NOTE ADULT - PROBLEM SELECTOR PLAN 1
+ Acute left 4th-8th rib fractures  + Left superior ramus fracture with a large extraperitoneal hematoma    + Left inferior pubic ramus fracture / right superior pubic ramus fracture  + Left L5 lamina & hemisacrum fractures  + Several spleen lacerations/  Grade 2 left kidney laceration  Patient S/P IR Glue and Coil embolization  CT Scan 4/14: Extraperitoneal hematoma slightly increased in size compared to prior imaging with increasing organization   Trauma Sx [place] enlarging hematoma likely represents redistribution of hematoma, no intervention performed

## 2018-05-08 LAB
ANION GAP SERPL CALC-SCNC: 8 MMOL/L — SIGNIFICANT CHANGE UP (ref 5–17)
BUN SERPL-MCNC: 27 MG/DL — HIGH (ref 7–23)
CALCIUM SERPL-MCNC: 8.7 MG/DL — SIGNIFICANT CHANGE UP (ref 8.4–10.5)
CHLORIDE SERPL-SCNC: 102 MMOL/L — SIGNIFICANT CHANGE UP (ref 96–108)
CO2 SERPL-SCNC: 30 MMOL/L — SIGNIFICANT CHANGE UP (ref 22–31)
CREAT SERPL-MCNC: 0.63 MG/DL — SIGNIFICANT CHANGE UP (ref 0.5–1.3)
GLUCOSE SERPL-MCNC: 89 MG/DL — SIGNIFICANT CHANGE UP (ref 70–99)
HCT VFR BLD CALC: 27.3 % — LOW (ref 39–50)
HGB BLD-MCNC: 8.8 G/DL — LOW (ref 13–17)
MAGNESIUM SERPL-MCNC: 2 MG/DL — SIGNIFICANT CHANGE UP (ref 1.6–2.6)
MCHC RBC-ENTMCNC: 32.3 GM/DL — SIGNIFICANT CHANGE UP (ref 32–36)
MCHC RBC-ENTMCNC: 33.6 PG — SIGNIFICANT CHANGE UP (ref 27–34)
MCV RBC AUTO: 104 FL — HIGH (ref 80–100)
PHOSPHATE SERPL-MCNC: 2.4 MG/DL — LOW (ref 2.5–4.5)
PLATELET # BLD AUTO: 247 K/UL — SIGNIFICANT CHANGE UP (ref 150–400)
POTASSIUM SERPL-MCNC: 4 MMOL/L — SIGNIFICANT CHANGE UP (ref 3.5–5.3)
POTASSIUM SERPL-SCNC: 4 MMOL/L — SIGNIFICANT CHANGE UP (ref 3.5–5.3)
RBC # BLD: 2.63 M/UL — LOW (ref 4.2–5.8)
RBC # FLD: 15.8 % — HIGH (ref 10.3–14.5)
SODIUM SERPL-SCNC: 140 MMOL/L — SIGNIFICANT CHANGE UP (ref 135–145)
WBC # BLD: 10.4 K/UL — SIGNIFICANT CHANGE UP (ref 3.8–10.5)
WBC # FLD AUTO: 10.4 K/UL — SIGNIFICANT CHANGE UP (ref 3.8–10.5)

## 2018-05-08 PROCEDURE — 99232 SBSQ HOSP IP/OBS MODERATE 35: CPT

## 2018-05-08 PROCEDURE — 99233 SBSQ HOSP IP/OBS HIGH 50: CPT | Mod: GC

## 2018-05-08 RX ADMIN — Medication 5 MILLIGRAM(S): at 05:31

## 2018-05-08 RX ADMIN — SIMETHICONE 80 MILLIGRAM(S): 80 TABLET, CHEWABLE ORAL at 14:29

## 2018-05-08 RX ADMIN — Medication 1 MILLIGRAM(S): at 22:20

## 2018-05-08 RX ADMIN — SIMETHICONE 80 MILLIGRAM(S): 80 TABLET, CHEWABLE ORAL at 05:32

## 2018-05-08 RX ADMIN — LIDOCAINE 1 PATCH: 4 CREAM TOPICAL at 23:00

## 2018-05-08 RX ADMIN — Medication 5 MILLIGRAM(S): at 22:21

## 2018-05-08 RX ADMIN — QUETIAPINE FUMARATE 75 MILLIGRAM(S): 200 TABLET, FILM COATED ORAL at 22:20

## 2018-05-08 RX ADMIN — HUMAN INSULIN 3 UNIT(S): 100 INJECTION, SUSPENSION SUBCUTANEOUS at 00:04

## 2018-05-08 RX ADMIN — Medication 1 TABLET(S): at 17:09

## 2018-05-08 RX ADMIN — Medication 2 MILLIGRAM(S): at 22:20

## 2018-05-08 RX ADMIN — HUMAN INSULIN 6 UNIT(S): 100 INJECTION, SUSPENSION SUBCUTANEOUS at 06:33

## 2018-05-08 RX ADMIN — Medication 3 MILLILITER(S): at 11:36

## 2018-05-08 RX ADMIN — Medication 100 MILLIGRAM(S): at 11:57

## 2018-05-08 RX ADMIN — LIDOCAINE 1 PATCH: 4 CREAM TOPICAL at 11:56

## 2018-05-08 RX ADMIN — HUMAN INSULIN 6 UNIT(S): 100 INJECTION, SUSPENSION SUBCUTANEOUS at 12:17

## 2018-05-08 RX ADMIN — SIMVASTATIN 40 MILLIGRAM(S): 20 TABLET, FILM COATED ORAL at 22:20

## 2018-05-08 RX ADMIN — SIMETHICONE 80 MILLIGRAM(S): 80 TABLET, CHEWABLE ORAL at 22:20

## 2018-05-08 RX ADMIN — Medication 3 MILLILITER(S): at 06:37

## 2018-05-08 RX ADMIN — Medication 81 MILLIGRAM(S): at 11:57

## 2018-05-08 RX ADMIN — ENOXAPARIN SODIUM 40 MILLIGRAM(S): 100 INJECTION SUBCUTANEOUS at 11:56

## 2018-05-08 RX ADMIN — HUMAN INSULIN 6 UNIT(S): 100 INJECTION, SUSPENSION SUBCUTANEOUS at 18:30

## 2018-05-08 RX ADMIN — Medication 5 MILLIGRAM(S): at 14:29

## 2018-05-08 RX ADMIN — FAMOTIDINE 20 MILLIGRAM(S): 10 INJECTION INTRAVENOUS at 17:09

## 2018-05-08 RX ADMIN — Medication 1 TABLET(S): at 05:37

## 2018-05-08 RX ADMIN — CLOPIDOGREL BISULFATE 75 MILLIGRAM(S): 75 TABLET, FILM COATED ORAL at 11:57

## 2018-05-08 RX ADMIN — FAMOTIDINE 20 MILLIGRAM(S): 10 INJECTION INTRAVENOUS at 05:32

## 2018-05-08 RX ADMIN — Medication 3 MILLILITER(S): at 17:04

## 2018-05-08 NOTE — PROGRESS NOTE BEHAVIORAL HEALTH - NS ED [facility] MED ROS ALLERGIC IMMUNOLOGIC
No complaints
Unable to assess
No complaints
Unable to assess
No complaints

## 2018-05-08 NOTE — PROGRESS NOTE BEHAVIORAL HEALTH - NS ED B[facility] MED ROS CONSTITUTIONAL SYMPTOMS
No complaints
Unable to assess
No complaints
Unable to assess
No complaints
Yes

## 2018-05-08 NOTE — PROGRESS NOTE BEHAVIORAL HEALTH - NSBHCONSULTOBS_PSY_A_CORE
Enhanced supervision
Routine observation
Enhanced supervision
Routine observation
Enhanced supervision
Routine observation
Enhanced supervision
Routine observation

## 2018-05-08 NOTE — PROGRESS NOTE ADULT - PROBLEM SELECTOR PLAN 1
+ Acute left 4th-8th rib fractures  + Left superior ramus fracture with a large extraperitoneal hematoma    + Left inferior pubic ramus fracture / right superior pubic ramus fracture  + Left L5 lamina & hemisacrum fractures  + Several spleen lacerations/  Grade 2 left kidney laceration  Patient S/P IR Glue and Coil embolization  CT Scan 4/14: Extraperitoneal hematoma slightly increased in size compared to prior imaging with increasing organization   Trauma Sx [place] enlarging hematoma likely represents redistribution of hematoma, no intervention performed
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
## 2018-05-08 NOTE — PROGRESS NOTE BEHAVIORAL HEALTH - PRIMARY DX
Delirium due to another medical condition

## 2018-05-08 NOTE — PROGRESS NOTE BEHAVIORAL HEALTH - NS ED B[facility] MED ROS SKIN
No complaints
Unable to assess
No complaints
Unable to assess
No complaints

## 2018-05-08 NOTE — PROGRESS NOTE BEHAVIORAL HEALTH - NS ED BHA MSE SPEECH SPONTANEITY
Normal/Other
Other/Normal
Normal/Other
Other
Normal/Other
Normal/Other
Other/Normal
Normal/Other
Other/Normal

## 2018-05-08 NOTE — PROGRESS NOTE BEHAVIORAL HEALTH - NSBHCONSULTFOLLOWAFTERCARE_PSY_A_CORE FT
Pt can f/u at [facility].
As per team.
pt can f/u at [facility]
As per team.
Pt can f/u at [facility].
As per team.
Pt can f/u at [facility].
Pt can f/u at [facility].
Pt can f/u at [facility].
As per team
As per team.

## 2018-05-08 NOTE — PROGRESS NOTE BEHAVIORAL HEALTH - NSBHCONSULTMEDS_PSY_A_CORE FT
Increase Seroquel to 50mg am, 100mg po qhs. cont prns
cont seroquel 25mg po bid,  cont depakote- can do 125mg po/iv bid. cont melatonin prn for insomnia
can start seroquel 25mg po qhs for agiation/insomnia. can also give melatotin 3mg po qhs prn insomnia. can give haldol 1mg po /iv q4hr prn agitation.
1. Initiate Depakene 125mg p.o. at bedtime  2. Continue Quetiapine 25mg p.o. at bedtime   3. Continue Melatonin 3mg PRN for insomnia
1. Increase Seroquel from 25mg BID to 50mg po BID  2. Continue Depakote 125mg po/iv BID. Check Valproic acid troph level and LFTs in AM. If low, can increased Depakote to 250 po/iv BID.   3. Continue melatonin for insomnia
1. can increase seroquel to 25mg am, 50mg po q6pm. decrease ativan to 1mg iv tid, will taper off. benzos can worsen delirium. d/c depakote, d/c remeron. cont melatonin prn for insomina
1. Increase Seroquel to 50mg am, 75mg po qhs. cont prns., d/c depakote
1. Increase Seroquel to 50mg am, 75mg po qhs. cont prns., d/c depakote
Increase Seroquel to 50mg am, 100mg po qhs. cont prns
Start Depakene 125mg p.o. at bedtime
cont seroquel 25mg po bid,  cont depakote- can do 125mg po/iv bid. cont melatonin prn for insomnia

## 2018-05-08 NOTE — PROGRESS NOTE BEHAVIORAL HEALTH - NSBHCONSULTOBSREASON_PSY_A_CORE FT
Pulling at trach/PEG/IV.
Pulling at trach/PEG/IV.
Pulled out trach AM.
Pulling at trach/PEG/IV.

## 2018-05-08 NOTE — PROGRESS NOTE BEHAVIORAL HEALTH - NS ED BHA MED ROS MUSCULOSKELETAL
No complaints
Unable to assess
Yes
Unable to assess
Yes
No complaints

## 2018-05-08 NOTE — PROGRESS NOTE BEHAVIORAL HEALTH - NSBHFUPREASONCONS_PSY_A_CORE
agitation
agitation
anxiety/agitation/delerium
delerium/agitation
florentin
florentin
agitation/delerium/anxiety
agitation

## 2018-05-08 NOTE — PROGRESS NOTE BEHAVIORAL HEALTH - NSBHCONSULTMEDPRNREASON_PSY_A_CORE
agitation...

## 2018-05-08 NOTE — PROGRESS NOTE BEHAVIORAL HEALTH - NS ED B[facility] MED ROS NEUROLOGICAL
Yes
No complaints
Unable to assess
No complaints
Unable to assess
No complaints

## 2018-05-08 NOTE — PROGRESS NOTE BEHAVIORAL HEALTH - NSBHFUPINTERVALCCFT_PSY_A_CORE
ok
nonverbal
nonverbal, confused
nonverbal, s/p trach
nonverbal
"I am waking up at night."
pt nonverbal, lethargic
"I feel helpless."
Persistent agitation

## 2018-05-08 NOTE — PROGRESS NOTE BEHAVIORAL HEALTH - NSBHLOC_PSY_A_CORE
Alert
Lethargic, arousable to tactile stimulus
Alert
Alert
Lethargic, arousable to tactile stimulus
Alert
Lethargic, arousable to tactile stimulus
Lethargic, arousable to tactile stimulus
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
## 2018-05-08 NOTE — PROGRESS NOTE BEHAVIORAL HEALTH - NSBHCHARTREVIEWVS_PSY_A_CORE FT
Vital Signs Last 24 Hrs  T(C): 36.9 (15 Apr 2018 04:03), Max: 37.3 (14 Apr 2018 14:39)  T(F): 98.4 (15 Apr 2018 04:03), Max: 99.2 (14 Apr 2018 14:39)  HR: 78 (15 Apr 2018 08:41) (77 - 98)  BP: 163/71 (15 Apr 2018 04:03) (146/61 - 169/73)  BP(mean): --  RR: 18 (15 Apr 2018 08:41) (18 - 28)  SpO2: 100% (15 Apr 2018 08:41) (95% - 100%)
Vital Signs Last 24 Hrs  T(C): 36.2 (04 May 2018 10:21), Max: 36.5 (03 May 2018 21:07)  T(F): 97.2 (04 May 2018 10:21), Max: 97.7 (03 May 2018 21:07)  HR: 66 (04 May 2018 11:46) (59 - 82)  BP: 145/64 (04 May 2018 10:21) (133/62 - 145/64)  BP(mean): --  RR: 20 (04 May 2018 10:21) (18 - 20)  SpO2: 93% (04 May 2018 11:46) (93% - 100%)
Vital Signs Last 24 Hrs  T(C): 36.7 (01 May 2018 12:23), Max: 37.1 (01 May 2018 04:13)  T(F): 98 (01 May 2018 12:23), Max: 98.7 (01 May 2018 04:13)  HR: 63 (01 May 2018 12:44) (62 - 78)  BP: 134/74 (01 May 2018 12:23) (112/65 - 138/57)  BP(mean): --  RR: 24 (01 May 2018 12:23) (18 - 26)  SpO2: 100% (01 May 2018 12:44) (98% - 100%)
Vital Signs Last 24 Hrs  T(C): 36.8 (07 May 2018 09:00), Max: 36.8 (06 May 2018 21:06)  T(F): 98.2 (07 May 2018 09:00), Max: 98.2 (06 May 2018 21:06)  HR: 65 (07 May 2018 09:00) (60 - 88)  BP: 115/53 (07 May 2018 09:00) (115/53 - 180/65)  BP(mean): --  RR: 20 (07 May 2018 09:00) (18 - 22)  SpO2: 95% (07 May 2018 09:00) (95% - 100%)
Vital Signs Last 24 Hrs  T(C): 36.1 (05 May 2018 15:16), Max: 37.2 (05 May 2018 09:51)  T(F): 97 (05 May 2018 15:16), Max: 99 (05 May 2018 09:51)  HR: 70 (05 May 2018 20:52) (60 - 83)  BP: 132/82 (05 May 2018 15:16) (132/82 - 165/60)  BP(mean): --  RR: 18 (05 May 2018 15:16) (18 - 24)  SpO2: 100% (05 May 2018 20:52) (95% - 100%)
Vital Signs Last 24 Hrs  T(C): 36.9 (20 Apr 2018 13:52), Max: 37.2 (19 Apr 2018 17:34)  T(F): 98.4 (20 Apr 2018 13:52), Max: 99 (19 Apr 2018 17:34)  HR: 87 (20 Apr 2018 13:52) (67 - 99)  BP: 160/63 (20 Apr 2018 13:52) (133/79 - 176/73)  BP(mean): --  RR: 18 (20 Apr 2018 13:52) (18 - 20)  SpO2: 98% (20 Apr 2018 13:52) (97% - 100%)
Vital Signs Last 24 Hrs  T(C): 36.4 (08 May 2018 04:15), Max: 36.9 (07 May 2018 23:38)  T(F): 97.5 (08 May 2018 04:15), Max: 98.4 (07 May 2018 23:38)  HR: 89 (08 May 2018 08:55) (60 - 89)  BP: 144/65 (08 May 2018 06:17) (101/55 - 190/63)  BP(mean): --  RR: 18 (08 May 2018 06:17) (18 - 24)  SpO2: 100% (08 May 2018 08:55) (96% - 100%)
Vital Signs Last 24 Hrs  T(C): 36.6 (02 May 2018 09:39), Max: 36.8 (01 May 2018 17:04)  T(F): 97.8 (02 May 2018 09:39), Max: 98.2 (01 May 2018 17:04)  HR: 80 (02 May 2018 09:39) (61 - 91)  BP: 137/69 (02 May 2018 09:39) (109/61 - 158/68)  BP(mean): --  RR: 18 (02 May 2018 09:39) (18 - 25)  SpO2: 95% (02 May 2018 09:39) (93% - 100%)
Vital Signs Last 24 Hrs  T(C): 36.8 (19 Apr 2018 12:58), Max: 36.9 (19 Apr 2018 04:00)  T(F): 98.2 (19 Apr 2018 12:58), Max: 98.4 (19 Apr 2018 04:00)  HR: 90 (19 Apr 2018 12:58) (84 - 94)  BP: 161/62 (19 Apr 2018 12:58) (161/62 - 185/68)  BP(mean): --  RR: 20 (19 Apr 2018 16:07) (18 - 22)  SpO2: 98% (19 Apr 2018 16:07) (97% - 100%)
Vital Signs Last 24 Hrs  T(C): 36.8 (30 Apr 2018 09:24), Max: 37.6 (29 Apr 2018 23:52)  T(F): 98.3 (30 Apr 2018 09:24), Max: 99.6 (29 Apr 2018 23:52)  HR: 81 (30 Apr 2018 09:31) (63 - 122)  BP: 148/66 (30 Apr 2018 09:24) (110/70 - 148/66)  BP(mean): --  RR: 117 (30 Apr 2018 09:24) (16 - 117)  SpO2: 100% (30 Apr 2018 09:31) (96% - 100%)

## 2018-05-08 NOTE — PROGRESS NOTE BEHAVIORAL HEALTH - NSBHCHARTREVIEWINVESTIGATE_PSY_A_CORE FT
Ventricular Rate 70 BPM    Atrial Rate 70 BPM    P-R Interval 142 ms    QRS Duration 88 ms     ms    QTc 440 ms    P Axis 73 degrees    R Axis 86 degrees    T Axis 46 degrees    Diagnosis Line NORMAL SINUS RHYTHM  BORDERLINE PROLONGED QT
Ventricular Rate 75 BPM    Atrial Rate 75 BPM    P-R Interval 152 ms    QRS Duration 90 ms     ms    QTc 419 ms    P Axis 45 degrees    R Axis 81 degrees    T Axis 41 degrees    Diagnosis Line NORMAL SINUS RHYTHM  NORMAL ECG
Ventricular Rate 110 BPM    Atrial Rate 110 BPM    P-R Interval 136 ms    QRS Duration 84 ms     ms    QTc 443 ms    P Axis 68 degrees    R Axis 74 degrees    T Axis 28 degrees    Diagnosis Line SINUS TACHYCARDIA  POSSIBLE LEFT ATRIAL ENLARGEMENT  BORDERLINE ECG
Ventricular Rate 70 BPM    Atrial Rate 70 BPM    P-R Interval 142 ms    QRS Duration 88 ms     ms    QTc 440 ms    P Axis 73 degrees    R Axis 86 degrees    T Axis 46 degrees    Diagnosis Line NORMAL SINUS RHYTHM  BORDERLINE PROLONGED QT
Ventricular Rate 110 BPM    Atrial Rate 110 BPM    P-R Interval 136 ms    QRS Duration 84 ms     ms    QTc 443 ms    P Axis 68 degrees    R Axis 74 degrees    T Axis 28 degrees    Diagnosis Line SINUS TACHYCARDIA  POSSIBLE LEFT ATRIAL ENLARGEMENT  BORDERLINE ECG
Ventricular Rate 61 BPM    Atrial Rate 61 BPM    P-R Interval 142 ms    QRS Duration 84 ms     ms    QTc 428 ms    P Axis 7 degrees    R Axis -3 degrees    T Axis -10 degrees    Diagnosis Line NORMAL SINUS RHYTHM  NORMAL ECG

## 2018-05-08 NOTE — PROGRESS NOTE BEHAVIORAL HEALTH - RISK ASSESSMENT
Pt is low risk of harm to self or others.
Low risk.
Low risk.
pt overall low risk for suicide attempt, no si/hi, no hx attempts, risk factors include agitation
Pt is low risk of harm to self or others.
Low risk.
Pt is low risk of harm to self or others.
Low risk.

## 2018-05-08 NOTE — PROGRESS NOTE BEHAVIORAL HEALTH - LANGUAGE
Impaired naming/Impaired repetition
No abnormalities noted
No abnormalities noted
Impaired repetition/Impaired naming
Impaired naming/Impaired repetition
Impaired repetition/Impaired naming
Impaired naming/Impaired repetition
Impaired repetition/Impaired naming
Impaired naming/Impaired repetition

## 2018-05-08 NOTE — PROGRESS NOTE BEHAVIORAL HEALTH - BEHAVIOR
Cooperative
Cooperative
Uncooperative
Uncooperative
Cooperative
Uncooperative
Cooperative
Uncooperative

## 2018-05-08 NOTE — PROGRESS NOTE ADULT - ATTENDING COMMENTS
Agree with above.  Looks much better today - less confused, interactive, sitting up in a chair.   On TC during the day up to 12 hours, vent at night  Completed Ancef (s/p treatment for MSSA in sputum for 10 days). WBC only mildly elevated, afebrile.  Remains intermittently agitated, appears to be component of delirium and possibly pain (responds to Tylenol). Appreciate Psych f/u. will c/w Seroquel at 75 mg 100 mg QHS and add low dose Klonopin.   Frequent reorientation and stimulation during the day- discussed with wife and she is planning to stay with the patient during the day.   DVT ppx - has b/l LE soleal DVTs that have been stable on repeat Dopplers. Will repeat tomorrow.  Full code .     I was physically present for the key portions of the evaluation and management (E/M) service provided.  I agree with the above history, physical, and plan which I have reviewed and edited where appropriate.     Plan discussed with wife and RCU team at bedside.

## 2018-05-08 NOTE — PROGRESS NOTE BEHAVIORAL HEALTH - ABNORMAL MOVEMENTS
No abnormal movements
No abnormal movements
Tremors
No abnormal movements
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
## 2018-05-08 NOTE — PROGRESS NOTE BEHAVIORAL HEALTH - NSBHPTASSESSDT_PSY_A_CORE
01-May-2018 14:44
02-May-2018 11:42
04-May-2018 11:47
07-May-2018 11:16
30-Apr-2018 11:36
05-May-2018 16:00
08-May-2018 10:52
19-Apr-2018 12:11
15-Apr-2018 10:37
20-Apr-2018 16:05
03-May-2018 10:54

## 2018-05-08 NOTE — PROGRESS NOTE BEHAVIORAL HEALTH - NSBHFUPMEDSE_PSY_A_CORE
None known
None known
Yes
None known
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
## 2018-05-08 NOTE — PROGRESS NOTE BEHAVIORAL HEALTH - AFFECT QUALITY
Euthymic
Depressed
Euthymic
Depressed
Euthymic

## 2018-05-08 NOTE — PROGRESS NOTE ADULT - SUBJECTIVE AND OBJECTIVE BOX
Patient is a 77y old  Male who presents with a chief complaint of pelvic fx with active extrav (27 Mar 2018 12:01)      Interval Events:    REVIEW OF SYSTEMS:  [ ] Positive  [ ] All other systems negative  [x ] Unable to assess ROS because ___trached_____    Vital Signs Last 24 Hrs  T(C): 36.4 (05-08-18 @ 04:15), Max: 36.9 (05-07-18 @ 23:38)  T(F): 97.5 (05-08-18 @ 04:15), Max: 98.4 (05-07-18 @ 23:38)  HR: 89 (05-08-18 @ 08:55) (60 - 89)  BP: 144/65 (05-08-18 @ 06:17) (101/55 - 190/63)  RR: 18 (05-08-18 @ 06:17) (18 - 24)  SpO2: 98% (05-08-18 @ 11:18) (96% - 100%)    PHYSICAL EXAM:  HEENT:   [x ]Tracheostomy: 7 portex cuffed  [ ]Pupils equal  [ ]No oral lesions  [ ]Abnormal    SKIN  [x ]No Rash  [ ] Abnormal  [ ] pressure    CARDIAC  [x ]Regular  [ ]Abnormal    PULMONARY  [x ]Bilateral Clear Breath Sounds  [ ]Normal Excursion  [ ]Abnormal    GI  [x ]PEG      [x ] +BS		              [x ]Soft, nondistended, nontender	  [ ]Abnormal    MUSCULOSKELETAL                                   [ ]Bedbound                 [ ]Abnormal    [x ]Ambulatory/OOB to chair                           EXTREMITIES                                         [x ]Normal  [ ]Edema                           NEUROLOGIC  [ ] Normal, non focal  [x ] Focal findings: improving delierium    PSYCHIATRIC  [x ]Alert   [ ] Sedated	 [ ]Agitated    :  Ybarra: [ ] Yes, if yes: Date of Placement:                   [x  ] No    LINES: Central Lines [ ] Yes, if yes: Date of Placement                                     [  x] No    HOSPITAL MEDICATIONS:  MEDICATIONS  (STANDING):  ALBUTerol/ipratropium for Nebulization 3 milliLiter(s) Nebulizer every 6 hours  aspirin  chewable 81 milliGRAM(s) Oral daily  clonazePAM Tablet 1 milliGRAM(s) Oral at bedtime  clopidogrel Tablet 75 milliGRAM(s) Oral daily  doxazosin 2 milliGRAM(s) Oral at bedtime  enoxaparin Injectable 40 milliGRAM(s) SubCutaneous daily  famotidine    Tablet 20 milliGRAM(s) Oral two times a day  insulin lispro (HumaLOG) corrective regimen sliding scale   SubCutaneous every 6 hours  insulin NPH human recombinant 6 Unit(s) SubCutaneous <User Schedule>  insulin NPH human recombinant 3 Unit(s) SubCutaneous <User Schedule>  lactobacillus acidophilus 1 Tablet(s) Oral every 12 hours  lidocaine   Patch 1 Patch Transdermal daily  lidocaine   Patch 1 Patch Transdermal daily  metoclopramide Injectable 5 milliGRAM(s) IV Push every 8 hours  metoprolol tartrate 100 milliGRAM(s) Oral every 12 hours  QUEtiapine 50 milliGRAM(s) Oral <User Schedule>  QUEtiapine 75 milliGRAM(s) Oral <User Schedule>  simethicone 80 milliGRAM(s) Chew every 8 hours  simvastatin 40 milliGRAM(s) Oral at bedtime    MEDICATIONS  (PRN):  acetaminophen    Suspension. 650 milliGRAM(s) Oral every 6 hours PRN Mild Pain (1 - 3)  acetaminophen    Suspension. 650 milliGRAM(s) Oral every 6 hours PRN mild to moderate pain  hydrALAZINE 25 milliGRAM(s) Oral every 6 hours PRN SBP>140 or DBP>90  melatonin 3 milliGRAM(s) Oral at bedtime PRN Insomnia  QUEtiapine 50 milliGRAM(s) Oral every 6 hours PRN agitation      LABS:                        8.8    10.4  )-----------( 247      ( 08 May 2018 07:19 )             27.3     05-08    140  |  102  |  27<H>  ----------------------------<  89  4.0   |  30  |  0.63    Ca    8.7      08 May 2018 07:19  Phos  2.4     05-08  Mg     2.0     05-08              CAPILLARY BLOOD GLUCOSE    MICROBIOLOGY:     RADIOLOGY:  [ ] Reviewed and interpreted by me    Mode: Off Patient is a 77y old  Male who presents with a chief complaint of pelvic fx with active extrav (27 Mar 2018 12:01)      Interval Events: Slept better overnight, less confused today and able to participate with PT.    REVIEW OF SYSTEMS:  [ ] Positive  [ ] All other systems negative  [x ] Unable to assess ROS because ___trached_____    Vital Signs Last 24 Hrs  T(C): 36.4 (05-08-18 @ 04:15), Max: 36.9 (05-07-18 @ 23:38)  T(F): 97.5 (05-08-18 @ 04:15), Max: 98.4 (05-07-18 @ 23:38)  HR: 89 (05-08-18 @ 08:55) (60 - 89)  BP: 144/65 (05-08-18 @ 06:17) (101/55 - 190/63)  RR: 18 (05-08-18 @ 06:17) (18 - 24)  SpO2: 98% (05-08-18 @ 11:18) (96% - 100%)    PHYSICAL EXAM:  HEENT:   [x ]Tracheostomy: 7 portex cuffed  [ ]Pupils equal  [ ]No oral lesions  [ ]Abnormal    SKIN  [x ]No Rash  [ ] Abnormal  [ ] pressure    CARDIAC  [x ]Regular  [ ]Abnormal    PULMONARY  [x ]Bilateral Clear Breath Sounds  [ ]Normal Excursion  [ ]Abnormal    GI  [x ]PEG      [x ] +BS		              [x ]Soft, nondistended, nontender	  [ ]Abnormal    MUSCULOSKELETAL                                   [ ]Bedbound                 [ ]Abnormal    [x ]Ambulatory/OOB to chair                           EXTREMITIES                                         [x ]Normal  [ ]Edema                           NEUROLOGIC  [ ] Normal, non focal  [x ] Focal findings: improving delierium    PSYCHIATRIC  [x ]Alert   [ ] Sedated	 [ ]Agitated    :  Ybarra: [ ] Yes, if yes: Date of Placement:                   [x  ] No    LINES: Central Lines [ ] Yes, if yes: Date of Placement                                     [  x] No    HOSPITAL MEDICATIONS:  MEDICATIONS  (STANDING):  ALBUTerol/ipratropium for Nebulization 3 milliLiter(s) Nebulizer every 6 hours  aspirin  chewable 81 milliGRAM(s) Oral daily  clonazePAM Tablet 1 milliGRAM(s) Oral at bedtime  clopidogrel Tablet 75 milliGRAM(s) Oral daily  doxazosin 2 milliGRAM(s) Oral at bedtime  enoxaparin Injectable 40 milliGRAM(s) SubCutaneous daily  famotidine    Tablet 20 milliGRAM(s) Oral two times a day  insulin lispro (HumaLOG) corrective regimen sliding scale   SubCutaneous every 6 hours  insulin NPH human recombinant 6 Unit(s) SubCutaneous <User Schedule>  insulin NPH human recombinant 3 Unit(s) SubCutaneous <User Schedule>  lactobacillus acidophilus 1 Tablet(s) Oral every 12 hours  lidocaine   Patch 1 Patch Transdermal daily  lidocaine   Patch 1 Patch Transdermal daily  metoclopramide Injectable 5 milliGRAM(s) IV Push every 8 hours  metoprolol tartrate 100 milliGRAM(s) Oral every 12 hours  QUEtiapine 50 milliGRAM(s) Oral <User Schedule>  QUEtiapine 75 milliGRAM(s) Oral <User Schedule>  simethicone 80 milliGRAM(s) Chew every 8 hours  simvastatin 40 milliGRAM(s) Oral at bedtime    MEDICATIONS  (PRN):  acetaminophen    Suspension. 650 milliGRAM(s) Oral every 6 hours PRN Mild Pain (1 - 3)  acetaminophen    Suspension. 650 milliGRAM(s) Oral every 6 hours PRN mild to moderate pain  hydrALAZINE 25 milliGRAM(s) Oral every 6 hours PRN SBP>140 or DBP>90  melatonin 3 milliGRAM(s) Oral at bedtime PRN Insomnia  QUEtiapine 50 milliGRAM(s) Oral every 6 hours PRN agitation      LABS:                        8.8    10.4  )-----------( 247      ( 08 May 2018 07:19 )             27.3     05-08    140  |  102  |  27<H>  ----------------------------<  89  4.0   |  30  |  0.63    Ca    8.7      08 May 2018 07:19  Phos  2.4     05-08  Mg     2.0     05-08              MICROBIOLOGY:     RADIOLOGY:  [x ] Reviewed and interpreted by me  < from: [facility] Duplex Lower Ext Vein Rubia Briggs (05.01.18 @ 16:14) >  EXAM:  DUPLEX SCAN EXT VEINS LOWER BI                            PROCEDURE DATE:  05/01/2018            INTERPRETATION:  CLINICAL INFORMATION: A follow-up examination, dated   4/24/2018, showed persistent bilateral soleal vein DVT, but with   resolution of the left peroneal vein DVT further follow-up examination    TECHNIQUE: Duplex sonography of the BILATERAL LOWER extremities with   color and spectral Doppler, with and without compression.      FINDINGS: There is edema within the superficial soft tissues of the right   and left calves appear    There is normal compressibility of the bilateral common femoral, femoral   and popliteal veins.     Doppler examination shows normal spontaneous and phasic flow.    There is persistent left soleal vein DVT without proximal propagation.    IMPRESSION: Persistent left soleal vein DVT without proximal propagation.    GENEVIEVE LAZARO M.D., ATTENDING RADIOLOGIST  This document has been electronically signed. May  1 2018  6:29PM    < end of copied text >    Mode: Off

## 2018-05-08 NOTE — PROGRESS NOTE BEHAVIORAL HEALTH - NS ED [facility] MED ROS GASTROINTESTINAL
No complaints
Unable to assess
No complaints
Unable to assess
No complaints
No complaints

## 2018-05-09 LAB
ANION GAP SERPL CALC-SCNC: 10 MMOL/L — SIGNIFICANT CHANGE UP (ref 5–17)
BASE EXCESS BLDA CALC-SCNC: 8.3 MMOL/L — HIGH (ref -2–2)
BUN SERPL-MCNC: 25 MG/DL — HIGH (ref 7–23)
CALCIUM SERPL-MCNC: 9.3 MG/DL — SIGNIFICANT CHANGE UP (ref 8.4–10.5)
CHLORIDE SERPL-SCNC: 101 MMOL/L — SIGNIFICANT CHANGE UP (ref 96–108)
CO2 BLDA-SCNC: 36 MMOL/L — HIGH (ref 22–30)
CO2 SERPL-SCNC: 32 MMOL/L — HIGH (ref 22–31)
CREAT SERPL-MCNC: 0.58 MG/DL — SIGNIFICANT CHANGE UP (ref 0.5–1.3)
GAS PNL BLDA: SIGNIFICANT CHANGE UP
GLUCOSE SERPL-MCNC: 91 MG/DL — SIGNIFICANT CHANGE UP (ref 70–99)
HCO3 BLDA-SCNC: 34 MMOL/L — HIGH (ref 21–29)
HCT VFR BLD CALC: 30.5 % — LOW (ref 39–50)
HGB BLD-MCNC: 9.3 G/DL — LOW (ref 13–17)
HOROWITZ INDEX BLDA+IHG-RTO: 40 — SIGNIFICANT CHANGE UP
MAGNESIUM SERPL-MCNC: 2.1 MG/DL — SIGNIFICANT CHANGE UP (ref 1.6–2.6)
MCHC RBC-ENTMCNC: 30.5 GM/DL — LOW (ref 32–36)
MCHC RBC-ENTMCNC: 31.9 PG — SIGNIFICANT CHANGE UP (ref 27–34)
MCV RBC AUTO: 105 FL — HIGH (ref 80–100)
PCO2 BLDA: 57 MMHG — HIGH (ref 32–46)
PH BLDA: 7.39 — SIGNIFICANT CHANGE UP (ref 7.35–7.45)
PHOSPHATE SERPL-MCNC: 3.5 MG/DL — SIGNIFICANT CHANGE UP (ref 2.5–4.5)
PLATELET # BLD AUTO: 268 K/UL — SIGNIFICANT CHANGE UP (ref 150–400)
PO2 BLDA: 77 MMHG — SIGNIFICANT CHANGE UP (ref 74–108)
POTASSIUM SERPL-MCNC: 4.2 MMOL/L — SIGNIFICANT CHANGE UP (ref 3.5–5.3)
POTASSIUM SERPL-SCNC: 4.2 MMOL/L — SIGNIFICANT CHANGE UP (ref 3.5–5.3)
RBC # BLD: 2.92 M/UL — LOW (ref 4.2–5.8)
RBC # FLD: 15.6 % — HIGH (ref 10.3–14.5)
SAO2 % BLDA: 97 % — HIGH (ref 92–96)
SODIUM SERPL-SCNC: 143 MMOL/L — SIGNIFICANT CHANGE UP (ref 135–145)
WBC # BLD: 12.9 K/UL — HIGH (ref 3.8–10.5)
WBC # FLD AUTO: 12.9 K/UL — HIGH (ref 3.8–10.5)

## 2018-05-09 PROCEDURE — 99233 SBSQ HOSP IP/OBS HIGH 50: CPT | Mod: GC

## 2018-05-09 PROCEDURE — 93970 EXTREMITY STUDY: CPT | Mod: 26

## 2018-05-09 RX ORDER — CLONAZEPAM 1 MG
0.5 TABLET ORAL
Qty: 0 | Refills: 0 | Status: DISCONTINUED | OUTPATIENT
Start: 2018-05-09 | End: 2018-05-14

## 2018-05-09 RX ORDER — INSULIN GLARGINE 100 [IU]/ML
3 INJECTION, SOLUTION SUBCUTANEOUS ONCE
Qty: 0 | Refills: 0 | Status: DISCONTINUED | OUTPATIENT
Start: 2018-05-09 | End: 2018-05-09

## 2018-05-09 RX ORDER — HUMAN INSULIN 100 [IU]/ML
3 INJECTION, SUSPENSION SUBCUTANEOUS ONCE
Qty: 0 | Refills: 0 | Status: COMPLETED | OUTPATIENT
Start: 2018-05-09 | End: 2018-05-09

## 2018-05-09 RX ADMIN — HUMAN INSULIN 3 UNIT(S): 100 INJECTION, SUSPENSION SUBCUTANEOUS at 00:24

## 2018-05-09 RX ADMIN — Medication 3 MILLILITER(S): at 00:22

## 2018-05-09 RX ADMIN — Medication 3 MILLILITER(S): at 23:45

## 2018-05-09 RX ADMIN — Medication 3 MILLILITER(S): at 05:51

## 2018-05-09 RX ADMIN — Medication 100 MILLIGRAM(S): at 12:02

## 2018-05-09 RX ADMIN — SIMETHICONE 80 MILLIGRAM(S): 80 TABLET, CHEWABLE ORAL at 21:44

## 2018-05-09 RX ADMIN — Medication 2 MILLIGRAM(S): at 21:44

## 2018-05-09 RX ADMIN — FAMOTIDINE 20 MILLIGRAM(S): 10 INJECTION INTRAVENOUS at 17:28

## 2018-05-09 RX ADMIN — FAMOTIDINE 20 MILLIGRAM(S): 10 INJECTION INTRAVENOUS at 06:52

## 2018-05-09 RX ADMIN — Medication 1 TABLET(S): at 17:28

## 2018-05-09 RX ADMIN — Medication 3 MILLILITER(S): at 12:10

## 2018-05-09 RX ADMIN — HUMAN INSULIN 6 UNIT(S): 100 INJECTION, SUSPENSION SUBCUTANEOUS at 12:59

## 2018-05-09 RX ADMIN — Medication 2: at 18:10

## 2018-05-09 RX ADMIN — Medication 3 MILLILITER(S): at 18:13

## 2018-05-09 RX ADMIN — CLOPIDOGREL BISULFATE 75 MILLIGRAM(S): 75 TABLET, FILM COATED ORAL at 12:02

## 2018-05-09 RX ADMIN — Medication 5 MILLIGRAM(S): at 14:02

## 2018-05-09 RX ADMIN — LIDOCAINE 1 PATCH: 4 CREAM TOPICAL at 12:03

## 2018-05-09 RX ADMIN — ENOXAPARIN SODIUM 40 MILLIGRAM(S): 100 INJECTION SUBCUTANEOUS at 12:02

## 2018-05-09 RX ADMIN — SIMETHICONE 80 MILLIGRAM(S): 80 TABLET, CHEWABLE ORAL at 14:03

## 2018-05-09 RX ADMIN — SIMETHICONE 80 MILLIGRAM(S): 80 TABLET, CHEWABLE ORAL at 06:52

## 2018-05-09 RX ADMIN — Medication 0.5 MILLIGRAM(S): at 18:10

## 2018-05-09 RX ADMIN — HUMAN INSULIN 6 UNIT(S): 100 INJECTION, SUSPENSION SUBCUTANEOUS at 18:10

## 2018-05-09 RX ADMIN — HUMAN INSULIN 3 UNIT(S): 100 INJECTION, SUSPENSION SUBCUTANEOUS at 06:56

## 2018-05-09 RX ADMIN — QUETIAPINE FUMARATE 50 MILLIGRAM(S): 200 TABLET, FILM COATED ORAL at 06:57

## 2018-05-09 RX ADMIN — Medication 5 MILLIGRAM(S): at 06:51

## 2018-05-09 RX ADMIN — SIMVASTATIN 40 MILLIGRAM(S): 20 TABLET, FILM COATED ORAL at 21:44

## 2018-05-09 RX ADMIN — Medication 1 TABLET(S): at 06:52

## 2018-05-09 RX ADMIN — Medication 5 MILLIGRAM(S): at 21:44

## 2018-05-09 RX ADMIN — QUETIAPINE FUMARATE 75 MILLIGRAM(S): 200 TABLET, FILM COATED ORAL at 21:44

## 2018-05-09 RX ADMIN — Medication 81 MILLIGRAM(S): at 12:02

## 2018-05-09 NOTE — PROGRESS NOTE ADULT - PROBLEM SELECTOR PLAN 7
Venous Duplex 4/1: Acute DVT of Rt and Lft Soleal Vein   Venous Duplex 4/10: Patient with persistent Soleal DVT b/l w/o propagation   Venous Duplex 4/18: Patient with New left peroneal DVT with persistent soleal DVT.  Venous Duplex 4/24: Persistent b/l soleal dvt, peroneal dvt not identified  Venous Duplex 5/1: Persistent b/l soleal dvt, no propagation noted.

## 2018-05-09 NOTE — PROGRESS NOTE ADULT - ATTENDING COMMENTS
Agree with above.  Sleepy this morning but arousable. +UE tremor   was on TC 24 hours, ABG with acute on chronic respiratory acidosis, will c/w TC during the day up to 12 hours, vent at night  Completed Ancef (s/p treatment for MSSA in sputum for 10 days). WBC only mildly elevated, afebrile.  Remains intermittently agitated, appears to be component of delirium and possibly pain (responds to Tylenol). Appreciate Psych f/u. will c/w Seroquel at 75 mg QHS and reduce Klonopin dose to 0.5 mg.   Frequent reorientation and stimulation during the day  DVT ppx - has b/l LE soleal DVTs that have been stable on repeat Dopplers. Will repeat  Full code.     I was physically present for the key portions of the evaluation and management (E/M) service provided.  I agree with the above history, physical, and plan which I have reviewed and edited where appropriate.     Plan discussed with RCU team at bedside.

## 2018-05-09 NOTE — PROGRESS NOTE ADULT - SUBJECTIVE AND OBJECTIVE BOX
Patient is a 77y old  Male who presents with a chief complaint of pelvic fx with active extrav (27 Mar 2018 12:01)      Interval Events:    REVIEW OF SYSTEMS:  [ ] Positive  [ ] All other systems negative  [x ] Unable to assess ROS because __sleepy______    Vital Signs Last 24 Hrs  T(C): 36.5 (05-09-18 @ 04:40), Max: 36.7 (05-08-18 @ 12:10)  T(F): 97.7 (05-09-18 @ 04:40), Max: 98.1 (05-08-18 @ 12:10)  HR: 75 (05-09-18 @ 08:07) (75 - 96)  BP: 158/57 (05-09-18 @ 04:40) (106/58 - 210/60)  RR: 18 (05-09-18 @ 04:40) (18 - 18)  SpO2: 100% (05-09-18 @ 08:07) (92% - 100%)    PHYSICAL EXAM:  HEENT:   [x ]Tracheostomy: 7 portex cuffed  [ ]Pupils equal  [ ]No oral lesions  [ ]Abnormal    SKIN  [x ]No Rash  [ ] Abnormal  [ ] pressure    CARDIAC  [ x]Regular  [ ]Abnormal    PULMONARY  [ ]Bilateral Clear Breath Sounds  [ ]Normal Excursion  [x ]Abnormal-coarse bs    GI  [x ]PEG      [x ] +BS		              [x ]Soft, nondistended, nontender	  [ ]Abnormal    MUSCULOSKELETAL                                   [ ]Bedbound                 [ ]Abnormal    [x ]Ambulatory/OOB to chair                           EXTREMITIES                                         [x ]Normal  [ ]Edema                           NEUROLOGIC  [ ] Normal, non focal  [ ] Focal findings:    PSYCHIATRIC  [ ]Alert and appropriate  [ x] Sedated- this am difficult to arouse	 [ ]Agitated    :  Ybarra: [ ] Yes, if yes: Date of Placement:                   [ x ] No    LINES: Central Lines [ ] Yes, if yes: Date of Placement                                     [ x ] No    HOSPITAL MEDICATIONS:  MEDICATIONS  (STANDING):  ALBUTerol/ipratropium for Nebulization 3 milliLiter(s) Nebulizer every 6 hours  aspirin  chewable 81 milliGRAM(s) Oral daily  clonazePAM Tablet 0.5 milliGRAM(s) Oral <User Schedule>  clopidogrel Tablet 75 milliGRAM(s) Oral daily  doxazosin 2 milliGRAM(s) Oral at bedtime  enoxaparin Injectable 40 milliGRAM(s) SubCutaneous daily  famotidine    Tablet 20 milliGRAM(s) Oral two times a day  insulin lispro (HumaLOG) corrective regimen sliding scale   SubCutaneous every 6 hours  insulin NPH human recombinant 6 Unit(s) SubCutaneous <User Schedule>  insulin NPH human recombinant 3 Unit(s) SubCutaneous <User Schedule>  lactobacillus acidophilus 1 Tablet(s) Oral every 12 hours  lidocaine   Patch 1 Patch Transdermal daily  lidocaine   Patch 1 Patch Transdermal daily  metoclopramide Injectable 5 milliGRAM(s) IV Push every 8 hours  metoprolol tartrate 100 milliGRAM(s) Oral every 12 hours  QUEtiapine 50 milliGRAM(s) Oral <User Schedule>  QUEtiapine 75 milliGRAM(s) Oral <User Schedule>  simethicone 80 milliGRAM(s) Chew every 8 hours  simvastatin 40 milliGRAM(s) Oral at bedtime    MEDICATIONS  (PRN):  acetaminophen    Suspension. 650 milliGRAM(s) Oral every 6 hours PRN Mild Pain (1 - 3)  acetaminophen    Suspension. 650 milliGRAM(s) Oral every 6 hours PRN mild to moderate pain  hydrALAZINE 25 milliGRAM(s) Oral every 6 hours PRN SBP>140 or DBP>90  melatonin 3 milliGRAM(s) Oral at bedtime PRN Insomnia  QUEtiapine 50 milliGRAM(s) Oral every 6 hours PRN agitation      LABS:                        9.3    12.9  )-----------( 268      ( 09 May 2018 07:09 )             30.5     05-09    143  |  101  |  25<H>  ----------------------------<  91  4.2   |  32<H>  |  0.58    Ca    9.3      09 May 2018 07:07  Phos  3.5     05-09  Mg     2.1     05-09          Arterial Blood Gas:  05-09 @ 05:44  7.39/57/77/34/97/8.3  ABG lactate: --      CAPILLARY BLOOD GLUCOSE    MICROBIOLOGY:     RADIOLOGY:  [ ] Reviewed and interpreted by me    Mode: AC/ CMV (Assist Control/ Continuous Mandatory Ventilation)  RR (machine): 18  TV (machine): 550  FiO2: 40  PEEP: 5  ITime: 1  MAP: 12  PIP: 27 Patient is a 77y old  Male who presents with a chief complaint of pelvic fx with active extrav (27 Mar 2018 12:01)      Interval Events: No events overnight. confused this morning but arousable and able to reorient.     REVIEW OF SYSTEMS:  [ ] Positive  [ ] All other systems negative  [x ] Unable to assess ROS because __sleepy______    Vital Signs Last 24 Hrs  T(C): 36.5 (05-09-18 @ 04:40), Max: 36.7 (05-08-18 @ 12:10)  T(F): 97.7 (05-09-18 @ 04:40), Max: 98.1 (05-08-18 @ 12:10)  HR: 75 (05-09-18 @ 08:07) (75 - 96)  BP: 158/57 (05-09-18 @ 04:40) (106/58 - 210/60)  RR: 18 (05-09-18 @ 04:40) (18 - 18)  SpO2: 100% (05-09-18 @ 08:07) (92% - 100%)    PHYSICAL EXAM:  HEENT:   [x ]Tracheostomy: 7 portex cuffed  [ ]Pupils equal  [ ]No oral lesions  [ ]Abnormal    SKIN  [x ]No Rash  [ ] Abnormal  [ ] pressure    CARDIAC  [ x]Regular  [ ]Abnormal    PULMONARY  [ ]Bilateral Clear Breath Sounds  [ ]Normal Excursion  [x ]Abnormal-coarse bs    GI  [x ]PEG      [x ] +BS		              [x ]Soft, nondistended, nontender	  [ ]Abnormal    MUSCULOSKELETAL                                   [ ]Bedbound                 [ ]Abnormal    [x ]Ambulatory/OOB to chair                           EXTREMITIES                                         [x ]Normal  [ ]Edema                           NEUROLOGIC  [ ] Normal, non focal  [ ] Focal findings:    PSYCHIATRIC  [ ]Alert and appropriate  [ x] Sedated- this am difficult to arouse	 [ ]Agitated    :  Ybarra: [ ] Yes, if yes: Date of Placement:                   [ x ] No    LINES: Central Lines [ ] Yes, if yes: Date of Placement                                     [ x ] No    HOSPITAL MEDICATIONS:  MEDICATIONS  (STANDING):  ALBUTerol/ipratropium for Nebulization 3 milliLiter(s) Nebulizer every 6 hours  aspirin  chewable 81 milliGRAM(s) Oral daily  clonazePAM Tablet 0.5 milliGRAM(s) Oral <User Schedule>  clopidogrel Tablet 75 milliGRAM(s) Oral daily  doxazosin 2 milliGRAM(s) Oral at bedtime  enoxaparin Injectable 40 milliGRAM(s) SubCutaneous daily  famotidine    Tablet 20 milliGRAM(s) Oral two times a day  insulin lispro (HumaLOG) corrective regimen sliding scale   SubCutaneous every 6 hours  insulin NPH human recombinant 6 Unit(s) SubCutaneous <User Schedule>  insulin NPH human recombinant 3 Unit(s) SubCutaneous <User Schedule>  lactobacillus acidophilus 1 Tablet(s) Oral every 12 hours  lidocaine   Patch 1 Patch Transdermal daily  lidocaine   Patch 1 Patch Transdermal daily  metoclopramide Injectable 5 milliGRAM(s) IV Push every 8 hours  metoprolol tartrate 100 milliGRAM(s) Oral every 12 hours  QUEtiapine 50 milliGRAM(s) Oral <User Schedule>  QUEtiapine 75 milliGRAM(s) Oral <User Schedule>  simethicone 80 milliGRAM(s) Chew every 8 hours  simvastatin 40 milliGRAM(s) Oral at bedtime    MEDICATIONS  (PRN):  acetaminophen    Suspension. 650 milliGRAM(s) Oral every 6 hours PRN Mild Pain (1 - 3)  acetaminophen    Suspension. 650 milliGRAM(s) Oral every 6 hours PRN mild to moderate pain  hydrALAZINE 25 milliGRAM(s) Oral every 6 hours PRN SBP>140 or DBP>90  melatonin 3 milliGRAM(s) Oral at bedtime PRN Insomnia  QUEtiapine 50 milliGRAM(s) Oral every 6 hours PRN agitation      LABS:                        9.3    12.9  )-----------( 268      ( 09 May 2018 07:09 )             30.5     05-09    143  |  101  |  25<H>  ----------------------------<  91  4.2   |  32<H>  |  0.58    Ca    9.3      09 May 2018 07:07  Phos  3.5     05-09  Mg     2.1     05-09          Arterial Blood Gas:  05-09 @ 05:44  7.39/57/77/34/97/8.3  ABG lactate: --      CAPILLARY BLOOD GLUCOSE    MICROBIOLOGY:     RADIOLOGY:  [ ] Reviewed and interpreted by me    Mode: AC/ CMV (Assist Control/ Continuous Mandatory Ventilation)  RR (machine): 18  TV (machine): 550  FiO2: 40  PEEP: 5  ITime: 1  MAP: 12  PIP: 27

## 2018-05-09 NOTE — PROGRESS NOTE ADULT - PROBLEM SELECTOR PLAN 1
+ Acute left 4th-8th rib fractures  + Left superior ramus fracture with a large extraperitoneal hematoma    + Left inferior pubic ramus fracture / right superior pubic ramus fracture  + Left L5 lamina & hemisacrum fractures  + Several spleen lacerations/  Grade 2 left kidney laceration  Patient S/P IR Glue and Coil embolization  CT Scan 4/14: Extraperitoneal hematoma slightly increased in size compared to prior imaging with increasing organization   Trauma Sx [place] enlarging hematoma likely represents redistribution of hematoma, no intervention performed
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
## 2018-05-10 LAB
ANION GAP SERPL CALC-SCNC: 8 MMOL/L — SIGNIFICANT CHANGE UP (ref 5–17)
BASE EXCESS BLDA CALC-SCNC: 9.9 MMOL/L — HIGH (ref -2–2)
BUN SERPL-MCNC: 23 MG/DL — SIGNIFICANT CHANGE UP (ref 7–23)
CALCIUM SERPL-MCNC: 9.1 MG/DL — SIGNIFICANT CHANGE UP (ref 8.4–10.5)
CHLORIDE SERPL-SCNC: 101 MMOL/L — SIGNIFICANT CHANGE UP (ref 96–108)
CO2 BLDA-SCNC: 39 MMOL/L — HIGH (ref 22–30)
CO2 SERPL-SCNC: 33 MMOL/L — HIGH (ref 22–31)
CREAT SERPL-MCNC: 0.54 MG/DL — SIGNIFICANT CHANGE UP (ref 0.5–1.3)
GAS PNL BLDA: SIGNIFICANT CHANGE UP
GLUCOSE SERPL-MCNC: 109 MG/DL — HIGH (ref 70–99)
HCO3 BLDA-SCNC: 37 MMOL/L — HIGH (ref 21–29)
HCT VFR BLD CALC: 29.4 % — LOW (ref 39–50)
HGB BLD-MCNC: 9.1 G/DL — LOW (ref 13–17)
HOROWITZ INDEX BLDA+IHG-RTO: 40 — SIGNIFICANT CHANGE UP
MAGNESIUM SERPL-MCNC: 2 MG/DL — SIGNIFICANT CHANGE UP (ref 1.6–2.6)
MCHC RBC-ENTMCNC: 31 GM/DL — LOW (ref 32–36)
MCHC RBC-ENTMCNC: 32.5 PG — SIGNIFICANT CHANGE UP (ref 27–34)
MCV RBC AUTO: 105 FL — HIGH (ref 80–100)
PCO2 BLDA: 67 MMHG — HIGH (ref 32–46)
PH BLDA: 7.36 — SIGNIFICANT CHANGE UP (ref 7.35–7.45)
PHOSPHATE SERPL-MCNC: 3.3 MG/DL — SIGNIFICANT CHANGE UP (ref 2.5–4.5)
PLATELET # BLD AUTO: 252 K/UL — SIGNIFICANT CHANGE UP (ref 150–400)
PO2 BLDA: 52 MMHG — LOW (ref 74–108)
POTASSIUM SERPL-MCNC: 4.3 MMOL/L — SIGNIFICANT CHANGE UP (ref 3.5–5.3)
POTASSIUM SERPL-SCNC: 4.3 MMOL/L — SIGNIFICANT CHANGE UP (ref 3.5–5.3)
RBC # BLD: 2.8 M/UL — LOW (ref 4.2–5.8)
RBC # FLD: 15.4 % — HIGH (ref 10.3–14.5)
SAO2 % BLDA: 84 % — LOW (ref 92–96)
SODIUM SERPL-SCNC: 142 MMOL/L — SIGNIFICANT CHANGE UP (ref 135–145)
WBC # BLD: 9.9 K/UL — SIGNIFICANT CHANGE UP (ref 3.8–10.5)
WBC # FLD AUTO: 9.9 K/UL — SIGNIFICANT CHANGE UP (ref 3.8–10.5)

## 2018-05-10 PROCEDURE — 99233 SBSQ HOSP IP/OBS HIGH 50: CPT | Mod: GC

## 2018-05-10 PROCEDURE — 71045 X-RAY EXAM CHEST 1 VIEW: CPT | Mod: 26

## 2018-05-10 RX ORDER — NYSTATIN 500MM UNIT
500000 POWDER (EA) MISCELLANEOUS THREE TIMES A DAY
Qty: 0 | Refills: 0 | Status: DISCONTINUED | OUTPATIENT
Start: 2018-05-10 | End: 2018-05-18

## 2018-05-10 RX ORDER — HUMAN INSULIN 100 [IU]/ML
3 INJECTION, SUSPENSION SUBCUTANEOUS ONCE
Qty: 0 | Refills: 0 | Status: COMPLETED | OUTPATIENT
Start: 2018-05-10 | End: 2018-05-10

## 2018-05-10 RX ADMIN — LIDOCAINE 1 PATCH: 4 CREAM TOPICAL at 00:00

## 2018-05-10 RX ADMIN — ENOXAPARIN SODIUM 40 MILLIGRAM(S): 100 INJECTION SUBCUTANEOUS at 12:25

## 2018-05-10 RX ADMIN — QUETIAPINE FUMARATE 50 MILLIGRAM(S): 200 TABLET, FILM COATED ORAL at 07:59

## 2018-05-10 RX ADMIN — HUMAN INSULIN 3 UNIT(S): 100 INJECTION, SUSPENSION SUBCUTANEOUS at 18:15

## 2018-05-10 RX ADMIN — Medication 1 TABLET(S): at 17:26

## 2018-05-10 RX ADMIN — Medication 100 MILLIGRAM(S): at 12:29

## 2018-05-10 RX ADMIN — Medication 0.5 MILLIGRAM(S): at 16:40

## 2018-05-10 RX ADMIN — Medication 650 MILLIGRAM(S): at 13:12

## 2018-05-10 RX ADMIN — FAMOTIDINE 20 MILLIGRAM(S): 10 INJECTION INTRAVENOUS at 17:27

## 2018-05-10 RX ADMIN — Medication 5 MILLIGRAM(S): at 13:11

## 2018-05-10 RX ADMIN — HUMAN INSULIN 6 UNIT(S): 100 INJECTION, SUSPENSION SUBCUTANEOUS at 12:24

## 2018-05-10 RX ADMIN — Medication 3 MILLILITER(S): at 17:20

## 2018-05-10 RX ADMIN — Medication 81 MILLIGRAM(S): at 12:24

## 2018-05-10 RX ADMIN — FAMOTIDINE 20 MILLIGRAM(S): 10 INJECTION INTRAVENOUS at 06:21

## 2018-05-10 RX ADMIN — Medication 5 MILLIGRAM(S): at 21:28

## 2018-05-10 RX ADMIN — Medication 100 MILLIGRAM(S): at 00:11

## 2018-05-10 RX ADMIN — Medication 500000 UNIT(S): at 21:27

## 2018-05-10 RX ADMIN — CLOPIDOGREL BISULFATE 75 MILLIGRAM(S): 75 TABLET, FILM COATED ORAL at 12:24

## 2018-05-10 RX ADMIN — Medication 5 MILLIGRAM(S): at 06:21

## 2018-05-10 RX ADMIN — SIMETHICONE 80 MILLIGRAM(S): 80 TABLET, CHEWABLE ORAL at 21:28

## 2018-05-10 RX ADMIN — SIMETHICONE 80 MILLIGRAM(S): 80 TABLET, CHEWABLE ORAL at 06:21

## 2018-05-10 RX ADMIN — Medication 3 MILLILITER(S): at 05:15

## 2018-05-10 RX ADMIN — HUMAN INSULIN 3 UNIT(S): 100 INJECTION, SUSPENSION SUBCUTANEOUS at 00:11

## 2018-05-10 RX ADMIN — QUETIAPINE FUMARATE 75 MILLIGRAM(S): 200 TABLET, FILM COATED ORAL at 21:27

## 2018-05-10 RX ADMIN — Medication 500000 UNIT(S): at 13:11

## 2018-05-10 RX ADMIN — SIMETHICONE 80 MILLIGRAM(S): 80 TABLET, CHEWABLE ORAL at 13:11

## 2018-05-10 RX ADMIN — QUETIAPINE FUMARATE 50 MILLIGRAM(S): 200 TABLET, FILM COATED ORAL at 13:57

## 2018-05-10 RX ADMIN — Medication 2 MILLIGRAM(S): at 21:27

## 2018-05-10 RX ADMIN — Medication 3 MILLILITER(S): at 23:09

## 2018-05-10 RX ADMIN — LIDOCAINE 1 PATCH: 4 CREAM TOPICAL at 12:25

## 2018-05-10 RX ADMIN — Medication 1 TABLET(S): at 06:21

## 2018-05-10 RX ADMIN — Medication 3 MILLILITER(S): at 11:38

## 2018-05-10 RX ADMIN — HUMAN INSULIN 3 UNIT(S): 100 INJECTION, SUSPENSION SUBCUTANEOUS at 06:22

## 2018-05-10 RX ADMIN — SIMVASTATIN 40 MILLIGRAM(S): 20 TABLET, FILM COATED ORAL at 21:27

## 2018-05-10 NOTE — PROGRESS NOTE ADULT - PROBLEM SELECTOR PLAN 7
Venous Duplex 4/1: Acute DVT of Rt and Lft Soleal Vein   Venous Duplex 4/10: Patient with persistent Soleal DVT b/l w/o propagation   Venous Duplex 4/18: Patient with New left peroneal DVT with persistent soleal DVT.  Venous Duplex 4/24: Persistent L soleal dvt, peroneal dvt not identified  Venous Duplex 5/1: Persistent L soleal dvt, no propagation noted.  Venous Duplex 5/9: Persistent L soleal DVT, No propagation noted.

## 2018-05-10 NOTE — PROGRESS NOTE ADULT - SUBJECTIVE AND OBJECTIVE BOX
Patient is a 77y old  Male who presents with a chief complaint of pelvic fx with active extrav (27 Mar 2018 12:01)      Interval Events:    REVIEW OF SYSTEMS:  [ ] Positive  [ ] All other systems negative  [x ] Unable to assess ROS because __trached______    Vital Signs Last 24 Hrs  T(C): 36.7 (05-10-18 @ 04:27), Max: 37.1 (05-09-18 @ 21:24)  T(F): 98 (05-10-18 @ 04:27), Max: 98.8 (05-09-18 @ 21:24)  HR: 66 (05-10-18 @ 07:29) (64 - 88)  BP: 128/61 (05-10-18 @ 07:29) (128/61 - 193/64)  RR: 20 (05-10-18 @ 07:29) (18 - 22)  SpO2: 98% (05-10-18 @ 07:29) (95% - 100%)    PHYSICAL EXAM:  HEENT:   [ x]Tracheostomy: 7 portex uncuffed  [ ]Pupils equal  [ ]No oral lesions  [ ]Abnormal    SKIN  [x ]No Rash-   [ ] Abnormal  [ ] pressure    CARDIAC  [x ]Regular  [ ]Abnormal    PULMONARY  [x ]Bilateral Clear Breath Sounds  [ ]Normal Excursion  [ ]Abnormal    GI  [x ]PEG      [x] +BS		              [x ]Soft, nondistended, nontender	  [ ]Abnormal    MUSCULOSKELETAL                                   [ ]Bedbound                 [ ]Abnormal    [xAmbulatory/OOB to chair                           EXTREMITIES                                         [xNormal  [ ]Edema                           NEUROLOGIC  [ ] Normal, non focal  [x ] Focal findings:    PSYCHIATRIC  [x ]Alert   [ ] Sedated	 [ ]Agitated    :  Ybarra: [ ] Yes, if yes: Date of Placement:                   [x  ] No    LINES: Central Lines [ ] Yes, if yes: Date of Placement                                     [x  ] No    HOSPITAL MEDICATIONS:  MEDICATIONS  (STANDING):  ALBUTerol/ipratropium for Nebulization 3 milliLiter(s) Nebulizer every 6 hours  aspirin  chewable 81 milliGRAM(s) Oral daily  clonazePAM Tablet 0.5 milliGRAM(s) Oral <User Schedule>  clopidogrel Tablet 75 milliGRAM(s) Oral daily  doxazosin 2 milliGRAM(s) Oral at bedtime  enoxaparin Injectable 40 milliGRAM(s) SubCutaneous daily  famotidine    Tablet 20 milliGRAM(s) Oral two times a day  insulin lispro (HumaLOG) corrective regimen sliding scale   SubCutaneous every 6 hours  insulin NPH human recombinant 6 Unit(s) SubCutaneous <User Schedule>  insulin NPH human recombinant 3 Unit(s) SubCutaneous <User Schedule>  lactobacillus acidophilus 1 Tablet(s) Oral every 12 hours  lidocaine   Patch 1 Patch Transdermal daily  lidocaine   Patch 1 Patch Transdermal daily  metoclopramide Injectable 5 milliGRAM(s) IV Push every 8 hours  metoprolol tartrate 100 milliGRAM(s) Oral every 12 hours  QUEtiapine 75 milliGRAM(s) Oral <User Schedule>  simethicone 80 milliGRAM(s) Chew every 8 hours  simvastatin 40 milliGRAM(s) Oral at bedtime    MEDICATIONS  (PRN):  acetaminophen    Suspension. 650 milliGRAM(s) Oral every 6 hours PRN Mild Pain (1 - 3)  acetaminophen    Suspension. 650 milliGRAM(s) Oral every 6 hours PRN mild to moderate pain  hydrALAZINE 25 milliGRAM(s) Oral every 6 hours PRN SBP>140 or DBP>90  melatonin 3 milliGRAM(s) Oral at bedtime PRN Insomnia  QUEtiapine 50 milliGRAM(s) Oral every 6 hours PRN agitation      LABS:                        9.1    9.9   )-----------( 252      ( 10 May 2018 06:49 )             29.4     05-10    142  |  101  |  23  ----------------------------<  109<H>  4.3   |  33<H>  |  0.54    Ca    9.1      10 May 2018 06:49  Phos  3.3     05-10  Mg     2.0     05-10          Arterial Blood Gas:  05-10 @ 05:58  7.36/67/52/37/84/9.9  ABG lactate: --  Arterial Blood Gas:  05-09 @ 05:44  7.39/57/77/34/97/8.3  ABG lactate: --      CAPILLARY BLOOD GLUCOSE    MICROBIOLOGY:     RADIOLOGY:  [ ] Reviewed and interpreted by me    Mode: AC/ CMV (Assist Control/ Continuous Mandatory Ventilation)  RR (machine): 18  TV (machine): 550  FiO2: 40  PEEP: 5  ITime: 1  MAP: 13  PIP: 24

## 2018-05-10 NOTE — PROGRESS NOTE ADULT - PROBLEM SELECTOR PLAN 1
+ Acute left 4th-8th rib fractures  + Left superior ramus fracture with a large extraperitoneal hematoma    + Left inferior pubic ramus fracture / right superior pubic ramus fracture  + Left L5 lamina & hemisacrum fractures  + Several spleen lacerations/  Grade 2 left kidney laceration  Patient S/P IR Glue and Coil embolization  CT Scan 4/14: Extraperitoneal hematoma slightly increased in size compared to prior imaging with increasing organization   Trauma Sx [place] enlarging hematoma likely represents redistribution of hematoma, no intervention performed

## 2018-05-11 LAB
ANION GAP SERPL CALC-SCNC: 9 MMOL/L — SIGNIFICANT CHANGE UP (ref 5–17)
BUN SERPL-MCNC: 26 MG/DL — HIGH (ref 7–23)
CALCIUM SERPL-MCNC: 9.1 MG/DL — SIGNIFICANT CHANGE UP (ref 8.4–10.5)
CHLORIDE SERPL-SCNC: 99 MMOL/L — SIGNIFICANT CHANGE UP (ref 96–108)
CO2 SERPL-SCNC: 31 MMOL/L — SIGNIFICANT CHANGE UP (ref 22–31)
CREAT SERPL-MCNC: 0.61 MG/DL — SIGNIFICANT CHANGE UP (ref 0.5–1.3)
GLUCOSE SERPL-MCNC: 96 MG/DL — SIGNIFICANT CHANGE UP (ref 70–99)
HCT VFR BLD CALC: 28.8 % — LOW (ref 39–50)
HGB BLD-MCNC: 9 G/DL — LOW (ref 13–17)
MAGNESIUM SERPL-MCNC: 2 MG/DL — SIGNIFICANT CHANGE UP (ref 1.6–2.6)
MCHC RBC-ENTMCNC: 31.1 GM/DL — LOW (ref 32–36)
MCHC RBC-ENTMCNC: 32.3 PG — SIGNIFICANT CHANGE UP (ref 27–34)
MCV RBC AUTO: 104 FL — HIGH (ref 80–100)
PHOSPHATE SERPL-MCNC: 2.7 MG/DL — SIGNIFICANT CHANGE UP (ref 2.5–4.5)
PLATELET # BLD AUTO: 226 K/UL — SIGNIFICANT CHANGE UP (ref 150–400)
POTASSIUM SERPL-MCNC: 4.1 MMOL/L — SIGNIFICANT CHANGE UP (ref 3.5–5.3)
POTASSIUM SERPL-SCNC: 4.1 MMOL/L — SIGNIFICANT CHANGE UP (ref 3.5–5.3)
RBC # BLD: 2.78 M/UL — LOW (ref 4.2–5.8)
RBC # FLD: 15.6 % — HIGH (ref 10.3–14.5)
SODIUM SERPL-SCNC: 139 MMOL/L — SIGNIFICANT CHANGE UP (ref 135–145)
WBC # BLD: 8.5 K/UL — SIGNIFICANT CHANGE UP (ref 3.8–10.5)
WBC # FLD AUTO: 8.5 K/UL — SIGNIFICANT CHANGE UP (ref 3.8–10.5)

## 2018-05-11 PROCEDURE — 99233 SBSQ HOSP IP/OBS HIGH 50: CPT | Mod: GC

## 2018-05-11 RX ORDER — QUETIAPINE FUMARATE 200 MG/1
50 TABLET, FILM COATED ORAL
Qty: 0 | Refills: 0 | Status: DISCONTINUED | OUTPATIENT
Start: 2018-05-11 | End: 2018-05-18

## 2018-05-11 RX ADMIN — Medication 3 MILLILITER(S): at 11:46

## 2018-05-11 RX ADMIN — Medication 650 MILLIGRAM(S): at 13:57

## 2018-05-11 RX ADMIN — LIDOCAINE 1 PATCH: 4 CREAM TOPICAL at 11:41

## 2018-05-11 RX ADMIN — Medication 3 MILLILITER(S): at 23:12

## 2018-05-11 RX ADMIN — Medication 1 TABLET(S): at 17:19

## 2018-05-11 RX ADMIN — LIDOCAINE 1 PATCH: 4 CREAM TOPICAL at 23:00

## 2018-05-11 RX ADMIN — Medication 2 MILLIGRAM(S): at 21:47

## 2018-05-11 RX ADMIN — LIDOCAINE 1 PATCH: 4 CREAM TOPICAL at 00:00

## 2018-05-11 RX ADMIN — FAMOTIDINE 20 MILLIGRAM(S): 10 INJECTION INTRAVENOUS at 05:00

## 2018-05-11 RX ADMIN — QUETIAPINE FUMARATE 50 MILLIGRAM(S): 200 TABLET, FILM COATED ORAL at 13:37

## 2018-05-11 RX ADMIN — LIDOCAINE 1 PATCH: 4 CREAM TOPICAL at 00:01

## 2018-05-11 RX ADMIN — Medication 5 MILLIGRAM(S): at 13:34

## 2018-05-11 RX ADMIN — Medication 650 MILLIGRAM(S): at 13:42

## 2018-05-11 RX ADMIN — HUMAN INSULIN 6 UNIT(S): 100 INJECTION, SUSPENSION SUBCUTANEOUS at 18:25

## 2018-05-11 RX ADMIN — ENOXAPARIN SODIUM 40 MILLIGRAM(S): 100 INJECTION SUBCUTANEOUS at 11:41

## 2018-05-11 RX ADMIN — HUMAN INSULIN 6 UNIT(S): 100 INJECTION, SUSPENSION SUBCUTANEOUS at 11:41

## 2018-05-11 RX ADMIN — HUMAN INSULIN 3 UNIT(S): 100 INJECTION, SUSPENSION SUBCUTANEOUS at 00:11

## 2018-05-11 RX ADMIN — FAMOTIDINE 20 MILLIGRAM(S): 10 INJECTION INTRAVENOUS at 17:19

## 2018-05-11 RX ADMIN — Medication 500000 UNIT(S): at 21:46

## 2018-05-11 RX ADMIN — Medication 500000 UNIT(S): at 05:00

## 2018-05-11 RX ADMIN — SIMETHICONE 80 MILLIGRAM(S): 80 TABLET, CHEWABLE ORAL at 05:01

## 2018-05-11 RX ADMIN — QUETIAPINE FUMARATE 75 MILLIGRAM(S): 200 TABLET, FILM COATED ORAL at 21:47

## 2018-05-11 RX ADMIN — Medication 650 MILLIGRAM(S): at 07:54

## 2018-05-11 RX ADMIN — QUETIAPINE FUMARATE 50 MILLIGRAM(S): 200 TABLET, FILM COATED ORAL at 17:19

## 2018-05-11 RX ADMIN — SIMETHICONE 80 MILLIGRAM(S): 80 TABLET, CHEWABLE ORAL at 21:47

## 2018-05-11 RX ADMIN — Medication 25 MILLIGRAM(S): at 17:19

## 2018-05-11 RX ADMIN — Medication 650 MILLIGRAM(S): at 17:19

## 2018-05-11 RX ADMIN — QUETIAPINE FUMARATE 50 MILLIGRAM(S): 200 TABLET, FILM COATED ORAL at 07:55

## 2018-05-11 RX ADMIN — SIMETHICONE 80 MILLIGRAM(S): 80 TABLET, CHEWABLE ORAL at 13:34

## 2018-05-11 RX ADMIN — Medication 100 MILLIGRAM(S): at 11:55

## 2018-05-11 RX ADMIN — HUMAN INSULIN 6 UNIT(S): 100 INJECTION, SUSPENSION SUBCUTANEOUS at 06:00

## 2018-05-11 RX ADMIN — Medication 0.5 MILLIGRAM(S): at 21:47

## 2018-05-11 RX ADMIN — CLOPIDOGREL BISULFATE 75 MILLIGRAM(S): 75 TABLET, FILM COATED ORAL at 11:41

## 2018-05-11 RX ADMIN — Medication 3 MILLILITER(S): at 05:12

## 2018-05-11 RX ADMIN — Medication 650 MILLIGRAM(S): at 08:09

## 2018-05-11 RX ADMIN — SIMVASTATIN 40 MILLIGRAM(S): 20 TABLET, FILM COATED ORAL at 21:47

## 2018-05-11 RX ADMIN — Medication 500000 UNIT(S): at 13:33

## 2018-05-11 RX ADMIN — Medication 5 MILLIGRAM(S): at 21:46

## 2018-05-11 RX ADMIN — Medication 5 MILLIGRAM(S): at 05:01

## 2018-05-11 RX ADMIN — Medication 81 MILLIGRAM(S): at 11:41

## 2018-05-11 RX ADMIN — Medication 3 MILLILITER(S): at 17:16

## 2018-05-11 RX ADMIN — Medication 1 TABLET(S): at 05:00

## 2018-05-11 RX ADMIN — Medication 100 MILLIGRAM(S): at 01:00

## 2018-05-11 NOTE — PROGRESS NOTE ADULT - PROBLEM SELECTOR PLAN 2
Pt S/p tracheostomy by trauma SX on 4/5  wean as tolerated- vent at night TC during day time  Continue Chest PT and Suctioning PRN
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
## 2018-05-11 NOTE — PROGRESS NOTE ADULT - SUBJECTIVE AND OBJECTIVE BOX
Patient is a 77y old  Male who presents with a chief complaint of pelvic fx with active extrav (27 Mar 2018 12:01)      Interval Events:    REVIEW OF SYSTEMS:  [ ] Positive  [x ] All other systems negative  [ ] Unable to assess ROS because ________    Vital Signs Last 24 Hrs  T(C): 37.2 (05-11-18 @ 08:00), Max: 37.2 (05-11-18 @ 08:00)  T(F): 98.9 (05-11-18 @ 08:00), Max: 98.9 (05-11-18 @ 08:00)  HR: 60 (05-11-18 @ 08:38) (60 - 92)  BP: 163/58 (05-11-18 @ 08:00) (116/65 - 168/92)  RR: 24 (05-11-18 @ 08:00) (18 - 24)  SpO2: 100% (05-11-18 @ 08:38) (98% - 100%)    PHYSICAL EXAM:  HEENT:   [x ]Tracheostomy: 7 portex cuffed  [ ]Pupils equal  [ ]No oral lesions  [ ]Abnormal    SKIN  [x ]No Rash  [x ] Abnormal- IAD  [ ] pressure    CARDIAC  [ x]Regular  [ ]Abnormal    PULMONARY  [x ]Bilateral Clear Breath Sounds  [ ]Normal Excursion  [ ]Abnormal    GI  [x ]PEG      [x ] +BS		              [x ]Soft, nondistended, nontender	  [ ]Abnormal    MUSCULOSKELETAL                                   [ ]Bedbound                 [ ]Abnormal    [x ]Ambulatory/OOB to chair                      EXTREMITIES                                         [ ]Normal  [x ]Edema                           NEUROLOGIC  [ ] Normal, non focal  [x ] Focal findings:    PSYCHIATRIC  [x ]Alert     [ ] Sedated	 [x ]Agitated- restless at times    :  Ybarra: [ ] Yes, if yes: Date of Placement:                   [ x ] No    LINES: Central Lines [ ] Yes, if yes: Date of Placement                                     [ x ] No    HOSPITAL MEDICATIONS:  MEDICATIONS  (STANDING):  ALBUTerol/ipratropium for Nebulization 3 milliLiter(s) Nebulizer every 6 hours  aspirin  chewable 81 milliGRAM(s) Oral daily  clonazePAM Tablet 0.5 milliGRAM(s) Oral <User Schedule>  clopidogrel Tablet 75 milliGRAM(s) Oral daily  doxazosin 2 milliGRAM(s) Oral at bedtime  enoxaparin Injectable 40 milliGRAM(s) SubCutaneous daily  famotidine    Tablet 20 milliGRAM(s) Oral two times a day  insulin lispro (HumaLOG) corrective regimen sliding scale   SubCutaneous every 6 hours  insulin NPH human recombinant 6 Unit(s) SubCutaneous <User Schedule>  insulin NPH human recombinant 3 Unit(s) SubCutaneous <User Schedule>  lactobacillus acidophilus 1 Tablet(s) Oral every 12 hours  lidocaine   Patch 1 Patch Transdermal daily  lidocaine   Patch 1 Patch Transdermal daily  metoclopramide Injectable 5 milliGRAM(s) IV Push every 8 hours  metoprolol tartrate 100 milliGRAM(s) Oral every 12 hours  nystatin    Suspension [id number] Unit(s) Oral three times a day  QUEtiapine 75 milliGRAM(s) Oral <User Schedule>  simethicone 80 milliGRAM(s) Chew every 8 hours  simvastatin 40 milliGRAM(s) Oral at bedtime    MEDICATIONS  (PRN):  acetaminophen    Suspension. 650 milliGRAM(s) Oral every 6 hours PRN Mild Pain (1 - 3)  acetaminophen    Suspension. 650 milliGRAM(s) Oral every 6 hours PRN mild to moderate pain  hydrALAZINE 25 milliGRAM(s) Oral every 6 hours PRN SBP>140 or DBP>90  melatonin 3 milliGRAM(s) Oral at bedtime PRN Insomnia  QUEtiapine 50 milliGRAM(s) Oral every 6 hours PRN agitation      LABS:                        9.0    8.5   )-----------( 226      ( 11 May 2018 07:20 )             28.8     05-11    139  |  99  |  26<H>  ----------------------------<  96  4.1   |  31  |  0.61    Ca    9.1      11 May 2018 07:20  Phos  2.7     05-11  Mg     2.0     05-11          Arterial Blood Gas:  05-10 @ 05:58  7.36/67/52/37/84/9.9  ABG lactate: --      CAPILLARY BLOOD GLUCOSE    MICROBIOLOGY:     RADIOLOGY:  [ ] Reviewed and interpreted by me    Mode: AC/ CMV (Assist Control/ Continuous Mandatory Ventilation)  RR (machine): 18  TV (machine): 550  FiO2: 40  PEEP: 5  ITime: 1  MAP: 11  PIP: 25 Patient is a 77y old  Male who presents with a chief complaint of pelvic fx with active extrav (27 Mar 2018 12:01)      Interval Events: No events overnight, slept better, sleepy but arousable this morning.     REVIEW OF SYSTEMS:  [ ] Positive  [ ] All other systems negative  [x ] Unable to assess ROS because _nonverbal_______    Vital Signs Last 24 Hrs  T(C): 37.2 (05-11-18 @ 08:00), Max: 37.2 (05-11-18 @ 08:00)  T(F): 98.9 (05-11-18 @ 08:00), Max: 98.9 (05-11-18 @ 08:00)  HR: 60 (05-11-18 @ 08:38) (60 - 92)  BP: 163/58 (05-11-18 @ 08:00) (116/65 - 168/92)  RR: 24 (05-11-18 @ 08:00) (18 - 24)  SpO2: 100% (05-11-18 @ 08:38) (98% - 100%)    PHYSICAL EXAM:  HEENT:   [x ]Tracheostomy: 7 portex cuffed  [ ]Pupils equal  [ ]No oral lesions  [ ]Abnormal    SKIN  [x ]No Rash  [x ] Abnormal- IAD  [ ] pressure    CARDIAC  [ x]Regular  [ ]Abnormal    PULMONARY  [x ]Bilateral Clear Breath Sounds  [ ]Normal Excursion  [ ]Abnormal    GI  [x ]PEG      [x ] +BS		              [x ]Soft, nondistended, nontender	  [ ]Abnormal    MUSCULOSKELETAL                                   [ ]Bedbound                 [ ]Abnormal    [x ]Ambulatory/OOB to chair                      EXTREMITIES                                         [ ]Normal  [x ]Edema                           NEUROLOGIC  [ ] Normal, non focal  [x ] Focal findings:    PSYCHIATRIC  [x ]Alert     [ ] Sedated	 [x ]Agitated- restless at times    :  Ybarra: [ ] Yes, if yes: Date of Placement:                   [ x ] No    LINES: Central Lines [ ] Yes, if yes: Date of Placement                                     [ x ] No    HOSPITAL MEDICATIONS:  MEDICATIONS  (STANDING):  ALBUTerol/ipratropium for Nebulization 3 milliLiter(s) Nebulizer every 6 hours  aspirin  chewable 81 milliGRAM(s) Oral daily  clonazePAM Tablet 0.5 milliGRAM(s) Oral <User Schedule>  clopidogrel Tablet 75 milliGRAM(s) Oral daily  doxazosin 2 milliGRAM(s) Oral at bedtime  enoxaparin Injectable 40 milliGRAM(s) SubCutaneous daily  famotidine    Tablet 20 milliGRAM(s) Oral two times a day  insulin lispro (HumaLOG) corrective regimen sliding scale   SubCutaneous every 6 hours  insulin NPH human recombinant 6 Unit(s) SubCutaneous <User Schedule>  insulin NPH human recombinant 3 Unit(s) SubCutaneous <User Schedule>  lactobacillus acidophilus 1 Tablet(s) Oral every 12 hours  lidocaine   Patch 1 Patch Transdermal daily  lidocaine   Patch 1 Patch Transdermal daily  metoclopramide Injectable 5 milliGRAM(s) IV Push every 8 hours  metoprolol tartrate 100 milliGRAM(s) Oral every 12 hours  nystatin    Suspension [id number] Unit(s) Oral three times a day  QUEtiapine 75 milliGRAM(s) Oral <User Schedule>  simethicone 80 milliGRAM(s) Chew every 8 hours  simvastatin 40 milliGRAM(s) Oral at bedtime    MEDICATIONS  (PRN):  acetaminophen    Suspension. 650 milliGRAM(s) Oral every 6 hours PRN Mild Pain (1 - 3)  acetaminophen    Suspension. 650 milliGRAM(s) Oral every 6 hours PRN mild to moderate pain  hydrALAZINE 25 milliGRAM(s) Oral every 6 hours PRN SBP>140 or DBP>90  melatonin 3 milliGRAM(s) Oral at bedtime PRN Insomnia  QUEtiapine 50 milliGRAM(s) Oral every 6 hours PRN agitation      LABS:                        9.0    8.5   )-----------( 226      ( 11 May 2018 07:20 )             28.8     05-11    139  |  99  |  26<H>  ----------------------------<  96  4.1   |  31  |  0.61    Ca    9.1      11 May 2018 07:20  Phos  2.7     05-11  Mg     2.0     05-11          Arterial Blood Gas:  05-10 @ 05:58  7.36/67/52/37/84/9.9  ABG lactate: --      MICROBIOLOGY:     RADIOLOGY:  [x ] Reviewed and interpreted by me    Mode: AC/ CMV (Assist Control/ Continuous Mandatory Ventilation)  RR (machine): 18  TV (machine): 550  FiO2: 40  PEEP: 5  ITime: 1  MAP: 11  PIP: 25

## 2018-05-11 NOTE — PROGRESS NOTE ADULT - PROBLEM SELECTOR PLAN 1
+ Acute left 4th-8th rib fractures  + Left superior ramus fracture with a large extraperitoneal hematoma    + Left inferior pubic ramus fracture / right superior pubic ramus fracture  + Left L5 lamina & hemisacrum fractures  + Several spleen lacerations/  Grade 2 left kidney laceration  Patient S/P IR Glue and Coil embolization  CT Scan 4/14: Extraperitoneal hematoma slightly increased in size compared to prior imaging with increasing organization   Trauma Sx [place] enlarging hematoma likely represents redistribution of hematoma, no intervention performed

## 2018-05-11 NOTE — PROGRESS NOTE ADULT - ATTENDING COMMENTS
Agree with above.  Sleepy this morning but arousable. +UE tremor   c/w TC during the day up to 12 hours, vent at night. trach care, chest PT, suctioning.  Remains intermittently agitated, appears to be component of delirium and possibly pain (responds to Tylenol). Appreciate Psych f/u. will c/w Seroquel at 75 mg QHS and reduce Klonopin dose to 0.5 mg.   Frequent reorientation and stimulation during the day  DVT ppx - has b/l LE soleal DVTs that have been stable on repeat Dopplers.  Full code.  Discharge planning to SNF early next week.     I was physically present for the key portions of the evaluation and management (E/M) service provided.  I agree with the above history, physical, and plan which I have reviewed and edited where appropriate.     Plan discussed with RCU team at bedside.

## 2018-05-12 LAB
ANION GAP SERPL CALC-SCNC: 9 MMOL/L — SIGNIFICANT CHANGE UP (ref 5–17)
BUN SERPL-MCNC: 25 MG/DL — HIGH (ref 7–23)
CALCIUM SERPL-MCNC: 9.3 MG/DL — SIGNIFICANT CHANGE UP (ref 8.4–10.5)
CHLORIDE SERPL-SCNC: 101 MMOL/L — SIGNIFICANT CHANGE UP (ref 96–108)
CO2 SERPL-SCNC: 33 MMOL/L — HIGH (ref 22–31)
CREAT SERPL-MCNC: 0.6 MG/DL — SIGNIFICANT CHANGE UP (ref 0.5–1.3)
GLUCOSE SERPL-MCNC: 81 MG/DL — SIGNIFICANT CHANGE UP (ref 70–99)
HCT VFR BLD CALC: 29.8 % — LOW (ref 39–50)
HGB BLD-MCNC: 9.7 G/DL — LOW (ref 13–17)
MAGNESIUM SERPL-MCNC: 1.9 MG/DL — SIGNIFICANT CHANGE UP (ref 1.6–2.6)
MCHC RBC-ENTMCNC: 32.6 GM/DL — SIGNIFICANT CHANGE UP (ref 32–36)
MCHC RBC-ENTMCNC: 34.1 PG — HIGH (ref 27–34)
MCV RBC AUTO: 104 FL — HIGH (ref 80–100)
PHOSPHATE SERPL-MCNC: 3.3 MG/DL — SIGNIFICANT CHANGE UP (ref 2.5–4.5)
PLATELET # BLD AUTO: 231 K/UL — SIGNIFICANT CHANGE UP (ref 150–400)
POTASSIUM SERPL-MCNC: 4.1 MMOL/L — SIGNIFICANT CHANGE UP (ref 3.5–5.3)
POTASSIUM SERPL-SCNC: 4.1 MMOL/L — SIGNIFICANT CHANGE UP (ref 3.5–5.3)
RBC # BLD: 2.85 M/UL — LOW (ref 4.2–5.8)
RBC # FLD: 15.3 % — HIGH (ref 10.3–14.5)
SODIUM SERPL-SCNC: 143 MMOL/L — SIGNIFICANT CHANGE UP (ref 135–145)
WBC # BLD: 9.9 K/UL — SIGNIFICANT CHANGE UP (ref 3.8–10.5)
WBC # FLD AUTO: 9.9 K/UL — SIGNIFICANT CHANGE UP (ref 3.8–10.5)

## 2018-05-12 PROCEDURE — 99233 SBSQ HOSP IP/OBS HIGH 50: CPT | Mod: GC

## 2018-05-12 RX ORDER — DEXTROSE 50 % IN WATER 50 %
12.5 SYRINGE (ML) INTRAVENOUS ONCE
Qty: 0 | Refills: 0 | Status: DISCONTINUED | OUTPATIENT
Start: 2018-05-12 | End: 2018-05-18

## 2018-05-12 RX ORDER — DEXTROSE 50 % IN WATER 50 %
25 SYRINGE (ML) INTRAVENOUS ONCE
Qty: 0 | Refills: 0 | Status: DISCONTINUED | OUTPATIENT
Start: 2018-05-12 | End: 2018-05-18

## 2018-05-12 RX ORDER — DEXTROSE 50 % IN WATER 50 %
15 SYRINGE (ML) INTRAVENOUS ONCE
Qty: 0 | Refills: 0 | Status: DISCONTINUED | OUTPATIENT
Start: 2018-05-12 | End: 2018-05-18

## 2018-05-12 RX ORDER — HUMAN INSULIN 100 [IU]/ML
3 INJECTION, SUSPENSION SUBCUTANEOUS ONCE
Qty: 0 | Refills: 0 | Status: COMPLETED | OUTPATIENT
Start: 2018-05-12 | End: 2018-05-12

## 2018-05-12 RX ORDER — GLUCAGON INJECTION, SOLUTION 0.5 MG/.1ML
1 INJECTION, SOLUTION SUBCUTANEOUS ONCE
Qty: 0 | Refills: 0 | Status: DISCONTINUED | OUTPATIENT
Start: 2018-05-12 | End: 2018-05-18

## 2018-05-12 RX ORDER — HUMAN INSULIN 100 [IU]/ML
3 INJECTION, SUSPENSION SUBCUTANEOUS
Qty: 0 | Refills: 0 | Status: DISCONTINUED | OUTPATIENT
Start: 2018-05-12 | End: 2018-05-14

## 2018-05-12 RX ORDER — SODIUM CHLORIDE 9 MG/ML
1000 INJECTION, SOLUTION INTRAVENOUS
Qty: 0 | Refills: 0 | Status: DISCONTINUED | OUTPATIENT
Start: 2018-05-12 | End: 2018-05-18

## 2018-05-12 RX ADMIN — HUMAN INSULIN 6 UNIT(S): 100 INJECTION, SUSPENSION SUBCUTANEOUS at 11:48

## 2018-05-12 RX ADMIN — Medication 2 MILLIGRAM(S): at 21:03

## 2018-05-12 RX ADMIN — Medication 2: at 11:48

## 2018-05-12 RX ADMIN — FAMOTIDINE 20 MILLIGRAM(S): 10 INJECTION INTRAVENOUS at 05:36

## 2018-05-12 RX ADMIN — QUETIAPINE FUMARATE 50 MILLIGRAM(S): 200 TABLET, FILM COATED ORAL at 01:43

## 2018-05-12 RX ADMIN — LIDOCAINE 1 PATCH: 4 CREAM TOPICAL at 00:00

## 2018-05-12 RX ADMIN — HUMAN INSULIN 6 UNIT(S): 100 INJECTION, SUSPENSION SUBCUTANEOUS at 18:39

## 2018-05-12 RX ADMIN — LIDOCAINE 1 PATCH: 4 CREAM TOPICAL at 11:47

## 2018-05-12 RX ADMIN — HUMAN INSULIN 3 UNIT(S): 100 INJECTION, SUSPENSION SUBCUTANEOUS at 19:35

## 2018-05-12 RX ADMIN — QUETIAPINE FUMARATE 75 MILLIGRAM(S): 200 TABLET, FILM COATED ORAL at 20:52

## 2018-05-12 RX ADMIN — FAMOTIDINE 20 MILLIGRAM(S): 10 INJECTION INTRAVENOUS at 18:39

## 2018-05-12 RX ADMIN — ENOXAPARIN SODIUM 40 MILLIGRAM(S): 100 INJECTION SUBCUTANEOUS at 11:48

## 2018-05-12 RX ADMIN — Medication 100 MILLIGRAM(S): at 00:27

## 2018-05-12 RX ADMIN — Medication 500000 UNIT(S): at 05:36

## 2018-05-12 RX ADMIN — Medication 81 MILLIGRAM(S): at 11:47

## 2018-05-12 RX ADMIN — Medication 25 MILLIGRAM(S): at 05:36

## 2018-05-12 RX ADMIN — Medication 1 TABLET(S): at 05:36

## 2018-05-12 RX ADMIN — Medication 500000 UNIT(S): at 21:04

## 2018-05-12 RX ADMIN — Medication 500000 UNIT(S): at 14:26

## 2018-05-12 RX ADMIN — SIMETHICONE 80 MILLIGRAM(S): 80 TABLET, CHEWABLE ORAL at 05:36

## 2018-05-12 RX ADMIN — Medication 5 MILLIGRAM(S): at 05:37

## 2018-05-12 RX ADMIN — LIDOCAINE 1 PATCH: 4 CREAM TOPICAL at 23:07

## 2018-05-12 RX ADMIN — SIMETHICONE 80 MILLIGRAM(S): 80 TABLET, CHEWABLE ORAL at 14:27

## 2018-05-12 RX ADMIN — Medication 0.5 MILLIGRAM(S): at 18:39

## 2018-05-12 RX ADMIN — CLOPIDOGREL BISULFATE 75 MILLIGRAM(S): 75 TABLET, FILM COATED ORAL at 11:48

## 2018-05-12 RX ADMIN — Medication 5 MILLIGRAM(S): at 21:02

## 2018-05-12 RX ADMIN — SIMVASTATIN 40 MILLIGRAM(S): 20 TABLET, FILM COATED ORAL at 21:03

## 2018-05-12 RX ADMIN — Medication 3 MILLILITER(S): at 23:13

## 2018-05-12 RX ADMIN — Medication 5 MILLIGRAM(S): at 14:26

## 2018-05-12 RX ADMIN — SIMETHICONE 80 MILLIGRAM(S): 80 TABLET, CHEWABLE ORAL at 21:03

## 2018-05-12 RX ADMIN — QUETIAPINE FUMARATE 50 MILLIGRAM(S): 200 TABLET, FILM COATED ORAL at 08:22

## 2018-05-12 RX ADMIN — Medication 25 MILLIGRAM(S): at 14:31

## 2018-05-12 RX ADMIN — Medication 3 MILLILITER(S): at 12:14

## 2018-05-12 RX ADMIN — Medication 3 MILLILITER(S): at 17:39

## 2018-05-12 RX ADMIN — Medication 3 MILLILITER(S): at 05:11

## 2018-05-12 RX ADMIN — Medication 25 MILLIGRAM(S): at 21:01

## 2018-05-12 RX ADMIN — Medication 1 TABLET(S): at 18:39

## 2018-05-12 RX ADMIN — Medication 100 MILLIGRAM(S): at 11:48

## 2018-05-12 NOTE — PROGRESS NOTE ADULT - ATTENDING COMMENTS
pt seen and examined   d/w the team  agree with above  Agree with above.   c/w TC during the day up to 12 hours, vent at night. trach care, chest PT, suctioning.  Remains intermittently agitated, appears to be component of delirium and possibly pain (responds to Tylenol). needs   Psych f/u. will c/w Seroquel   Frequent reorientation and stimulation during the day  DVT ppx - has b/l LE soleal DVTs that have been stable on repeat Dopplers

## 2018-05-12 NOTE — PROGRESS NOTE ADULT - SUBJECTIVE AND OBJECTIVE BOX
Patient is a 77y old  Male who presents with a chief complaint of pelvic fx with active extrav (27 Mar 2018 12:01)      Interval Events:    REVIEW OF SYSTEMS:  [ ] Positive  [ ] All other systems negative  [ ] Unable to assess ROS because ________    Vital Signs Last 24 Hrs  T(C): 36.7 (05-12-18 @ 04:08), Max: 37.6 (05-11-18 @ 12:38)  T(F): 98.1 (05-12-18 @ 04:08), Max: 99.7 (05-11-18 @ 12:38)  HR: 70 (05-12-18 @ 06:04) (60 - 89)  BP: 160/59 (05-12-18 @ 04:08) (140/58 - 175/68)  RR: 18 (05-12-18 @ 04:08) (18 - 24)  SpO2: 100% (05-12-18 @ 06:04) (100% - 100%)    PHYSICAL EXAM:  HEENT:   [ ]Tracheostomy:  [ ]Pupils equal  [ ]No oral lesions  [ ]Abnormal    SKIN  [ ]No Rash  [ ] Abnormal  [ ] pressure    CARDIAC  [ ]Regular  [ ]Abnormal    PULMONARY  [ ]Bilateral Clear Breath Sounds  [ ]Normal Excursion  [ ]Abnormal    GI  [ ]PEG      [ ] +BS		              [ ]Soft, nondistended, nontender	  [ ]Abnormal    MUSCULOSKELETAL                                   [ ]Bedbound                 [ ]Abnormal    [ ]Ambulatory/OOB to chair                           EXTREMITIES                                         [ ]Normal  [ ]Edema                           NEUROLOGIC  [ ] Normal, non focal  [ ] Focal findings:    PSYCHIATRIC  [ ]Alert and appropriate  [ ] Sedated	 [ ]Agitated    :  Ybarra: [ ] Yes, if yes: Date of Placement:                   [  ] No    LINES: Central Lines [ ] Yes, if yes: Date of Placement                                     [  ] No    HOSPITAL MEDICATIONS:  MEDICATIONS  (STANDING):  ALBUTerol/ipratropium for Nebulization 3 milliLiter(s) Nebulizer every 6 hours  aspirin  chewable 81 milliGRAM(s) Oral daily  clonazePAM Tablet 0.5 milliGRAM(s) Oral <User Schedule>  clopidogrel Tablet 75 milliGRAM(s) Oral daily  dextrose 5%. 1000 milliLiter(s) (50 mL/Hr) IV Continuous <Continuous>  dextrose 50% Injectable 12.5 Gram(s) IV Push once  dextrose 50% Injectable 25 Gram(s) IV Push once  dextrose 50% Injectable 25 Gram(s) IV Push once  doxazosin 2 milliGRAM(s) Oral at bedtime  enoxaparin Injectable 40 milliGRAM(s) SubCutaneous daily  famotidine    Tablet 20 milliGRAM(s) Oral two times a day  insulin lispro (HumaLOG) corrective regimen sliding scale   SubCutaneous every 6 hours  insulin NPH human recombinant 6 Unit(s) SubCutaneous <User Schedule>  insulin NPH human recombinant 3 Unit(s) SubCutaneous <User Schedule>  insulin NPH human recombinant 3 Unit(s) SubCutaneous once  lactobacillus acidophilus 1 Tablet(s) Oral every 12 hours  lidocaine   Patch 1 Patch Transdermal daily  lidocaine   Patch 1 Patch Transdermal daily  metoclopramide Injectable 5 milliGRAM(s) IV Push every 8 hours  metoprolol tartrate 100 milliGRAM(s) Oral every 12 hours  nystatin    Suspension [id number] Unit(s) Oral three times a day  QUEtiapine 75 milliGRAM(s) Oral <User Schedule>  QUEtiapine 50 milliGRAM(s) Oral <User Schedule>  simethicone 80 milliGRAM(s) Chew every 8 hours  simvastatin 40 milliGRAM(s) Oral at bedtime    MEDICATIONS  (PRN):  acetaminophen    Suspension. 650 milliGRAM(s) Oral every 6 hours PRN Mild Pain (1 - 3)  acetaminophen    Suspension. 650 milliGRAM(s) Oral every 6 hours PRN mild to moderate pain  dextrose 40% Gel 15 Gram(s) Oral once PRN Blood Glucose LESS THAN 70 milliGRAM(s)/deciliter  glucagon  Injectable 1 milliGRAM(s) IntraMuscular once PRN Glucose LESS THAN 70 milligrams/deciliter  hydrALAZINE 25 milliGRAM(s) Oral every 6 hours PRN SBP>140 or DBP>90  melatonin 3 milliGRAM(s) Oral at bedtime PRN Insomnia  QUEtiapine 50 milliGRAM(s) Oral every 6 hours PRN agitation      LABS:                        9.0    8.5   )-----------( 226      ( 11 May 2018 07:20 )             28.8     05-11    139  |  99  |  26<H>  ----------------------------<  96  4.1   |  31  |  0.61    Ca    9.1      11 May 2018 07:20  Phos  2.7     05-11  Mg     2.0     05-11              CAPILLARY BLOOD GLUCOSE    MICROBIOLOGY:     RADIOLOGY:  [ ] Reviewed and interpreted by me    Mode: vent off Patient is a 77y old  Male who presents with a chief complaint of pelvic fx with active extrav (27 Mar 2018 12:01)      Interval Events: increased restless 4: 30pm yst- continues overnight       REVIEW OF SYSTEMS:  [ ] Positive  [ ] All other systems negative  [x ] Unable to assess ROS because _non verbal _______    Vital Signs Last 24 Hrs  T(C): 36.7 (05-12-18 @ 04:08), Max: 37.6 (05-11-18 @ 12:38)  T(F): 98.1 (05-12-18 @ 04:08), Max: 99.7 (05-11-18 @ 12:38)  HR: 70 (05-12-18 @ 06:04) (60 - 89)  BP: 160/59 (05-12-18 @ 04:08) (140/58 - 175/68)  RR: 18 (05-12-18 @ 04:08) (18 - 24)  SpO2: 100% (05-12-18 @ 06:04) (100% - 100%)    PHYSICAL EXAM:  HEENT:   [x ]Tracheostomy:  :2001  [x ]Pupils equal  [ ]No oral lesions  [ ]Abnormal    SKIN  [x ]No Rash  [ ] Abnormal  [ ] pressure    CARDIAC  [x ]Regular  [ ]Abnormal    PULMONARY  [ ]Bilateral Clear Breath Sounds  [ ]Normal Excursion  x[ ]Abnormal  rhonchi    GI  [x ]PEG      [x ] +BS		              [ x]Soft, nondistended, nontender	  [ ]Abnormal    MUSCULOSKELETAL                                   [x ]Bedbound                 [ ]Abnormal    [ ]Ambulatory/OOB to chair                           EXTREMITIES                                         [x ]Normal  [ ]Edema                           NEUROLOGIC  [ ] Normal, non focal  [x ] Focal findings: restless pulling at gown , pin rolling activity of fingers    PSYCHIATRIC  [ ]Alert and appropriate  [ ] Sedated	 [ ]Agitated    :  Ybarra: [ ] Yes, if yes: Date of Placement:                   [x  ] No  st cath    LINES: Central Lines [ ] Yes, if yes: Date of Placement                                     [ x  ] No    HOSPITAL MEDICATIONS:  MEDICATIONS  (STANDING):  ALBUTerol/ipratropium for Nebulization 3 milliLiter(s) Nebulizer every 6 hours  aspirin  chewable 81 milliGRAM(s) Oral daily  clonazePAM Tablet 0.5 milliGRAM(s) Oral <User Schedule>  clopidogrel Tablet 75 milliGRAM(s) Oral daily  dextrose 5%. 1000 milliLiter(s) (50 mL/Hr) IV Continuous <Continuous>  dextrose 50% Injectable 12.5 Gram(s) IV Push once  dextrose 50% Injectable 25 Gram(s) IV Push once  dextrose 50% Injectable 25 Gram(s) IV Push once  doxazosin 2 milliGRAM(s) Oral at bedtime  enoxaparin Injectable 40 milliGRAM(s) SubCutaneous daily  famotidine    Tablet 20 milliGRAM(s) Oral two times a day  insulin lispro (HumaLOG) corrective regimen sliding scale   SubCutaneous every 6 hours  insulin NPH human recombinant 6 Unit(s) SubCutaneous <User Schedule>  insulin NPH human recombinant 3 Unit(s) SubCutaneous <User Schedule>  insulin NPH human recombinant 3 Unit(s) SubCutaneous once  lactobacillus acidophilus 1 Tablet(s) Oral every 12 hours  lidocaine   Patch 1 Patch Transdermal daily  lidocaine   Patch 1 Patch Transdermal daily  metoclopramide Injectable 5 milliGRAM(s) IV Push every 8 hours  metoprolol tartrate 100 milliGRAM(s) Oral every 12 hours  nystatin    Suspension [id number] Unit(s) Oral three times a day  QUEtiapine 75 milliGRAM(s) Oral <User Schedule>  QUEtiapine 50 milliGRAM(s) Oral <User Schedule>  simethicone 80 milliGRAM(s) Chew every 8 hours  simvastatin 40 milliGRAM(s) Oral at bedtime    MEDICATIONS  (PRN):  acetaminophen    Suspension. 650 milliGRAM(s) Oral every 6 hours PRN Mild Pain (1 - 3)  acetaminophen    Suspension. 650 milliGRAM(s) Oral every 6 hours PRN mild to moderate pain  dextrose 40% Gel 15 Gram(s) Oral once PRN Blood Glucose LESS THAN 70 milliGRAM(s)/deciliter  glucagon  Injectable 1 milliGRAM(s) IntraMuscular once PRN Glucose LESS THAN 70 milligrams/deciliter  hydrALAZINE 25 milliGRAM(s) Oral every 6 hours PRN SBP>140 or DBP>90  melatonin 3 milliGRAM(s) Oral at bedtime PRN Insomnia  QUEtiapine 50 milliGRAM(s) Oral every 6 hours PRN agitation      LABS:                        9.0    8.5   )-----------( 226      ( 11 May 2018 07:20 )             28.8     05-11    139  |  99  |  26<H>  ----------------------------<  96  4.1   |  31  |  0.61    Ca    9.1      11 May 2018 07:20  Phos  2.7     05-11  Mg     2.0     05-11              CAPILLARY BLOOD GLUCOSE    MICROBIOLOGY:     RADIOLOGY:  [ ] Reviewed and interpreted by me    Mode: vent off Patient is a 77y old  Male who presents with a chief complaint of pelvic fx with active extrav (27 Mar 2018 12:01)      Interval Events: increased restless 4: 30pm yst- continues overnight       REVIEW OF SYSTEMS:  [ ] Positive  [ ] All other systems negative  [x ] Unable to assess ROS because _non verbal _______    Vital Signs Last 24 Hrs  T(C): 36.7 (05-12-18 @ 04:08), Max: 37.6 (05-11-18 @ 12:38)  T(F): 98.1 (05-12-18 @ 04:08), Max: 99.7 (05-11-18 @ 12:38)  HR: 70 (05-12-18 @ 06:04) (60 - 89)  BP: 160/59 (05-12-18 @ 04:08) (140/58 - 175/68)  RR: 18 (05-12-18 @ 04:08) (18 - 24)  SpO2: 100% (05-12-18 @ 06:04) (100% - 100%)    PHYSICAL EXAM:  HEENT:   [x ]Tracheostomy:  :2001  [x ]Pupils equal  [ ]No oral lesions  [ ]Abnormal    SKIN  [x ]No Rash  [ ] Abnormal  [ ] pressure    CARDIAC  [x ]Regular  [ ]Abnormal    PULMONARY  [ ]Bilateral Clear Breath Sounds  [ ]Normal Excursion  x[ ]Abnormal  rhonchi    GI  [x ]PEG      [x ] +BS		              [ x]Soft, nondistended, nontender	  [ ]Abnormal    MUSCULOSKELETAL                                   [x ]Bedbound                 [ ]Abnormal    [ ]Ambulatory/OOB to chair                           EXTREMITIES                                         [x ]Normal  [ ]Edema                           NEUROLOGIC  [ ] Normal, non focal  [x ] Focal findings: restless pulling at gown ,     PSYCHIATRIC  [ ]Alert and appropriate  [ ] Sedated	 [ ]Agitated    :  Ybarra: [ ] Yes, if yes: Date of Placement:                   [x  ] No  st cath    LINES: Central Lines [ ] Yes, if yes: Date of Placement                                     [ x  ] No    HOSPITAL MEDICATIONS:  MEDICATIONS  (STANDING):  ALBUTerol/ipratropium for Nebulization 3 milliLiter(s) Nebulizer every 6 hours  aspirin  chewable 81 milliGRAM(s) Oral daily  clonazePAM Tablet 0.5 milliGRAM(s) Oral <User Schedule>  clopidogrel Tablet 75 milliGRAM(s) Oral daily  dextrose 5%. 1000 milliLiter(s) (50 mL/Hr) IV Continuous <Continuous>  dextrose 50% Injectable 12.5 Gram(s) IV Push once  dextrose 50% Injectable 25 Gram(s) IV Push once  dextrose 50% Injectable 25 Gram(s) IV Push once  doxazosin 2 milliGRAM(s) Oral at bedtime  enoxaparin Injectable 40 milliGRAM(s) SubCutaneous daily  famotidine    Tablet 20 milliGRAM(s) Oral two times a day  insulin lispro (HumaLOG) corrective regimen sliding scale   SubCutaneous every 6 hours  insulin NPH human recombinant 6 Unit(s) SubCutaneous <User Schedule>  insulin NPH human recombinant 3 Unit(s) SubCutaneous <User Schedule>  insulin NPH human recombinant 3 Unit(s) SubCutaneous once  lactobacillus acidophilus 1 Tablet(s) Oral every 12 hours  lidocaine   Patch 1 Patch Transdermal daily  lidocaine   Patch 1 Patch Transdermal daily  metoclopramide Injectable 5 milliGRAM(s) IV Push every 8 hours  metoprolol tartrate 100 milliGRAM(s) Oral every 12 hours  nystatin    Suspension [id number] Unit(s) Oral three times a day  QUEtiapine 75 milliGRAM(s) Oral <User Schedule>  QUEtiapine 50 milliGRAM(s) Oral <User Schedule>  simethicone 80 milliGRAM(s) Chew every 8 hours  simvastatin 40 milliGRAM(s) Oral at bedtime    MEDICATIONS  (PRN):  acetaminophen    Suspension. 650 milliGRAM(s) Oral every 6 hours PRN Mild Pain (1 - 3)  acetaminophen    Suspension. 650 milliGRAM(s) Oral every 6 hours PRN mild to moderate pain  dextrose 40% Gel 15 Gram(s) Oral once PRN Blood Glucose LESS THAN 70 milliGRAM(s)/deciliter  glucagon  Injectable 1 milliGRAM(s) IntraMuscular once PRN Glucose LESS THAN 70 milligrams/deciliter  hydrALAZINE 25 milliGRAM(s) Oral every 6 hours PRN SBP>140 or DBP>90  melatonin 3 milliGRAM(s) Oral at bedtime PRN Insomnia  QUEtiapine 50 milliGRAM(s) Oral every 6 hours PRN agitation      LABS:                        9.0    8.5   )-----------( 226      ( 11 May 2018 07:20 )             28.8     05-11    139  |  99  |  26<H>  ----------------------------<  96  4.1   |  31  |  0.61    Ca    9.1      11 May 2018 07:20  Phos  2.7     05-11  Mg     2.0     05-11              CAPILLARY BLOOD GLUCOSE    MICROBIOLOGY:     RADIOLOGY:  [ ] Reviewed and interpreted by me    Mode: vent off
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
## 2018-05-13 LAB
ANION GAP SERPL CALC-SCNC: 11 MMOL/L — SIGNIFICANT CHANGE UP (ref 5–17)
BUN SERPL-MCNC: 26 MG/DL — HIGH (ref 7–23)
CALCIUM SERPL-MCNC: 9.4 MG/DL — SIGNIFICANT CHANGE UP (ref 8.4–10.5)
CHLORIDE SERPL-SCNC: 100 MMOL/L — SIGNIFICANT CHANGE UP (ref 96–108)
CO2 SERPL-SCNC: 30 MMOL/L — SIGNIFICANT CHANGE UP (ref 22–31)
CREAT SERPL-MCNC: 0.66 MG/DL — SIGNIFICANT CHANGE UP (ref 0.5–1.3)
GLUCOSE SERPL-MCNC: 66 MG/DL — LOW (ref 70–99)
HCT VFR BLD CALC: 28.7 % — LOW (ref 39–50)
HGB BLD-MCNC: 9.3 G/DL — LOW (ref 13–17)
MAGNESIUM SERPL-MCNC: 1.9 MG/DL — SIGNIFICANT CHANGE UP (ref 1.6–2.6)
MCHC RBC-ENTMCNC: 32.5 GM/DL — SIGNIFICANT CHANGE UP (ref 32–36)
MCHC RBC-ENTMCNC: 33.5 PG — SIGNIFICANT CHANGE UP (ref 27–34)
MCV RBC AUTO: 103 FL — HIGH (ref 80–100)
PHOSPHATE SERPL-MCNC: 2.9 MG/DL — SIGNIFICANT CHANGE UP (ref 2.5–4.5)
PLATELET # BLD AUTO: 214 K/UL — SIGNIFICANT CHANGE UP (ref 150–400)
POTASSIUM SERPL-MCNC: 4.2 MMOL/L — SIGNIFICANT CHANGE UP (ref 3.5–5.3)
POTASSIUM SERPL-SCNC: 4.2 MMOL/L — SIGNIFICANT CHANGE UP (ref 3.5–5.3)
RBC # BLD: 2.78 M/UL — LOW (ref 4.2–5.8)
RBC # FLD: 15.4 % — HIGH (ref 10.3–14.5)
SODIUM SERPL-SCNC: 141 MMOL/L — SIGNIFICANT CHANGE UP (ref 135–145)
WBC # BLD: 10.1 K/UL — SIGNIFICANT CHANGE UP (ref 3.8–10.5)
WBC # FLD AUTO: 10.1 K/UL — SIGNIFICANT CHANGE UP (ref 3.8–10.5)

## 2018-05-13 PROCEDURE — 99233 SBSQ HOSP IP/OBS HIGH 50: CPT | Mod: GC

## 2018-05-13 RX ADMIN — Medication 500000 UNIT(S): at 05:15

## 2018-05-13 RX ADMIN — Medication 2 MILLIGRAM(S): at 21:13

## 2018-05-13 RX ADMIN — Medication 5 MILLIGRAM(S): at 21:13

## 2018-05-13 RX ADMIN — Medication 1 TABLET(S): at 18:19

## 2018-05-13 RX ADMIN — QUETIAPINE FUMARATE 50 MILLIGRAM(S): 200 TABLET, FILM COATED ORAL at 08:56

## 2018-05-13 RX ADMIN — Medication 3 MILLILITER(S): at 17:12

## 2018-05-13 RX ADMIN — ENOXAPARIN SODIUM 40 MILLIGRAM(S): 100 INJECTION SUBCUTANEOUS at 11:51

## 2018-05-13 RX ADMIN — HUMAN INSULIN 6 UNIT(S): 100 INJECTION, SUSPENSION SUBCUTANEOUS at 13:01

## 2018-05-13 RX ADMIN — FAMOTIDINE 20 MILLIGRAM(S): 10 INJECTION INTRAVENOUS at 05:15

## 2018-05-13 RX ADMIN — Medication 0.5 MILLIGRAM(S): at 19:11

## 2018-05-13 RX ADMIN — Medication 5 MILLIGRAM(S): at 05:18

## 2018-05-13 RX ADMIN — QUETIAPINE FUMARATE 75 MILLIGRAM(S): 200 TABLET, FILM COATED ORAL at 20:14

## 2018-05-13 RX ADMIN — Medication 5 MILLIGRAM(S): at 13:01

## 2018-05-13 RX ADMIN — LIDOCAINE 1 PATCH: 4 CREAM TOPICAL at 11:52

## 2018-05-13 RX ADMIN — HUMAN INSULIN 6 UNIT(S): 100 INJECTION, SUSPENSION SUBCUTANEOUS at 18:20

## 2018-05-13 RX ADMIN — Medication 3 MILLILITER(S): at 11:13

## 2018-05-13 RX ADMIN — Medication 25 MILLIGRAM(S): at 18:18

## 2018-05-13 RX ADMIN — Medication 3 MILLILITER(S): at 05:25

## 2018-05-13 RX ADMIN — Medication 2: at 00:11

## 2018-05-13 RX ADMIN — Medication 500000 UNIT(S): at 21:12

## 2018-05-13 RX ADMIN — SIMETHICONE 80 MILLIGRAM(S): 80 TABLET, CHEWABLE ORAL at 14:01

## 2018-05-13 RX ADMIN — CLOPIDOGREL BISULFATE 75 MILLIGRAM(S): 75 TABLET, FILM COATED ORAL at 11:52

## 2018-05-13 RX ADMIN — SIMETHICONE 80 MILLIGRAM(S): 80 TABLET, CHEWABLE ORAL at 21:12

## 2018-05-13 RX ADMIN — SIMVASTATIN 40 MILLIGRAM(S): 20 TABLET, FILM COATED ORAL at 21:14

## 2018-05-13 RX ADMIN — Medication 100 MILLIGRAM(S): at 11:53

## 2018-05-13 RX ADMIN — Medication 3 MILLILITER(S): at 23:32

## 2018-05-13 RX ADMIN — SIMETHICONE 80 MILLIGRAM(S): 80 TABLET, CHEWABLE ORAL at 05:18

## 2018-05-13 RX ADMIN — FAMOTIDINE 20 MILLIGRAM(S): 10 INJECTION INTRAVENOUS at 18:19

## 2018-05-13 RX ADMIN — HUMAN INSULIN 3 UNIT(S): 100 INJECTION, SUSPENSION SUBCUTANEOUS at 00:11

## 2018-05-13 RX ADMIN — Medication 81 MILLIGRAM(S): at 11:52

## 2018-05-13 RX ADMIN — Medication 500000 UNIT(S): at 13:01

## 2018-05-13 RX ADMIN — Medication 1 TABLET(S): at 05:15

## 2018-05-13 NOTE — PROGRESS NOTE ADULT - ATTENDING COMMENTS
pt seen and examined   d/w the team  agree with above  Agree with above.   c/w TC during the day up to 12 hours, vent at night. trach care, chest PT, suctioning.  Remains intermittently agitated, appears to be component of delirium and possibly pain (responds to Tylenol). needs   Psych f/u. will c/w Seroquel   Frequent reorientation and stimulation during the day  DVT ppx - has b/l LE soleal DVTs that have been stable on repeat Dopplers .   d/c planning in progress
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
## 2018-05-13 NOTE — PROGRESS NOTE ADULT - PROBLEM SELECTOR PLAN 1
+ Acute left 4th-8th rib fractures  + Left superior ramus fracture with a large extraperitoneal hematoma    + Left inferior pubic ramus fracture / right superior pubic ramus fracture  + Left L5 lamina & hemisacrum fractures  + Several spleen lacerations/  Grade 2 left kidney laceration  Patient S/P IR Glue and Coil embolization  CT Scan 4/14: Extraperitoneal hematoma slightly increased in size compared to prior imaging with increasing organization   Trauma Sx [place] enlarging hematoma likely represents redistribution of hematoma, no intervention performed

## 2018-05-13 NOTE — PROGRESS NOTE ADULT - PROBLEM SELECTOR PLAN 5
Continue current regimen  Continue Insulin sliding scale Continue Insulin sliding scale  FS 77 am- feeds off x 6 hrs- held NPH this am- nutrition consult re feeds rate and hours- re eval 5/14

## 2018-05-14 LAB
ANION GAP SERPL CALC-SCNC: 9 MMOL/L — SIGNIFICANT CHANGE UP (ref 5–17)
BUN SERPL-MCNC: 27 MG/DL — HIGH (ref 7–23)
CALCIUM SERPL-MCNC: 9.4 MG/DL — SIGNIFICANT CHANGE UP (ref 8.4–10.5)
CHLORIDE SERPL-SCNC: 100 MMOL/L — SIGNIFICANT CHANGE UP (ref 96–108)
CO2 SERPL-SCNC: 32 MMOL/L — HIGH (ref 22–31)
CREAT SERPL-MCNC: 0.64 MG/DL — SIGNIFICANT CHANGE UP (ref 0.5–1.3)
GLUCOSE SERPL-MCNC: 56 MG/DL — LOW (ref 70–99)
HCT VFR BLD CALC: 29.7 % — LOW (ref 39–50)
HGB BLD-MCNC: 9.6 G/DL — LOW (ref 13–17)
MAGNESIUM SERPL-MCNC: 1.9 MG/DL — SIGNIFICANT CHANGE UP (ref 1.6–2.6)
MCHC RBC-ENTMCNC: 32.2 GM/DL — SIGNIFICANT CHANGE UP (ref 32–36)
MCHC RBC-ENTMCNC: 33.6 PG — SIGNIFICANT CHANGE UP (ref 27–34)
MCV RBC AUTO: 104 FL — HIGH (ref 80–100)
PHOSPHATE SERPL-MCNC: 2.8 MG/DL — SIGNIFICANT CHANGE UP (ref 2.5–4.5)
PLATELET # BLD AUTO: 215 K/UL — SIGNIFICANT CHANGE UP (ref 150–400)
POTASSIUM SERPL-MCNC: 4 MMOL/L — SIGNIFICANT CHANGE UP (ref 3.5–5.3)
POTASSIUM SERPL-SCNC: 4 MMOL/L — SIGNIFICANT CHANGE UP (ref 3.5–5.3)
RBC # BLD: 2.84 M/UL — LOW (ref 4.2–5.8)
RBC # FLD: 15 % — HIGH (ref 10.3–14.5)
SODIUM SERPL-SCNC: 141 MMOL/L — SIGNIFICANT CHANGE UP (ref 135–145)
WBC # BLD: 10.9 K/UL — HIGH (ref 3.8–10.5)
WBC # FLD AUTO: 10.9 K/UL — HIGH (ref 3.8–10.5)

## 2018-05-14 PROCEDURE — 99233 SBSQ HOSP IP/OBS HIGH 50: CPT | Mod: GC

## 2018-05-14 RX ORDER — HUMAN INSULIN 100 [IU]/ML
1.5 INJECTION, SUSPENSION SUBCUTANEOUS ONCE
Qty: 0 | Refills: 0 | Status: DISCONTINUED | OUTPATIENT
Start: 2018-05-14 | End: 2018-05-14

## 2018-05-14 RX ORDER — HUMAN INSULIN 100 [IU]/ML
1 INJECTION, SUSPENSION SUBCUTANEOUS ONCE
Qty: 0 | Refills: 0 | Status: COMPLETED | OUTPATIENT
Start: 2018-05-14 | End: 2018-05-14

## 2018-05-14 RX ORDER — CLONAZEPAM 1 MG
0.5 TABLET ORAL
Qty: 0 | Refills: 0 | Status: DISCONTINUED | OUTPATIENT
Start: 2018-05-14 | End: 2018-05-18

## 2018-05-14 RX ADMIN — HUMAN INSULIN 1 UNIT(S): 100 INJECTION, SUSPENSION SUBCUTANEOUS at 00:45

## 2018-05-14 RX ADMIN — Medication 500000 UNIT(S): at 13:20

## 2018-05-14 RX ADMIN — Medication 100 MILLIGRAM(S): at 11:45

## 2018-05-14 RX ADMIN — ENOXAPARIN SODIUM 40 MILLIGRAM(S): 100 INJECTION SUBCUTANEOUS at 11:45

## 2018-05-14 RX ADMIN — Medication 0.5 MILLIGRAM(S): at 18:03

## 2018-05-14 RX ADMIN — LIDOCAINE 1 PATCH: 4 CREAM TOPICAL at 11:52

## 2018-05-14 RX ADMIN — LIDOCAINE 1 PATCH: 4 CREAM TOPICAL at 00:03

## 2018-05-14 RX ADMIN — SIMVASTATIN 40 MILLIGRAM(S): 20 TABLET, FILM COATED ORAL at 21:20

## 2018-05-14 RX ADMIN — Medication 2: at 13:19

## 2018-05-14 RX ADMIN — QUETIAPINE FUMARATE 50 MILLIGRAM(S): 200 TABLET, FILM COATED ORAL at 01:31

## 2018-05-14 RX ADMIN — LIDOCAINE 1 PATCH: 4 CREAM TOPICAL at 23:11

## 2018-05-14 RX ADMIN — Medication 3 MILLILITER(S): at 11:42

## 2018-05-14 RX ADMIN — Medication 5 MILLIGRAM(S): at 05:11

## 2018-05-14 RX ADMIN — Medication 1 TABLET(S): at 17:58

## 2018-05-14 RX ADMIN — Medication 3 MILLILITER(S): at 17:23

## 2018-05-14 RX ADMIN — LIDOCAINE 1 PATCH: 4 CREAM TOPICAL at 11:46

## 2018-05-14 RX ADMIN — FAMOTIDINE 20 MILLIGRAM(S): 10 INJECTION INTRAVENOUS at 17:58

## 2018-05-14 RX ADMIN — Medication 3 MILLILITER(S): at 23:13

## 2018-05-14 RX ADMIN — CLOPIDOGREL BISULFATE 75 MILLIGRAM(S): 75 TABLET, FILM COATED ORAL at 11:45

## 2018-05-14 RX ADMIN — Medication 5 MILLIGRAM(S): at 21:20

## 2018-05-14 RX ADMIN — SIMETHICONE 80 MILLIGRAM(S): 80 TABLET, CHEWABLE ORAL at 05:12

## 2018-05-14 RX ADMIN — SIMETHICONE 80 MILLIGRAM(S): 80 TABLET, CHEWABLE ORAL at 21:20

## 2018-05-14 RX ADMIN — FAMOTIDINE 20 MILLIGRAM(S): 10 INJECTION INTRAVENOUS at 05:11

## 2018-05-14 RX ADMIN — Medication 500000 UNIT(S): at 21:21

## 2018-05-14 RX ADMIN — Medication 81 MILLIGRAM(S): at 11:46

## 2018-05-14 RX ADMIN — QUETIAPINE FUMARATE 75 MILLIGRAM(S): 200 TABLET, FILM COATED ORAL at 20:42

## 2018-05-14 RX ADMIN — Medication 2 MILLIGRAM(S): at 21:20

## 2018-05-14 RX ADMIN — Medication 1 TABLET(S): at 05:11

## 2018-05-14 RX ADMIN — Medication 500000 UNIT(S): at 05:11

## 2018-05-14 RX ADMIN — Medication 5 MILLIGRAM(S): at 13:20

## 2018-05-14 RX ADMIN — SIMETHICONE 80 MILLIGRAM(S): 80 TABLET, CHEWABLE ORAL at 13:21

## 2018-05-14 RX ADMIN — Medication 3 MILLILITER(S): at 05:10

## 2018-05-14 RX ADMIN — QUETIAPINE FUMARATE 50 MILLIGRAM(S): 200 TABLET, FILM COATED ORAL at 08:49

## 2018-05-14 RX ADMIN — Medication 100 MILLIGRAM(S): at 01:31

## 2018-05-14 NOTE — PROVIDER CONTACT NOTE (OTHER) - NAME OF MD/NP/PA/DO NOTIFIED:
Bart ZUNIGA
COLE Hobbs
COLE IBRAHIM
COLE Zendejas
Christel Grijalva, NP
EFRAIN CAMPBELL
EFRAIN Chin
EFRAIN Hernandez
EFRAIN Keenan
EFRAIN Rodrigues
EFRAIN Sanchez
EFRAIN Valerio
EFRAIN Villanueva
EFRAIN Zendejas
Fendune NP
MD Owens
NO Lizeth
aldair baig
eddie willson
EFRAIN Sanchez
Gracia Grijalva, NP
EFRAIN Mitchell

## 2018-05-14 NOTE — PROVIDER CONTACT NOTE (OTHER) - RECOMMENDATIONS
restart vasopressors
PA will assess in am.
give last dose at 2200
None
[id number] MG GIVEN
Straight cath done.
D/cd NPH
EFRAIN Chin at bedside to assess, consider drawing coags.
EKG
Evaluate and treat.
MD aware, insulin drip start
Notify NP
Reinsert bustamante due to history of retention.
continue to monitor, follow up CBC in AM
increase dosage of Dilaudid
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
## 2018-05-14 NOTE — PROVIDER CONTACT NOTE (OTHER) - REASON
T0 126
Bladder scan
Bladder scan
Bladder scan 245cc post bustamante d/c
Bladder scan greater than 400
FS 95
Finger stick 95
Glucose of 76
Large amount of bloody frothy sputum
Low F/S
Patient agitated and restless; unrelieved with prn medication
Pt BP labile, HR on monitor not correlating with pulse rate
Pt pulling at trach and other tubing. Pt very restless/ agitated
Pt very  agitated  pulling on trach and IV lines bleeding from trach
Pt's Vanco trough 20.1 at 1400
bp high
fs 214, 204, serum 223
pt hypertensive tachycardic and agitated
pt is bleeding from Trach
pt's saturations decrease
pt. s bp elevated and bladder sono 457 straight cathed 250cc's
patient hypotensive
Suctioned secretions similar to tube feeding.
Agitation

## 2018-05-14 NOTE — PROVIDER CONTACT NOTE (OTHER) - DATE AND TIME:
10-May-2018 08:00
27-Apr-2018 09:51
02-May-2018 04:57
03-May-2018 23:36
04-May-2018 05:56
07-Apr-2018 05:30
07-May-2018 01:10
07-May-2018 17:15
09-Apr-2018 14:00
09-Apr-2018 18:00
09-May-2018 06:43
10-May-2018 16:40
14-Apr-2018 22:20
14-May-2018 06:00
19-Apr-2018 21:00
20-Apr-2018 17:30
23-Apr-2018 22:00
26-Apr-2018 06:35
26-Apr-2018 12:00
27-Apr-2018 08:57
29-Apr-2018 23:57
29-Mar-2018 11:00
30-Mar-2018 19:20
30-Mar-2018 20:00

## 2018-05-14 NOTE — PROVIDER CONTACT NOTE (OTHER) - ACTION/TREATMENT ORDERED:
monitor for now not concerned since patient just received dilauded and ativan
Np notified. Will continue to monitor secretions.
PA notified. Give haldol early.
monitor bp and urine output
NP notified. Straight cath patient. will continue to monitor patient.
PA notified. Straight cath patient now. Will continue to monitor patient.
Give half dose 3u of NPH. No further change in treatment will continue to assess.
Last dose given at 2200.
PA notified. As per PA, give 0.5mg of Klonopin now, if patient needs another dose, 0.5mg will be ordered.
Provider will adjust am dose of nph.
COLE Zendejas made aware. Will continue to monitor.
None
Provider notified, no change in treatment.
1mg dilauded and 1mg ativan
EFRAIN Chin at bedside and aware, will continue to monitor. Not clinton red blood, no acute intervention
EFRAIN Keenan at bedside. See EMAR for interventions. Will continue to monitor.
EKG done, started on lopressor 100 mg q12 hrs, stat dose given, will continue to monitor,
Hold 0600 NPH, as per NP Np states she will address with day team
MD aware, retake at q8hrs and change IVF LR
NP made awrae
Wait 2 hrs per protocol and rescan at 2000. Straight cath if >300 per policy
will continue to monitor, will do CBC in am
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
## 2018-05-14 NOTE — PROVIDER CONTACT NOTE (OTHER) - ASSESSMENT
PT  CALM
Pt awake alert confused very agitated pulling on trach and IV lines and bleeding from trach .Pt already received haldol and pain meds with no improvements
Pt has orders for straight catheretization for urine out put greater than 400.
Pt is resting in bed. No s/s of distress noted.
HR 80 /61 SpO2 98 RR 20. Pt restless at baseline, no pain upon bladder palpation.
Now patient is restless, anxious, pulling tubers, on B/L  unsecured mittens, medicated with ativan 1 mg IVP Q4HRS, Seroquel 25 mg q12hrs,  /53, , TEMP 99.6 RECTAL, on vent.
PT IS alert, confused, maintaining O2  Sat above 95%, no distress, vital signs stable
Patient agitated and restless in bed; unable to reorient or redirect. Restlessness continues despite prn medication previously given. VS WNL.
Pt maintained saturations, no acute s/s of respiratory distress. Not clinton red blood. Pt on T/C during shift.
Pt unable to follow commands. Pt agitated. BP fluctuates between hypertension and hypotension.
Pt vital signs WNL; Alert to name, and arrousable ; Pt in no apparent distress; Resting in bed
follows commands, alert
low f/s 73
pt SBP >200s tachycardic >140s pt agitated restrained pulling at gown
Pt SBP <90 MAP of 57

## 2018-05-14 NOTE — PROGRESS NOTE ADULT - PROBLEM SELECTOR PLAN 1
+ Acute left 4th-8th rib fractures  + Left superior ramus fracture with a large extraperitoneal hematoma    + Left inferior pubic ramus fracture / right superior pubic ramus fracture  + Left L5 lamina & hemisacrum fractures  + Several spleen lacerations/  Grade 2 left kidney laceration  Patient S/P IR Glue and Coil embolization  CT Scan 4/14: Extraperitoneal hematoma slightly increased in size compared to prior imaging with increasing organization   Trauma Sx [place] enlarging hematoma likely represents redistribution of hematoma, no intervention performed

## 2018-05-15 LAB
ANION GAP SERPL CALC-SCNC: 11 MMOL/L — SIGNIFICANT CHANGE UP (ref 5–17)
BUN SERPL-MCNC: 30 MG/DL — HIGH (ref 7–23)
CALCIUM SERPL-MCNC: 9.6 MG/DL — SIGNIFICANT CHANGE UP (ref 8.4–10.5)
CHLORIDE SERPL-SCNC: 99 MMOL/L — SIGNIFICANT CHANGE UP (ref 96–108)
CO2 SERPL-SCNC: 31 MMOL/L — SIGNIFICANT CHANGE UP (ref 22–31)
CREAT SERPL-MCNC: 0.71 MG/DL — SIGNIFICANT CHANGE UP (ref 0.5–1.3)
GLUCOSE SERPL-MCNC: 99 MG/DL — SIGNIFICANT CHANGE UP (ref 70–99)
HCT VFR BLD CALC: 30.8 % — LOW (ref 39–50)
HGB BLD-MCNC: 9.9 G/DL — LOW (ref 13–17)
MAGNESIUM SERPL-MCNC: 2 MG/DL — SIGNIFICANT CHANGE UP (ref 1.6–2.6)
MCHC RBC-ENTMCNC: 32.3 GM/DL — SIGNIFICANT CHANGE UP (ref 32–36)
MCHC RBC-ENTMCNC: 33.4 PG — SIGNIFICANT CHANGE UP (ref 27–34)
MCV RBC AUTO: 104 FL — HIGH (ref 80–100)
PHOSPHATE SERPL-MCNC: 3 MG/DL — SIGNIFICANT CHANGE UP (ref 2.5–4.5)
PLATELET # BLD AUTO: 207 K/UL — SIGNIFICANT CHANGE UP (ref 150–400)
POTASSIUM SERPL-MCNC: 4.2 MMOL/L — SIGNIFICANT CHANGE UP (ref 3.5–5.3)
POTASSIUM SERPL-SCNC: 4.2 MMOL/L — SIGNIFICANT CHANGE UP (ref 3.5–5.3)
RBC # BLD: 2.97 M/UL — LOW (ref 4.2–5.8)
RBC # FLD: 15 % — HIGH (ref 10.3–14.5)
SODIUM SERPL-SCNC: 141 MMOL/L — SIGNIFICANT CHANGE UP (ref 135–145)
WBC # BLD: 8.2 K/UL — SIGNIFICANT CHANGE UP (ref 3.8–10.5)
WBC # FLD AUTO: 8.2 K/UL — SIGNIFICANT CHANGE UP (ref 3.8–10.5)

## 2018-05-15 PROCEDURE — 99233 SBSQ HOSP IP/OBS HIGH 50: CPT | Mod: GC

## 2018-05-15 RX ADMIN — SIMETHICONE 80 MILLIGRAM(S): 80 TABLET, CHEWABLE ORAL at 13:16

## 2018-05-15 RX ADMIN — Medication 100 MILLIGRAM(S): at 00:03

## 2018-05-15 RX ADMIN — Medication 500000 UNIT(S): at 13:16

## 2018-05-15 RX ADMIN — QUETIAPINE FUMARATE 75 MILLIGRAM(S): 200 TABLET, FILM COATED ORAL at 21:17

## 2018-05-15 RX ADMIN — FAMOTIDINE 20 MILLIGRAM(S): 10 INJECTION INTRAVENOUS at 06:09

## 2018-05-15 RX ADMIN — Medication 81 MILLIGRAM(S): at 11:05

## 2018-05-15 RX ADMIN — FAMOTIDINE 20 MILLIGRAM(S): 10 INJECTION INTRAVENOUS at 17:06

## 2018-05-15 RX ADMIN — Medication 2: at 00:04

## 2018-05-15 RX ADMIN — ENOXAPARIN SODIUM 40 MILLIGRAM(S): 100 INJECTION SUBCUTANEOUS at 11:04

## 2018-05-15 RX ADMIN — Medication 2: at 12:19

## 2018-05-15 RX ADMIN — CLOPIDOGREL BISULFATE 75 MILLIGRAM(S): 75 TABLET, FILM COATED ORAL at 11:04

## 2018-05-15 RX ADMIN — Medication 3 MILLILITER(S): at 17:17

## 2018-05-15 RX ADMIN — Medication 1 TABLET(S): at 17:06

## 2018-05-15 RX ADMIN — QUETIAPINE FUMARATE 50 MILLIGRAM(S): 200 TABLET, FILM COATED ORAL at 16:38

## 2018-05-15 RX ADMIN — Medication 3 MILLILITER(S): at 12:05

## 2018-05-15 RX ADMIN — Medication 1 TABLET(S): at 06:09

## 2018-05-15 RX ADMIN — Medication 5 MILLIGRAM(S): at 06:09

## 2018-05-15 RX ADMIN — Medication 3 MILLILITER(S): at 05:08

## 2018-05-15 RX ADMIN — Medication 650 MILLIGRAM(S): at 00:04

## 2018-05-15 RX ADMIN — SIMETHICONE 80 MILLIGRAM(S): 80 TABLET, CHEWABLE ORAL at 21:17

## 2018-05-15 RX ADMIN — Medication 5 MILLIGRAM(S): at 13:16

## 2018-05-15 RX ADMIN — LIDOCAINE 1 PATCH: 4 CREAM TOPICAL at 11:04

## 2018-05-15 RX ADMIN — Medication 5 MILLIGRAM(S): at 21:17

## 2018-05-15 RX ADMIN — QUETIAPINE FUMARATE 50 MILLIGRAM(S): 200 TABLET, FILM COATED ORAL at 08:20

## 2018-05-15 RX ADMIN — Medication 100 MILLIGRAM(S): at 11:07

## 2018-05-15 RX ADMIN — Medication 650 MILLIGRAM(S): at 00:46

## 2018-05-15 RX ADMIN — Medication 2: at 17:43

## 2018-05-15 RX ADMIN — Medication 500000 UNIT(S): at 06:10

## 2018-05-15 RX ADMIN — Medication 3 MILLILITER(S): at 23:13

## 2018-05-15 RX ADMIN — SIMETHICONE 80 MILLIGRAM(S): 80 TABLET, CHEWABLE ORAL at 06:09

## 2018-05-15 RX ADMIN — Medication 500000 UNIT(S): at 21:17

## 2018-05-15 RX ADMIN — SIMVASTATIN 40 MILLIGRAM(S): 20 TABLET, FILM COATED ORAL at 21:17
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
## 2018-05-15 NOTE — PROGRESS NOTE ADULT - PROBLEM SELECTOR PLAN 1
+ Acute left 4th-8th rib fractures  + Left superior ramus fracture with a large extraperitoneal hematoma    + Left inferior pubic ramus fracture / right superior pubic ramus fracture  + Left L5 lamina & hemisacrum fractures  + Several spleen lacerations/  Grade 2 left kidney laceration  Patient S/P IR Glue and Coil embolization  CT Scan 4/14: Extraperitoneal hematoma slightly increased in size compared to prior imaging with increasing organization   Trauma Sx [place] enlarging hematoma likely represents redistribution of hematoma, no intervention performed
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
## 2018-05-16 LAB
ANION GAP SERPL CALC-SCNC: 9 MMOL/L — SIGNIFICANT CHANGE UP (ref 5–17)
BUN SERPL-MCNC: 27 MG/DL — HIGH (ref 7–23)
CALCIUM SERPL-MCNC: 9.9 MG/DL — SIGNIFICANT CHANGE UP (ref 8.4–10.5)
CHLORIDE SERPL-SCNC: 100 MMOL/L — SIGNIFICANT CHANGE UP (ref 96–108)
CO2 SERPL-SCNC: 32 MMOL/L — HIGH (ref 22–31)
CREAT SERPL-MCNC: 0.66 MG/DL — SIGNIFICANT CHANGE UP (ref 0.5–1.3)
GLUCOSE SERPL-MCNC: 114 MG/DL — HIGH (ref 70–99)
HCT VFR BLD CALC: 31.9 % — LOW (ref 39–50)
HGB BLD-MCNC: 10.3 G/DL — LOW (ref 13–17)
MAGNESIUM SERPL-MCNC: 1.9 MG/DL — SIGNIFICANT CHANGE UP (ref 1.6–2.6)
MCHC RBC-ENTMCNC: 32.2 GM/DL — SIGNIFICANT CHANGE UP (ref 32–36)
MCHC RBC-ENTMCNC: 33.5 PG — SIGNIFICANT CHANGE UP (ref 27–34)
MCV RBC AUTO: 104 FL — HIGH (ref 80–100)
PHOSPHATE SERPL-MCNC: 3.3 MG/DL — SIGNIFICANT CHANGE UP (ref 2.5–4.5)
PLATELET # BLD AUTO: 221 K/UL — SIGNIFICANT CHANGE UP (ref 150–400)
POTASSIUM SERPL-MCNC: 4.5 MMOL/L — SIGNIFICANT CHANGE UP (ref 3.5–5.3)
POTASSIUM SERPL-SCNC: 4.5 MMOL/L — SIGNIFICANT CHANGE UP (ref 3.5–5.3)
RBC # BLD: 3.07 M/UL — LOW (ref 4.2–5.8)
RBC # FLD: 14.9 % — HIGH (ref 10.3–14.5)
SODIUM SERPL-SCNC: 141 MMOL/L — SIGNIFICANT CHANGE UP (ref 135–145)
WBC # BLD: 8.8 K/UL — SIGNIFICANT CHANGE UP (ref 3.8–10.5)
WBC # FLD AUTO: 8.8 K/UL — SIGNIFICANT CHANGE UP (ref 3.8–10.5)

## 2018-05-16 PROCEDURE — 99233 SBSQ HOSP IP/OBS HIGH 50: CPT | Mod: 25,GC

## 2018-05-16 PROCEDURE — 99497 ADVNCD CARE PLAN 30 MIN: CPT

## 2018-05-16 PROCEDURE — 99498 ADVNCD CARE PLAN ADDL 30 MIN: CPT

## 2018-05-16 RX ADMIN — QUETIAPINE FUMARATE 50 MILLIGRAM(S): 200 TABLET, FILM COATED ORAL at 07:39

## 2018-05-16 RX ADMIN — Medication 2 MILLIGRAM(S): at 21:12

## 2018-05-16 RX ADMIN — Medication 1 TABLET(S): at 18:02

## 2018-05-16 RX ADMIN — Medication 3 MILLILITER(S): at 05:36

## 2018-05-16 RX ADMIN — Medication 2 MILLIGRAM(S): at 00:13

## 2018-05-16 RX ADMIN — ENOXAPARIN SODIUM 40 MILLIGRAM(S): 100 INJECTION SUBCUTANEOUS at 13:03

## 2018-05-16 RX ADMIN — QUETIAPINE FUMARATE 75 MILLIGRAM(S): 200 TABLET, FILM COATED ORAL at 20:06

## 2018-05-16 RX ADMIN — SIMVASTATIN 40 MILLIGRAM(S): 20 TABLET, FILM COATED ORAL at 21:12

## 2018-05-16 RX ADMIN — Medication 100 MILLIGRAM(S): at 13:03

## 2018-05-16 RX ADMIN — SIMETHICONE 80 MILLIGRAM(S): 80 TABLET, CHEWABLE ORAL at 21:12

## 2018-05-16 RX ADMIN — FAMOTIDINE 20 MILLIGRAM(S): 10 INJECTION INTRAVENOUS at 18:02

## 2018-05-16 RX ADMIN — QUETIAPINE FUMARATE 50 MILLIGRAM(S): 200 TABLET, FILM COATED ORAL at 07:38

## 2018-05-16 RX ADMIN — Medication 81 MILLIGRAM(S): at 13:02

## 2018-05-16 RX ADMIN — Medication 500000 UNIT(S): at 21:18

## 2018-05-16 RX ADMIN — Medication 5 MILLIGRAM(S): at 06:15

## 2018-05-16 RX ADMIN — Medication 3 MILLILITER(S): at 11:35

## 2018-05-16 RX ADMIN — Medication 3 MILLILITER(S): at 17:44

## 2018-05-16 RX ADMIN — LIDOCAINE 1 PATCH: 4 CREAM TOPICAL at 00:14

## 2018-05-16 RX ADMIN — LIDOCAINE 1 PATCH: 4 CREAM TOPICAL at 13:02

## 2018-05-16 RX ADMIN — Medication 500000 UNIT(S): at 15:06

## 2018-05-16 RX ADMIN — Medication 500000 UNIT(S): at 06:15

## 2018-05-16 RX ADMIN — Medication 25 MILLIGRAM(S): at 15:06

## 2018-05-16 RX ADMIN — Medication 2: at 18:43

## 2018-05-16 RX ADMIN — SIMETHICONE 80 MILLIGRAM(S): 80 TABLET, CHEWABLE ORAL at 15:07

## 2018-05-16 RX ADMIN — FAMOTIDINE 20 MILLIGRAM(S): 10 INJECTION INTRAVENOUS at 06:15

## 2018-05-16 RX ADMIN — Medication 5 MILLIGRAM(S): at 21:12

## 2018-05-16 RX ADMIN — QUETIAPINE FUMARATE 50 MILLIGRAM(S): 200 TABLET, FILM COATED ORAL at 18:02

## 2018-05-16 RX ADMIN — Medication 1 TABLET(S): at 06:15

## 2018-05-16 RX ADMIN — Medication 25 MILLIGRAM(S): at 08:40

## 2018-05-16 RX ADMIN — Medication 100 MILLIGRAM(S): at 00:13

## 2018-05-16 RX ADMIN — Medication 0.5 MILLIGRAM(S): at 18:02

## 2018-05-16 RX ADMIN — SIMETHICONE 80 MILLIGRAM(S): 80 TABLET, CHEWABLE ORAL at 06:15

## 2018-05-16 RX ADMIN — Medication 5 MILLIGRAM(S): at 15:06

## 2018-05-16 RX ADMIN — CLOPIDOGREL BISULFATE 75 MILLIGRAM(S): 75 TABLET, FILM COATED ORAL at 13:03

## 2018-05-16 NOTE — PROGRESS NOTE ADULT - PROBLEM SELECTOR PLAN 1
+ Acute left 4th-8th rib fractures  + Left superior ramus fracture with a large extraperitoneal hematoma    + Left inferior pubic ramus fracture / right superior pubic ramus fracture  + Left L5 lamina & hemisacrum fractures  + Several spleen lacerations/  Grade 2 left kidney laceration  Patient S/P IR Glue and Coil embolization  CT Scan 4/14: Extraperitoneal hematoma slightly increased in size compared to prior imaging with increasing organization   Trauma Sx [place] enlarging hematoma likely represents redistribution of hematoma, no intervention performed
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
## 2018-05-17 LAB
ANION GAP SERPL CALC-SCNC: 11 MMOL/L — SIGNIFICANT CHANGE UP (ref 5–17)
BUN SERPL-MCNC: 30 MG/DL — HIGH (ref 7–23)
CALCIUM SERPL-MCNC: 9.8 MG/DL — SIGNIFICANT CHANGE UP (ref 8.4–10.5)
CHLORIDE SERPL-SCNC: 100 MMOL/L — SIGNIFICANT CHANGE UP (ref 96–108)
CO2 SERPL-SCNC: 29 MMOL/L — SIGNIFICANT CHANGE UP (ref 22–31)
CREAT SERPL-MCNC: 0.77 MG/DL — SIGNIFICANT CHANGE UP (ref 0.5–1.3)
GLUCOSE SERPL-MCNC: 99 MG/DL — SIGNIFICANT CHANGE UP (ref 70–99)
MAGNESIUM SERPL-MCNC: 1.8 MG/DL — SIGNIFICANT CHANGE UP (ref 1.6–2.6)
PHOSPHATE SERPL-MCNC: 3 MG/DL — SIGNIFICANT CHANGE UP (ref 2.5–4.5)
POTASSIUM SERPL-MCNC: 3.8 MMOL/L — SIGNIFICANT CHANGE UP (ref 3.5–5.3)
POTASSIUM SERPL-SCNC: 3.8 MMOL/L — SIGNIFICANT CHANGE UP (ref 3.5–5.3)
SODIUM SERPL-SCNC: 140 MMOL/L — SIGNIFICANT CHANGE UP (ref 135–145)

## 2018-05-17 PROCEDURE — 93970 EXTREMITY STUDY: CPT | Mod: 26

## 2018-05-17 PROCEDURE — 99233 SBSQ HOSP IP/OBS HIGH 50: CPT | Mod: GC

## 2018-05-17 RX ORDER — SODIUM CHLORIDE 9 MG/ML
3 INJECTION INTRAMUSCULAR; INTRAVENOUS; SUBCUTANEOUS EVERY 6 HOURS
Qty: 0 | Refills: 0 | Status: DISCONTINUED | OUTPATIENT
Start: 2018-05-17 | End: 2018-05-18

## 2018-05-17 RX ORDER — FUROSEMIDE 40 MG
40 TABLET ORAL ONCE
Qty: 0 | Refills: 0 | Status: COMPLETED | OUTPATIENT
Start: 2018-05-17 | End: 2018-05-17

## 2018-05-17 RX ADMIN — Medication 5 MILLIGRAM(S): at 05:44

## 2018-05-17 RX ADMIN — Medication 5 MILLIGRAM(S): at 15:00

## 2018-05-17 RX ADMIN — Medication 3 MILLILITER(S): at 06:35

## 2018-05-17 RX ADMIN — SODIUM CHLORIDE 3 MILLILITER(S): 9 INJECTION INTRAMUSCULAR; INTRAVENOUS; SUBCUTANEOUS at 23:30

## 2018-05-17 RX ADMIN — Medication 3 MILLILITER(S): at 11:25

## 2018-05-17 RX ADMIN — CLOPIDOGREL BISULFATE 75 MILLIGRAM(S): 75 TABLET, FILM COATED ORAL at 12:10

## 2018-05-17 RX ADMIN — LIDOCAINE 1 PATCH: 4 CREAM TOPICAL at 12:11

## 2018-05-17 RX ADMIN — SODIUM CHLORIDE 3 MILLILITER(S): 9 INJECTION INTRAMUSCULAR; INTRAVENOUS; SUBCUTANEOUS at 13:03

## 2018-05-17 RX ADMIN — SIMETHICONE 80 MILLIGRAM(S): 80 TABLET, CHEWABLE ORAL at 15:01

## 2018-05-17 RX ADMIN — Medication 81 MILLIGRAM(S): at 12:10

## 2018-05-17 RX ADMIN — Medication 2 MILLIGRAM(S): at 21:30

## 2018-05-17 RX ADMIN — Medication 1 TABLET(S): at 05:44

## 2018-05-17 RX ADMIN — Medication 1 TABLET(S): at 18:04

## 2018-05-17 RX ADMIN — Medication 500000 UNIT(S): at 21:27

## 2018-05-17 RX ADMIN — FAMOTIDINE 20 MILLIGRAM(S): 10 INJECTION INTRAVENOUS at 05:44

## 2018-05-17 RX ADMIN — LIDOCAINE 1 PATCH: 4 CREAM TOPICAL at 23:52

## 2018-05-17 RX ADMIN — SIMETHICONE 80 MILLIGRAM(S): 80 TABLET, CHEWABLE ORAL at 21:31

## 2018-05-17 RX ADMIN — Medication 100 MILLIGRAM(S): at 00:19

## 2018-05-17 RX ADMIN — Medication 3 MILLILITER(S): at 00:50

## 2018-05-17 RX ADMIN — ENOXAPARIN SODIUM 40 MILLIGRAM(S): 100 INJECTION SUBCUTANEOUS at 12:10

## 2018-05-17 RX ADMIN — SIMETHICONE 80 MILLIGRAM(S): 80 TABLET, CHEWABLE ORAL at 05:43

## 2018-05-17 RX ADMIN — Medication 0.5 MILLIGRAM(S): at 18:04

## 2018-05-17 RX ADMIN — SODIUM CHLORIDE 3 MILLILITER(S): 9 INJECTION INTRAMUSCULAR; INTRAVENOUS; SUBCUTANEOUS at 17:37

## 2018-05-17 RX ADMIN — Medication 2: at 12:41

## 2018-05-17 RX ADMIN — QUETIAPINE FUMARATE 75 MILLIGRAM(S): 200 TABLET, FILM COATED ORAL at 21:30

## 2018-05-17 RX ADMIN — QUETIAPINE FUMARATE 50 MILLIGRAM(S): 200 TABLET, FILM COATED ORAL at 10:00

## 2018-05-17 RX ADMIN — Medication 5 MILLIGRAM(S): at 21:23

## 2018-05-17 RX ADMIN — SIMVASTATIN 40 MILLIGRAM(S): 20 TABLET, FILM COATED ORAL at 21:28

## 2018-05-17 RX ADMIN — Medication 500000 UNIT(S): at 05:44

## 2018-05-17 RX ADMIN — Medication 40 MILLIGRAM(S): at 15:04

## 2018-05-17 RX ADMIN — LIDOCAINE 1 PATCH: 4 CREAM TOPICAL at 00:22

## 2018-05-17 RX ADMIN — Medication 500000 UNIT(S): at 15:00

## 2018-05-17 RX ADMIN — Medication 25 MILLIGRAM(S): at 18:04

## 2018-05-17 RX ADMIN — Medication 2: at 00:23

## 2018-05-17 RX ADMIN — Medication 3 MILLILITER(S): at 17:36

## 2018-05-17 RX ADMIN — FAMOTIDINE 20 MILLIGRAM(S): 10 INJECTION INTRAVENOUS at 18:04

## 2018-05-17 RX ADMIN — Medication 3 MILLILITER(S): at 23:30
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
## 2018-05-17 NOTE — CHART NOTE - NSCHARTNOTEFT_GEN_A_CORE
Ortho dc recs:  WBAT upper extremity can DC sling  WBAT lower extremities b/l  FU w/ Dr. Hector in 14 days

## 2018-05-17 NOTE — PROGRESS NOTE ADULT - PROBLEM SELECTOR PLAN 1
+ Acute left 4th-8th rib fractures  + Left superior ramus fracture with a large extraperitoneal hematoma    + Left inferior pubic ramus fracture / right superior pubic ramus fracture  + Left L5 lamina & hemisacrum fractures  + Several spleen lacerations/  Grade 2 left kidney laceration  Patient S/P IR Glue and Coil embolization  CT Scan 4/14: Extraperitoneal hematoma slightly increased in size compared to prior imaging with increasing organization   Trauma Sx [place] enlarging hematoma likely represents redistribution of hematoma, no intervention performed
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
## 2018-05-18 VITALS
RESPIRATION RATE: 18 BRPM | SYSTOLIC BLOOD PRESSURE: 142 MMHG | OXYGEN SATURATION: 96 % | HEART RATE: 78 BPM | DIASTOLIC BLOOD PRESSURE: 61 MMHG | TEMPERATURE: 99 F

## 2018-05-18 LAB
BUN SERPL-MCNC: 35 MG/DL — HIGH (ref 7–23)
CALCIUM SERPL-MCNC: 9.8 MG/DL — SIGNIFICANT CHANGE UP (ref 8.4–10.5)
CHLORIDE SERPL-SCNC: 98 MMOL/L — SIGNIFICANT CHANGE UP (ref 96–108)
CO2 SERPL-SCNC: 33 MMOL/L — HIGH (ref 22–31)
CREAT SERPL-MCNC: 0.86 MG/DL — SIGNIFICANT CHANGE UP (ref 0.5–1.3)
GLUCOSE SERPL-MCNC: 114 MG/DL — HIGH (ref 70–99)
HCT VFR BLD CALC: 34.1 % — LOW (ref 39–50)
HGB BLD-MCNC: 10.6 G/DL — LOW (ref 13–17)
MAGNESIUM SERPL-MCNC: 1.9 MG/DL — SIGNIFICANT CHANGE UP (ref 1.6–2.6)
MCHC RBC-ENTMCNC: 31 GM/DL — LOW (ref 32–36)
MCHC RBC-ENTMCNC: 31.8 PG — SIGNIFICANT CHANGE UP (ref 27–34)
MCV RBC AUTO: 103 FL — HIGH (ref 80–100)
PHOSPHATE SERPL-MCNC: 3.9 MG/DL — SIGNIFICANT CHANGE UP (ref 2.5–4.5)
PLATELET # BLD AUTO: 221 K/UL — SIGNIFICANT CHANGE UP (ref 150–400)
POTASSIUM SERPL-MCNC: 4 MMOL/L — SIGNIFICANT CHANGE UP (ref 3.5–5.3)
POTASSIUM SERPL-SCNC: 4 MMOL/L — SIGNIFICANT CHANGE UP (ref 3.5–5.3)
RBC # BLD: 3.33 M/UL — LOW (ref 4.2–5.8)
RBC # FLD: 14.8 % — HIGH (ref 10.3–14.5)
SODIUM SERPL-SCNC: 142 MMOL/L — SIGNIFICANT CHANGE UP (ref 135–145)
WBC # BLD: 8.2 K/UL — SIGNIFICANT CHANGE UP (ref 3.8–10.5)
WBC # FLD AUTO: 8.2 K/UL — SIGNIFICANT CHANGE UP (ref 3.8–10.5)

## 2018-05-18 PROCEDURE — 99239 HOSP IP/OBS DSCHRG MGMT >30: CPT

## 2018-05-18 RX ORDER — QUETIAPINE FUMARATE 200 MG/1
3 TABLET, FILM COATED ORAL
Qty: 0 | Refills: 0 | COMMUNITY
Start: 2018-05-18

## 2018-05-18 RX ORDER — CLOPIDOGREL BISULFATE 75 MG/1
1 TABLET, FILM COATED ORAL
Qty: 0 | Refills: 0 | COMMUNITY
Start: 2018-05-18

## 2018-05-18 RX ORDER — IPRATROPIUM/ALBUTEROL SULFATE 18-103MCG
3 AEROSOL WITH ADAPTER (GRAM) INHALATION
Qty: 0 | Refills: 0 | COMMUNITY
Start: 2018-05-18

## 2018-05-18 RX ORDER — LACTOBACILLUS ACIDOPHILUS 100MM CELL
1 CAPSULE ORAL
Qty: 0 | Refills: 0 | COMMUNITY
Start: 2018-05-18

## 2018-05-18 RX ORDER — SIMVASTATIN 20 MG/1
1 TABLET, FILM COATED ORAL
Qty: 0 | Refills: 0 | COMMUNITY
Start: 2018-05-18

## 2018-05-18 RX ORDER — QUETIAPINE FUMARATE 200 MG/1
1 TABLET, FILM COATED ORAL
Qty: 0 | Refills: 0 | COMMUNITY
Start: 2018-05-18

## 2018-05-18 RX ORDER — METOPROLOL TARTRATE 50 MG
1 TABLET ORAL
Qty: 0 | Refills: 0 | COMMUNITY
Start: 2018-05-18

## 2018-05-18 RX ORDER — CLONAZEPAM 1 MG
1 TABLET ORAL
Qty: 0 | Refills: 0 | COMMUNITY
Start: 2018-05-18

## 2018-05-18 RX ORDER — LIDOCAINE 4 G/100G
1 CREAM TOPICAL
Qty: 0 | Refills: 0 | COMMUNITY
Start: 2018-05-18

## 2018-05-18 RX ORDER — SIMETHICONE 80 MG/1
1 TABLET, CHEWABLE ORAL
Qty: 0 | Refills: 0 | COMMUNITY
Start: 2018-05-18

## 2018-05-18 RX ORDER — ENOXAPARIN SODIUM 100 MG/ML
40 INJECTION SUBCUTANEOUS
Qty: 0 | Refills: 0 | COMMUNITY
Start: 2018-05-18

## 2018-05-18 RX ORDER — ASPIRIN/CALCIUM CARB/MAGNESIUM 324 MG
1 TABLET ORAL
Qty: 0 | Refills: 0 | COMMUNITY
Start: 2018-05-18

## 2018-05-18 RX ORDER — DOXAZOSIN MESYLATE 4 MG
1 TABLET ORAL
Qty: 0 | Refills: 0 | COMMUNITY
Start: 2018-05-18

## 2018-05-18 RX ORDER — NYSTATIN 500MM UNIT
5 POWDER (EA) MISCELLANEOUS
Qty: 0 | Refills: 0 | COMMUNITY
Start: 2018-05-18

## 2018-05-18 RX ORDER — FAMOTIDINE 10 MG/ML
1 INJECTION INTRAVENOUS
Qty: 0 | Refills: 0 | COMMUNITY
Start: 2018-05-18

## 2018-05-18 RX ORDER — LANOLIN ALCOHOL/MO/W.PET/CERES
1 CREAM (GRAM) TOPICAL
Qty: 0 | Refills: 0 | COMMUNITY
Start: 2018-05-18

## 2018-05-18 RX ORDER — ACETAMINOPHEN 500 MG
20.31 TABLET ORAL
Qty: 0 | Refills: 0 | COMMUNITY
Start: 2018-05-18

## 2018-05-18 RX ADMIN — Medication 100 MILLIGRAM(S): at 01:35

## 2018-05-18 RX ADMIN — Medication 81 MILLIGRAM(S): at 12:04

## 2018-05-18 RX ADMIN — CLOPIDOGREL BISULFATE 75 MILLIGRAM(S): 75 TABLET, FILM COATED ORAL at 12:04

## 2018-05-18 RX ADMIN — Medication 500000 UNIT(S): at 05:34

## 2018-05-18 RX ADMIN — Medication 5 MILLIGRAM(S): at 05:34

## 2018-05-18 RX ADMIN — Medication 1 TABLET(S): at 05:35

## 2018-05-18 RX ADMIN — SIMETHICONE 80 MILLIGRAM(S): 80 TABLET, CHEWABLE ORAL at 05:35

## 2018-05-18 RX ADMIN — ENOXAPARIN SODIUM 40 MILLIGRAM(S): 100 INJECTION SUBCUTANEOUS at 12:03

## 2018-05-18 RX ADMIN — Medication 100 MILLIGRAM(S): at 13:07

## 2018-05-18 RX ADMIN — Medication 3 MILLILITER(S): at 11:28

## 2018-05-18 RX ADMIN — SODIUM CHLORIDE 3 MILLILITER(S): 9 INJECTION INTRAMUSCULAR; INTRAVENOUS; SUBCUTANEOUS at 05:35

## 2018-05-18 RX ADMIN — Medication 3 MILLILITER(S): at 05:35

## 2018-05-18 RX ADMIN — SODIUM CHLORIDE 3 MILLILITER(S): 9 INJECTION INTRAMUSCULAR; INTRAVENOUS; SUBCUTANEOUS at 11:29

## 2018-05-18 RX ADMIN — QUETIAPINE FUMARATE 50 MILLIGRAM(S): 200 TABLET, FILM COATED ORAL at 08:15

## 2018-05-18 RX ADMIN — QUETIAPINE FUMARATE 50 MILLIGRAM(S): 200 TABLET, FILM COATED ORAL at 02:51

## 2018-05-18 RX ADMIN — LIDOCAINE 1 PATCH: 4 CREAM TOPICAL at 12:04

## 2018-05-18 RX ADMIN — FAMOTIDINE 20 MILLIGRAM(S): 10 INJECTION INTRAVENOUS at 05:38

## 2018-05-18 NOTE — PROGRESS NOTE ADULT - NSHPATTENDINGPLANDISCUSS_GEN_ALL_CORE
Dr Varela
RCU team
surgery
RCU team
wife and RCU team at bedside
rcu
rcu team
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
## 2018-05-18 NOTE — PROGRESS NOTE ADULT - PROVIDER SPECIALTY LIST ADULT
Anesthesia
Critical Care
ENT
Gastroenterology
Intervent Radiology
Orthopedics
Pulmonology
SICU
Surgery
Thoracic Surgery
Thoracic Surgery
Trauma Surgery
ENT
Intervent Radiology
SICU
Trauma Surgery
Trauma Surgery
Pulmonology
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
## 2018-05-27 ENCOUNTER — INPATIENT (INPATIENT)
Facility: HOSPITAL | Age: 78
LOS: 42 days | DRG: 870 | End: 2018-07-09
Attending: INTERNAL MEDICINE | Admitting: GENERAL ACUTE CARE HOSPITAL
Payer: MEDICARE

## 2018-05-27 VITALS — WEIGHT: 189.6 LBS

## 2018-05-27 DIAGNOSIS — Z95.9 PRESENCE OF CARDIAC AND VASCULAR IMPLANT AND GRAFT, UNSPECIFIED: Chronic | ICD-10-CM

## 2018-05-27 DIAGNOSIS — J18.9 PNEUMONIA, UNSPECIFIED ORGANISM: ICD-10-CM

## 2018-05-27 LAB
ALBUMIN SERPL ELPH-MCNC: 3.3 G/DL — SIGNIFICANT CHANGE UP (ref 3.3–5)
ALP SERPL-CCNC: 180 U/L — HIGH (ref 40–120)
ALT FLD-CCNC: 69 U/L — HIGH (ref 10–45)
ANION GAP SERPL CALC-SCNC: 11 MMOL/L — SIGNIFICANT CHANGE UP (ref 5–17)
APPEARANCE UR: ABNORMAL
APTT BLD: 28.8 SEC — SIGNIFICANT CHANGE UP (ref 27.5–37.4)
AST SERPL-CCNC: 93 U/L — HIGH (ref 10–40)
BASE EXCESS BLDV CALC-SCNC: -1.1 MMOL/L — SIGNIFICANT CHANGE UP (ref -2–2)
BASOPHILS # BLD AUTO: 0 K/UL — SIGNIFICANT CHANGE UP (ref 0–0.2)
BASOPHILS NFR BLD AUTO: 0.3 % — SIGNIFICANT CHANGE UP (ref 0–2)
BILIRUB SERPL-MCNC: 0.6 MG/DL — SIGNIFICANT CHANGE UP (ref 0.2–1.2)
BILIRUB UR-MCNC: NEGATIVE — SIGNIFICANT CHANGE UP
BUN SERPL-MCNC: 47 MG/DL — HIGH (ref 7–23)
CA-I SERPL-SCNC: 1.16 MMOL/L — SIGNIFICANT CHANGE UP (ref 1.12–1.3)
CALCIUM SERPL-MCNC: 8.1 MG/DL — LOW (ref 8.4–10.5)
CHLORIDE BLDV-SCNC: 118 MMOL/L — HIGH (ref 96–108)
CHLORIDE SERPL-SCNC: 115 MMOL/L — HIGH (ref 96–108)
CK MB BLD-MCNC: 0.5 % — SIGNIFICANT CHANGE UP (ref 0–3.5)
CK MB CFR SERPL CALC: 3.5 NG/ML — SIGNIFICANT CHANGE UP (ref 0–6.7)
CK SERPL-CCNC: 643 U/L — HIGH (ref 30–200)
CO2 BLDV-SCNC: 29 MMOL/L — SIGNIFICANT CHANGE UP (ref 22–30)
CO2 SERPL-SCNC: 26 MMOL/L — SIGNIFICANT CHANGE UP (ref 22–31)
COLOR SPEC: YELLOW — SIGNIFICANT CHANGE UP
CREAT SERPL-MCNC: 1.24 MG/DL — SIGNIFICANT CHANGE UP (ref 0.5–1.3)
DIFF PNL FLD: ABNORMAL
EOSINOPHIL # BLD AUTO: 0 K/UL — SIGNIFICANT CHANGE UP (ref 0–0.5)
EOSINOPHIL NFR BLD AUTO: 0.1 % — SIGNIFICANT CHANGE UP (ref 0–6)
GAS PNL BLDV: 149 MMOL/L — HIGH (ref 136–145)
GAS PNL BLDV: SIGNIFICANT CHANGE UP
GAS PNL BLDV: SIGNIFICANT CHANGE UP
GLUCOSE BLDV-MCNC: 67 MG/DL — LOW (ref 70–99)
GLUCOSE SERPL-MCNC: 74 MG/DL — SIGNIFICANT CHANGE UP (ref 70–99)
GLUCOSE UR QL: NEGATIVE — SIGNIFICANT CHANGE UP
HCO3 BLDV-SCNC: 27 MMOL/L — SIGNIFICANT CHANGE UP (ref 21–29)
HCT VFR BLD CALC: 30.6 % — LOW (ref 39–50)
HCT VFR BLDA CALC: 29 % — LOW (ref 39–50)
HGB BLD CALC-MCNC: 9.3 G/DL — LOW (ref 13–17)
HGB BLD-MCNC: 9.8 G/DL — LOW (ref 13–17)
INR BLD: 1.41 RATIO — HIGH (ref 0.88–1.16)
KETONES UR-MCNC: NEGATIVE — SIGNIFICANT CHANGE UP
LACTATE BLDV-MCNC: 1 MMOL/L — SIGNIFICANT CHANGE UP (ref 0.7–2)
LEUKOCYTE ESTERASE UR-ACNC: ABNORMAL
LYMPHOCYTES # BLD AUTO: 17.1 % — SIGNIFICANT CHANGE UP (ref 13–44)
LYMPHOCYTES # BLD AUTO: 2.5 K/UL — SIGNIFICANT CHANGE UP (ref 1–3.3)
MCHC RBC-ENTMCNC: 32 GM/DL — SIGNIFICANT CHANGE UP (ref 32–36)
MCHC RBC-ENTMCNC: 33.4 PG — SIGNIFICANT CHANGE UP (ref 27–34)
MCV RBC AUTO: 104 FL — HIGH (ref 80–100)
MONOCYTES # BLD AUTO: 1.9 K/UL — HIGH (ref 0–0.9)
MONOCYTES NFR BLD AUTO: 13.3 % — SIGNIFICANT CHANGE UP (ref 2–14)
NEUTROPHILS # BLD AUTO: 10.1 K/UL — HIGH (ref 1.8–7.4)
NEUTROPHILS NFR BLD AUTO: 69.1 % — SIGNIFICANT CHANGE UP (ref 43–77)
NITRITE UR-MCNC: NEGATIVE — SIGNIFICANT CHANGE UP
PCO2 BLDV: 69 MMHG — HIGH (ref 35–50)
PH BLDV: 7.22 — LOW (ref 7.35–7.45)
PH UR: 6 — SIGNIFICANT CHANGE UP (ref 5–8)
PLAT MORPH BLD: NORMAL — SIGNIFICANT CHANGE UP
PLATELET # BLD AUTO: 323 K/UL — SIGNIFICANT CHANGE UP (ref 150–400)
PO2 BLDV: 36 MMHG — SIGNIFICANT CHANGE UP (ref 25–45)
POTASSIUM BLDV-SCNC: 4.1 MMOL/L — SIGNIFICANT CHANGE UP (ref 3.5–5)
POTASSIUM SERPL-MCNC: 3.6 MMOL/L — SIGNIFICANT CHANGE UP (ref 3.5–5.3)
POTASSIUM SERPL-SCNC: 3.6 MMOL/L — SIGNIFICANT CHANGE UP (ref 3.5–5.3)
PROT SERPL-MCNC: 6.4 G/DL — SIGNIFICANT CHANGE UP (ref 6–8.3)
PROT UR-MCNC: 100 MG/DL
PROTHROM AB SERPL-ACNC: 15.3 SEC — HIGH (ref 9.8–12.7)
RAPID RVP RESULT: SIGNIFICANT CHANGE UP
RBC # BLD: 2.93 M/UL — LOW (ref 4.2–5.8)
RBC # FLD: 13.7 % — SIGNIFICANT CHANGE UP (ref 10.3–14.5)
RBC BLD AUTO: SIGNIFICANT CHANGE UP
RBC CASTS # UR COMP ASSIST: ABNORMAL /HPF (ref 0–2)
SAO2 % BLDV: 49 % — LOW (ref 67–88)
SODIUM SERPL-SCNC: 152 MMOL/L — HIGH (ref 135–145)
SP GR SPEC: 1.03 — HIGH (ref 1.01–1.02)
TROPONIN T SERPL-MCNC: 0.09 NG/ML — HIGH (ref 0–0.06)
UROBILINOGEN FLD QL: NEGATIVE — SIGNIFICANT CHANGE UP
WBC # BLD: 14.6 K/UL — HIGH (ref 3.8–10.5)
WBC # FLD AUTO: 14.6 K/UL — HIGH (ref 3.8–10.5)
WBC UR QL: >50 /HPF (ref 0–5)

## 2018-05-27 PROCEDURE — 99285 EMERGENCY DEPT VISIT HI MDM: CPT | Mod: GC

## 2018-05-27 PROCEDURE — 71045 X-RAY EXAM CHEST 1 VIEW: CPT | Mod: 26

## 2018-05-27 RX ORDER — DEXTROSE 50 % IN WATER 50 %
25 SYRINGE (ML) INTRAVENOUS ONCE
Qty: 0 | Refills: 0 | Status: DISCONTINUED | OUTPATIENT
Start: 2018-05-27 | End: 2018-06-11

## 2018-05-27 RX ORDER — AZITHROMYCIN 500 MG/1
500 TABLET, FILM COATED ORAL EVERY 24 HOURS
Qty: 0 | Refills: 0 | Status: DISCONTINUED | OUTPATIENT
Start: 2018-05-27 | End: 2018-05-31

## 2018-05-27 RX ORDER — DEXTROSE 50 % IN WATER 50 %
12.5 SYRINGE (ML) INTRAVENOUS ONCE
Qty: 0 | Refills: 0 | Status: DISCONTINUED | OUTPATIENT
Start: 2018-05-27 | End: 2018-06-11

## 2018-05-27 RX ORDER — DEXTROSE 50 % IN WATER 50 %
15 SYRINGE (ML) INTRAVENOUS ONCE
Qty: 0 | Refills: 0 | Status: DISCONTINUED | OUTPATIENT
Start: 2018-05-27 | End: 2018-06-11

## 2018-05-27 RX ORDER — SODIUM CHLORIDE 9 MG/ML
1000 INJECTION, SOLUTION INTRAVENOUS
Qty: 0 | Refills: 0 | Status: DISCONTINUED | OUTPATIENT
Start: 2018-05-27 | End: 2018-06-11

## 2018-05-27 RX ORDER — AZITHROMYCIN 500 MG/1
500 TABLET, FILM COATED ORAL ONCE
Qty: 0 | Refills: 0 | Status: COMPLETED | OUTPATIENT
Start: 2018-05-27 | End: 2018-05-27

## 2018-05-27 RX ORDER — PANTOPRAZOLE SODIUM 20 MG/1
40 TABLET, DELAYED RELEASE ORAL DAILY
Qty: 0 | Refills: 0 | Status: DISCONTINUED | OUTPATIENT
Start: 2018-05-27 | End: 2018-06-11

## 2018-05-27 RX ORDER — PIPERACILLIN AND TAZOBACTAM 4; .5 G/20ML; G/20ML
3.38 INJECTION, POWDER, LYOPHILIZED, FOR SOLUTION INTRAVENOUS EVERY 8 HOURS
Qty: 0 | Refills: 0 | Status: DISCONTINUED | OUTPATIENT
Start: 2018-05-27 | End: 2018-05-30

## 2018-05-27 RX ORDER — SODIUM CHLORIDE 9 MG/ML
3 INJECTION INTRAMUSCULAR; INTRAVENOUS; SUBCUTANEOUS ONCE
Qty: 0 | Refills: 0 | Status: COMPLETED | OUTPATIENT
Start: 2018-05-27 | End: 2018-05-27

## 2018-05-27 RX ORDER — ACETAMINOPHEN 500 MG
1000 TABLET ORAL ONCE
Qty: 0 | Refills: 0 | Status: COMPLETED | OUTPATIENT
Start: 2018-05-27 | End: 2018-05-27

## 2018-05-27 RX ORDER — VANCOMYCIN HCL 1 G
1000 VIAL (EA) INTRAVENOUS ONCE
Qty: 0 | Refills: 0 | Status: COMPLETED | OUTPATIENT
Start: 2018-05-27 | End: 2018-05-28

## 2018-05-27 RX ORDER — HEPARIN SODIUM 5000 [USP'U]/ML
5000 INJECTION INTRAVENOUS; SUBCUTANEOUS EVERY 8 HOURS
Qty: 0 | Refills: 0 | Status: DISCONTINUED | OUTPATIENT
Start: 2018-05-27 | End: 2018-06-14

## 2018-05-27 RX ORDER — GLUCAGON INJECTION, SOLUTION 0.5 MG/.1ML
1 INJECTION, SOLUTION SUBCUTANEOUS ONCE
Qty: 0 | Refills: 0 | Status: DISCONTINUED | OUTPATIENT
Start: 2018-05-27 | End: 2018-06-11

## 2018-05-27 RX ORDER — SODIUM CHLORIDE 9 MG/ML
1000 INJECTION, SOLUTION INTRAVENOUS
Qty: 0 | Refills: 0 | Status: DISCONTINUED | OUTPATIENT
Start: 2018-05-27 | End: 2018-05-28

## 2018-05-27 RX ADMIN — SODIUM CHLORIDE 150 MILLILITER(S): 9 INJECTION, SOLUTION INTRAVENOUS at 20:35

## 2018-05-27 RX ADMIN — SODIUM CHLORIDE 3 MILLILITER(S): 9 INJECTION INTRAMUSCULAR; INTRAVENOUS; SUBCUTANEOUS at 20:37

## 2018-05-27 RX ADMIN — AZITHROMYCIN 250 MILLIGRAM(S): 500 TABLET, FILM COATED ORAL at 20:35

## 2018-05-27 RX ADMIN — Medication 400 MILLIGRAM(S): at 21:03

## 2018-05-27 NOTE — H&P ADULT - NSHPREVIEWOFSYSTEMS_GEN_ALL_CORE
REVIEW OF SYSTEMS      General:	    Skin/Breast:  	  Ophthalmologic:  	  ENMT:	    Respiratory and Thorax:  	  Cardiovascular:	    Gastrointestinal:	    Genitourinary:	    Musculoskeletal:	    Neurological:	    Psychiatric:	    Hematology/Lymphatics:	    Endocrine:	    Allergic/Immunologic: REVIEW OF SYSTEMS      General:	    Skin/Breast:  	  Ophthalmologic:  	  ENMT:	    Respiratory and Thorax:  	  Cardiovascular:	    Gastrointestinal:	    Genitourinary:	    Musculoskeletal:	    Neurological:	    Psychiatric:	    Hematology/Lymphatics:	    Endocrine:	    Allergic/Immunologic:  Unable to obtain the patient is Unresponsive

## 2018-05-27 NOTE — ED PROVIDER NOTE - PHYSICAL EXAMINATION
BILLY Stubbs MD   Physical Exam:   constitutional - chronically ill appearing, awake and alert, oriented x1, tremulous (wife states  pt has chronic tremors) vs rigors  head - no external evidence of trauma  cvs - rrr, no murmurs, no peripheral edema  resp - coarse breath sounds bilat. trached and vent dependent   gi - abdomen soft and nontender, no rigidity, guarding or rebound, bowel sounds present, peg present  msk - moving all extremities spontaneously  neuro - alert and oriented x1, no focal deficits  skin- no jaundice, warm and dry
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
## 2018-05-27 NOTE — ED PROVIDER NOTE - CARE PLAN
Principal Discharge DX:	HCAP (healthcare-associated pneumonia)  Secondary Diagnosis:	Cystitis Principal Discharge DX:	HCAP (healthcare-associated pneumonia)  Secondary Diagnosis:	Cystitis  Secondary Diagnosis:	Respiratory failure
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
## 2018-05-27 NOTE — ED PROVIDER NOTE - MEDICAL DECISION MAKING DETAILS
BILLY Stubbs MD :  pt likely w urosepsis vs hcap - is HD stable after 6L NS given at outside ED. will start mild 1/2 normal for persistent hypernatremia. admit to rcu. BILLY Stubbs MD :  pt likely w urosepsis vs hcap - is HD stable after 6L NS given at outside ED. will start mild 1/2 normal for persistent hypernatremia. admit to rcu. pt w molst form in chart that indicates pt is full code.
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
## 2018-05-27 NOTE — H&P ADULT - NSHPPHYSICALEXAM_GEN_ALL_CORE
PHYSICAL EXAM:      Constitutional:    Eyes:    ENMT:    Neck:    Breasts:    Back:    Respiratory:    Cardiovascular:    Gastrointestinal:    Genitourinary:    Rectal:    Extremities:    Vascular:    Neurological:    Skin:    Lymph Nodes:    Musculoskeletal:    Psychiatric: PHYSICAL EXAM:      Constitutional: No acute distress (+) rigors     Eyes: Perrla     ENMT: Dry mucous membranes (+) tracheostomy      Neck: Supple     Respiratory: On mechanical vent support  respirations symmetrical non labored (+) rhonchi to right  lobe and left lower lobe diminished sounds to left upper lobe     Cardiovascular: S1 S2 no M/C/G/R     Gastrointestinal: Abdomen with (+) tenderness to right upper quadrant (+) Peg tube soft (+) BS no hepatosplenomegaly     Genitourinary: Ybarra Cath in place with kalie urine    Extremities: No edema noted     Vascular: all extremities are warm cap refil <3 seconds (+) peripheral pulses noted     Neurological: Altered     Skin: warm and pink ecchymotic areas noted to bilateral upper extremities     Lymph Nodes: No palpable lymphadenopathy to cervical chain pre/post auricular nodes     Musculoskeletal: 2/5 to all extremities Difficult to examine with rigors     Psychiatric: unable to assess due rigors
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
## 2018-05-27 NOTE — ED ADULT NURSE NOTE - NSSISCREENINGQ2_ED_A_ED
[facility]    Hospitalist Progress Note    Date of Service (when I saw the patient): 01/24/2017    Assessment and Plan  Tad Manning is a 72 year old male who presents with renal failure from outside facility. Admitted for further evaluation and treatment.     Acute renal failure: Patient with a creatinine of 1.57 on 10 January 2007, increased to 4.2 on January 20, 2017.              - Ultrasound of the kidneys normal, no hydronephrosis              - Fractional excretion of sodium <1 indicating pre renal.              - Urinalysis shows albumin in urine, and hyaline casts.            - CK level normal   - aggressively hydrated IV now off IVF              - Appreciate nephro consultation   -Creatinine has trended down 4.2-->3.14-->2.43-->1.31-->1.01      Hypotension, syncope: Previously with lightheadedness and syncope in January and resultant back injury.  most likely due to orthostatic hypotension. As per patient, his BP meds were decreased by his PCP likely due to hypotension and dizziness.              - Echocardiogram with decreased LV EF, no prior to compare              - Telemetry- no arrhythmias              - Orthostatic drop noted today 1/24 again and dizzy intermittently. 1 L IV slowly @125 cc/hr, compression stocking, and midodrine 2.5 mg BID started. Recheck orthostatic in am. Decrease atenolol to daily dose.   - Therapy eval noted.   - BP meds adjusted as below, BP normal   - CT head negative for acute process     Cardiac and coronary artery disease and hypertension and hyperlipidemia: Patient with history of triple bypass in 1995 per report, as well as hypertension, hyperlipidemia, peripheral vascular disease, coronary artery disease.              - continue to hold PTA Lisinopril and HCTZ, will not resume at discharge              - Continue PTA Amlodipine 5 mg daily, Plavix 75 mg daily Lipitor 40 mg daily ASA 81mg QD   - PTA atenolol 25 mg BID decreased to daily               - ECHO shows decreased LV function, EKG 1st degree block with left axis deviation. Had remote CABG, no prior ECHO result available, given ongoing dizziness, no arrhythmias so far on tele, evaluated by cardiology, no further intervention, but to consider low dose ACEI - if renal function stable, defer as outpatient.    Diabetes mellitus type 2: Patient maintained on multiple medications as an outpatient. A1C is 7 on this admission              - Insulin sliding scale, BS mid 100s              - PTA Victoza and lower dose glipizide resumed.              - Hold PTA Metformin, likely will not resume at dc given his renal failure.          Possible history of CLL, now with anemia and thrombocytopenia: Patient with history of chronic lymphocytic leukemia in remission per report, now with anemia and thrombocytopenia               - No sign of bleed, but Hb trended down, ? dilutional as well.   - stool for guaiac negative, iron panel- AOCD, retic count LDH B12 and bilirubin are normal    - peripheral smear shows Marked normocytic normochromic anemia    - transfuse if <7   - appreciate hemonc recommendation.       History of BPH: Stable.  Maintained on high dose Flomax as an outpatient.              - PTA Flomax resumed, ? Also contributing to orthostatic hypotension.     Back pain, history of fall in early January: Patient with x-rays from outside as well as CT of the abdomen without explicit fracture, reports from care everywhere noted.               - Pain control- patient is not using much pain medication. Ambulated in hallway. No leg weakness, bowel/bladder incontinence              - PTA tramadol to continue   - heat pack   - lidocaine patch    H/o Insomnia: Maintained on zolpidem prior to admission.                - PTA Zolpidem   -dc PTA benadryl due to dizziness.    DVT Prophylaxis: Pneumatic Compression Devices  Code Status: Prior    Disposition: Expected discharge likely in am if orthostatic hypotension improves.  Plan discussed with patient.     Patria Dee MD  hospitalist    Interval History     Patient was seen and examined, had difficulty lying in his back due to back pain for CT head- says back pain is chronic, worse on lying on his back. Back pain (+) but does not want medication, occ dizziness- overall improved but does persist.   - anemia and thrombocytopenia stable.    -Data reviewed today: I reviewed all new labs and imaging results over the last 24 hours. I personally reviewed no images or EKG's today. Prior EKG- 1st degree HB, LAD and wide QRS       Physical Exam  Temp: 95.8  F (35.4  C) Temp src: Oral BP: 147/71 mmHg   Heart Rate: 92 Resp: 16 SpO2: 96 % O2 Device: None (Room air)    Filed Vitals:    01/22/17 0700 01/23/17 0544 01/24/17 0636   Weight: 86.1 kg (189 lb 13.1 oz) 85.6 kg (188 lb 11.4 oz) 85.7 kg (188 lb 15 oz)     Vital Signs with Ranges  Temp:  [95.8  F (35.4  C)-99.2  F (37.3  C)] 95.8  F (35.4  C)  Heart Rate:  [80-92] 92  Resp:  [16] 16  BP: (136-147)/(61-71) 147/71 mmHg  SpO2:  [95 %-96 %] 96 %  I/O last 3 completed shifts:  In: 360 [P.O.:360]  Out: 700 [Urine:700]    Constitutional: Alert, awake. Comfortable.  HEENT: PERRLA, EOMI  Respiratory: Bilateral equal air entry, clear to auscultation. No respiratory distress.  Cardiovascular: Regular s1s2, no murmur, rub or gallop. No tachycardia.  GI: Soft, non distended, non tender, bowel tones active.  Skin/Integumen: No rash, no blister. Pale.  MSK: mild lower thoracic spine tenderness but no deformity/ swelling.      Medications    NaCl 125 mL/hr at 01/24/17 1313       lidocaine  1 patch Transdermal Q24H     lidocaine   Transdermal Q24h     lidocaine   Transdermal Q8H     [START ON 1/25/2017] atenolol (TENORMIN) tablet 25 mg  25 mg Oral Daily     midodrine  2.5 mg Oral BID 09 12     glipiZIDE  2.5 mg Oral BID AC     liraglutide  1.2 mg Subcutaneous Daily     cholecalciferol  1,000 Units Oral Daily     bacitracin-polymyxin b   Topical BID      sodium chloride (PF)  3 mL Intracatheter Q8H     amLODIPine (NORVASC) tablet 5 mg  5 mg Oral Daily     ascorbic acid (VITAMIN C) tablet 500 mg  500 mg Oral At Bedtime     atorvastatin  40 mg Oral At Bedtime     clopidogrel  75 mg Oral Daily     aspirin chewable tablet 81 mg  81 mg Oral At Bedtime     tamsulosin  0.8 mg Oral At Bedtime     insulin aspart  1-7 Units Subcutaneous TID AC     insulin aspart  1-5 Units Subcutaneous At Bedtime       Data    Recent Labs  Lab 01/24/17  0719 01/23/17  0659 01/22/17  1456 01/22/17  0720 01/21/17  0740 01/20/17  2040   WBC 6.8 5.9 6.0 5.3 6.7 9.7   HGB 8.6* 8.2* 8.2* 7.9* 8.9* 9.1*   MCV 85 86 87 86 87 87   * 115* 118* 131* 151 158   NA  --  135  --  137 137 133   POTASSIUM  --  3.8  --  4.0 4.8 4.6   CHLORIDE  --  103  --  105 103 101   CO2  --  23  --  23 23 24   BUN  --  19  --  32* 52* 60*   CR  --  1.01  --  1.31* 2.43* 3.14*   ANIONGAP  --  9  --  9 11 8   JUSTINO  --  8.5  --  8.4* 9.3 8.8   GLC  --  184*  --  144* 183* 113*   ALBUMIN  --   --   --   --  2.7* 2.8*   PROTTOTAL  --   --   --   --  6.6* 6.6*   BILITOTAL  --   --   --   --  0.7 0.6   ALKPHOS  --   --   --   --  101 100   ALT  --   --   --   --  23 26   AST  --   --   --   --  11 10       Recent Results (from the past 24 hour(s))   CT Head w/o Contrast    Narrative    CT SCAN OF THE HEAD WITHOUT CONTRAST January 24, 2017 10:55 AM     HISTORY: Recent fall, intermittent dizziness. Rule out head bleed.  Patient is on ASA/Plavix.    TECHNIQUE: Axial images of the head and coronal reformations without  IV contrast material. Radiation dose for this scan was reduced using  automated exposure control, adjustment of the mA and/or kV according  to patient size, or iterative reconstruction technique.    COMPARISON: None.    FINDINGS: Mild cerebral and cerebellar atrophy is present. Minimal  nonspecific white matter changes are present in both hemispheres  without mass effect. Vascular structures are patent at the  skull base.  There is no evidence for intracoronal hemorrhage, acute infarct, mass  effect, or skull fracture. The mastoid air cells are clear. The  visualized paranasal sinuses are clear.      Impression    IMPRESSION: Chronic changes. No evidence for intracranial hemorrhage  or any acute process.    CATHERINE RIVERA MD      No
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
## 2018-05-28 DIAGNOSIS — R74.0 NONSPECIFIC ELEVATION OF LEVELS OF TRANSAMINASE AND LACTIC ACID DEHYDROGENASE [LDH]: ICD-10-CM

## 2018-05-28 DIAGNOSIS — R41.0 DISORIENTATION, UNSPECIFIED: ICD-10-CM

## 2018-05-28 DIAGNOSIS — I25.10 ATHEROSCLEROTIC HEART DISEASE OF NATIVE CORONARY ARTERY WITHOUT ANGINA PECTORIS: ICD-10-CM

## 2018-05-28 DIAGNOSIS — R13.10 DYSPHAGIA, UNSPECIFIED: ICD-10-CM

## 2018-05-28 DIAGNOSIS — B99.9 UNSPECIFIED INFECTIOUS DISEASE: ICD-10-CM

## 2018-05-28 DIAGNOSIS — E11.9 TYPE 2 DIABETES MELLITUS WITHOUT COMPLICATIONS: ICD-10-CM

## 2018-05-28 DIAGNOSIS — Z29.9 ENCOUNTER FOR PROPHYLACTIC MEASURES, UNSPECIFIED: ICD-10-CM

## 2018-05-28 DIAGNOSIS — J96.90 RESPIRATORY FAILURE, UNSPECIFIED, UNSPECIFIED WHETHER WITH HYPOXIA OR HYPERCAPNIA: ICD-10-CM

## 2018-05-28 DIAGNOSIS — I10 ESSENTIAL (PRIMARY) HYPERTENSION: ICD-10-CM

## 2018-05-28 LAB
-  COAGULASE NEGATIVE STAPHYLOCOCCUS: SIGNIFICANT CHANGE UP
ALBUMIN SERPL ELPH-MCNC: 3.1 G/DL — LOW (ref 3.3–5)
ALP SERPL-CCNC: 173 U/L — HIGH (ref 40–120)
ALT FLD-CCNC: 68 U/L — HIGH (ref 10–45)
ANION GAP SERPL CALC-SCNC: 10 MMOL/L — SIGNIFICANT CHANGE UP (ref 5–17)
AST SERPL-CCNC: 83 U/L — HIGH (ref 10–40)
BILIRUB SERPL-MCNC: 0.8 MG/DL — SIGNIFICANT CHANGE UP (ref 0.2–1.2)
BUN SERPL-MCNC: 41 MG/DL — HIGH (ref 7–23)
CALCIUM SERPL-MCNC: 8.8 MG/DL — SIGNIFICANT CHANGE UP (ref 8.4–10.5)
CHLORIDE SERPL-SCNC: 112 MMOL/L — HIGH (ref 96–108)
CHLORIDE UR-SCNC: 35 MMOL/L — SIGNIFICANT CHANGE UP
CK MB CFR SERPL CALC: 5.4 NG/ML — SIGNIFICANT CHANGE UP (ref 0–6.7)
CO2 SERPL-SCNC: 26 MMOL/L — SIGNIFICANT CHANGE UP (ref 22–31)
CREAT ?TM UR-MCNC: 101 MG/DL — SIGNIFICANT CHANGE UP
CREAT SERPL-MCNC: 0.99 MG/DL — SIGNIFICANT CHANGE UP (ref 0.5–1.3)
CULTURE RESULTS: NO GROWTH — SIGNIFICANT CHANGE UP
GAS PNL BLDA: SIGNIFICANT CHANGE UP
GLUCOSE BLDC GLUCOMTR-MCNC: 100 MG/DL — HIGH (ref 70–99)
GLUCOSE BLDC GLUCOMTR-MCNC: 109 MG/DL — HIGH (ref 70–99)
GLUCOSE BLDC GLUCOMTR-MCNC: 124 MG/DL — HIGH (ref 70–99)
GLUCOSE BLDC GLUCOMTR-MCNC: 94 MG/DL — SIGNIFICANT CHANGE UP (ref 70–99)
GLUCOSE SERPL-MCNC: 115 MG/DL — HIGH (ref 70–99)
GRAM STN FLD: SIGNIFICANT CHANGE UP
HCT VFR BLD CALC: 29.1 % — LOW (ref 39–50)
HGB BLD-MCNC: 9.4 G/DL — LOW (ref 13–17)
INR BLD: 1.36 RATIO — HIGH (ref 0.88–1.16)
MAGNESIUM SERPL-MCNC: 2 MG/DL — SIGNIFICANT CHANGE UP (ref 1.6–2.6)
MCHC RBC-ENTMCNC: 32.3 GM/DL — SIGNIFICANT CHANGE UP (ref 32–36)
MCHC RBC-ENTMCNC: 33.6 PG — SIGNIFICANT CHANGE UP (ref 27–34)
MCV RBC AUTO: 104 FL — HIGH (ref 80–100)
METHOD TYPE: SIGNIFICANT CHANGE UP
PHOSPHATE SERPL-MCNC: 3.6 MG/DL — SIGNIFICANT CHANGE UP (ref 2.5–4.5)
PLATELET # BLD AUTO: 267 K/UL — SIGNIFICANT CHANGE UP (ref 150–400)
POTASSIUM SERPL-MCNC: 3.4 MMOL/L — LOW (ref 3.5–5.3)
POTASSIUM SERPL-SCNC: 3.4 MMOL/L — LOW (ref 3.5–5.3)
PROT SERPL-MCNC: 6.4 G/DL — SIGNIFICANT CHANGE UP (ref 6–8.3)
PROTHROM AB SERPL-ACNC: 14.8 SEC — HIGH (ref 9.8–12.7)
RBC # BLD: 2.79 M/UL — LOW (ref 4.2–5.8)
RBC # FLD: 13.8 % — SIGNIFICANT CHANGE UP (ref 10.3–14.5)
SODIUM SERPL-SCNC: 148 MMOL/L — HIGH (ref 135–145)
SODIUM UR-SCNC: 71 MMOL/L — SIGNIFICANT CHANGE UP
SPECIMEN SOURCE: SIGNIFICANT CHANGE UP
SPECIMEN SOURCE: SIGNIFICANT CHANGE UP
TROPONIN T SERPL-MCNC: 0.08 NG/ML — HIGH (ref 0–0.06)
VANCOMYCIN TROUGH SERPL-MCNC: 11 UG/ML — SIGNIFICANT CHANGE UP (ref 10–20)
WBC # BLD: 12.9 K/UL — HIGH (ref 3.8–10.5)
WBC # FLD AUTO: 12.9 K/UL — HIGH (ref 3.8–10.5)

## 2018-05-28 PROCEDURE — 93010 ELECTROCARDIOGRAM REPORT: CPT

## 2018-05-28 PROCEDURE — 99223 1ST HOSP IP/OBS HIGH 75: CPT | Mod: GC

## 2018-05-28 PROCEDURE — 71260 CT THORAX DX C+: CPT | Mod: 26

## 2018-05-28 PROCEDURE — 74177 CT ABD & PELVIS W/CONTRAST: CPT | Mod: 26

## 2018-05-28 RX ORDER — NYSTATIN 500MM UNIT
500000 POWDER (EA) MISCELLANEOUS THREE TIMES A DAY
Qty: 0 | Refills: 0 | Status: DISCONTINUED | OUTPATIENT
Start: 2018-05-28 | End: 2018-05-28

## 2018-05-28 RX ORDER — POTASSIUM CHLORIDE 20 MEQ
10 PACKET (EA) ORAL
Qty: 0 | Refills: 0 | Status: COMPLETED | OUTPATIENT
Start: 2018-05-28 | End: 2018-05-28

## 2018-05-28 RX ORDER — QUETIAPINE FUMARATE 200 MG/1
75 TABLET, FILM COATED ORAL EVERY 8 HOURS
Qty: 0 | Refills: 0 | Status: DISCONTINUED | OUTPATIENT
Start: 2018-05-28 | End: 2018-05-28

## 2018-05-28 RX ORDER — VANCOMYCIN HCL 1 G
1000 VIAL (EA) INTRAVENOUS EVERY 12 HOURS
Qty: 0 | Refills: 0 | Status: DISCONTINUED | OUTPATIENT
Start: 2018-05-28 | End: 2018-05-30

## 2018-05-28 RX ORDER — INSULIN LISPRO 100/ML
VIAL (ML) SUBCUTANEOUS EVERY 6 HOURS
Qty: 0 | Refills: 0 | Status: DISCONTINUED | OUTPATIENT
Start: 2018-05-28 | End: 2018-06-11

## 2018-05-28 RX ORDER — DOXAZOSIN MESYLATE 4 MG
2 TABLET ORAL AT BEDTIME
Qty: 0 | Refills: 0 | Status: DISCONTINUED | OUTPATIENT
Start: 2018-05-28 | End: 2018-07-02

## 2018-05-28 RX ORDER — SODIUM CHLORIDE 9 MG/ML
1000 INJECTION, SOLUTION INTRAVENOUS
Qty: 0 | Refills: 0 | Status: DISCONTINUED | OUTPATIENT
Start: 2018-05-28 | End: 2018-05-28

## 2018-05-28 RX ORDER — QUETIAPINE FUMARATE 200 MG/1
50 TABLET, FILM COATED ORAL EVERY 6 HOURS
Qty: 0 | Refills: 0 | Status: DISCONTINUED | OUTPATIENT
Start: 2018-05-28 | End: 2018-06-11

## 2018-05-28 RX ORDER — CLOPIDOGREL BISULFATE 75 MG/1
75 TABLET, FILM COATED ORAL DAILY
Qty: 0 | Refills: 0 | Status: DISCONTINUED | OUTPATIENT
Start: 2018-05-28 | End: 2018-06-15

## 2018-05-28 RX ORDER — LANOLIN ALCOHOL/MO/W.PET/CERES
3 CREAM (GRAM) TOPICAL AT BEDTIME
Qty: 0 | Refills: 0 | Status: DISCONTINUED | OUTPATIENT
Start: 2018-05-28 | End: 2018-06-22

## 2018-05-28 RX ORDER — METOPROLOL TARTRATE 50 MG
100 TABLET ORAL
Qty: 0 | Refills: 0 | Status: DISCONTINUED | OUTPATIENT
Start: 2018-05-28 | End: 2018-06-28

## 2018-05-28 RX ORDER — SODIUM CHLORIDE 9 MG/ML
1000 INJECTION, SOLUTION INTRAVENOUS
Qty: 0 | Refills: 0 | Status: DISCONTINUED | OUTPATIENT
Start: 2018-05-28 | End: 2018-06-02

## 2018-05-28 RX ORDER — QUETIAPINE FUMARATE 200 MG/1
50 TABLET, FILM COATED ORAL AT BEDTIME
Qty: 0 | Refills: 0 | Status: DISCONTINUED | OUTPATIENT
Start: 2018-05-28 | End: 2018-05-28

## 2018-05-28 RX ORDER — ASPIRIN/CALCIUM CARB/MAGNESIUM 324 MG
81 TABLET ORAL DAILY
Qty: 0 | Refills: 0 | Status: DISCONTINUED | OUTPATIENT
Start: 2018-05-28 | End: 2018-06-15

## 2018-05-28 RX ORDER — CLONAZEPAM 1 MG
1 TABLET ORAL DAILY
Qty: 0 | Refills: 0 | Status: DISCONTINUED | OUTPATIENT
Start: 2018-05-28 | End: 2018-05-31

## 2018-05-28 RX ADMIN — HEPARIN SODIUM 5000 UNIT(S): 5000 INJECTION INTRAVENOUS; SUBCUTANEOUS at 15:36

## 2018-05-28 RX ADMIN — HEPARIN SODIUM 5000 UNIT(S): 5000 INJECTION INTRAVENOUS; SUBCUTANEOUS at 22:18

## 2018-05-28 RX ADMIN — AZITHROMYCIN 250 MILLIGRAM(S): 500 TABLET, FILM COATED ORAL at 19:44

## 2018-05-28 RX ADMIN — SODIUM CHLORIDE 150 MILLILITER(S): 9 INJECTION, SOLUTION INTRAVENOUS at 01:22

## 2018-05-28 RX ADMIN — SODIUM CHLORIDE 75 MILLILITER(S): 9 INJECTION, SOLUTION INTRAVENOUS at 13:19

## 2018-05-28 RX ADMIN — Medication 100 MILLIEQUIVALENT(S): at 10:30

## 2018-05-28 RX ADMIN — Medication 2 MILLIGRAM(S): at 22:17

## 2018-05-28 RX ADMIN — QUETIAPINE FUMARATE 50 MILLIGRAM(S): 200 TABLET, FILM COATED ORAL at 22:17

## 2018-05-28 RX ADMIN — HEPARIN SODIUM 5000 UNIT(S): 5000 INJECTION INTRAVENOUS; SUBCUTANEOUS at 05:30

## 2018-05-28 RX ADMIN — Medication 81 MILLIGRAM(S): at 12:41

## 2018-05-28 RX ADMIN — Medication 100 MILLIGRAM(S): at 17:33

## 2018-05-28 RX ADMIN — PIPERACILLIN AND TAZOBACTAM 25 GRAM(S): 4; .5 INJECTION, POWDER, LYOPHILIZED, FOR SOLUTION INTRAVENOUS at 04:28

## 2018-05-28 RX ADMIN — Medication 100 MILLIEQUIVALENT(S): at 12:41

## 2018-05-28 RX ADMIN — Medication 250 MILLIGRAM(S): at 17:33

## 2018-05-28 RX ADMIN — Medication 250 MILLIGRAM(S): at 01:22

## 2018-05-28 RX ADMIN — QUETIAPINE FUMARATE 50 MILLIGRAM(S): 200 TABLET, FILM COATED ORAL at 14:00

## 2018-05-28 RX ADMIN — Medication 100 MILLIEQUIVALENT(S): at 09:37

## 2018-05-28 RX ADMIN — Medication 1 MILLIGRAM(S): at 12:49

## 2018-05-28 RX ADMIN — SODIUM CHLORIDE 75 MILLILITER(S): 9 INJECTION, SOLUTION INTRAVENOUS at 19:48

## 2018-05-28 RX ADMIN — PIPERACILLIN AND TAZOBACTAM 25 GRAM(S): 4; .5 INJECTION, POWDER, LYOPHILIZED, FOR SOLUTION INTRAVENOUS at 13:18

## 2018-05-28 RX ADMIN — PANTOPRAZOLE SODIUM 40 MILLIGRAM(S): 20 TABLET, DELAYED RELEASE ORAL at 12:41

## 2018-05-28 RX ADMIN — PIPERACILLIN AND TAZOBACTAM 25 GRAM(S): 4; .5 INJECTION, POWDER, LYOPHILIZED, FOR SOLUTION INTRAVENOUS at 22:15

## 2018-05-28 RX ADMIN — CLOPIDOGREL BISULFATE 75 MILLIGRAM(S): 75 TABLET, FILM COATED ORAL at 12:41

## 2018-05-28 NOTE — PROGRESS NOTE ADULT - SUBJECTIVE AND OBJECTIVE BOX
Patient is a 77y old  Male who presents with a chief complaint of Admitted with fever (27 May 2018 20:28)    Interval Events: Patient transferred to [facility] after wife signed him out of [facility] AMA, Patient febrile overnight T.max 102    REVIEW OF SYSTEMS:  [ ] Positive  [ ] All other systems negative  [ ] Unable to assess ROS because ________    Vital Signs Last 24 Hrs  T(C): 37.7 (18 @ 04:09), Max: 38.9 (18 @ 22:58)  T(F): 99.8 (18 @ 04:09), Max: 102 (18 @ 22:58)  HR: 73 (18 @ 06:00) (70 - 104)  BP: 109/59 (18 @ 04:09) (109/59 - 170/66)  RR: 21 (18 @ 04:09) (21 - 27)  SpO2: 100% (18 @ 06:00) (100% - 100%)    PHYSICAL EXAM:  HEENT:   [ ]Tracheostomy:  [ ]Pupils equal  [ ]No oral lesions  [ ]Abnormal        SKIN  [ ]No Rash  [ ] Abnormal  [ ] pressure    CARDIAC  [ ]Regular  [ ]Abnormal    PULMONARY  [ ]Bilateral Clear Breath Sounds  [ ]Normal Excursion  [ ]Abnormal    GI  [ ]PEG      [ ] +BS		              [ ]Soft, nondistended, nontender	  [ ]Abnormal    MUSCULOSKELETAL                                   [ ]Bedbound                 [ ]Abnormal    [ ]Ambulatory/OOB to chair                           EXTREMITIES                                         [ ]Normal  [ ]Edema                           NEUROLOGIC  [ ] Normal, non focal  [ ] Focal findings:    PSYCHIATRIC  [ ]Alert and appropriate  [ ] Sedated	 [ ]Agitated    :  Ybarra: [ ] Yes, if yes: Date of Placement:                   [  ] No    LINES: Central Lines [ ] Yes, if yes: Date of Placement                                     [  ] No    HOSPITAL MEDICATIONS:  MEDICATIONS  (STANDING):  aspirin  chewable 81 milliGRAM(s) Oral daily  azithromycin  IVPB 500 milliGRAM(s) IV Intermittent every 24 hours  clonazePAM Tablet 1 milliGRAM(s) Oral daily  clopidogrel Tablet 75 milliGRAM(s) Oral daily  dextrose 5% + sodium chloride 0.45%. 1000 milliLiter(s) (150 mL/Hr) IV Continuous <Continuous>  dextrose 5%. 1000 milliLiter(s) (50 mL/Hr) IV Continuous <Continuous>  dextrose 50% Injectable 12.5 Gram(s) IV Push once  dextrose 50% Injectable 25 Gram(s) IV Push once  dextrose 50% Injectable 25 Gram(s) IV Push once  doxazosin 2 milliGRAM(s) Oral at bedtime  heparin  Injectable 5000 Unit(s) SubCutaneous every 8 hours  insulin lispro (HumaLOG) corrective regimen sliding scale   SubCutaneous every 6 hours  metoprolol tartrate 100 milliGRAM(s) Oral two times a day  pantoprazole  Injectable 40 milliGRAM(s) IV Push daily  piperacillin/tazobactam IVPB. 3.375 Gram(s) IV Intermittent every 8 hours  potassium chloride  10 mEq/100 mL IVPB 10 milliEquivalent(s) IV Intermittent every 1 hour    MEDICATIONS  (PRN):  dextrose 40% Gel 15 Gram(s) Oral once PRN Blood Glucose LESS THAN 70 milliGRAM(s)/deciLiter  glucagon  Injectable 1 milliGRAM(s) IntraMuscular once PRN Glucose <70 milliGRAM(s)/deciLiter  melatonin 3 milliGRAM(s) Oral at bedtime PRN Insomnia  QUEtiapine 50 milliGRAM(s) Oral every 6 hours PRN break through aggitation      LABS:                        9.4    12.9  )-----------( 267      ( 28 May 2018 04:26 )             29.1         148<H>  |  112<H>  |  41<H>  ----------------------------<  115<H>  3.4<L>   |  26  |  0.99    Ca    8.8      28 May 2018 04:26  Phos  3.6       Mg     2.0         TPro  6.4  /  Alb  3.1<L>  /  TBili  0.8  /  DBili  x   /  AST  83<H>  /  ALT  68<H>  /  AlkPhos  173<H>      PT/INR - ( 28 May 2018 04:26 )   PT: 14.8 sec;   INR: 1.36 ratio         PTT - ( 27 May 2018 20:11 )  PTT:28.8 sec  Urinalysis Basic - ( 27 May 2018 20:11 )    Color: Yellow / Appearance: SL Turbid / S.026 / pH: x  Gluc: x / Ketone: Negative  / Bili: Negative / Urobili: Negative   Blood: x / Protein: 100 mg/dL / Nitrite: Negative   Leuk Esterase: Large / RBC: 2-5 /HPF / WBC >50 /HPF   Sq Epi: x / Non Sq Epi: x / Bacteria: x      Arterial Blood Gas:   @ 02:23  7.35/43/204/23/100/-1.7  ABG lactate: --    Venous Blood Gas:   @ 20:36  7.22/69/36/27/49  VBG Lactate: 1.0    CAPILLARY BLOOD GLUCOSE    MICROBIOLOGY:     RADIOLOGY:  [ ] Reviewed and interpreted by me    Mode: AC/ CMV (Assist Control/ Continuous Mandatory Ventilation)  RR (machine): 16  TV (machine): 500  FiO2: 40  PEEP: 5  ITime: 1  MAP: 11  PIP: 23 Patient is a 77y old  Male who presents with a chief complaint of Admitted with fever (27 May 2018 20:28)    Interval Events: Patient transferred to [facility] after wife signed him out of [facility] AMA, Patient febrile overnight T.max 102    REVIEW OF SYSTEMS:  [ ] Positive  [ ] All other systems negative  [x] Unable to assess ROS because patient remains confused with delirium     Vital Signs Last 24 Hrs  T(C): 37.7 (18 @ 04:09), Max: 38.9 (18 @ 22:58)  T(F): 99.8 (18 @ 04:09), Max: 102 (18 @ 22:58)  HR: 73 (18 @ 06:00) (70 - 104)  BP: 109/59 (18 @ 04:09) (109/59 - 170/66)  RR: 21 (18 @ 04:09) (21 - 27)  SpO2: 100% (18 @ 06:00) (100% - 100%)    PHYSICAL EXAM:  HEENT:   [x]Tracheostomy:  [x]Pupils equal  [ ]No oral lesions  [ ]Abnormal    SKIN  [x]No Rash  [ ] Abnormal  [ ] pressure    CARDIAC  [x]Regular  [ ]Abnormal    PULMONARY  [ ]Bilateral Clear Breath Sounds  [ ]Normal Excursion  [x]Abnormal: + Coarse Scattered rhonchi with decreased breath sounds at left base     GI  [x]PEG      [x] +BS		              [x] + Tympanic to percussion, No rebound , No guarding     MUSCULOSKELETAL                                   [ ]Bedbound                 [ ]Abnormal    [x]OOB to chair                           EXTREMITIES                                         [ ]Normal  [x]Edema: + Trace pedal edema                         NEUROLOGIC  [x] Normal, non focal  [ ] Focal findings:    PSYCHIATRIC  [x]Alert  / Confused / Delirious   [ ] Sedated	 [ ]Agitated    :  Ybarra: [x] Yes, if yes: Date of Placement:                    [  ] No    LINES: Central Lines [ ] Yes, if yes: Date of Placement                                     [x] No    HOSPITAL MEDICATIONS:  MEDICATIONS  (STANDING):  aspirin  chewable 81 milliGRAM(s) Oral daily  azithromycin  IVPB 500 milliGRAM(s) IV Intermittent every 24 hours  clonazePAM Tablet 1 milliGRAM(s) Oral daily  clopidogrel Tablet 75 milliGRAM(s) Oral daily  dextrose 5% + sodium chloride 0.45%. 1000 milliLiter(s) (150 mL/Hr) IV Continuous <Continuous>  dextrose 5%. 1000 milliLiter(s) (50 mL/Hr) IV Continuous <Continuous>  dextrose 50% Injectable 12.5 Gram(s) IV Push once  dextrose 50% Injectable 25 Gram(s) IV Push once  dextrose 50% Injectable 25 Gram(s) IV Push once  doxazosin 2 milliGRAM(s) Oral at bedtime  heparin  Injectable 5000 Unit(s) SubCutaneous every 8 hours  insulin lispro (HumaLOG) corrective regimen sliding scale   SubCutaneous every 6 hours  metoprolol tartrate 100 milliGRAM(s) Oral two times a day  pantoprazole  Injectable 40 milliGRAM(s) IV Push daily  piperacillin/tazobactam IVPB. 3.375 Gram(s) IV Intermittent every 8 hours  potassium chloride  10 mEq/100 mL IVPB 10 milliEquivalent(s) IV Intermittent every 1 hour    MEDICATIONS  (PRN):  dextrose 40% Gel 15 Gram(s) Oral once PRN Blood Glucose LESS THAN 70 milliGRAM(s)/deciLiter  glucagon  Injectable 1 milliGRAM(s) IntraMuscular once PRN Glucose <70 milliGRAM(s)/deciLiter  melatonin 3 milliGRAM(s) Oral at bedtime PRN Insomnia  QUEtiapine 50 milliGRAM(s) Oral every 6 hours PRN break through aggitation      LABS:                        9.4    12.9  )-----------( 267      ( 28 May 2018 04:26 )             29.1     -28    148<H>  |  112<H>  |  41<H>  ----------------------------<  115<H>  3.4<L>   |  26  |  0.99    Ca    8.8      28 May 2018 04:26  Phos  3.6       Mg     2.0         TPro  6.4  /  Alb  3.1<L>  /  TBili  0.8  /  DBili  x   /  AST  83<H>  /  ALT  68<H>  /  AlkPhos  173<H>      PT/INR - ( 28 May 2018 04:26 )   PT: 14.8 sec;   INR: 1.36 ratio         PTT - ( 27 May 2018 20:11 )  PTT:28.8 sec  Urinalysis Basic - ( 27 May 2018 20:11 )    Color: Yellow / Appearance: SL Turbid / S.026 / pH: x  Gluc: x / Ketone: Negative  / Bili: Negative / Urobili: Negative   Blood: x / Protein: 100 mg/dL / Nitrite: Negative   Leuk Esterase: Large / RBC: 2-5 /HPF / WBC >50 /HPF   Sq Epi: x / Non Sq Epi: x / Bacteria: x      Arterial Blood Gas:   @ 02:23  7.35/43/204/23/100/-1.7  ABG lactate: --    Venous Blood Gas:   @ 20:36  7.22/69/36/27/49  VBG Lactate: 1.0    CAPILLARY BLOOD GLUCOSE    MICROBIOLOGY:     RADIOLOGY:  [ ] Reviewed and interpreted by me    Mode: AC/ CMV (Assist Control/ Continuous Mandatory Ventilation)  RR (machine): 16  TV (machine): 500  FiO2: 40  PEEP: 5  ITime: 1  MAP: 11  PIP: 23
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
## 2018-05-28 NOTE — PROGRESS NOTE ADULT - PROBLEM SELECTOR PLAN 3
CT Abd/ Pelvis 4/14: Previously seen 1.3 cm Stone in Neck of GB  Repeat CT Abdomen and Pelvis pending   Continue to monitor LFTs  Statin Discontinued

## 2018-05-28 NOTE — PROGRESS NOTE ADULT - PROBLEM SELECTOR PLAN 2
Wife Reports + Blood Cxs reported from [facility]  Message left with Medical Records, will F/u to obtain Bld cx report  Repeat Blood Cx sent  ( Pending )   Patient with + Urinalaysis, Urine Cx sent ( Pending)   Sputum Cx ordered ( Pending )   CT Chest Non Contrast and CT Abdomen and pelvis with po and IV contrast ordered as per Dr. Trevino to rule out infectious source   Continue Zosyn and Azithro  Vanco level Subtherapeutic will change dose to 1000 mg q 12 hrs
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
## 2018-05-28 NOTE — PROGRESS NOTE ADULT - PROBLEM SELECTOR PLAN 1
Patient S/p Tracheostomy last admission   Continue Mechanical Ventilation   Continue  Chest PT and Suctioning

## 2018-05-28 NOTE — PROGRESS NOTE ADULT - PROBLEM SELECTOR PLAN 4
Patient with Hx of Ileus last admission   Patient with slightly protuberant abdomen on PE  Will await Abdominal Imaging prior to initiating feeds   Continue D5 1/2 NS @ 45 cc/hr

## 2018-05-29 DIAGNOSIS — R50.9 FEVER, UNSPECIFIED: ICD-10-CM

## 2018-05-29 DIAGNOSIS — R78.81 BACTEREMIA: ICD-10-CM

## 2018-05-29 LAB
ALBUMIN SERPL ELPH-MCNC: 2.3 G/DL — LOW (ref 3.3–5)
ALBUMIN SERPL ELPH-MCNC: 3.1 G/DL — LOW (ref 3.3–5)
ALP SERPL-CCNC: 121 U/L — HIGH (ref 40–120)
ALP SERPL-CCNC: 160 U/L — HIGH (ref 40–120)
ALT FLD-CCNC: 42 U/L — SIGNIFICANT CHANGE UP (ref 10–45)
ALT FLD-CCNC: 53 U/L — HIGH (ref 10–45)
ANION GAP SERPL CALC-SCNC: 7 MMOL/L — SIGNIFICANT CHANGE UP (ref 5–17)
ANION GAP SERPL CALC-SCNC: 8 MMOL/L — SIGNIFICANT CHANGE UP (ref 5–17)
APTT BLD: 31.2 SEC — SIGNIFICANT CHANGE UP (ref 27.5–37.4)
AST SERPL-CCNC: 38 U/L — SIGNIFICANT CHANGE UP (ref 10–40)
AST SERPL-CCNC: 48 U/L — HIGH (ref 10–40)
BILIRUB SERPL-MCNC: 0.9 MG/DL — SIGNIFICANT CHANGE UP (ref 0.2–1.2)
BILIRUB SERPL-MCNC: 1.1 MG/DL — SIGNIFICANT CHANGE UP (ref 0.2–1.2)
BUN SERPL-MCNC: 23 MG/DL — SIGNIFICANT CHANGE UP (ref 7–23)
BUN SERPL-MCNC: 28 MG/DL — HIGH (ref 7–23)
CALCIUM SERPL-MCNC: 7.3 MG/DL — LOW (ref 8.4–10.5)
CALCIUM SERPL-MCNC: 9.8 MG/DL — SIGNIFICANT CHANGE UP (ref 8.4–10.5)
CHLORIDE SERPL-SCNC: 109 MMOL/L — HIGH (ref 96–108)
CHLORIDE SERPL-SCNC: 84 MMOL/L — LOW (ref 96–108)
CO2 SERPL-SCNC: 21 MMOL/L — LOW (ref 22–31)
CO2 SERPL-SCNC: 27 MMOL/L — SIGNIFICANT CHANGE UP (ref 22–31)
CREAT SERPL-MCNC: 0.59 MG/DL — SIGNIFICANT CHANGE UP (ref 0.5–1.3)
CREAT SERPL-MCNC: 0.72 MG/DL — SIGNIFICANT CHANGE UP (ref 0.5–1.3)
CULTURE RESULTS: SIGNIFICANT CHANGE UP
GLUCOSE BLDC GLUCOMTR-MCNC: 101 MG/DL — HIGH (ref 70–99)
GLUCOSE BLDC GLUCOMTR-MCNC: 140 MG/DL — HIGH (ref 70–99)
GLUCOSE BLDC GLUCOMTR-MCNC: 142 MG/DL — HIGH (ref 70–99)
GLUCOSE BLDC GLUCOMTR-MCNC: 166 MG/DL — HIGH (ref 70–99)
GLUCOSE BLDC GLUCOMTR-MCNC: 86 MG/DL — SIGNIFICANT CHANGE UP (ref 70–99)
GLUCOSE SERPL-MCNC: 113 MG/DL — HIGH (ref 70–99)
GLUCOSE SERPL-MCNC: 958 MG/DL — CRITICAL HIGH (ref 70–99)
GRAM STN FLD: SIGNIFICANT CHANGE UP
HCT VFR BLD CALC: 23.1 % — LOW (ref 39–50)
HCT VFR BLD CALC: 29.5 % — LOW (ref 39–50)
HGB BLD-MCNC: 6.5 G/DL — CRITICAL LOW (ref 13–17)
HGB BLD-MCNC: 9.4 G/DL — LOW (ref 13–17)
INR BLD: 1.47 RATIO — HIGH (ref 0.88–1.16)
MAGNESIUM SERPL-MCNC: 1.6 MG/DL — SIGNIFICANT CHANGE UP (ref 1.6–2.6)
MCHC RBC-ENTMCNC: 28 GM/DL — LOW (ref 32–36)
MCHC RBC-ENTMCNC: 31.9 GM/DL — LOW (ref 32–36)
MCHC RBC-ENTMCNC: 33 PG — SIGNIFICANT CHANGE UP (ref 27–34)
MCHC RBC-ENTMCNC: 33.1 PG — SIGNIFICANT CHANGE UP (ref 27–34)
MCV RBC AUTO: 104 FL — HIGH (ref 80–100)
MCV RBC AUTO: 118 FL — HIGH (ref 80–100)
ORGANISM # SPEC MICROSCOPIC CNT: SIGNIFICANT CHANGE UP
ORGANISM # SPEC MICROSCOPIC CNT: SIGNIFICANT CHANGE UP
PHOSPHATE SERPL-MCNC: 1.6 MG/DL — LOW (ref 2.5–4.5)
PHOSPHATE SERPL-MCNC: 2.4 MG/DL — LOW (ref 2.5–4.5)
PLATELET # BLD AUTO: 184 K/UL — SIGNIFICANT CHANGE UP (ref 150–400)
PLATELET # BLD AUTO: 241 K/UL — SIGNIFICANT CHANGE UP (ref 150–400)
POTASSIUM SERPL-MCNC: 3.2 MMOL/L — LOW (ref 3.5–5.3)
POTASSIUM SERPL-MCNC: 4.1 MMOL/L — SIGNIFICANT CHANGE UP (ref 3.5–5.3)
POTASSIUM SERPL-SCNC: 3.2 MMOL/L — LOW (ref 3.5–5.3)
POTASSIUM SERPL-SCNC: 4.1 MMOL/L — SIGNIFICANT CHANGE UP (ref 3.5–5.3)
PROT SERPL-MCNC: 4.6 G/DL — LOW (ref 6–8.3)
PROT SERPL-MCNC: 6.2 G/DL — SIGNIFICANT CHANGE UP (ref 6–8.3)
PROTHROM AB SERPL-ACNC: 16.2 SEC — HIGH (ref 9.8–12.7)
RBC # BLD: 1.97 M/UL — LOW (ref 4.2–5.8)
RBC # BLD: 2.85 M/UL — LOW (ref 4.2–5.8)
RBC # FLD: 13.4 % — SIGNIFICANT CHANGE UP (ref 10.3–14.5)
RBC # FLD: 13.7 % — SIGNIFICANT CHANGE UP (ref 10.3–14.5)
SODIUM SERPL-SCNC: 113 MMOL/L — CRITICAL LOW (ref 135–145)
SODIUM SERPL-SCNC: 143 MMOL/L — SIGNIFICANT CHANGE UP (ref 135–145)
SPECIMEN SOURCE: SIGNIFICANT CHANGE UP
SPECIMEN SOURCE: SIGNIFICANT CHANGE UP
VANCOMYCIN TROUGH SERPL-MCNC: 11.6 UG/ML — SIGNIFICANT CHANGE UP (ref 10–20)
WBC # BLD: 8.4 K/UL — SIGNIFICANT CHANGE UP (ref 3.8–10.5)
WBC # BLD: 9.2 K/UL — SIGNIFICANT CHANGE UP (ref 3.8–10.5)
WBC # FLD AUTO: 8.4 K/UL — SIGNIFICANT CHANGE UP (ref 3.8–10.5)
WBC # FLD AUTO: 9.2 K/UL — SIGNIFICANT CHANGE UP (ref 3.8–10.5)

## 2018-05-29 PROCEDURE — 99233 SBSQ HOSP IP/OBS HIGH 50: CPT | Mod: GC

## 2018-05-29 PROCEDURE — 76700 US EXAM ABDOM COMPLETE: CPT | Mod: 26

## 2018-05-29 PROCEDURE — 99222 1ST HOSP IP/OBS MODERATE 55: CPT

## 2018-05-29 RX ORDER — OFLOXACIN 0.3 %
1 DROPS OPHTHALMIC (EYE)
Qty: 0 | Refills: 0 | Status: COMPLETED | OUTPATIENT
Start: 2018-05-29 | End: 2018-06-03

## 2018-05-29 RX ADMIN — PIPERACILLIN AND TAZOBACTAM 25 GRAM(S): 4; .5 INJECTION, POWDER, LYOPHILIZED, FOR SOLUTION INTRAVENOUS at 05:42

## 2018-05-29 RX ADMIN — CLOPIDOGREL BISULFATE 75 MILLIGRAM(S): 75 TABLET, FILM COATED ORAL at 12:29

## 2018-05-29 RX ADMIN — Medication 1 DROP(S): at 17:30

## 2018-05-29 RX ADMIN — Medication 250 MILLIGRAM(S): at 17:30

## 2018-05-29 RX ADMIN — HEPARIN SODIUM 5000 UNIT(S): 5000 INJECTION INTRAVENOUS; SUBCUTANEOUS at 05:42

## 2018-05-29 RX ADMIN — HEPARIN SODIUM 5000 UNIT(S): 5000 INJECTION INTRAVENOUS; SUBCUTANEOUS at 22:08

## 2018-05-29 RX ADMIN — Medication 2 MILLIGRAM(S): at 22:07

## 2018-05-29 RX ADMIN — HEPARIN SODIUM 5000 UNIT(S): 5000 INJECTION INTRAVENOUS; SUBCUTANEOUS at 12:46

## 2018-05-29 RX ADMIN — Medication 1 MILLIGRAM(S): at 12:33

## 2018-05-29 RX ADMIN — Medication 250 MILLIGRAM(S): at 05:42

## 2018-05-29 RX ADMIN — Medication 3 MILLIGRAM(S): at 22:08

## 2018-05-29 RX ADMIN — PANTOPRAZOLE SODIUM 40 MILLIGRAM(S): 20 TABLET, DELAYED RELEASE ORAL at 12:29

## 2018-05-29 RX ADMIN — QUETIAPINE FUMARATE 50 MILLIGRAM(S): 200 TABLET, FILM COATED ORAL at 22:23

## 2018-05-29 RX ADMIN — Medication 81 MILLIGRAM(S): at 12:28

## 2018-05-29 RX ADMIN — Medication 1 DROP(S): at 22:07

## 2018-05-29 RX ADMIN — Medication 100 MILLIGRAM(S): at 05:42

## 2018-05-29 RX ADMIN — PIPERACILLIN AND TAZOBACTAM 25 GRAM(S): 4; .5 INJECTION, POWDER, LYOPHILIZED, FOR SOLUTION INTRAVENOUS at 22:07

## 2018-05-29 RX ADMIN — Medication 100 MILLIGRAM(S): at 17:26

## 2018-05-29 RX ADMIN — SODIUM CHLORIDE 75 MILLILITER(S): 9 INJECTION, SOLUTION INTRAVENOUS at 05:44

## 2018-05-29 RX ADMIN — AZITHROMYCIN 250 MILLIGRAM(S): 500 TABLET, FILM COATED ORAL at 19:55

## 2018-05-29 RX ADMIN — PIPERACILLIN AND TAZOBACTAM 25 GRAM(S): 4; .5 INJECTION, POWDER, LYOPHILIZED, FOR SOLUTION INTRAVENOUS at 12:46
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
## 2018-05-29 NOTE — CONSULT NOTE ADULT - ATTENDING COMMENTS
Mickey Benites  Attending Physician   Division of Infectious Disease  Pager #[phone]  After 5pm/weekend or no response, call #[phone]
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
## 2018-05-29 NOTE — PHYSICAL THERAPY INITIAL EVALUATION ADULT - ASR WT BEARING STATUS EVAL
f/u with Shelia GALVAN re: WB restrictions; pt previously NWB LUE due to acromial-clavicular seperation/Left LE

## 2018-05-29 NOTE — PHYSICAL THERAPY INITIAL EVALUATION ADULT - ADDITIONAL COMMENTS
CT CHEST 5/29: Improving of the multi focal consolidations when compared with CT examination 4/25/2018. Scattered groundglass opacities are seen bilaterally may represent some residual change although new small foci of infection is considered. Small bilateral pleural effusions, left greater than right. The pleural fluid has decreased bilaterally when compared with previous study 4/25/2018.  Decrease in the extraperitoneal hematoma in the space of Retzius now   measuring 6 cm x 5.6 cm. 5/29 US ABDOMEN: Cholelithiasis without evidence of cholecystitis or biliary obstruction.

## 2018-05-29 NOTE — PROGRESS NOTE ADULT - SUBJECTIVE AND OBJECTIVE BOX
Patient is a 77y old  Male who presents with a chief complaint of Admitted with fever (27 May 2018 20:28)      Interval Events:    REVIEW OF SYSTEMS:  [x ] Positive- itchy eyes  [ ] All other systems negative  [ ] Unable to assess ROS because _______    Vital Signs Last 24 Hrs  T(C): 36.5 (18 @ 05:08), Max: 37.3 (18 @ 09:30)  T(F): 97.7 (18 @ 05:08), Max: 99.1 (18 @ 09:30)  HR: 66 (18 @ 05:08) (60 - 630)  BP: 112/62 (18 @ 05:08) (98/59 - 155/73)  RR: 18 (18 @ 05:08) (18 - 116)  SpO2: 100% (18 @ 05:08) (98% - 100%)    PHYSICAL EXAM:  HEENT:   [x ]Tracheostomy: 7 portex uncuffed  [ ]Pupils equal  [ ]No oral lesions  [ ]Abnormal- eye reddned with matter    SKIN  [ ]No Rash  [x ] Abnormal- left and right buttock wound  [ ] pressure    CARDIAC  [ x]Regular  [ ]Abnormal    PULMONARY  [ ]Bilateral Clear Breath Sounds  [ ]Normal Excursion  [x ]Abnormal- coarse bs b/l    GI  [x ]PEG      [x ] +BS		              [x ]Soft, nondistended, nontender	  [ ]Abnormal    MUSCULOSKELETAL                                   [ ]Bedbound                 [ ]Abnormal    [x ]Ambulatory/OOB to chair                           EXTREMITIES                                         [ ]Normal  [x ]Edema   1+                        NEUROLOGIC  [ ] Normal, non focal  [x ] Focal findings: generalized weakness    PSYCHIATRIC  [x ]Alert and appropriate  [ ] Sedated	 [ ]Agitated    :  Ybarra: [ x] Yes, if yes: Date of Placement:                   [  ] No    LINES: Central Lines [ ] Yes, if yes: Date of Placement                                     [ x ] No    HOSPITAL MEDICATIONS:  MEDICATIONS  (STANDING):  aspirin  chewable 81 milliGRAM(s) Oral daily  azithromycin  IVPB 500 milliGRAM(s) IV Intermittent every 24 hours  clonazePAM Tablet 1 milliGRAM(s) Oral daily  clopidogrel Tablet 75 milliGRAM(s) Oral daily  dextrose 5% + sodium chloride 0.45%. 1000 milliLiter(s) (75 mL/Hr) IV Continuous <Continuous>  dextrose 5%. 1000 milliLiter(s) (50 mL/Hr) IV Continuous <Continuous>  dextrose 50% Injectable 12.5 Gram(s) IV Push once  dextrose 50% Injectable 25 Gram(s) IV Push once  dextrose 50% Injectable 25 Gram(s) IV Push once  doxazosin 2 milliGRAM(s) Oral at bedtime  heparin  Injectable 5000 Unit(s) SubCutaneous every 8 hours  insulin lispro (HumaLOG) corrective regimen sliding scale   SubCutaneous every 6 hours  metoprolol tartrate 100 milliGRAM(s) Oral two times a day  pantoprazole  Injectable 40 milliGRAM(s) IV Push daily  piperacillin/tazobactam IVPB. 3.375 Gram(s) IV Intermittent every 8 hours  vancomycin  IVPB 1000 milliGRAM(s) IV Intermittent every 12 hours    MEDICATIONS  (PRN):  dextrose 40% Gel 15 Gram(s) Oral once PRN Blood Glucose LESS THAN 70 milliGRAM(s)/deciLiter  glucagon  Injectable 1 milliGRAM(s) IntraMuscular once PRN Glucose <70 milliGRAM(s)/deciLiter  melatonin 3 milliGRAM(s) Oral at bedtime PRN Insomnia  QUEtiapine 50 milliGRAM(s) Oral every 6 hours PRN break through aggitation      LABS:                        9.4    9.2   )-----------( 241      ( 29 May 2018 08:49 )             29.5         113<LL>  |  84<L>  |  23  ----------------------------<  958<HH>  3.2<L>   |  21<L>  |  0.59    Ca    7.3<L>      29 May 2018 07:18  Phos  1.6       Mg     1.6         TPro  4.6<L>  /  Alb  2.3<L>  /  TBili  0.9  /  DBili  x   /  AST  38  /  ALT  42  /  AlkPhos  121<H>      PT/INR - ( 29 May 2018 07:18 )   PT: 16.2 sec;   INR: 1.47 ratio         PTT - ( 29 May 2018 07:18 )  PTT:31.2 sec  Urinalysis Basic - ( 27 May 2018 20:11 )    Color: Yellow / Appearance: SL Turbid / S.026 / pH: x  Gluc: x / Ketone: Negative  / Bili: Negative / Urobili: Negative   Blood: x / Protein: 100 mg/dL / Nitrite: Negative   Leuk Esterase: Large / RBC: 2-5 /HPF / WBC >50 /HPF   Sq Epi: x / Non Sq Epi: x / Bacteria: x      Arterial Blood Gas:   @ 02:23  7.35/43/204/23/100/-1.7  ABG lactate: --    Venous Blood Gas:   @ 20:36  7.22/69/36/27/49  VBG Lactate: 1.0    CAPILLARY BLOOD GLUCOSE    MICROBIOLOGY:     RADIOLOGY:  [ ] Reviewed and interpreted by me    Mode: AC/ CMV (Assist Control/ Continuous Mandatory Ventilation)  RR (machine): 16  TV (machine): 500  FiO2: 30  PEEP: 5  ITime: 1  MAP: 10  PIP: 24 Patient is a 77y old  Male who presents with a chief complaint of Admitted with fever (27 May 2018 20:28)      Interval Events:    REVIEW OF SYSTEMS:  [x ] Positive- itchy eyes  [ ] All other systems negative  [ ] Unable to assess ROS because _______    Vital Signs Last 24 Hrs  T(C): 36.5 (18 @ 05:08), Max: 37.3 (18 @ 09:30)  T(F): 97.7 (18 @ 05:08), Max: 99.1 (18 @ 09:30)  HR: 66 (18 @ 05:08) (60 - 630)  BP: 112/62 (18 @ 05:08) (98/59 - 155/73)  RR: 18 (18 @ 05:08) (18 - 116)  SpO2: 100% (18 @ 05:08) (98% - 100%)    PHYSICAL EXAM:  HEENT:   [x ]Tracheostomy: 7 portex cuffed  [ ]Pupils equal  [ ]No oral lesions  [ ]Abnormal- eye reddned with matter    SKIN  [ ]No Rash  [x ] Abnormal- left and right buttock wound  [ ] pressure    CARDIAC  [ x]Regular  [ ]Abnormal    PULMONARY  [ ]Bilateral Clear Breath Sounds  [ ]Normal Excursion  [x ]Abnormal- coarse bs b/l    GI  [x ]PEG      [x ] +BS		              [x ]Soft, nondistended, nontender	  [ ]Abnormal    MUSCULOSKELETAL                                   [ ]Bedbound                 [ ]Abnormal    [x ]Ambulatory/OOB to chair                           EXTREMITIES                                         [ ]Normal  [x ]Edema   1+                        NEUROLOGIC  [ ] Normal, non focal  [x ] Focal findings: generalized weakness    PSYCHIATRIC  [x ]Alert and appropriate  [ ] Sedated	 [ ]Agitated    :  Ybarra: [ x] Yes, if yes: Date of Placement:                   [  ] No    LINES: Central Lines [ ] Yes, if yes: Date of Placement                                     [ x ] No    HOSPITAL MEDICATIONS:  MEDICATIONS  (STANDING):  aspirin  chewable 81 milliGRAM(s) Oral daily  azithromycin  IVPB 500 milliGRAM(s) IV Intermittent every 24 hours  clonazePAM Tablet 1 milliGRAM(s) Oral daily  clopidogrel Tablet 75 milliGRAM(s) Oral daily  dextrose 5% + sodium chloride 0.45%. 1000 milliLiter(s) (75 mL/Hr) IV Continuous <Continuous>  dextrose 5%. 1000 milliLiter(s) (50 mL/Hr) IV Continuous <Continuous>  dextrose 50% Injectable 12.5 Gram(s) IV Push once  dextrose 50% Injectable 25 Gram(s) IV Push once  dextrose 50% Injectable 25 Gram(s) IV Push once  doxazosin 2 milliGRAM(s) Oral at bedtime  heparin  Injectable 5000 Unit(s) SubCutaneous every 8 hours  insulin lispro (HumaLOG) corrective regimen sliding scale   SubCutaneous every 6 hours  metoprolol tartrate 100 milliGRAM(s) Oral two times a day  pantoprazole  Injectable 40 milliGRAM(s) IV Push daily  piperacillin/tazobactam IVPB. 3.375 Gram(s) IV Intermittent every 8 hours  vancomycin  IVPB 1000 milliGRAM(s) IV Intermittent every 12 hours    MEDICATIONS  (PRN):  dextrose 40% Gel 15 Gram(s) Oral once PRN Blood Glucose LESS THAN 70 milliGRAM(s)/deciLiter  glucagon  Injectable 1 milliGRAM(s) IntraMuscular once PRN Glucose <70 milliGRAM(s)/deciLiter  melatonin 3 milliGRAM(s) Oral at bedtime PRN Insomnia  QUEtiapine 50 milliGRAM(s) Oral every 6 hours PRN break through aggitation      LABS:                        9.4    9.2   )-----------( 241      ( 29 May 2018 08:49 )             29.5     -    113<LL>  |  84<L>  |  23  ----------------------------<  958<HH>  3.2<L>   |  21<L>  |  0.59    Ca    7.3<L>      29 May 2018 07:18  Phos  1.6       Mg     1.6         TPro  4.6<L>  /  Alb  2.3<L>  /  TBili  0.9  /  DBili  x   /  AST  38  /  ALT  42  /  AlkPhos  121<H>      PT/INR - ( 29 May 2018 07:18 )   PT: 16.2 sec;   INR: 1.47 ratio         PTT - ( 29 May 2018 07:18 )  PTT:31.2 sec  Urinalysis Basic - ( 27 May 2018 20:11 )    Color: Yellow / Appearance: SL Turbid / S.026 / pH: x  Gluc: x / Ketone: Negative  / Bili: Negative / Urobili: Negative   Blood: x / Protein: 100 mg/dL / Nitrite: Negative   Leuk Esterase: Large / RBC: 2-5 /HPF / WBC >50 /HPF   Sq Epi: x / Non Sq Epi: x / Bacteria: x      Arterial Blood Gas:   @ 02:23  7.35/43/204/23/100/-1.7  ABG lactate: --    Venous Blood Gas:   @ 20:36  7.22/69/36/27/49  VBG Lactate: 1.0    CAPILLARY BLOOD GLUCOSE    MICROBIOLOGY:     RADIOLOGY:  [ ] Reviewed and interpreted by me    Mode: AC/ CMV (Assist Control/ Continuous Mandatory Ventilation)  RR (machine): 16  TV (machine): 500  FiO2: 30  PEEP: 5  ITime: 1  MAP: 10  PIP: 24 Patient is a 77y old  Male who presents with a chief complaint of Admitted with fever (27 May 2018 20:28)      Interval Events: No acute events overnight     REVIEW OF SYSTEMS:  [x ] Positive- itchy eyes  [ ] All other systems negative  [ ] Unable to assess ROS because _______    Vital Signs Last 24 Hrs  T(C): 36.5 (18 @ 05:08), Max: 37.3 (18 @ 09:30)  T(F): 97.7 (18 @ 05:08), Max: 99.1 (18 @ 09:30)  HR: 66 (18 @ 05:08) (60 - 630)  BP: 112/62 (18 @ 05:08) (98/59 - 155/73)  RR: 18 (18 @ 05:08) (18 - 116)  SpO2: 100% (18 @ 05:08) (98% - 100%)    PHYSICAL EXAM:  HEENT:   [x ]Tracheostomy: 7 portex cuffed  [x ]Pupils equal  [ x]No oral lesions  [ ]Abnormal- eye reddned with matter    SKIN  [ ]No Rash  [x ] Abnormal- left and right buttock wound  [ ] pressure    CARDIAC  [ x]Regular  [ ]Abnormal    PULMONARY  [ ]Bilateral Clear Breath Sounds  [ ]Normal Excursion  [x ]Abnormal- coarse bs b/l    GI  [x ]PEG      [x ] +BS		              [x ]Soft, nondistended, nontender	  [ ]Abnormal    MUSCULOSKELETAL                                   [ ]Bedbound                 [ ]Abnormal    [x ]Ambulatory/OOB to chair                           EXTREMITIES                                         [ ]Normal  [x ]Edema   1+                        NEUROLOGIC  [ ] Normal, non focal  [x ] Focal findings: generalized weakness    PSYCHIATRIC  [x ]Alert and appropriate  [ ] Sedated	 [ ]Agitated    :  Ybarra: [ x] Yes, if yes: Date of Placement:                   [  ] No    LINES: Central Lines [ ] Yes, if yes: Date of Placement                                     [ x ] No    HOSPITAL MEDICATIONS:  MEDICATIONS  (STANDING):  aspirin  chewable 81 milliGRAM(s) Oral daily  azithromycin  IVPB 500 milliGRAM(s) IV Intermittent every 24 hours  clonazePAM Tablet 1 milliGRAM(s) Oral daily  clopidogrel Tablet 75 milliGRAM(s) Oral daily  dextrose 5% + sodium chloride 0.45%. 1000 milliLiter(s) (75 mL/Hr) IV Continuous <Continuous>  dextrose 5%. 1000 milliLiter(s) (50 mL/Hr) IV Continuous <Continuous>  dextrose 50% Injectable 12.5 Gram(s) IV Push once  dextrose 50% Injectable 25 Gram(s) IV Push once  dextrose 50% Injectable 25 Gram(s) IV Push once  doxazosin 2 milliGRAM(s) Oral at bedtime  heparin  Injectable 5000 Unit(s) SubCutaneous every 8 hours  insulin lispro (HumaLOG) corrective regimen sliding scale   SubCutaneous every 6 hours  metoprolol tartrate 100 milliGRAM(s) Oral two times a day  pantoprazole  Injectable 40 milliGRAM(s) IV Push daily  piperacillin/tazobactam IVPB. 3.375 Gram(s) IV Intermittent every 8 hours  vancomycin  IVPB 1000 milliGRAM(s) IV Intermittent every 12 hours    MEDICATIONS  (PRN):  dextrose 40% Gel 15 Gram(s) Oral once PRN Blood Glucose LESS THAN 70 milliGRAM(s)/deciLiter  glucagon  Injectable 1 milliGRAM(s) IntraMuscular once PRN Glucose <70 milliGRAM(s)/deciLiter  melatonin 3 milliGRAM(s) Oral at bedtime PRN Insomnia  QUEtiapine 50 milliGRAM(s) Oral every 6 hours PRN break through aggitation      LABS:                        9.4    9.2   )-----------( 241      ( 29 May 2018 08:49 )             29.5         113<LL>  |  84<L>  |  23  ----------------------------<  958<HH>  3.2<L>   |  21<L>  |  0.59    Ca    7.3<L>      29 May 2018 07:18  Phos  1.6       Mg     1.6         TPro  4.6<L>  /  Alb  2.3<L>  /  TBili  0.9  /  DBili  x   /  AST  38  /  ALT  42  /  AlkPhos  121<H>      PT/INR - ( 29 May 2018 07:18 )   PT: 16.2 sec;   INR: 1.47 ratio         PTT - ( 29 May 2018 07:18 )  PTT:31.2 sec  Urinalysis Basic - ( 27 May 2018 20:11 )    Color: Yellow / Appearance: SL Turbid / S.026 / pH: x  Gluc: x / Ketone: Negative  / Bili: Negative / Urobili: Negative   Blood: x / Protein: 100 mg/dL / Nitrite: Negative   Leuk Esterase: Large / RBC: 2-5 /HPF / WBC >50 /HPF   Sq Epi: x / Non Sq Epi: x / Bacteria: x      Arterial Blood Gas:   @ 02:23  7.35/43/204/23/100/-1.7  ABG lactate: --    Venous Blood Gas:   @ 20:36  7.22/69/36/27/49  VBG Lactate: 1.0    CAPILLARY BLOOD GLUCOSE    MICROBIOLOGY:     RADIOLOGY:  [ ] Reviewed and interpreted by me    Mode: AC/ CMV (Assist Control/ Continuous Mandatory Ventilation)  RR (machine): 16  TV (machine): 500  FiO2: 30  PEEP: 5  ITime: 1  MAP: 10  PIP: 24

## 2018-05-29 NOTE — PROGRESS NOTE ADULT - PROBLEM SELECTOR PLAN 7
Patient currently NPO  Continue to monitor Blood glucose levels Continue to monitor Blood glucose levels

## 2018-05-29 NOTE — PHYSICAL THERAPY INITIAL EVALUATION ADULT - CRITERIA FOR SKILLED THERAPEUTIC INTERVENTIONS
therapy frequency/anticipated equipment needs at discharge/functional limitations in following categories/predicted duration of therapy intervention/risk reduction/prevention/rehab potential/anticipated discharge recommendation/impairments found

## 2018-05-29 NOTE — PROGRESS NOTE ADULT - PROBLEM SELECTOR PLAN 2
Wife Reports + Blood Cxs reported from [facility]  Message left with Medical Records, will F/u to obtain Bld cx report  Repeat Blood Cx sent  ( Pending )   Patient with + Urinalaysis, Urine Cx sent ( Pending)   Sputum Cx ordered ( Pending )   CT Chest Non Contrast and CT Abdomen and pelvis with po and IV contrast ordered as per Dr. Trevino to rule out infectious source   Continue Zosyn and Azithro  Vanco level Subtherapeutic will change dose to 1000 mg q 12 hrs Wife Reports + Blood Cxs reported from [facility]  Message left with Medical Records, will F/u to obtain Bld cx report   Blood Cx sent  1/2 GPC- Repeat sent this am  Patient with + Urinalaysis, Urine Cx negative to date   Sputum Cx ordered ( Pending )   CT Chest Non Contrast and CT Abdomen and pelvis with po and IV contrast ordered as per Dr. Trevino to rule out infectious source   Continue Zosyn and Azithro  Vanco level Subtherapeutic will change dose to 1000 mg q 12 hrs

## 2018-05-29 NOTE — PHYSICAL THERAPY INITIAL EVALUATION ADULT - TRANSFER TRAINING, PT EVAL
GOAL: Pt will perform ALL transfers with min assist x1, w/use of appropriate assistive device as needed, in 4 weeks.

## 2018-05-29 NOTE — PHYSICAL THERAPY INITIAL EVALUATION ADULT - DISCHARGE DISPOSITION, PT EVAL
to be determined following completion of functional eval for transfers and ambulation/rehabilitation facility

## 2018-05-29 NOTE — PHYSICAL THERAPY INITIAL EVALUATION ADULT - BALANCE TRAINING, PT EVAL
GOAL: Pt will demonstrate improved static/dynamic sitting balance to good, in order to improve stability, decrease fall risk and increase independence with transfers & ADL's within 4 weeks.
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
## 2018-05-29 NOTE — PROGRESS NOTE ADULT - PROBLEM SELECTOR PLAN 4
Patient with Hx of Ileus last admission   Patient with slightly protuberant abdomen on PE  Will await Abdominal Imaging prior to initiating feeds   Continue D5 1/2 NS @ 45 cc/hr Patient with Hx of Ileus last admission   Will initiate tube feeds this am

## 2018-05-29 NOTE — PHYSICAL THERAPY INITIAL EVALUATION ADULT - PRECAUTIONS/LIMITATIONS, REHAB EVAL
During hospitalization the patient was s/p glue & coil embolization of the left inferior epigastric artery, left pubic rami supply from a distal branch of the CFA, left internal iliac artery branches, right inferior epigastric artery, and distal main splenic artery.  (+)  hematothorax s/p chest tube placement respiratory failure s/p tracheostomy recent tracheo bronchitis S. Aureus and (+) Illeus.  Now presenting from [facility] after leaving AMA with (+) fevers found to have septic shock and PNA per [facility] records/fall precautions

## 2018-05-29 NOTE — PROGRESS NOTE ADULT - ATTENDING COMMENTS
Pt seen and examined.   1. Chronic resp failure with hypoxia:  - Cont vent support at current settings.   - Pulm toilet, weaning trials as tolerated   2. Fever:  - ? source (PNA vs GI source), FUP CXs  - Cont broad spectrum antibiotics for now  3. Oropharyngeal dysphagia:  - Cont tube feeds
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
## 2018-05-29 NOTE — PHYSICAL THERAPY INITIAL EVALUATION ADULT - DIAGNOSIS, PT EVAL
decreased functional mobility secondary to generalized weakness, impaired balance, abnormal tone, impaired cognition

## 2018-05-30 LAB
ALBUMIN SERPL ELPH-MCNC: 3.1 G/DL — LOW (ref 3.3–5)
ALP SERPL-CCNC: 154 U/L — HIGH (ref 40–120)
ALT FLD-CCNC: 54 U/L — HIGH (ref 10–45)
ANION GAP SERPL CALC-SCNC: 11 MMOL/L — SIGNIFICANT CHANGE UP (ref 5–17)
APTT BLD: 29 SEC — SIGNIFICANT CHANGE UP (ref 27.5–37.4)
AST SERPL-CCNC: 43 U/L — HIGH (ref 10–40)
BILIRUB SERPL-MCNC: 0.7 MG/DL — SIGNIFICANT CHANGE UP (ref 0.2–1.2)
BUN SERPL-MCNC: 25 MG/DL — HIGH (ref 7–23)
CALCIUM SERPL-MCNC: 9.6 MG/DL — SIGNIFICANT CHANGE UP (ref 8.4–10.5)
CHLORIDE SERPL-SCNC: 110 MMOL/L — HIGH (ref 96–108)
CO2 SERPL-SCNC: 25 MMOL/L — SIGNIFICANT CHANGE UP (ref 22–31)
CREAT SERPL-MCNC: 0.68 MG/DL — SIGNIFICANT CHANGE UP (ref 0.5–1.3)
GLUCOSE BLDC GLUCOMTR-MCNC: 165 MG/DL — HIGH (ref 70–99)
GLUCOSE BLDC GLUCOMTR-MCNC: 173 MG/DL — HIGH (ref 70–99)
GLUCOSE BLDC GLUCOMTR-MCNC: 189 MG/DL — HIGH (ref 70–99)
GLUCOSE SERPL-MCNC: 131 MG/DL — HIGH (ref 70–99)
HCT VFR BLD CALC: 29 % — LOW (ref 39–50)
HGB BLD-MCNC: 9.3 G/DL — LOW (ref 13–17)
INR BLD: 1.09 RATIO — SIGNIFICANT CHANGE UP (ref 0.88–1.16)
LEGIONELLA AG UR QL: NEGATIVE — SIGNIFICANT CHANGE UP
MAGNESIUM SERPL-MCNC: 2.1 MG/DL — SIGNIFICANT CHANGE UP (ref 1.6–2.6)
MCHC RBC-ENTMCNC: 32.1 GM/DL — SIGNIFICANT CHANGE UP (ref 32–36)
MCHC RBC-ENTMCNC: 32.8 PG — SIGNIFICANT CHANGE UP (ref 27–34)
MCV RBC AUTO: 102 FL — HIGH (ref 80–100)
PHOSPHATE SERPL-MCNC: 2.9 MG/DL — SIGNIFICANT CHANGE UP (ref 2.5–4.5)
PLATELET # BLD AUTO: 255 K/UL — SIGNIFICANT CHANGE UP (ref 150–400)
POTASSIUM SERPL-MCNC: 4.4 MMOL/L — SIGNIFICANT CHANGE UP (ref 3.5–5.3)
POTASSIUM SERPL-SCNC: 4.4 MMOL/L — SIGNIFICANT CHANGE UP (ref 3.5–5.3)
PROT SERPL-MCNC: 6.2 G/DL — SIGNIFICANT CHANGE UP (ref 6–8.3)
PROTHROM AB SERPL-ACNC: 11.8 SEC — SIGNIFICANT CHANGE UP (ref 9.8–12.7)
RBC # BLD: 2.83 M/UL — LOW (ref 4.2–5.8)
RBC # FLD: 13.4 % — SIGNIFICANT CHANGE UP (ref 10.3–14.5)
SODIUM SERPL-SCNC: 146 MMOL/L — HIGH (ref 135–145)
WBC # BLD: 8.5 K/UL — SIGNIFICANT CHANGE UP (ref 3.8–10.5)
WBC # FLD AUTO: 8.5 K/UL — SIGNIFICANT CHANGE UP (ref 3.8–10.5)

## 2018-05-30 PROCEDURE — 93970 EXTREMITY STUDY: CPT | Mod: 26

## 2018-05-30 PROCEDURE — 99233 SBSQ HOSP IP/OBS HIGH 50: CPT | Mod: GC

## 2018-05-30 PROCEDURE — 99233 SBSQ HOSP IP/OBS HIGH 50: CPT

## 2018-05-30 RX ORDER — AMPICILLIN SODIUM AND SULBACTAM SODIUM 250; 125 MG/ML; MG/ML
INJECTION, POWDER, FOR SUSPENSION INTRAMUSCULAR; INTRAVENOUS
Qty: 0 | Refills: 0 | Status: DISCONTINUED | OUTPATIENT
Start: 2018-05-30 | End: 2018-05-31

## 2018-05-30 RX ORDER — AMPICILLIN SODIUM AND SULBACTAM SODIUM 250; 125 MG/ML; MG/ML
3 INJECTION, POWDER, FOR SUSPENSION INTRAMUSCULAR; INTRAVENOUS ONCE
Qty: 0 | Refills: 0 | Status: COMPLETED | OUTPATIENT
Start: 2018-05-30 | End: 2018-05-30

## 2018-05-30 RX ORDER — AMPICILLIN SODIUM AND SULBACTAM SODIUM 250; 125 MG/ML; MG/ML
3 INJECTION, POWDER, FOR SUSPENSION INTRAMUSCULAR; INTRAVENOUS EVERY 6 HOURS
Qty: 0 | Refills: 0 | Status: DISCONTINUED | OUTPATIENT
Start: 2018-05-31 | End: 2018-05-31

## 2018-05-30 RX ADMIN — Medication 81 MILLIGRAM(S): at 14:21

## 2018-05-30 RX ADMIN — PANTOPRAZOLE SODIUM 40 MILLIGRAM(S): 20 TABLET, DELAYED RELEASE ORAL at 14:21

## 2018-05-30 RX ADMIN — PIPERACILLIN AND TAZOBACTAM 25 GRAM(S): 4; .5 INJECTION, POWDER, LYOPHILIZED, FOR SOLUTION INTRAVENOUS at 05:59

## 2018-05-30 RX ADMIN — Medication 250 MILLIGRAM(S): at 06:00

## 2018-05-30 RX ADMIN — Medication 1: at 06:31

## 2018-05-30 RX ADMIN — Medication 1: at 00:18

## 2018-05-30 RX ADMIN — HEPARIN SODIUM 5000 UNIT(S): 5000 INJECTION INTRAVENOUS; SUBCUTANEOUS at 14:24

## 2018-05-30 RX ADMIN — AZITHROMYCIN 250 MILLIGRAM(S): 500 TABLET, FILM COATED ORAL at 18:43

## 2018-05-30 RX ADMIN — Medication 1 DROP(S): at 00:17

## 2018-05-30 RX ADMIN — Medication 2 MILLIGRAM(S): at 22:12

## 2018-05-30 RX ADMIN — Medication 250 MILLIGRAM(S): at 17:12

## 2018-05-30 RX ADMIN — Medication 100 MILLIGRAM(S): at 06:00

## 2018-05-30 RX ADMIN — Medication 1 DROP(S): at 03:36

## 2018-05-30 RX ADMIN — Medication 1 DROP(S): at 14:25

## 2018-05-30 RX ADMIN — Medication 1 MILLIGRAM(S): at 14:21

## 2018-05-30 RX ADMIN — Medication 1 DROP(S): at 22:12

## 2018-05-30 RX ADMIN — Medication 100 MILLIGRAM(S): at 17:12

## 2018-05-30 RX ADMIN — Medication 1 DROP(S): at 09:46

## 2018-05-30 RX ADMIN — Medication 1: at 18:40

## 2018-05-30 RX ADMIN — Medication 1: at 14:30

## 2018-05-30 RX ADMIN — HEPARIN SODIUM 5000 UNIT(S): 5000 INJECTION INTRAVENOUS; SUBCUTANEOUS at 06:00

## 2018-05-30 RX ADMIN — Medication 1 DROP(S): at 17:12

## 2018-05-30 RX ADMIN — PIPERACILLIN AND TAZOBACTAM 25 GRAM(S): 4; .5 INJECTION, POWDER, LYOPHILIZED, FOR SOLUTION INTRAVENOUS at 14:22

## 2018-05-30 RX ADMIN — CLOPIDOGREL BISULFATE 75 MILLIGRAM(S): 75 TABLET, FILM COATED ORAL at 14:21

## 2018-05-30 RX ADMIN — Medication 1 DROP(S): at 12:47

## 2018-05-30 RX ADMIN — Medication 1 DROP(S): at 06:00

## 2018-05-30 RX ADMIN — AMPICILLIN SODIUM AND SULBACTAM SODIUM 200 GRAM(S): 250; 125 INJECTION, POWDER, FOR SUSPENSION INTRAMUSCULAR; INTRAVENOUS at 20:30

## 2018-05-30 RX ADMIN — Medication 1 DROP(S): at 00:19

## 2018-05-30 RX ADMIN — HEPARIN SODIUM 5000 UNIT(S): 5000 INJECTION INTRAVENOUS; SUBCUTANEOUS at 22:11

## 2018-05-30 NOTE — PROGRESS NOTE ADULT - PROBLEM SELECTOR PLAN 2
Wife Reports + Blood Cxs reported from [facility]  Message left with Medical Records, will F/u to obtain Bld cx report   Blood Cx sent  1/2 GPC- Repeat sent this am  Patient with + Urinalaysis, Urine Cx negative to date   Sputum Cx ordered ( Pending )   CT Chest Non Contrast and CT Abdomen and pelvis with po and IV contrast ordered as per Dr. Trevino to rule out infectious source   Continue Zosyn and Azithro  Vanco level Subtherapeutic will change dose to 1000 mg q 12 hrs

## 2018-05-30 NOTE — OCCUPATIONAL THERAPY INITIAL EVALUATION ADULT - BALANCE TRAINING, PT EVAL
Pt will demonstrate improved static/dynamic balance to fair + in order to increase safety and independence in ADLs within 4 weeks

## 2018-05-30 NOTE — PROGRESS NOTE ADULT - SUBJECTIVE AND OBJECTIVE BOX
Patient is a 77y old  Male who presents with a chief complaint of Admitted with fever (27 May 2018 20:28)      Interval Events:    REVIEW OF SYSTEMS:  [ ] Positive  [ ] All other systems negative  [x ] Unable to assess ROS because ________    Vital Signs Last 24 Hrs  T(C): 36.8 (05-30-18 @ 08:58), Max: 36.9 (05-29-18 @ 18:03)  T(F): 98.3 (05-30-18 @ 08:58), Max: 98.5 (05-29-18 @ 18:03)  HR: 71 (05-30-18 @ 11:31) (60 - 80)  BP: 132/81 (05-30-18 @ 08:58) (124/65 - 159/72)  RR: 21 (05-30-18 @ 08:37) (18 - 21)  SpO2: 100% (05-30-18 @ 11:31) (87% - 100%)    PHYSICAL EXAM:  HEENT:   [x ]Tracheostomy: 7 portex cuffed  [ ]Pupils equal  [ ]No oral lesions  [ ]Abnormal    SKIN  [ ]No Rash  [ x] Abnormal left and right buttock wound  [ ] pressure    CARDIAC  [x ]Regular  [ ]Abnormal    PULMONARY  [x ]Bilateral Clear Breath Sounds  [ ]Normal Excursion  [ ]Abnormal    GI  [ x]PEG      [ x] +BS		              [ x]Soft, nondistended, nontender	  [ ]Abnormal    MUSCULOSKELETAL                                   [ ]Bedbound                 [ ]Abnormal    [x ]Ambulatory/OOB to chair                           EXTREMITIES                                         [ ]Normal  [x ]Edema 1+                          NEUROLOGIC  [x ] Normal, non focal  [ ] Focal findings:    PSYCHIATRIC  [ x]Alert and appropriate  [ ] Sedated	 [ ]Agitated    :  Ybarra: [ ] Yes, if yes: Date of Placement:                   [x  ] No    LINES: Central Lines [ ] Yes, if yes: Date of Placement                                     [ x ] No    HOSPITAL MEDICATIONS:  MEDICATIONS  (STANDING):  aspirin  chewable 81 milliGRAM(s) Oral daily  azithromycin  IVPB 500 milliGRAM(s) IV Intermittent every 24 hours  clonazePAM Tablet 1 milliGRAM(s) Oral daily  clopidogrel Tablet 75 milliGRAM(s) Oral daily  dextrose 5% + sodium chloride 0.45%. 1000 milliLiter(s) (75 mL/Hr) IV Continuous <Continuous>  dextrose 5%. 1000 milliLiter(s) (50 mL/Hr) IV Continuous <Continuous>  dextrose 50% Injectable 12.5 Gram(s) IV Push once  dextrose 50% Injectable 25 Gram(s) IV Push once  dextrose 50% Injectable 25 Gram(s) IV Push once  doxazosin 2 milliGRAM(s) Oral at bedtime  heparin  Injectable 5000 Unit(s) SubCutaneous every 8 hours  insulin lispro (HumaLOG) corrective regimen sliding scale   SubCutaneous every 6 hours  metoprolol tartrate 100 milliGRAM(s) Oral two times a day  ofloxacin 0.3% Solution 1 Drop(s) Both EYES every 3 hours  pantoprazole  Injectable 40 milliGRAM(s) IV Push daily  piperacillin/tazobactam IVPB. 3.375 Gram(s) IV Intermittent every 8 hours  vancomycin  IVPB 1000 milliGRAM(s) IV Intermittent every 12 hours    MEDICATIONS  (PRN):  dextrose 40% Gel 15 Gram(s) Oral once PRN Blood Glucose LESS THAN 70 milliGRAM(s)/deciLiter  glucagon  Injectable 1 milliGRAM(s) IntraMuscular once PRN Glucose <70 milliGRAM(s)/deciLiter  melatonin 3 milliGRAM(s) Oral at bedtime PRN Insomnia  QUEtiapine 50 milliGRAM(s) Oral every 6 hours PRN break through aggitation      LABS:                        9.3    8.5   )-----------( 255      ( 30 May 2018 07:07 )             29.0     05-30    146<H>  |  110<H>  |  25<H>  ----------------------------<  131<H>  4.4   |  25  |  0.68    Ca    9.6      30 May 2018 07:06  Phos  2.9     05-30  Mg     2.1     05-30    TPro  6.2  /  Alb  3.1<L>  /  TBili  0.7  /  DBili  x   /  AST  43<H>  /  ALT  54<H>  /  AlkPhos  154<H>  05-30    PT/INR - ( 30 May 2018 07:06 )   PT: 11.8 sec;   INR: 1.09 ratio         PTT - ( 30 May 2018 07:06 )  PTT:29.0 sec        CAPILLARY BLOOD GLUCOSE    MICROBIOLOGY:     RADIOLOGY:  [ ] Reviewed and interpreted by me    Mode: AC/ CMV (Assist Control/ Continuous Mandatory Ventilation)  RR (machine): 16  TV (machine): 500  FiO2: 30  PEEP: 5  ITime: 1  MAP: 14  PIP: 29 Patient is a 77y old  Male who presents with a chief complaint of Admitted with fever (27 May 2018 20:28)      Interval Events: No acute events overnight.     REVIEW OF SYSTEMS:  [ ] Positive  [ ] All other systems negative  [x ] Unable to assess ROS because ________    Vital Signs Last 24 Hrs  T(C): 36.8 (05-30-18 @ 08:58), Max: 36.9 (05-29-18 @ 18:03)  T(F): 98.3 (05-30-18 @ 08:58), Max: 98.5 (05-29-18 @ 18:03)  HR: 71 (05-30-18 @ 11:31) (60 - 80)  BP: 132/81 (05-30-18 @ 08:58) (124/65 - 159/72)  RR: 21 (05-30-18 @ 08:37) (18 - 21)  SpO2: 100% (05-30-18 @ 11:31) (87% - 100%)    PHYSICAL EXAM:  HEENT:   [x ]Tracheostomy: 7 portex cuffed  [x ]Pupils equal  [x ]No oral lesions  [ ]Abnormal    SKIN  [ ]No Rash  [ x] Abnormal left and right buttock wound  [ ] pressure    CARDIAC  [x ]Regular  [ ]Abnormal    PULMONARY  [x ]Bilateral Clear Breath Sounds  [ ]Normal Excursion  [ ]Abnormal    GI  [ x]PEG      [ x] +BS		              [ x]Soft, nondistended, nontender	  [ ]Abnormal    MUSCULOSKELETAL                                   [ ]Bedbound                 [ ]Abnormal    [x ]Ambulatory/OOB to chair                           EXTREMITIES                                         [ ]Normal  [x ]Edema 1+                          NEUROLOGIC  [x ] Normal, non focal  [ ] Focal findings:    PSYCHIATRIC  [ x]Alert and appropriate  [ ] Sedated	 [ ]Agitated    :  Ybarra: [ ] Yes, if yes: Date of Placement:                   [x  ] No    LINES: Central Lines [ ] Yes, if yes: Date of Placement                                     [ x ] No    HOSPITAL MEDICATIONS:  MEDICATIONS  (STANDING):  aspirin  chewable 81 milliGRAM(s) Oral daily  azithromycin  IVPB 500 milliGRAM(s) IV Intermittent every 24 hours  clonazePAM Tablet 1 milliGRAM(s) Oral daily  clopidogrel Tablet 75 milliGRAM(s) Oral daily  dextrose 5% + sodium chloride 0.45%. 1000 milliLiter(s) (75 mL/Hr) IV Continuous <Continuous>  dextrose 5%. 1000 milliLiter(s) (50 mL/Hr) IV Continuous <Continuous>  dextrose 50% Injectable 12.5 Gram(s) IV Push once  dextrose 50% Injectable 25 Gram(s) IV Push once  dextrose 50% Injectable 25 Gram(s) IV Push once  doxazosin 2 milliGRAM(s) Oral at bedtime  heparin  Injectable 5000 Unit(s) SubCutaneous every 8 hours  insulin lispro (HumaLOG) corrective regimen sliding scale   SubCutaneous every 6 hours  metoprolol tartrate 100 milliGRAM(s) Oral two times a day  ofloxacin 0.3% Solution 1 Drop(s) Both EYES every 3 hours  pantoprazole  Injectable 40 milliGRAM(s) IV Push daily  piperacillin/tazobactam IVPB. 3.375 Gram(s) IV Intermittent every 8 hours  vancomycin  IVPB 1000 milliGRAM(s) IV Intermittent every 12 hours    MEDICATIONS  (PRN):  dextrose 40% Gel 15 Gram(s) Oral once PRN Blood Glucose LESS THAN 70 milliGRAM(s)/deciLiter  glucagon  Injectable 1 milliGRAM(s) IntraMuscular once PRN Glucose <70 milliGRAM(s)/deciLiter  melatonin 3 milliGRAM(s) Oral at bedtime PRN Insomnia  QUEtiapine 50 milliGRAM(s) Oral every 6 hours PRN break through aggitation      LABS:                        9.3    8.5   )-----------( 255      ( 30 May 2018 07:07 )             29.0     05-30    146<H>  |  110<H>  |  25<H>  ----------------------------<  131<H>  4.4   |  25  |  0.68    Ca    9.6      30 May 2018 07:06  Phos  2.9     05-30  Mg     2.1     05-30    TPro  6.2  /  Alb  3.1<L>  /  TBili  0.7  /  DBili  x   /  AST  43<H>  /  ALT  54<H>  /  AlkPhos  154<H>  05-30    PT/INR - ( 30 May 2018 07:06 )   PT: 11.8 sec;   INR: 1.09 ratio         PTT - ( 30 May 2018 07:06 )  PTT:29.0 sec        CAPILLARY BLOOD GLUCOSE    MICROBIOLOGY:     RADIOLOGY:  [ ] Reviewed and interpreted by me    Mode: AC/ CMV (Assist Control/ Continuous Mandatory Ventilation)  RR (machine): 16  TV (machine): 500  FiO2: 30  PEEP: 5  ITime: 1  MAP: 14  PIP: 29

## 2018-05-30 NOTE — CHART NOTE - NSCHARTNOTEFT_GEN_A_CORE
May 30, 2018      To whom it may Concern:      Mr. Dayton Hinds was hospitalized at your facility on 5/27/2018. Please release his blood cultures result dated 5/27/2018 and fax it to [phone].   Mrs. Tiana Hinds had already called your medical record dept and given her verbal permission to release Mr. Hinds's record.     Thank you in advance for your assistance.     Sincerely yours,     Augusto Dodge NP  RCU Nurse Practitioner   [facility]   [phone] May 30, 2018      To whom it may Concern:      Mr. Dayton Hinds was hospitalized at your facility on 5/27/2018. Please release his blood cultures result dated 5/27/2018 and fax it to [phone].   Mrs. Tiana Hinds had already called your medical record dept and given her verbal permission to release Mr. Hinds's record.     Thank you in advance for your assistance.     Sincerely yours,     Augusto Dodge NP  RCU Nurse Practitioner   [facility]   [phone]    5/30 554pm  Received record from  [facility], will place in chart once reviewed by team May 30, 2018      To whom it may Concern:      Mr. Dayton Hinds was hospitalized at your facility on 5/27/2018. Please release his blood cultures result dated 5/27/2018 and fax it to [phone].   Mrs. Tiana Hinds had already called your medical record dept and given her verbal permission to release Mr. Hinds's record.     Thank you in advance for your assistance.     Sincerely yours,     Augusto Dodge NP  RCU Nurse Practitioner   [facility]   [phone]    5/30 554pm  Received record from  [facility], will place in chart May 30, 2018      To whom it may Concern:      Mr. Dayton Hinds was hospitalized at your facility on 5/27/2018. Please release his blood cultures result dated 5/27/2018 and fax it to [phone].   Mrs. Tiana Hinds had already called your medical record dept and given her verbal permission to release Mr. Hinds's record.     Thank you in advance for your assistance.     Sincerely yours,     Augusto Dodge NP  RCU Nurse Practitioner   [facility]   [phone]    5/30 609 pm addendum   Received record from  [facility], will place in chart  + bld cx 5/27 for acinebacter  baumanni, resistant to Zosyn and sensitive to UNasyn, gent and tobramycin  also + CNS in bld cx same day, possible contaminant       dc Zosyn and VAnco per Dr Benites  added Unasyn 3g q6h

## 2018-05-30 NOTE — OCCUPATIONAL THERAPY INITIAL EVALUATION ADULT - LIVES WITH, PROFILE
Pt with recent hospital admission and discharge to rehab facility where he required assistance for all ADLs, prior to previous admission pt lives in private home with wife, I In ADLs and ambulation/spouse

## 2018-05-30 NOTE — PROGRESS NOTE ADULT - ATTENDING COMMENTS
Pt seen and examined.   1. Chronic resp failure with hypoxia:  - Cont vent support at current settings.   - Cont aggressive pulm toilet  - Not tolerating weaning even at high levels of PS  2. Fever:  - Reportedly Bcxs from [facility] show coag - staph and acinetobacter  - FUP repeat BCxs  - Cont IV Zosyn, can d-c Vanc/ azithro  - Source could be the lungs as he does have scattered areas of b/l GGOs  - Appreciate ID follow up   3. Oropharyngeal dysphagia:  - Cont tube feeds  4. Gen:  - Overall prognosis guarded.

## 2018-05-30 NOTE — CHART NOTE - NSCHARTNOTEFT_GEN_A_CORE
Patient is s/p fall with Pubic Ramus Fracture  Okay to progress weight bearing to weight bearing as tolerated  Call if any questions or concerns

## 2018-05-30 NOTE — OCCUPATIONAL THERAPY INITIAL EVALUATION ADULT - PLANNED THERAPY INTERVENTIONS, OT EVAL
bed mobility training/cognitive, visual perceptual/transfer training/strengthening/ADL retraining/balance training

## 2018-05-30 NOTE — PROGRESS NOTE ADULT - PROBLEM SELECTOR PLAN 2
-Coag neg staph here likely contaminant  -cont zosyn  -DC vanco    Spoke to micro dept at [facility] - bcx from 5/27 grew Coag neg staph and Acinetobacter baumannii     -Acinetobacter source? - possibly respiratory
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
## 2018-05-30 NOTE — PROGRESS NOTE ADULT - ATTENDING COMMENTS
Mickey Benites  Attending Physician   Division of Infectious Disease  Pager #[phone]  After 5pm/weekend or no response, call #[phone]

## 2018-05-30 NOTE — PROGRESS NOTE ADULT - SUBJECTIVE AND OBJECTIVE BOX
NANO SZYMANSKI 77y [id number]    Patient is a 77y old  Male who presents with a chief complaint of Admitted with fever (27 May 2018 20:28)      Follow Up/CC:  ID following for fever    Interval History/ROS: no fever today    Allergies    No Known Allergies    Intolerances        ANTIMICROBIALS:  azithromycin  IVPB 500 every 24 hours  piperacillin/tazobactam IVPB. 3.375 every 8 hours  vancomycin  IVPB 1000 every 12 hours      MEDICATIONS  (STANDING):  aspirin  chewable 81 milliGRAM(s) Oral daily  azithromycin  IVPB 500 milliGRAM(s) IV Intermittent every 24 hours  clonazePAM Tablet 1 milliGRAM(s) Oral daily  clopidogrel Tablet 75 milliGRAM(s) Oral daily  dextrose 5% + sodium chloride 0.45%. 1000 milliLiter(s) (75 mL/Hr) IV Continuous <Continuous>  dextrose 5%. 1000 milliLiter(s) (50 mL/Hr) IV Continuous <Continuous>  dextrose 50% Injectable 12.5 Gram(s) IV Push once  dextrose 50% Injectable 25 Gram(s) IV Push once  dextrose 50% Injectable 25 Gram(s) IV Push once  doxazosin 2 milliGRAM(s) Oral at bedtime  heparin  Injectable 5000 Unit(s) SubCutaneous every 8 hours  insulin lispro (HumaLOG) corrective regimen sliding scale   SubCutaneous every 6 hours  metoprolol tartrate 100 milliGRAM(s) Oral two times a day  ofloxacin 0.3% Solution 1 Drop(s) Both EYES every 3 hours  pantoprazole  Injectable 40 milliGRAM(s) IV Push daily  piperacillin/tazobactam IVPB. 3.375 Gram(s) IV Intermittent every 8 hours  vancomycin  IVPB 1000 milliGRAM(s) IV Intermittent every 12 hours    MEDICATIONS  (PRN):  dextrose 40% Gel 15 Gram(s) Oral once PRN Blood Glucose LESS THAN 70 milliGRAM(s)/deciLiter  glucagon  Injectable 1 milliGRAM(s) IntraMuscular once PRN Glucose <70 milliGRAM(s)/deciLiter  melatonin 3 milliGRAM(s) Oral at bedtime PRN Insomnia  QUEtiapine 50 milliGRAM(s) Oral every 6 hours PRN break through aggitation        Vital Signs Last 24 Hrs  T(C): 36.8 (30 May 2018 14:17), Max: 36.9 (29 May 2018 18:03)  T(F): 98.3 (30 May 2018 14:17), Max: 98.5 (29 May 2018 18:03)  HR: 73 (30 May 2018 14:17) (63 - 80)  BP: 162/72 (30 May 2018 14:17) (132/81 - 162/72)  BP(mean): --  RR: 21 (30 May 2018 08:37) (18 - 21)  SpO2: 100% (30 May 2018 14:17) (87% - 100%)    CBC Full  -  ( 30 May 2018 07:07 )  WBC Count : 8.5 K/uL  Hemoglobin : 9.3 g/dL  Hematocrit : 29.0 %  Platelet Count - Automated : 255 K/uL  Mean Cell Volume : 102.0 fl  Mean Cell Hemoglobin : 32.8 pg  Mean Cell Hemoglobin Concentration : 32.1 gm/dL  Auto Neutrophil # : x  Auto Lymphocyte # : x  Auto Monocyte # : x  Auto Eosinophil # : x  Auto Basophil # : x  Auto Neutrophil % : x  Auto Lymphocyte % : x  Auto Monocyte % : x  Auto Eosinophil % : x  Auto Basophil % : x    05-30    146<H>  |  110<H>  |  25<H>  ----------------------------<  131<H>  4.4   |  25  |  0.68    Ca    9.6      30 May 2018 07:06  Phos  2.9     05-30  Mg     2.1     05-30    TPro  6.2  /  Alb  3.1<L>  /  TBili  0.7  /  DBili  x   /  AST  43<H>  /  ALT  54<H>  /  AlkPhos  154<H>  05-30    LIVER FUNCTIONS - ( 30 May 2018 07:06 )  Alb: 3.1 g/dL / Pro: 6.2 g/dL / ALK PHOS: 154 U/L / ALT: 54 U/L / AST: 43 U/L / GGT: x               MICROBIOLOGY:  .Blood Blood  05-29-18   No growth to date.  --  --      .Blood Blood  05-29-18   No growth to date.  --  --      .Sputum Sputum, trap  05-29-18   Normal Respiratory Silvia present  --    Moderate polymorphonuclear leukocytes per low power field  No Squamous epithelial cells per low power field  No organisms seen      .Blood Blood-Peripheral  05-27-18   No growth to date.  --  Blood Culture PCR      .Urine Catheterized  05-27-18   No growth  --  --        Vancomycin Level, Trough: 11.6 ug/mL (05-29-18 @ 16:13)    Rapid RVP Result: NotDetec (05-27 @ 20:11)    RADIOLOGY    CT Abdomen and Pelvis w/ Oral Cont and w/ IV Cont (05.28.18 @ 18:15) >   Improving of the multi focal consolidations when compared   with CT examination 4/25/2018. Scattered groundglass opacities are seen   bilaterally may represent some residual change although new small foci of   infection is considered.    Small bilateral pleural effusions, left greater than right. The pleural   fluid has decreased bilaterally when compared with previous study   4/25/2018.    Decrease in the extraperitoneal hematoma in the space of Retzius now   measuring 6 cm x 5.6 cm.

## 2018-05-30 NOTE — OCCUPATIONAL THERAPY INITIAL EVALUATION ADULT - IMPAIRMENTS CONTRIBUTING IMPAIRED BED MOBILITY, REHAB EVAL
impaired postural control/decreased strength/impaired balance/impaired coordination/decreased flexibility/cognition/decreased ROM
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
## 2018-05-30 NOTE — OCCUPATIONAL THERAPY INITIAL EVALUATION ADULT - ADDITIONAL COMMENTS
During hospitalization the patient was s/p glue & coil embolization of the left inferior epigastric artery, left pubic rami supply from a distal branch of the CFA, left internal iliac artery branches, right inferior epigastric artery, and distal main splenic artery.  (+)hematothorax s/p chest tube placement respiratory failure s/p tracheostomy recent tracheo bronchitis S. Aureus and (+) Illeus.  Now presenting from [facility] after leaving AMA with (+) fevers found to have septic shock and PNA per [facility] records

## 2018-05-31 DIAGNOSIS — A48.8 OTHER SPECIFIED BACTERIAL DISEASES: ICD-10-CM

## 2018-05-31 LAB
ALBUMIN SERPL ELPH-MCNC: 3 G/DL — LOW (ref 3.3–5)
ALP SERPL-CCNC: 167 U/L — HIGH (ref 40–120)
ALT FLD-CCNC: 53 U/L — HIGH (ref 10–45)
ANION GAP SERPL CALC-SCNC: 9 MMOL/L — SIGNIFICANT CHANGE UP (ref 5–17)
APTT BLD: 31 SEC — SIGNIFICANT CHANGE UP (ref 27.5–37.4)
AST SERPL-CCNC: 39 U/L — SIGNIFICANT CHANGE UP (ref 10–40)
BILIRUB SERPL-MCNC: 0.9 MG/DL — SIGNIFICANT CHANGE UP (ref 0.2–1.2)
BUN SERPL-MCNC: 20 MG/DL — SIGNIFICANT CHANGE UP (ref 7–23)
CALCIUM SERPL-MCNC: 9.3 MG/DL — SIGNIFICANT CHANGE UP (ref 8.4–10.5)
CHLORIDE SERPL-SCNC: 106 MMOL/L — SIGNIFICANT CHANGE UP (ref 96–108)
CO2 SERPL-SCNC: 29 MMOL/L — SIGNIFICANT CHANGE UP (ref 22–31)
CREAT SERPL-MCNC: 0.58 MG/DL — SIGNIFICANT CHANGE UP (ref 0.5–1.3)
GLUCOSE BLDC GLUCOMTR-MCNC: 131 MG/DL — HIGH (ref 70–99)
GLUCOSE BLDC GLUCOMTR-MCNC: 142 MG/DL — HIGH (ref 70–99)
GLUCOSE BLDC GLUCOMTR-MCNC: 152 MG/DL — HIGH (ref 70–99)
GLUCOSE BLDC GLUCOMTR-MCNC: 157 MG/DL — HIGH (ref 70–99)
GLUCOSE BLDC GLUCOMTR-MCNC: 182 MG/DL — HIGH (ref 70–99)
GLUCOSE SERPL-MCNC: 141 MG/DL — HIGH (ref 70–99)
HCT VFR BLD CALC: 29.3 % — LOW (ref 39–50)
HGB BLD-MCNC: 9.6 G/DL — LOW (ref 13–17)
INR BLD: 1.09 RATIO — SIGNIFICANT CHANGE UP (ref 0.88–1.16)
MAGNESIUM SERPL-MCNC: 1.9 MG/DL — SIGNIFICANT CHANGE UP (ref 1.6–2.6)
MCHC RBC-ENTMCNC: 32.9 GM/DL — SIGNIFICANT CHANGE UP (ref 32–36)
MCHC RBC-ENTMCNC: 33.8 PG — SIGNIFICANT CHANGE UP (ref 27–34)
MCV RBC AUTO: 103 FL — HIGH (ref 80–100)
PHOSPHATE SERPL-MCNC: 2.7 MG/DL — SIGNIFICANT CHANGE UP (ref 2.5–4.5)
PLATELET # BLD AUTO: 247 K/UL — SIGNIFICANT CHANGE UP (ref 150–400)
POTASSIUM SERPL-MCNC: 3.8 MMOL/L — SIGNIFICANT CHANGE UP (ref 3.5–5.3)
POTASSIUM SERPL-SCNC: 3.8 MMOL/L — SIGNIFICANT CHANGE UP (ref 3.5–5.3)
PROT SERPL-MCNC: 6.3 G/DL — SIGNIFICANT CHANGE UP (ref 6–8.3)
PROTHROM AB SERPL-ACNC: 11.9 SEC — SIGNIFICANT CHANGE UP (ref 9.8–12.7)
RBC # BLD: 2.85 M/UL — LOW (ref 4.2–5.8)
RBC # FLD: 13.2 % — SIGNIFICANT CHANGE UP (ref 10.3–14.5)
SODIUM SERPL-SCNC: 144 MMOL/L — SIGNIFICANT CHANGE UP (ref 135–145)
WBC # BLD: 8.8 K/UL — SIGNIFICANT CHANGE UP (ref 3.8–10.5)
WBC # FLD AUTO: 8.8 K/UL — SIGNIFICANT CHANGE UP (ref 3.8–10.5)

## 2018-05-31 PROCEDURE — 99233 SBSQ HOSP IP/OBS HIGH 50: CPT | Mod: GC

## 2018-05-31 PROCEDURE — 99232 SBSQ HOSP IP/OBS MODERATE 35: CPT

## 2018-05-31 RX ORDER — AMPICILLIN SODIUM AND SULBACTAM SODIUM 250; 125 MG/ML; MG/ML
3 INJECTION, POWDER, FOR SUSPENSION INTRAMUSCULAR; INTRAVENOUS ONCE
Qty: 0 | Refills: 0 | Status: COMPLETED | OUTPATIENT
Start: 2018-05-31 | End: 2018-05-31

## 2018-05-31 RX ORDER — AMPICILLIN SODIUM AND SULBACTAM SODIUM 250; 125 MG/ML; MG/ML
3 INJECTION, POWDER, FOR SUSPENSION INTRAMUSCULAR; INTRAVENOUS EVERY 6 HOURS
Qty: 0 | Refills: 0 | Status: DISCONTINUED | OUTPATIENT
Start: 2018-06-01 | End: 2018-06-09

## 2018-05-31 RX ORDER — AMPICILLIN SODIUM AND SULBACTAM SODIUM 250; 125 MG/ML; MG/ML
INJECTION, POWDER, FOR SUSPENSION INTRAMUSCULAR; INTRAVENOUS
Qty: 0 | Refills: 0 | Status: DISCONTINUED | OUTPATIENT
Start: 2018-05-31 | End: 2018-06-09

## 2018-05-31 RX ORDER — CLONAZEPAM 1 MG
0.5 TABLET ORAL
Qty: 0 | Refills: 0 | Status: DISCONTINUED | OUTPATIENT
Start: 2018-05-31 | End: 2018-06-05

## 2018-05-31 RX ADMIN — Medication 1 DROP(S): at 08:22

## 2018-05-31 RX ADMIN — Medication 1 DROP(S): at 00:35

## 2018-05-31 RX ADMIN — Medication 1 DROP(S): at 11:44

## 2018-05-31 RX ADMIN — AMPICILLIN SODIUM AND SULBACTAM SODIUM 200 GRAM(S): 250; 125 INJECTION, POWDER, FOR SUSPENSION INTRAMUSCULAR; INTRAVENOUS at 09:54

## 2018-05-31 RX ADMIN — AMPICILLIN SODIUM AND SULBACTAM SODIUM 200 GRAM(S): 250; 125 INJECTION, POWDER, FOR SUSPENSION INTRAMUSCULAR; INTRAVENOUS at 13:43

## 2018-05-31 RX ADMIN — Medication 0.5 MILLIGRAM(S): at 18:02

## 2018-05-31 RX ADMIN — Medication 1: at 07:12

## 2018-05-31 RX ADMIN — Medication 1 DROP(S): at 07:13

## 2018-05-31 RX ADMIN — Medication 81 MILLIGRAM(S): at 11:44

## 2018-05-31 RX ADMIN — CLOPIDOGREL BISULFATE 75 MILLIGRAM(S): 75 TABLET, FILM COATED ORAL at 11:44

## 2018-05-31 RX ADMIN — Medication 5 MILLIGRAM(S): at 18:01

## 2018-05-31 RX ADMIN — Medication 100 MILLIGRAM(S): at 18:02

## 2018-05-31 RX ADMIN — HEPARIN SODIUM 5000 UNIT(S): 5000 INJECTION INTRAVENOUS; SUBCUTANEOUS at 13:43

## 2018-05-31 RX ADMIN — Medication 1 DROP(S): at 21:14

## 2018-05-31 RX ADMIN — Medication 1 DROP(S): at 18:02

## 2018-05-31 RX ADMIN — Medication 2 MILLIGRAM(S): at 21:14

## 2018-05-31 RX ADMIN — Medication 1: at 18:02

## 2018-05-31 RX ADMIN — PANTOPRAZOLE SODIUM 40 MILLIGRAM(S): 20 TABLET, DELAYED RELEASE ORAL at 11:43

## 2018-05-31 RX ADMIN — AMPICILLIN SODIUM AND SULBACTAM SODIUM 200 GRAM(S): 250; 125 INJECTION, POWDER, FOR SUSPENSION INTRAMUSCULAR; INTRAVENOUS at 20:15

## 2018-05-31 RX ADMIN — HEPARIN SODIUM 5000 UNIT(S): 5000 INJECTION INTRAVENOUS; SUBCUTANEOUS at 21:14

## 2018-05-31 RX ADMIN — AMPICILLIN SODIUM AND SULBACTAM SODIUM 200 GRAM(S): 250; 125 INJECTION, POWDER, FOR SUSPENSION INTRAMUSCULAR; INTRAVENOUS at 02:00

## 2018-05-31 RX ADMIN — Medication 1 DROP(S): at 02:38

## 2018-05-31 RX ADMIN — Medication 100 MILLIGRAM(S): at 07:13

## 2018-05-31 RX ADMIN — HEPARIN SODIUM 5000 UNIT(S): 5000 INJECTION INTRAVENOUS; SUBCUTANEOUS at 07:13

## 2018-05-31 RX ADMIN — Medication 1: at 11:44

## 2018-05-31 RX ADMIN — Medication 5 MILLIGRAM(S): at 08:22
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
## 2018-05-31 NOTE — CONSULT NOTE ADULT - ASSESSMENT
A/P: Clinically suspect underlining parkinsonism/PD but limited evaluation in the setting of multiple medical issues/hospitalization/infection. Mitchell scan 12/2017 supported diagnosis of PD    Plan  1. Outpatient sinemet trial when medically stabilizes.  2. Continue supportive care, Abx per ID  3. PT  4. Frequent reorientation, high risk for delirium, avoidance of delirium provoking medications  No further inpatient neurological recommendations.  Plan reviewed with RCU NP.

## 2018-05-31 NOTE — PROGRESS NOTE ADULT - PROBLEM SELECTOR PLAN 2
Blood cx from [facility] + acienobacter and CNS   Blood Cx sent  1/2 [facility]-  5/29 Bld cx negative times 2  Patient with + Urinalaysis, Urine Cx negative to date   Sputum Cx no growth   CT Chest Non Contrast and CT Abdomen and pelvis with po and IV contrast ordered as per Dr. Trevino to rule out infectious source. No specific infective course  Blood cx from [facility] with Acinebater and CNS abx changed to ertapenem as it is resistant

## 2018-05-31 NOTE — PROGRESS NOTE ADULT - PROBLEM SELECTOR PLAN 2
-Coag neg staph here likely contaminant  -DC vanco    Spoke to micro dept at [facility] 5/30- bcx from 5/27 grew Coag neg staph and Acinetobacter baumannii
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
## 2018-05-31 NOTE — PROGRESS NOTE ADULT - SUBJECTIVE AND OBJECTIVE BOX
NANO SZYMANSKI 77y [id number]    Patient is a 77y old  Male who presents with a chief complaint of Admitted with fever (27 May 2018 20:28)      Follow Up/CC:  ID following for fever    Interval History/ROS: in RCU, no acute issues    Allergies    No Known Allergies    Intolerances        ANTIMICROBIALS:  ampicillin/sulbactam  IVPB 3 every 6 hours  ampicillin/sulbactam  IVPB        MEDICATIONS  (STANDING):  ampicillin/sulbactam  IVPB 3 Gram(s) IV Intermittent every 6 hours  ampicillin/sulbactam  IVPB      aspirin  chewable 81 milliGRAM(s) Oral daily  clonazePAM Tablet 0.5 milliGRAM(s) Oral <User Schedule>  clopidogrel Tablet 75 milliGRAM(s) Oral daily  dextrose 5% + sodium chloride 0.45%. 1000 milliLiter(s) (75 mL/Hr) IV Continuous <Continuous>  dextrose 5%. 1000 milliLiter(s) (50 mL/Hr) IV Continuous <Continuous>  dextrose 50% Injectable 12.5 Gram(s) IV Push once  dextrose 50% Injectable 25 Gram(s) IV Push once  dextrose 50% Injectable 25 Gram(s) IV Push once  doxazosin 2 milliGRAM(s) Oral at bedtime  enalapril 5 milliGRAM(s) Oral two times a day  heparin  Injectable 5000 Unit(s) SubCutaneous every 8 hours  insulin lispro (HumaLOG) corrective regimen sliding scale   SubCutaneous every 6 hours  metoprolol tartrate 100 milliGRAM(s) Oral two times a day  ofloxacin 0.3% Solution 1 Drop(s) Both EYES every 3 hours  pantoprazole  Injectable 40 milliGRAM(s) IV Push daily    MEDICATIONS  (PRN):  dextrose 40% Gel 15 Gram(s) Oral once PRN Blood Glucose LESS THAN 70 milliGRAM(s)/deciLiter  glucagon  Injectable 1 milliGRAM(s) IntraMuscular once PRN Glucose <70 milliGRAM(s)/deciLiter  melatonin 3 milliGRAM(s) Oral at bedtime PRN Insomnia  QUEtiapine 50 milliGRAM(s) Oral every 6 hours PRN break through aggitation        Vital Signs Last 24 Hrs  T(C): 36.5 (31 May 2018 12:54), Max: 36.9 (31 May 2018 04:09)  T(F): 97.7 (31 May 2018 12:54), Max: 98.4 (31 May 2018 04:09)  HR: 71 (31 May 2018 12:54) (68 - 80)  BP: 168/65 (31 May 2018 12:54) (157/48 - 184/61)  BP(mean): --  RR: 22 (31 May 2018 12:54) (16 - 22)  SpO2: 100% (31 May 2018 12:54) (98% - 100%)    CBC Full  -  ( 31 May 2018 07:07 )  WBC Count : 8.8 K/uL  Hemoglobin : 9.6 g/dL  Hematocrit : 29.3 %  Platelet Count - Automated : 247 K/uL  Mean Cell Volume : 103.0 fl  Mean Cell Hemoglobin : 33.8 pg  Mean Cell Hemoglobin Concentration : 32.9 gm/dL  Auto Neutrophil # : x  Auto Lymphocyte # : x  Auto Monocyte # : x  Auto Eosinophil # : x  Auto Basophil # : x  Auto Neutrophil % : x  Auto Lymphocyte % : x  Auto Monocyte % : x  Auto Eosinophil % : x  Auto Basophil % : x    05-31    144  |  106  |  20  ----------------------------<  141<H>  3.8   |  29  |  0.58    Ca    9.3      31 May 2018 07:07  Phos  2.7     05-31  Mg     1.9     05-31    TPro  6.3  /  Alb  3.0<L>  /  TBili  0.9  /  DBili  x   /  AST  39  /  ALT  53<H>  /  AlkPhos  167<H>  05-31    LIVER FUNCTIONS - ( 31 May 2018 07:07 )  Alb: 3.0 g/dL / Pro: 6.3 g/dL / ALK PHOS: 167 U/L / ALT: 53 U/L / AST: 39 U/L / GGT: x               MICROBIOLOGY:  .Blood Blood  05-29-18   No growth to date.  --  --      .Blood Blood  05-29-18   No growth to date.  --  --      .Sputum Sputum, trap  05-29-18   Normal Respiratory Silvia present  Few Pseudomonas aeruginosa Susceptibility to follow.  --    Moderate polymorphonuclear leukocytes per low power field  No Squamous epithelial cells per low power field  No organisms seen      .Blood Blood-Peripheral  05-27-18   No growth to date.  --  Blood Culture PCR      .Urine Catheterized  05-27-18   No growth  --  --    Rapid RVP Result: NotDetec (05-27 @ 20:11)      RADIOLOGY    VA Duplex Lower Ext Vein Scan, Bilat (05.30.18 @ 16:40) >  There is persistent left soleal vein DVT in the calf.  Right soleal vein DVT is no longer identified.

## 2018-05-31 NOTE — PROGRESS NOTE ADULT - ATTENDING COMMENTS
Mickey Benites  Attending Physician   Division of Infectious Disease  Pager #[phone]  After 5pm/weekend or no response, call #[phone]
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
## 2018-05-31 NOTE — DIETITIAN INITIAL EVALUATION ADULT. - NS AS NUTRI INTERV ENTERAL NUTRITION
Volume/Concentration/Rate/Composition/Vital AF@95ml/hr x18hrs providing 1710ml, 2052calories, 28/kg, protein 128gm,1.78gm/kg based on  admit weight 72kg Volume/Composition/Concentration/Rate/Vital 1.5@ 60ml/hr x24 hrs  1440ml, 2160calories, 30/kg, protein 92gm, 1.3gm/kg plus kehinde 2x/day to provide 180 additional calories,  28gm additional amino acids for wound healing Concentration/Rate/Volume/Composition/Vital 1.2@ 100ml/hr x18 hrs  1800ml, 2160calories, 30/kg, protein 135gm, 1.87gm/kg based on 72kg, 1485ml free water, add free water [id number] hrs

## 2018-05-31 NOTE — PROGRESS NOTE ADULT - ATTENDING COMMENTS
Pt seen and examined.   1. Chronic resp failure with hypoxia:  - Cont vent support at current settings.   - Cont aggressive pulm toilet  - Not tolerating weaning even at high levels of PS  2. Acinetobacter bacteremia likely 2/2 bacterial pneumonia:  -  Bcxs from [facility] show coag - staph and acinetobacter R to Zosyn, S to Unasyn  - Abx switched to Unasyn Q6H IV  - BCxs here remain NGTD.   - Will complete a 14 day course of antibiotics   - Appreciate ID follow up   3. Oropharyngeal dysphagia:  - Cont tube feeds  4. Gen:  - Overall prognosis guarded.

## 2018-05-31 NOTE — PROGRESS NOTE ADULT - SUBJECTIVE AND OBJECTIVE BOX
Patient is a 77y old  Male who presents with a chief complaint of Admitted with fever (27 May 2018 20:28)      Interval Events:    REVIEW OF SYSTEMS:  [ ] Positive  [ x] All other systems negative  [ ] Unable to assess ROS because ________    Vital Signs Last 24 Hrs  T(C): 36.9 (05-31-18 @ 04:09), Max: 36.9 (05-31-18 @ 04:09)  T(F): 98.4 (05-31-18 @ 04:09), Max: 98.4 (05-31-18 @ 04:09)  HR: 68 (05-31-18 @ 07:13) (67 - 80)  BP: 174/67 (05-31-18 @ 07:13) (132/81 - 178/69)  RR: 18 (05-31-18 @ 04:09) (18 - 21)  SpO2: 100% (05-31-18 @ 04:29) (98% - 100%)    PHYSICAL EXAM:  HEENT:   [x ]Tracheostomy: portex cuffed  [ ]Pupils equal  [ ]No oral lesions  [ ]Abnormal    SKIN  [ ]No Rash  [ x] Abnormal- DTI left and right buttocks  [ ] pressure    CARDIAC  [x ]Regular  [ ]Abnormal    PULMONARY  [ x]Bilateral Clear Breath Sounds  [ ]Normal Excursion  [ ]Abnormal    GI  [x ]PEG      [ x] +BS		              [x ]Soft, nondistended, nontender	  [ ]Abnormal    MUSCULOSKELETAL                                   [ ]Bedbound                 [ ]Abnormal    [x ]Ambulatory/OOB to chair                           EXTREMITIES                                         [x ]Normal  [ ]Edema                           NEUROLOGIC  [x ] Normal, non focal  [ ] Focal findings:    PSYCHIATRIC  [ x]Alert and appropriate  [ ] Sedated	 [ ]Agitated    :  Ybarra: [ ] Yes, if yes: Date of Placement:                   [x ] No    LINES: Central Lines [ ] Yes, if yes: Date of Placement                                     [ x ] No    HOSPITAL MEDICATIONS:  MEDICATIONS  (STANDING):  ampicillin/sulbactam  IVPB 3 Gram(s) IV Intermittent every 6 hours  ampicillin/sulbactam  IVPB      aspirin  chewable 81 milliGRAM(s) Oral daily  azithromycin  IVPB 500 milliGRAM(s) IV Intermittent every 24 hours  clonazePAM Tablet 0.5 milliGRAM(s) Oral <User Schedule>  clopidogrel Tablet 75 milliGRAM(s) Oral daily  dextrose 5% + sodium chloride 0.45%. 1000 milliLiter(s) (75 mL/Hr) IV Continuous <Continuous>  dextrose 5%. 1000 milliLiter(s) (50 mL/Hr) IV Continuous <Continuous>  dextrose 50% Injectable 12.5 Gram(s) IV Push once  dextrose 50% Injectable 25 Gram(s) IV Push once  dextrose 50% Injectable 25 Gram(s) IV Push once  doxazosin 2 milliGRAM(s) Oral at bedtime  enalapril 5 milliGRAM(s) Oral two times a day  heparin  Injectable 5000 Unit(s) SubCutaneous every 8 hours  insulin lispro (HumaLOG) corrective regimen sliding scale   SubCutaneous every 6 hours  metoprolol tartrate 100 milliGRAM(s) Oral two times a day  ofloxacin 0.3% Solution 1 Drop(s) Both EYES every 3 hours  pantoprazole  Injectable 40 milliGRAM(s) IV Push daily    MEDICATIONS  (PRN):  dextrose 40% Gel 15 Gram(s) Oral once PRN Blood Glucose LESS THAN 70 milliGRAM(s)/deciLiter  glucagon  Injectable 1 milliGRAM(s) IntraMuscular once PRN Glucose <70 milliGRAM(s)/deciLiter  melatonin 3 milliGRAM(s) Oral at bedtime PRN Insomnia  QUEtiapine 50 milliGRAM(s) Oral every 6 hours PRN break through aggitation      LABS:                        9.6    8.8   )-----------( 247      ( 31 May 2018 07:07 )             29.3     05-31    144  |  106  |  20  ----------------------------<  141<H>  3.8   |  29  |  0.58    Ca    9.3      31 May 2018 07:07  Phos  2.7     05-31  Mg     1.9     05-31    TPro  6.3  /  Alb  3.0<L>  /  TBili  0.9  /  DBili  x   /  AST  39  /  ALT  53<H>  /  AlkPhos  167<H>  05-31    PT/INR - ( 31 May 2018 07:08 )   PT: 11.9 sec;   INR: 1.09 ratio         PTT - ( 31 May 2018 07:08 )  PTT:31.0 sec        CAPILLARY BLOOD GLUCOSE    MICROBIOLOGY:     RADIOLOGY:  [ ] Reviewed and interpreted by me    Mode: AC/ CMV (Assist Control/ Continuous Mandatory Ventilation)  RR (machine): 16  TV (machine): 500  FiO2: 30  PEEP: 5  ITime: 1  MAP: 11  PIP: 27 Patient is a 77y old  Male who presents with a chief complaint of Admitted with fever (27 May 2018 20:28)      Interval Events: No acute events overnight     REVIEW OF SYSTEMS:  [ ] Positive  [ ] All other systems negative  [ x] Unable to assess ROS because __pt trached/vented______    Vital Signs Last 24 Hrs  T(C): 36.9 (05-31-18 @ 04:09), Max: 36.9 (05-31-18 @ 04:09)  T(F): 98.4 (05-31-18 @ 04:09), Max: 98.4 (05-31-18 @ 04:09)  HR: 68 (05-31-18 @ 07:13) (67 - 80)  BP: 174/67 (05-31-18 @ 07:13) (132/81 - 178/69)  RR: 18 (05-31-18 @ 04:09) (18 - 21)  SpO2: 100% (05-31-18 @ 04:29) (98% - 100%)    PHYSICAL EXAM:  HEENT:   [x ]Tracheostomy: portex cuffed  [x ]Pupils equal  [x ]No oral lesions  [ ]Abnormal    SKIN  [ ]No Rash  [ x] Abnormal- DTI left and right buttocks  [ ] pressure    CARDIAC  [x ]Regular  [ ]Abnormal    PULMONARY  [ x]Bilateral Clear Breath Sounds  [ ]Normal Excursion  [ ]Abnormal    GI  [x ]PEG      [ x] +BS		              [x ]Soft, nondistended, nontender	  [ ]Abnormal    MUSCULOSKELETAL                                   [ ]Bedbound                 [ ]Abnormal    [x ]Ambulatory/OOB to chair                           EXTREMITIES                                         [x ]Normal  [ ]Edema                           NEUROLOGIC  [x ] Normal, non focal  [ ] Focal findings:    PSYCHIATRIC  [ x]Alert and appropriate  [ ] Sedated	 [ ]Agitated    :  Ybarra: [ ] Yes, if yes: Date of Placement:                   [x ] No    LINES: Central Lines [ ] Yes, if yes: Date of Placement                                     [ x ] No    HOSPITAL MEDICATIONS:  MEDICATIONS  (STANDING):  ampicillin/sulbactam  IVPB 3 Gram(s) IV Intermittent every 6 hours  ampicillin/sulbactam  IVPB      aspirin  chewable 81 milliGRAM(s) Oral daily  azithromycin  IVPB 500 milliGRAM(s) IV Intermittent every 24 hours  clonazePAM Tablet 0.5 milliGRAM(s) Oral <User Schedule>  clopidogrel Tablet 75 milliGRAM(s) Oral daily  dextrose 5% + sodium chloride 0.45%. 1000 milliLiter(s) (75 mL/Hr) IV Continuous <Continuous>  dextrose 5%. 1000 milliLiter(s) (50 mL/Hr) IV Continuous <Continuous>  dextrose 50% Injectable 12.5 Gram(s) IV Push once  dextrose 50% Injectable 25 Gram(s) IV Push once  dextrose 50% Injectable 25 Gram(s) IV Push once  doxazosin 2 milliGRAM(s) Oral at bedtime  enalapril 5 milliGRAM(s) Oral two times a day  heparin  Injectable 5000 Unit(s) SubCutaneous every 8 hours  insulin lispro (HumaLOG) corrective regimen sliding scale   SubCutaneous every 6 hours  metoprolol tartrate 100 milliGRAM(s) Oral two times a day  ofloxacin 0.3% Solution 1 Drop(s) Both EYES every 3 hours  pantoprazole  Injectable 40 milliGRAM(s) IV Push daily    MEDICATIONS  (PRN):  dextrose 40% Gel 15 Gram(s) Oral once PRN Blood Glucose LESS THAN 70 milliGRAM(s)/deciLiter  glucagon  Injectable 1 milliGRAM(s) IntraMuscular once PRN Glucose <70 milliGRAM(s)/deciLiter  melatonin 3 milliGRAM(s) Oral at bedtime PRN Insomnia  QUEtiapine 50 milliGRAM(s) Oral every 6 hours PRN break through aggitation      LABS:                        9.6    8.8   )-----------( 247      ( 31 May 2018 07:07 )             29.3     05-31    144  |  106  |  20  ----------------------------<  141<H>  3.8   |  29  |  0.58    Ca    9.3      31 May 2018 07:07  Phos  2.7     05-31  Mg     1.9     05-31    TPro  6.3  /  Alb  3.0<L>  /  TBili  0.9  /  DBili  x   /  AST  39  /  ALT  53<H>  /  AlkPhos  167<H>  05-31    PT/INR - ( 31 May 2018 07:08 )   PT: 11.9 sec;   INR: 1.09 ratio         PTT - ( 31 May 2018 07:08 )  PTT:31.0 sec        CAPILLARY BLOOD GLUCOSE    MICROBIOLOGY:     RADIOLOGY:  [ ] Reviewed and interpreted by me    Mode: AC/ CMV (Assist Control/ Continuous Mandatory Ventilation)  RR (machine): 16  TV (machine): 500  FiO2: 30  PEEP: 5  ITime: 1  MAP: 11  PIP: 27

## 2018-06-01 LAB
-  AMIKACIN: SIGNIFICANT CHANGE UP
-  AZTREONAM: SIGNIFICANT CHANGE UP
-  CEFEPIME: SIGNIFICANT CHANGE UP
-  CEFTAZIDIME: SIGNIFICANT CHANGE UP
-  CIPROFLOXACIN: SIGNIFICANT CHANGE UP
-  GENTAMICIN: SIGNIFICANT CHANGE UP
-  IMIPENEM: SIGNIFICANT CHANGE UP
-  LEVOFLOXACIN: SIGNIFICANT CHANGE UP
-  MEROPENEM: SIGNIFICANT CHANGE UP
-  PIPERACILLIN/TAZOBACTAM: SIGNIFICANT CHANGE UP
-  TOBRAMYCIN: SIGNIFICANT CHANGE UP
ALBUMIN SERPL ELPH-MCNC: 3.1 G/DL — LOW (ref 3.3–5)
ALP SERPL-CCNC: 147 U/L — HIGH (ref 40–120)
ALT FLD-CCNC: 48 U/L — HIGH (ref 10–45)
ANION GAP SERPL CALC-SCNC: 7 MMOL/L — SIGNIFICANT CHANGE UP (ref 5–17)
APTT BLD: 29.2 SEC — SIGNIFICANT CHANGE UP (ref 27.5–37.4)
AST SERPL-CCNC: 30 U/L — SIGNIFICANT CHANGE UP (ref 10–40)
BILIRUB SERPL-MCNC: 0.8 MG/DL — SIGNIFICANT CHANGE UP (ref 0.2–1.2)
BUN SERPL-MCNC: 17 MG/DL — SIGNIFICANT CHANGE UP (ref 7–23)
CALCIUM SERPL-MCNC: 9.2 MG/DL — SIGNIFICANT CHANGE UP (ref 8.4–10.5)
CHLORIDE SERPL-SCNC: 105 MMOL/L — SIGNIFICANT CHANGE UP (ref 96–108)
CO2 SERPL-SCNC: 32 MMOL/L — HIGH (ref 22–31)
CREAT SERPL-MCNC: 0.59 MG/DL — SIGNIFICANT CHANGE UP (ref 0.5–1.3)
CULTURE RESULTS: SIGNIFICANT CHANGE UP
CULTURE RESULTS: SIGNIFICANT CHANGE UP
GLUCOSE BLDC GLUCOMTR-MCNC: 123 MG/DL — HIGH (ref 70–99)
GLUCOSE BLDC GLUCOMTR-MCNC: 148 MG/DL — HIGH (ref 70–99)
GLUCOSE BLDC GLUCOMTR-MCNC: 190 MG/DL — HIGH (ref 70–99)
GLUCOSE SERPL-MCNC: 117 MG/DL — HIGH (ref 70–99)
HCT VFR BLD CALC: 26.7 % — LOW (ref 39–50)
HGB BLD-MCNC: 8.5 G/DL — LOW (ref 13–17)
INR BLD: 1.11 RATIO — SIGNIFICANT CHANGE UP (ref 0.88–1.16)
MAGNESIUM SERPL-MCNC: 1.9 MG/DL — SIGNIFICANT CHANGE UP (ref 1.6–2.6)
MCHC RBC-ENTMCNC: 31.9 GM/DL — LOW (ref 32–36)
MCHC RBC-ENTMCNC: 32.5 PG — SIGNIFICANT CHANGE UP (ref 27–34)
MCV RBC AUTO: 102 FL — HIGH (ref 80–100)
METHOD TYPE: SIGNIFICANT CHANGE UP
ORGANISM # SPEC MICROSCOPIC CNT: SIGNIFICANT CHANGE UP
ORGANISM # SPEC MICROSCOPIC CNT: SIGNIFICANT CHANGE UP
PHOSPHATE SERPL-MCNC: 2.3 MG/DL — LOW (ref 2.5–4.5)
PLATELET # BLD AUTO: 220 K/UL — SIGNIFICANT CHANGE UP (ref 150–400)
POTASSIUM SERPL-MCNC: 3.6 MMOL/L — SIGNIFICANT CHANGE UP (ref 3.5–5.3)
POTASSIUM SERPL-SCNC: 3.6 MMOL/L — SIGNIFICANT CHANGE UP (ref 3.5–5.3)
PROT SERPL-MCNC: 5.7 G/DL — LOW (ref 6–8.3)
PROTHROM AB SERPL-ACNC: 12.1 SEC — SIGNIFICANT CHANGE UP (ref 9.8–12.7)
RBC # BLD: 2.62 M/UL — LOW (ref 4.2–5.8)
RBC # FLD: 13.1 % — SIGNIFICANT CHANGE UP (ref 10.3–14.5)
SODIUM SERPL-SCNC: 144 MMOL/L — SIGNIFICANT CHANGE UP (ref 135–145)
SPECIMEN SOURCE: SIGNIFICANT CHANGE UP
SPECIMEN SOURCE: SIGNIFICANT CHANGE UP
WBC # BLD: 8.4 K/UL — SIGNIFICANT CHANGE UP (ref 3.8–10.5)
WBC # FLD AUTO: 8.4 K/UL — SIGNIFICANT CHANGE UP (ref 3.8–10.5)

## 2018-06-01 PROCEDURE — 99233 SBSQ HOSP IP/OBS HIGH 50: CPT | Mod: GC

## 2018-06-01 PROCEDURE — 99232 SBSQ HOSP IP/OBS MODERATE 35: CPT

## 2018-06-01 RX ORDER — HYDRALAZINE HCL 50 MG
25 TABLET ORAL EVERY 8 HOURS
Qty: 0 | Refills: 0 | Status: DISCONTINUED | OUTPATIENT
Start: 2018-06-01 | End: 2018-06-28

## 2018-06-01 RX ORDER — CARBIDOPA AND LEVODOPA 25; 100 MG/1; MG/1
0.5 TABLET ORAL THREE TIMES A DAY
Qty: 0 | Refills: 0 | Status: DISCONTINUED | OUTPATIENT
Start: 2018-06-01 | End: 2018-06-05

## 2018-06-01 RX ADMIN — AMPICILLIN SODIUM AND SULBACTAM SODIUM 200 GRAM(S): 250; 125 INJECTION, POWDER, FOR SUSPENSION INTRAMUSCULAR; INTRAVENOUS at 23:30

## 2018-06-01 RX ADMIN — Medication 81 MILLIGRAM(S): at 11:53

## 2018-06-01 RX ADMIN — AMPICILLIN SODIUM AND SULBACTAM SODIUM 200 GRAM(S): 250; 125 INJECTION, POWDER, FOR SUSPENSION INTRAMUSCULAR; INTRAVENOUS at 02:56

## 2018-06-01 RX ADMIN — Medication 10 MILLIGRAM(S): at 16:56

## 2018-06-01 RX ADMIN — Medication 5 MILLIGRAM(S): at 06:06

## 2018-06-01 RX ADMIN — Medication 25 MILLIGRAM(S): at 22:06

## 2018-06-01 RX ADMIN — Medication 1 DROP(S): at 02:33

## 2018-06-01 RX ADMIN — Medication 1 DROP(S): at 16:40

## 2018-06-01 RX ADMIN — CLOPIDOGREL BISULFATE 75 MILLIGRAM(S): 75 TABLET, FILM COATED ORAL at 11:53

## 2018-06-01 RX ADMIN — Medication 1 DROP(S): at 19:02

## 2018-06-01 RX ADMIN — AMPICILLIN SODIUM AND SULBACTAM SODIUM 200 GRAM(S): 250; 125 INJECTION, POWDER, FOR SUSPENSION INTRAMUSCULAR; INTRAVENOUS at 11:52

## 2018-06-01 RX ADMIN — Medication 1 DROP(S): at 01:03

## 2018-06-01 RX ADMIN — SODIUM CHLORIDE 75 MILLILITER(S): 9 INJECTION, SOLUTION INTRAVENOUS at 09:34

## 2018-06-01 RX ADMIN — Medication 1 DROP(S): at 06:06

## 2018-06-01 RX ADMIN — HEPARIN SODIUM 5000 UNIT(S): 5000 INJECTION INTRAVENOUS; SUBCUTANEOUS at 14:25

## 2018-06-01 RX ADMIN — Medication 2 MILLIGRAM(S): at 22:03

## 2018-06-01 RX ADMIN — AMPICILLIN SODIUM AND SULBACTAM SODIUM 200 GRAM(S): 250; 125 INJECTION, POWDER, FOR SUSPENSION INTRAMUSCULAR; INTRAVENOUS at 16:41

## 2018-06-01 RX ADMIN — Medication 100 MILLIGRAM(S): at 16:56

## 2018-06-01 RX ADMIN — Medication 1 DROP(S): at 11:54

## 2018-06-01 RX ADMIN — Medication 100 MILLIGRAM(S): at 06:06

## 2018-06-01 RX ADMIN — PANTOPRAZOLE SODIUM 40 MILLIGRAM(S): 20 TABLET, DELAYED RELEASE ORAL at 11:54

## 2018-06-01 RX ADMIN — Medication 1 DROP(S): at 09:32

## 2018-06-01 RX ADMIN — Medication 25 MILLIGRAM(S): at 14:26

## 2018-06-01 RX ADMIN — SODIUM CHLORIDE 75 MILLILITER(S): 9 INJECTION, SOLUTION INTRAVENOUS at 22:07

## 2018-06-01 RX ADMIN — Medication 0.5 MILLIGRAM(S): at 19:02

## 2018-06-01 RX ADMIN — CARBIDOPA AND LEVODOPA 0.5 TABLET(S): 25; 100 TABLET ORAL at 22:06

## 2018-06-01 RX ADMIN — Medication 1 DROP(S): at 22:02

## 2018-06-01 RX ADMIN — Medication 1: at 11:53

## 2018-06-01 RX ADMIN — HEPARIN SODIUM 5000 UNIT(S): 5000 INJECTION INTRAVENOUS; SUBCUTANEOUS at 22:03

## 2018-06-01 RX ADMIN — HEPARIN SODIUM 5000 UNIT(S): 5000 INJECTION INTRAVENOUS; SUBCUTANEOUS at 06:06

## 2018-06-01 RX ADMIN — CARBIDOPA AND LEVODOPA 0.5 TABLET(S): 25; 100 TABLET ORAL at 14:25

## 2018-06-01 NOTE — GOALS OF CARE CONVERSATION - PERSONAL ADVANCE DIRECTIVE - CONVERSATION DETAILS
LCSW had follow up meeting with spouse after speaking with primary SW, Anthony Braxton and reviewing patient's living will.  Living will has been scanned into [employer] and placed on chart. Please see prior LCSW notes. Patient is currently a     "I do not wish to be kept alive by various measures if there is no reasonable expectation of my being able to enjoy a meaningful quality of life due to my medical condition."  "I direct that life sustaining procedures should be either withheld or withdrawn if I have an illness, diease or injury or experience extreme mental deterioration, and if doctors selected by me or my family determine that there is no reasonable expectation that I will recover to a sufficient extent to enable me to enjoy a meaningful quality of life. "    ......."the life sustaining procedures that I would want withheld or withdrawn include, but are not limited to, surgery, respiratory support, artificially administered nutrition and hydration and antibiotics.  In addition, I would want CPR withheld and I specifically consent to the issuance of a DNR order."    "I would like to live out my last days at home or in a hospice or similar facility rather then a hospital."    Wife presented as fatigued and tearful and reports falling asleep in chair.  Recliner, blanket and pillow secured for wife by PCA to increase her comfort.  Brief discussion with wife about patient's living will.  She agreed to further discussion on Monday regarding patient's advance directives.  She is aware of what living will says but stated she hasn't read it in years.  She knows patient's wishes but is coping poorly with loss of her son and 's medical decline.  Wife would benefit from ongoing supportive counseling in the hospital and long term in the community as well as "care for the caregiver", food and rest. LCSW had follow up meeting with spouse after speaking with primary SW, Anthony Braxton and reviewing patient's living will.  Living will has been scanned into [employer] and placed on chart. Please see prior LCSW notes. Patient is currently a full code.     "I do not wish to be kept alive by various measures if there is no reasonable expectation of my being able to enjoy a meaningful quality of life due to my medical condition."  "I direct that life sustaining procedures should be either withheld or withdrawn if I have an illness, diease or injury or experience extreme mental deterioration, and if doctors selected by me or my family determine that there is no reasonable expectation that I will recover to a sufficient extent to enable me to enjoy a meaningful quality of life. "    ......."the life sustaining procedures that I would want withheld or withdrawn include, but are not limited to, surgery, respiratory support, artificially administered nutrition and hydration and antibiotics.  In addition, I would want CPR withheld and I specifically consent to the issuance of a DNR order."    "I would like to live out my last days at home or in a hospice or similar facility rather then a hospital."    Wife presented as fatigued and tearful and reports falling asleep in chair.  Recliner, blanket and pillow secured for wife by PCA to increase her comfort.  Brief discussion with wife about patient's living will.  She agreed to further discussion on Monday regarding patient's advance directives.  She is aware of what living will says but stated she hasn't read it in years.  She knows patient's wishes but is coping poorly with loss of her son and 's medical decline.  Wife would benefit from ongoing supportive counseling in the hospital and long term in the community as well as "care for the caregiver", food and rest.
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
## 2018-06-01 NOTE — PROGRESS NOTE ADULT - ATTENDING COMMENTS
Pt seen and examined.   1. Chronic resp failure with hypoxia:  - Cont vent support at current settings. Daily weaning trials as tolerated.   - Cont aggressive pulm toilet  2. Acinetobacter bacteremia likely 2/2 bacterial pneumonia:  -  Bcxs from [facility] show coag - staph and acinetobacter R to Zosyn, S to Unasyn  - Cont Unasyn Q6H IV  - BCxs here remain NGTD.   - Will complete a 14 day course of antibiotics   - Appreciate ID follow up   3. Oropharyngeal dysphagia:  - Cont tube feeds  4. Parkinson's disease:  - Low dose Sinemet initiated per neuro recs  4. Gen:  - Overall prognosis guarded.

## 2018-06-01 NOTE — PROGRESS NOTE ADULT - SUBJECTIVE AND OBJECTIVE BOX
NANO SZYMANSKI 77y [id number]    Patient is a 77y old  Male who presents with a chief complaint of Admitted with fever (27 May 2018 20:28)      Follow Up/CC:  ID following for fever    Interval History/ROS: on CPAP, no fever    Allergies    No Known Allergies    Intolerances        ANTIMICROBIALS:  ampicillin/sulbactam  IVPB    ampicillin/sulbactam  IVPB 3 every 6 hours      MEDICATIONS  (STANDING):  ampicillin/sulbactam  IVPB      ampicillin/sulbactam  IVPB 3 Gram(s) IV Intermittent every 6 hours  aspirin  chewable 81 milliGRAM(s) Oral daily  carbidopa/levodopa  25/100 0.5 Tablet(s) Oral three times a day  clonazePAM Tablet 0.5 milliGRAM(s) Oral <User Schedule>  clopidogrel Tablet 75 milliGRAM(s) Oral daily  dextrose 5% + sodium chloride 0.45%. 1000 milliLiter(s) (75 mL/Hr) IV Continuous <Continuous>  dextrose 5%. 1000 milliLiter(s) (50 mL/Hr) IV Continuous <Continuous>  dextrose 50% Injectable 12.5 Gram(s) IV Push once  dextrose 50% Injectable 25 Gram(s) IV Push once  dextrose 50% Injectable 25 Gram(s) IV Push once  doxazosin 2 milliGRAM(s) Oral at bedtime  enalapril 10 milliGRAM(s) Oral two times a day  heparin  Injectable 5000 Unit(s) SubCutaneous every 8 hours  hydrALAZINE 25 milliGRAM(s) Oral every 8 hours  insulin lispro (HumaLOG) corrective regimen sliding scale   SubCutaneous every 6 hours  metoprolol tartrate 100 milliGRAM(s) Oral two times a day  ofloxacin 0.3% Solution 1 Drop(s) Both EYES every 3 hours  pantoprazole  Injectable 40 milliGRAM(s) IV Push daily    MEDICATIONS  (PRN):  dextrose 40% Gel 15 Gram(s) Oral once PRN Blood Glucose LESS THAN 70 milliGRAM(s)/deciLiter  glucagon  Injectable 1 milliGRAM(s) IntraMuscular once PRN Glucose <70 milliGRAM(s)/deciLiter  melatonin 3 milliGRAM(s) Oral at bedtime PRN Insomnia  QUEtiapine 50 milliGRAM(s) Oral every 6 hours PRN break through aggitation        Vital Signs Last 24 Hrs  T(C): 36.4 (01 Jun 2018 13:04), Max: 37.1 (31 May 2018 18:00)  T(F): 97.5 (01 Jun 2018 13:04), Max: 98.8 (31 May 2018 18:00)  HR: 73 (01 Jun 2018 13:04) (64 - 100)  BP: 182/66 (01 Jun 2018 13:04) (149/56 - 182/66)  BP(mean): --  RR: 18 (01 Jun 2018 13:04) (18 - 20)  SpO2: 100% (01 Jun 2018 13:04) (70% - 100%)    CBC Full  -  ( 01 Jun 2018 06:57 )  WBC Count : 8.4 K/uL  Hemoglobin : 8.5 g/dL  Hematocrit : 26.7 %  Platelet Count - Automated : 220 K/uL  Mean Cell Volume : 102.0 fl  Mean Cell Hemoglobin : 32.5 pg  Mean Cell Hemoglobin Concentration : 31.9 gm/dL  Auto Neutrophil # : x  Auto Lymphocyte # : x  Auto Monocyte # : x  Auto Eosinophil # : x  Auto Basophil # : x  Auto Neutrophil % : x  Auto Lymphocyte % : x  Auto Monocyte % : x  Auto Eosinophil % : x  Auto Basophil % : x    06-01    144  |  105  |  17  ----------------------------<  117<H>  3.6   |  32<H>  |  0.59    Ca    9.2      01 Jun 2018 06:56  Phos  2.3     06-01  Mg     1.9     06-01    TPro  5.7<L>  /  Alb  3.1<L>  /  TBili  0.8  /  DBili  x   /  AST  30  /  ALT  48<H>  /  AlkPhos  147<H>  06-01    LIVER FUNCTIONS - ( 01 Jun 2018 06:56 )  Alb: 3.1 g/dL / Pro: 5.7 g/dL / ALK PHOS: 147 U/L / ALT: 48 U/L / AST: 30 U/L / GGT: x               MICROBIOLOGY:  .Blood Blood  05-29-18   No growth to date.  --  --      .Blood Blood  05-29-18   No growth to date.  --  --      .Sputum Sputum, trap  05-29-18   Few Pseudomonas aeruginosa  Normal Respiratory Silvia present  --  Pseudomonas aeruginosa      .Blood Blood-Peripheral  05-27-18   No growth to date.  --  Blood Culture PCR      .Urine Catheterized  05-27-18   No growth  --  --      Rapid RVP Result: Shankar (05-27 @ 20:11)      RADIOLOGY

## 2018-06-01 NOTE — PROGRESS NOTE ADULT - PROBLEM SELECTOR PLAN 2
Blood cx from [facility] + acienobacter and CNS   Blood Cx sent  1/2 [facility]-  5/29 Bld cx negative times 2  Patient with + Urinalaysis, Urine Cx negative to date   Sputum Cx no growth   CT Chest Non Contrast and CT Abdomen and pelvis with po and IV contrast ordered as per Dr. Trevino to rule out infectious source. No specific infective course  Blood cx from [facility] with Acinebater and CNS abx changed to ertapenem as it is resistant

## 2018-06-01 NOTE — PROGRESS NOTE ADULT - SUBJECTIVE AND OBJECTIVE BOX
SUBJECTIVE: No new neurologic events overnight.  No new neurologic complaints.      Medications:  MEDICATIONS  (STANDING):  ampicillin/sulbactam  IVPB      ampicillin/sulbactam  IVPB 3 Gram(s) IV Intermittent every 6 hours  aspirin  chewable 81 milliGRAM(s) Oral daily  clonazePAM Tablet 0.5 milliGRAM(s) Oral <User Schedule>  clopidogrel Tablet 75 milliGRAM(s) Oral daily  dextrose 5% + sodium chloride 0.45%. 1000 milliLiter(s) (75 mL/Hr) IV Continuous <Continuous>  dextrose 5%. 1000 milliLiter(s) (50 mL/Hr) IV Continuous <Continuous>  dextrose 50% Injectable 12.5 Gram(s) IV Push once  dextrose 50% Injectable 25 Gram(s) IV Push once  dextrose 50% Injectable 25 Gram(s) IV Push once  doxazosin 2 milliGRAM(s) Oral at bedtime  heparin  Injectable 5000 Unit(s) SubCutaneous every 8 hours  insulin lispro (HumaLOG) corrective regimen sliding scale   SubCutaneous every 6 hours  metoprolol tartrate 100 milliGRAM(s) Oral two times a day  ofloxacin 0.3% Solution 1 Drop(s) Both EYES every 3 hours  pantoprazole  Injectable 40 milliGRAM(s) IV Push daily    MEDICATIONS  (PRN):  dextrose 40% Gel 15 Gram(s) Oral once PRN Blood Glucose LESS THAN 70 milliGRAM(s)/deciLiter  glucagon  Injectable 1 milliGRAM(s) IntraMuscular once PRN Glucose <70 milliGRAM(s)/deciLiter  melatonin 3 milliGRAM(s) Oral at bedtime PRN Insomnia  QUEtiapine 50 milliGRAM(s) Oral every 6 hours PRN break through aggitation      Labs:  CBC Full  -  ( 01 Jun 2018 06:57 )  WBC Count : 8.4 K/uL  Hemoglobin : 8.5 g/dL  Hematocrit : 26.7 %  Platelet Count - Automated : 220 K/uL  Mean Cell Volume : 102.0 fl  Mean Cell Hemoglobin : 32.5 pg  Mean Cell Hemoglobin Concentration : 31.9 gm/dL  Auto Neutrophil # : x  Auto Lymphocyte # : x  Auto Monocyte # : x  Auto Eosinophil # : x  Auto Basophil # : x  Auto Neutrophil % : x  Auto Lymphocyte % : x  Auto Monocyte % : x  Auto Eosinophil % : x  Auto Basophil % : x    06-01    144  |  105  |  17  ----------------------------<  117<H>  3.6   |  32<H>  |  0.59    Ca    9.2      01 Jun 2018 06:56  Phos  2.3     06-01  Mg     1.9     06-01    TPro  5.7<L>  /  Alb  3.1<L>  /  TBili  0.8  /  DBili  x   /  AST  30  /  ALT  48<H>  /  AlkPhos  147<H>  06-01    CAPILLARY BLOOD GLUCOSE      POCT Blood Glucose.: 123 mg/dL (01 Jun 2018 05:14)  POCT Blood Glucose.: 142 mg/dL (31 May 2018 23:51)  POCT Blood Glucose.: 157 mg/dL (31 May 2018 17:53)  POCT Blood Glucose.: 182 mg/dL (31 May 2018 11:30)    LIVER FUNCTIONS - ( 01 Jun 2018 06:56 )  Alb: 3.1 g/dL / Pro: 5.7 g/dL / ALK PHOS: 147 U/L / ALT: 48 U/L / AST: 30 U/L / GGT: x           PT/INR - ( 01 Jun 2018 07:11 )   PT: 12.1 sec;   INR: 1.11 ratio         PTT - ( 01 Jun 2018 07:11 )  PTT:29.2 sec      Vitals:  Vital Signs Last 24 Hrs  T(C): 36.9 (01 Jun 2018 07:51), Max: 37.1 (31 May 2018 18:00)  T(F): 98.4 (01 Jun 2018 07:51), Max: 98.8 (31 May 2018 18:00)  HR: 100 (01 Jun 2018 08:42) (64 - 100)  BP: 173/69 (01 Jun 2018 07:51) (149/56 - 173/69)  BP(mean): --  RR: 18 (01 Jun 2018 07:51) (18 - 22)  SpO2: 70% (01 Jun 2018 08:42) (70% - 100%)          NEUROLOGICAL EXAM:    Mental status: trached, lying in bed, eyes closed, minimal wincing to DSR., +myerson's sign    Cranial Nerves: Pupils were equal, round, reactive to light. Extraocular movements were intact. Fundoscopic exam was deferred. Facial sensation was intact to light touch. There was no facial asymmetry. The palate was upgoing symmetrically and tongue was midline.    Motor exam: Diffuse markedly increased tone, cogwheeling at wrist bilaterally, bilateral parkinsonian pill rolling tremors in hands. Minimal movements in all extremities, exam limited by poor effort    Reflexes: 2+ in the bilateral upper extremities. 2+ in the bilateral lower extremities. Toes were mute    Sensation: responds to tactile stimuli in all extremities     Coordination: unable    Gait: unable.     Imaging:  < from: CT Head No Cont (03.26.18 @ 16:59) >  Impression:     There is straightening of the normal cervical lordosis. No acute   fracture or traumatic subluxations identified. There are large anterior   bridging osteophytes at C3-4 through C6-7. Multilevel uncovertebral joint   and facet arthropathy is again identified. The prevertebral soft tissues   are within normal limits.    Impression:    Brain CT: No acute hemorrhage, infarct or displaced calvarial fracture.    Cervical spine CT: No acute fracture or traumatic subluxation. Multilevel   degenerative changes.    BRYAN LYNCH M.D., ATTENDING RADIOLOGIST  This document has been electronically signed. Mar 26 2018  5:10PM

## 2018-06-01 NOTE — PROGRESS NOTE ADULT - ASSESSMENT
A/P: Clinically suspect underlining parkinsonism/PD but limited evaluation in the setting of multiple medical issues/hospitalization/infection. [facility] scan 12/2017 supported diagnosis of PD    Plan  1. will start sinemet now given likelihood pt will have difficulty following up as an outpt in timely manner and to possibly help  his rehabilitation potential.   - start 1/2 tab 25/100 mg tid  2. Continue supportive care, Abx per ID  3. PT  4. Frequent reorientation, high risk for delirium, avoidance of delirium provoking medications.  will monitor closely on sinemet as can cause confusion  Plan reviewed with RCU NP.

## 2018-06-01 NOTE — PROGRESS NOTE ADULT - PROBLEM SELECTOR PLAN 2
-Coag neg staph here likely contaminant  -DC vanco    Spoke to micro dept at [facility] 5/30- bcx from 5/27 grew Coag neg staph and Acinetobacter baumannii

## 2018-06-01 NOTE — PROGRESS NOTE ADULT - ATTENDING COMMENTS
Mickey Benites  Attending Physician   Division of Infectious Disease  Pager #[phone]  After 5pm/weekend or no response, call #[phone]    Please call the ID service [phone] with questions or concerns over the weekend.

## 2018-06-01 NOTE — PROGRESS NOTE ADULT - SUBJECTIVE AND OBJECTIVE BOX
Patient is a 77y old  Male who presents with a chief complaint of Admitted with fever (27 May 2018 20:28)      Interval Events:    REVIEW OF SYSTEMS:  [ ] Positive  [ ] All other systems negative  [x ] Unable to assess ROS because _nonverbal_______    Vital Signs Last 24 Hrs  T(C): 36.4 (06-01-18 @ 13:04), Max: 37.1 (05-31-18 @ 18:00)  T(F): 97.5 (06-01-18 @ 13:04), Max: 98.8 (05-31-18 @ 18:00)  HR: 73 (06-01-18 @ 13:04) (64 - 100)  BP: 182/66 (06-01-18 @ 13:04) (149/56 - 182/66)  RR: 18 (06-01-18 @ 13:04) (18 - 20)  SpO2: 100% (06-01-18 @ 13:04) (70% - 100%)    PHYSICAL EXAM:  HEENT:   [x ]Tracheostomy:  [ ]Pupils equal  [ ]No oral lesions  [ ]Abnormal    SKIN  [ ]No Rash  [x ] Abnormal-DTI left and right buttocks  [ ] pressure    CARDIAC  [x ]Regular  [ ]Abnormal    PULMONARY  [x ]Bilateral Clear Breath Sounds  [ ]Normal Excursion  [ ]Abnormal    GI  [x ]PEG      [x ] +BS		              [x ]Soft, nondistended, nontender	  [ ]Abnormal    MUSCULOSKELETAL                                   [ ]Bedbound                 [ ]Abnormal    [x ]Ambulatory/OOB to chair                           EXTREMITIES                                         [ ]Normal  [ ]Edema                           NEUROLOGIC  [ ] Normal, non focal  [x ] Focal findings: Parkinsons    PSYCHIATRIC  [x ]Alert and appropriate  [ ] Sedated	 [ ]Agitated    :  Ybarra: [ ] Yes, if yes: Date of Placement:                   [ x ] No    LINES: Central Lines [ ] Yes, if yes: Date of Placement                                     [  ] No    HOSPITAL MEDICATIONS:  MEDICATIONS  (STANDING):  ampicillin/sulbactam  IVPB      ampicillin/sulbactam  IVPB 3 Gram(s) IV Intermittent every 6 hours  aspirin  chewable 81 milliGRAM(s) Oral daily  carbidopa/levodopa  25/100 0.5 Tablet(s) Oral three times a day  clonazePAM Tablet 0.5 milliGRAM(s) Oral <User Schedule>  clopidogrel Tablet 75 milliGRAM(s) Oral daily  dextrose 5% + sodium chloride 0.45%. 1000 milliLiter(s) (75 mL/Hr) IV Continuous <Continuous>  dextrose 5%. 1000 milliLiter(s) (50 mL/Hr) IV Continuous <Continuous>  dextrose 50% Injectable 12.5 Gram(s) IV Push once  dextrose 50% Injectable 25 Gram(s) IV Push once  dextrose 50% Injectable 25 Gram(s) IV Push once  doxazosin 2 milliGRAM(s) Oral at bedtime  enalapril 10 milliGRAM(s) Oral two times a day  heparin  Injectable 5000 Unit(s) SubCutaneous every 8 hours  hydrALAZINE 25 milliGRAM(s) Oral every 8 hours  insulin lispro (HumaLOG) corrective regimen sliding scale   SubCutaneous every 6 hours  metoprolol tartrate 100 milliGRAM(s) Oral two times a day  ofloxacin 0.3% Solution 1 Drop(s) Both EYES every 3 hours  pantoprazole  Injectable 40 milliGRAM(s) IV Push daily    MEDICATIONS  (PRN):  dextrose 40% Gel 15 Gram(s) Oral once PRN Blood Glucose LESS THAN 70 milliGRAM(s)/deciLiter  glucagon  Injectable 1 milliGRAM(s) IntraMuscular once PRN Glucose <70 milliGRAM(s)/deciLiter  melatonin 3 milliGRAM(s) Oral at bedtime PRN Insomnia  QUEtiapine 50 milliGRAM(s) Oral every 6 hours PRN break through aggitation      LABS:                        8.5    8.4   )-----------( 220      ( 01 Jun 2018 06:57 )             26.7     06-01    144  |  105  |  17  ----------------------------<  117<H>  3.6   |  32<H>  |  0.59    Ca    9.2      01 Jun 2018 06:56  Phos  2.3     06-01  Mg     1.9     06-01    TPro  5.7<L>  /  Alb  3.1<L>  /  TBili  0.8  /  DBili  x   /  AST  30  /  ALT  48<H>  /  AlkPhos  147<H>  06-01    PT/INR - ( 01 Jun 2018 07:11 )   PT: 12.1 sec;   INR: 1.11 ratio         PTT - ( 01 Jun 2018 07:11 )  PTT:29.2 sec        CAPILLARY BLOOD GLUCOSE    MICROBIOLOGY:     RADIOLOGY:  [ ] Reviewed and interpreted by me    Mode: CPAP with PS  FiO2: 30  PEEP: 5  PS: 12  MAP: 9  PIP: 18 Patient is a 77y old  Male who presents with a chief complaint of Admitted with fever (27 May 2018 20:28)      Interval Events: No acute events overnight     REVIEW OF SYSTEMS:  [ ] Positive  [ ] All other systems negative  [x ] Unable to assess ROS because _nonverbal_______    Vital Signs Last 24 Hrs  T(C): 36.4 (06-01-18 @ 13:04), Max: 37.1 (05-31-18 @ 18:00)  T(F): 97.5 (06-01-18 @ 13:04), Max: 98.8 (05-31-18 @ 18:00)  HR: 73 (06-01-18 @ 13:04) (64 - 100)  BP: 182/66 (06-01-18 @ 13:04) (149/56 - 182/66)  RR: 18 (06-01-18 @ 13:04) (18 - 20)  SpO2: 100% (06-01-18 @ 13:04) (70% - 100%)    PHYSICAL EXAM:  HEENT:   [x ]Tracheostomy:  [x ]Pupils equal  [ x]No oral lesions  [ ]Abnormal    SKIN  [ ]No Rash  [x ] Abnormal-DTI left and right buttocks  [ ] pressure    CARDIAC  [x ]Regular  [ ]Abnormal    PULMONARY  [x ]Bilateral Clear Breath Sounds  [ ]Normal Excursion  [ ]Abnormal    GI  [x ]PEG      [x ] +BS		              [x ]Soft, nondistended, nontender	  [ ]Abnormal    MUSCULOSKELETAL                                   [ ]Bedbound                 [ ]Abnormal    [x ]Ambulatory/OOB to chair                           EXTREMITIES                                         [ ]Normal  [ ]Edema                           NEUROLOGIC  [ ] Normal, non focal  [x ] Focal findings: Parkinsons    PSYCHIATRIC  [x ]Alert and appropriate  [ ] Sedated	 [ ]Agitated    :  Ybarra: [ ] Yes, if yes: Date of Placement:                   [ x ] No    LINES: Central Lines [ ] Yes, if yes: Date of Placement                                     [  ] No    HOSPITAL MEDICATIONS:  MEDICATIONS  (STANDING):  ampicillin/sulbactam  IVPB      ampicillin/sulbactam  IVPB 3 Gram(s) IV Intermittent every 6 hours  aspirin  chewable 81 milliGRAM(s) Oral daily  carbidopa/levodopa  25/100 0.5 Tablet(s) Oral three times a day  clonazePAM Tablet 0.5 milliGRAM(s) Oral <User Schedule>  clopidogrel Tablet 75 milliGRAM(s) Oral daily  dextrose 5% + sodium chloride 0.45%. 1000 milliLiter(s) (75 mL/Hr) IV Continuous <Continuous>  dextrose 5%. 1000 milliLiter(s) (50 mL/Hr) IV Continuous <Continuous>  dextrose 50% Injectable 12.5 Gram(s) IV Push once  dextrose 50% Injectable 25 Gram(s) IV Push once  dextrose 50% Injectable 25 Gram(s) IV Push once  doxazosin 2 milliGRAM(s) Oral at bedtime  enalapril 10 milliGRAM(s) Oral two times a day  heparin  Injectable 5000 Unit(s) SubCutaneous every 8 hours  hydrALAZINE 25 milliGRAM(s) Oral every 8 hours  insulin lispro (HumaLOG) corrective regimen sliding scale   SubCutaneous every 6 hours  metoprolol tartrate 100 milliGRAM(s) Oral two times a day  ofloxacin 0.3% Solution 1 Drop(s) Both EYES every 3 hours  pantoprazole  Injectable 40 milliGRAM(s) IV Push daily    MEDICATIONS  (PRN):  dextrose 40% Gel 15 Gram(s) Oral once PRN Blood Glucose LESS THAN 70 milliGRAM(s)/deciLiter  glucagon  Injectable 1 milliGRAM(s) IntraMuscular once PRN Glucose <70 milliGRAM(s)/deciLiter  melatonin 3 milliGRAM(s) Oral at bedtime PRN Insomnia  QUEtiapine 50 milliGRAM(s) Oral every 6 hours PRN break through aggitation      LABS:                        8.5    8.4   )-----------( 220      ( 01 Jun 2018 06:57 )             26.7     06-01    144  |  105  |  17  ----------------------------<  117<H>  3.6   |  32<H>  |  0.59    Ca    9.2      01 Jun 2018 06:56  Phos  2.3     06-01  Mg     1.9     06-01    TPro  5.7<L>  /  Alb  3.1<L>  /  TBili  0.8  /  DBili  x   /  AST  30  /  ALT  48<H>  /  AlkPhos  147<H>  06-01    PT/INR - ( 01 Jun 2018 07:11 )   PT: 12.1 sec;   INR: 1.11 ratio         PTT - ( 01 Jun 2018 07:11 )  PTT:29.2 sec        CAPILLARY BLOOD GLUCOSE    MICROBIOLOGY:     RADIOLOGY:  [ ] Reviewed and interpreted by me    Mode: CPAP with PS  FiO2: 30  PEEP: 5  PS: 12  MAP: 9  PIP: 18

## 2018-06-02 DIAGNOSIS — G20 PARKINSON'S DISEASE: ICD-10-CM

## 2018-06-02 LAB
ALBUMIN SERPL ELPH-MCNC: 2.9 G/DL — LOW (ref 3.3–5)
ALP SERPL-CCNC: 144 U/L — HIGH (ref 40–120)
ALT FLD-CCNC: 8 U/L — LOW (ref 10–45)
ANION GAP SERPL CALC-SCNC: 8 MMOL/L — SIGNIFICANT CHANGE UP (ref 5–17)
APTT BLD: 33 SEC — SIGNIFICANT CHANGE UP (ref 27.5–37.4)
AST SERPL-CCNC: 23 U/L — SIGNIFICANT CHANGE UP (ref 10–40)
BILIRUB SERPL-MCNC: 0.6 MG/DL — SIGNIFICANT CHANGE UP (ref 0.2–1.2)
BUN SERPL-MCNC: 16 MG/DL — SIGNIFICANT CHANGE UP (ref 7–23)
CALCIUM SERPL-MCNC: 9.2 MG/DL — SIGNIFICANT CHANGE UP (ref 8.4–10.5)
CHLORIDE SERPL-SCNC: 104 MMOL/L — SIGNIFICANT CHANGE UP (ref 96–108)
CO2 SERPL-SCNC: 30 MMOL/L — SIGNIFICANT CHANGE UP (ref 22–31)
CREAT SERPL-MCNC: 0.48 MG/DL — LOW (ref 0.5–1.3)
GLUCOSE BLDC GLUCOMTR-MCNC: 135 MG/DL — HIGH (ref 70–99)
GLUCOSE BLDC GLUCOMTR-MCNC: 156 MG/DL — HIGH (ref 70–99)
GLUCOSE BLDC GLUCOMTR-MCNC: 161 MG/DL — HIGH (ref 70–99)
GLUCOSE BLDC GLUCOMTR-MCNC: 174 MG/DL — HIGH (ref 70–99)
GLUCOSE SERPL-MCNC: 129 MG/DL — HIGH (ref 70–99)
HCT VFR BLD CALC: 26.8 % — LOW (ref 39–50)
HGB BLD-MCNC: 9 G/DL — LOW (ref 13–17)
INR BLD: 1.12 RATIO — SIGNIFICANT CHANGE UP (ref 0.88–1.16)
MAGNESIUM SERPL-MCNC: 1.8 MG/DL — SIGNIFICANT CHANGE UP (ref 1.6–2.6)
MCHC RBC-ENTMCNC: 33.4 GM/DL — SIGNIFICANT CHANGE UP (ref 32–36)
MCHC RBC-ENTMCNC: 33.7 PG — SIGNIFICANT CHANGE UP (ref 27–34)
MCV RBC AUTO: 101 FL — HIGH (ref 80–100)
PHOSPHATE SERPL-MCNC: 2.4 MG/DL — LOW (ref 2.5–4.5)
PLATELET # BLD AUTO: 246 K/UL — SIGNIFICANT CHANGE UP (ref 150–400)
POTASSIUM SERPL-MCNC: 3.4 MMOL/L — LOW (ref 3.5–5.3)
POTASSIUM SERPL-SCNC: 3.4 MMOL/L — LOW (ref 3.5–5.3)
PROT SERPL-MCNC: 5.7 G/DL — LOW (ref 6–8.3)
PROTHROM AB SERPL-ACNC: 12.1 SEC — SIGNIFICANT CHANGE UP (ref 9.8–12.7)
RBC # BLD: 2.66 M/UL — LOW (ref 4.2–5.8)
RBC # FLD: 13 % — SIGNIFICANT CHANGE UP (ref 10.3–14.5)
SODIUM SERPL-SCNC: 142 MMOL/L — SIGNIFICANT CHANGE UP (ref 135–145)
WBC # BLD: 11.7 K/UL — HIGH (ref 3.8–10.5)
WBC # FLD AUTO: 11.7 K/UL — HIGH (ref 3.8–10.5)

## 2018-06-02 PROCEDURE — 99233 SBSQ HOSP IP/OBS HIGH 50: CPT | Mod: GC

## 2018-06-02 PROCEDURE — 99232 SBSQ HOSP IP/OBS MODERATE 35: CPT

## 2018-06-02 RX ORDER — POTASSIUM CHLORIDE 20 MEQ
40 PACKET (EA) ORAL
Qty: 0 | Refills: 0 | Status: COMPLETED | OUTPATIENT
Start: 2018-06-02 | End: 2018-06-04

## 2018-06-02 RX ORDER — POTASSIUM PHOSPHATE, MONOBASIC POTASSIUM PHOSPHATE, DIBASIC 236; 224 MG/ML; MG/ML
15 INJECTION, SOLUTION INTRAVENOUS ONCE
Qty: 0 | Refills: 0 | Status: COMPLETED | OUTPATIENT
Start: 2018-06-02 | End: 2018-06-02

## 2018-06-02 RX ADMIN — Medication 1 DROP(S): at 05:31

## 2018-06-02 RX ADMIN — Medication 0.5 MILLIGRAM(S): at 20:14

## 2018-06-02 RX ADMIN — Medication 1 DROP(S): at 22:04

## 2018-06-02 RX ADMIN — Medication 1 DROP(S): at 17:41

## 2018-06-02 RX ADMIN — Medication 25 MILLIGRAM(S): at 13:39

## 2018-06-02 RX ADMIN — Medication 1 DROP(S): at 00:46

## 2018-06-02 RX ADMIN — AMPICILLIN SODIUM AND SULBACTAM SODIUM 200 GRAM(S): 250; 125 INJECTION, POWDER, FOR SUSPENSION INTRAMUSCULAR; INTRAVENOUS at 17:52

## 2018-06-02 RX ADMIN — CARBIDOPA AND LEVODOPA 0.5 TABLET(S): 25; 100 TABLET ORAL at 13:39

## 2018-06-02 RX ADMIN — HEPARIN SODIUM 5000 UNIT(S): 5000 INJECTION INTRAVENOUS; SUBCUTANEOUS at 22:05

## 2018-06-02 RX ADMIN — Medication 1 DROP(S): at 03:10

## 2018-06-02 RX ADMIN — CLOPIDOGREL BISULFATE 75 MILLIGRAM(S): 75 TABLET, FILM COATED ORAL at 11:39

## 2018-06-02 RX ADMIN — Medication 1 DROP(S): at 15:01

## 2018-06-02 RX ADMIN — PANTOPRAZOLE SODIUM 40 MILLIGRAM(S): 20 TABLET, DELAYED RELEASE ORAL at 11:39

## 2018-06-02 RX ADMIN — HEPARIN SODIUM 5000 UNIT(S): 5000 INJECTION INTRAVENOUS; SUBCUTANEOUS at 05:31

## 2018-06-02 RX ADMIN — Medication 1 DROP(S): at 09:33

## 2018-06-02 RX ADMIN — AMPICILLIN SODIUM AND SULBACTAM SODIUM 200 GRAM(S): 250; 125 INJECTION, POWDER, FOR SUSPENSION INTRAMUSCULAR; INTRAVENOUS at 10:34

## 2018-06-02 RX ADMIN — Medication 2 MILLIGRAM(S): at 22:04

## 2018-06-02 RX ADMIN — CARBIDOPA AND LEVODOPA 0.5 TABLET(S): 25; 100 TABLET ORAL at 05:31

## 2018-06-02 RX ADMIN — Medication 40 MILLIEQUIVALENT(S): at 17:41

## 2018-06-02 RX ADMIN — Medication 1: at 11:39

## 2018-06-02 RX ADMIN — Medication 25 MILLIGRAM(S): at 05:31

## 2018-06-02 RX ADMIN — Medication 100 MILLIGRAM(S): at 17:41

## 2018-06-02 RX ADMIN — CARBIDOPA AND LEVODOPA 0.5 TABLET(S): 25; 100 TABLET ORAL at 22:04

## 2018-06-02 RX ADMIN — Medication 10 MILLIGRAM(S): at 05:31

## 2018-06-02 RX ADMIN — Medication 81 MILLIGRAM(S): at 11:39

## 2018-06-02 RX ADMIN — Medication 25 MILLIGRAM(S): at 22:04

## 2018-06-02 RX ADMIN — Medication 1: at 00:46

## 2018-06-02 RX ADMIN — HEPARIN SODIUM 5000 UNIT(S): 5000 INJECTION INTRAVENOUS; SUBCUTANEOUS at 13:40

## 2018-06-02 RX ADMIN — AMPICILLIN SODIUM AND SULBACTAM SODIUM 200 GRAM(S): 250; 125 INJECTION, POWDER, FOR SUSPENSION INTRAMUSCULAR; INTRAVENOUS at 05:30

## 2018-06-02 RX ADMIN — Medication 1 DROP(S): at 11:39

## 2018-06-02 RX ADMIN — Medication 10 MILLIGRAM(S): at 17:41

## 2018-06-02 RX ADMIN — Medication 100 MILLIGRAM(S): at 05:31

## 2018-06-02 RX ADMIN — POTASSIUM PHOSPHATE, MONOBASIC POTASSIUM PHOSPHATE, DIBASIC 62.5 MILLIMOLE(S): 236; 224 INJECTION, SOLUTION INTRAVENOUS at 13:42

## 2018-06-02 RX ADMIN — Medication 1: at 17:59

## 2018-06-02 NOTE — PROGRESS NOTE ADULT - PROBLEM SELECTOR PLAN 2
Blood cx from [facility] + acienobacter and CNS   Blood Cx sent  1/2 [facility]-  5/29 Bld cx negative times 2  Patient with + Urinalaysis, Urine Cx negative to date   Sputum Cx no growth   CT Chest Non Contrast and CT Abdomen and pelvis with po and IV contrast ordered as per Dr. Trevino to rule out infectious source. No specific infective course  Blood cx from [facility] with Acinebater and CNS abx changed to ertapenem as it is resistant Blood cx from [facility] + acienobacter and CNS   Blood Cx sent  1/2 [facility]-  5/29 Bld cx negative times 2  Patient with + Urinalaysis, Urine Cx negative to date   Sputum Cx no growth   CT Chest Non Contrast and CT Abdomen and pelvis with po and IV contrast ordered as per Dr. Trevino to rule out infectious source. No specific infective course   6/1  Blood cx from [facility] with Acinebater and CNS abx changed to ertapenem as it is resistant Acinetobacter baumannii or Acinetobacter haemolyticus identified by diagnostic testing.  Plan: -on bcx+  -cont unasyn - plan 10-14 days - day 4 of abx  -repeat bcx negative  -suspect resp source.

## 2018-06-02 NOTE — PROGRESS NOTE ADULT - PROBLEM SELECTOR PLAN 10
Clinically suspect underlining parkinsonism/PD but limited evaluation in the setting of multiple medical issues/hospitalization/infection. [facility] scan 12/2017 supported diagnosis of PD  start low dose sinemet  follow up with neuro after discharge

## 2018-06-02 NOTE — PROGRESS NOTE ADULT - SUBJECTIVE AND OBJECTIVE BOX
NANO SZYMANSKI 77y [id number]    Patient is a 77y old  Male who presents with a chief complaint of Admitted with fever (27 May 2018 20:28)      Follow Up/CC:  ID following for fever    Interval History/ROS: in RCU, no acute issues o/n    Allergies    No Known Allergies    Intolerances        ANTIMICROBIALS:  ampicillin/sulbactam  IVPB    ampicillin/sulbactam  IVPB 3 every 6 hours      MEDICATIONS  (STANDING):  ampicillin/sulbactam  IVPB      ampicillin/sulbactam  IVPB 3 Gram(s) IV Intermittent every 6 hours  aspirin  chewable 81 milliGRAM(s) Oral daily  carbidopa/levodopa  25/100 0.5 Tablet(s) Oral three times a day  clonazePAM Tablet 0.5 milliGRAM(s) Oral <User Schedule>  clopidogrel Tablet 75 milliGRAM(s) Oral daily  dextrose 5% + sodium chloride 0.45%. 1000 milliLiter(s) (75 mL/Hr) IV Continuous <Continuous>  dextrose 5%. 1000 milliLiter(s) (50 mL/Hr) IV Continuous <Continuous>  dextrose 50% Injectable 12.5 Gram(s) IV Push once  dextrose 50% Injectable 25 Gram(s) IV Push once  dextrose 50% Injectable 25 Gram(s) IV Push once  doxazosin 2 milliGRAM(s) Oral at bedtime  enalapril 10 milliGRAM(s) Oral two times a day  heparin  Injectable 5000 Unit(s) SubCutaneous every 8 hours  hydrALAZINE 25 milliGRAM(s) Oral every 8 hours  insulin lispro (HumaLOG) corrective regimen sliding scale   SubCutaneous every 6 hours  metoprolol tartrate 100 milliGRAM(s) Oral two times a day  ofloxacin 0.3% Solution 1 Drop(s) Both EYES every 3 hours  pantoprazole  Injectable 40 milliGRAM(s) IV Push daily    MEDICATIONS  (PRN):  dextrose 40% Gel 15 Gram(s) Oral once PRN Blood Glucose LESS THAN 70 milliGRAM(s)/deciLiter  glucagon  Injectable 1 milliGRAM(s) IntraMuscular once PRN Glucose <70 milliGRAM(s)/deciLiter  melatonin 3 milliGRAM(s) Oral at bedtime PRN Insomnia  QUEtiapine 50 milliGRAM(s) Oral every 6 hours PRN break through aggitation        Vital Signs Last 24 Hrs  T(C): 37.1 (02 Jun 2018 04:08), Max: 37.1 (01 Jun 2018 16:52)  T(F): 98.8 (02 Jun 2018 04:08), Max: 98.8 (01 Jun 2018 16:52)  HR: 65 (02 Jun 2018 07:56) (60 - 100)  BP: 149/57 (02 Jun 2018 07:56) (149/57 - 182/66)  BP(mean): --  RR: 20 (02 Jun 2018 07:56) (18 - 22)  SpO2: 100% (02 Jun 2018 07:56) (70% - 100%)    CBC Full  -  ( 01 Jun 2018 06:57 )  WBC Count : 8.4 K/uL  Hemoglobin : 8.5 g/dL  Hematocrit : 26.7 %  Platelet Count - Automated : 220 K/uL  Mean Cell Volume : 102.0 fl  Mean Cell Hemoglobin : 32.5 pg  Mean Cell Hemoglobin Concentration : 31.9 gm/dL  Auto Neutrophil # : x  Auto Lymphocyte # : x  Auto Monocyte # : x  Auto Eosinophil # : x  Auto Basophil # : x  Auto Neutrophil % : x  Auto Lymphocyte % : x  Auto Monocyte % : x  Auto Eosinophil % : x  Auto Basophil % : x    06-02    142  |  104  |  16  ----------------------------<  129<H>  3.4<L>   |  30  |  0.48<L>    Ca    9.2      02 Jun 2018 07:13  Phos  2.4     06-02  Mg     1.8     06-02    TPro  5.7<L>  /  Alb  2.9<L>  /  TBili  0.6  /  DBili  x   /  AST  23  /  ALT  8<L>  /  AlkPhos  144<H>  06-02    LIVER FUNCTIONS - ( 02 Jun 2018 07:13 )  Alb: 2.9 g/dL / Pro: 5.7 g/dL / ALK PHOS: 144 U/L / ALT: 8 U/L / AST: 23 U/L / GGT: x               MICROBIOLOGY:  .Blood Blood  05-29-18   No growth to date.  --  --      .Blood Blood  05-29-18   No growth to date.  --  --      .Sputum Sputum, trap  05-29-18   Few Pseudomonas aeruginosa  Normal Respiratory Silvia present  --  Pseudomonas aeruginosa      .Blood Blood-Peripheral  05-27-18   No growth at 5 days.  --  Blood Culture PCR      .Urine Catheterized  05-27-18   No growth  --  --    Rapid RVP Result: Shankar (05-27 @ 20:11)    RADIOLOGY

## 2018-06-02 NOTE — PROGRESS NOTE ADULT - ATTENDING COMMENTS
Mickey Benites  Attending Physician   Division of Infectious Disease  Pager #[phone]  After 5pm/weekend or no response, call #[phone]    Please call the ID service [phone] with questions or concerns over the weekend.

## 2018-06-02 NOTE — PROGRESS NOTE ADULT - ATTENDING COMMENTS
chronic resp failure with hypoxia, vent dependence  admitted with acintobacter bacteremia/pna, on unasyn. ID f/u appreciated  weaning trials as tolerated

## 2018-06-02 NOTE — PROGRESS NOTE ADULT - SUBJECTIVE AND OBJECTIVE BOX
Patient is a 77y old  Male who presents with a chief complaint of Admitted with fever (27 May 2018 20:28)      Interval Events:    REVIEW OF SYSTEMS:  [ ] Positive  [ ] All other systems negative  [ ] Unable to assess ROS because ________    Vital Signs Last 24 Hrs  T(C): 37.1 (06-02-18 @ 04:08), Max: 37.1 (06-01-18 @ 16:52)  T(F): 98.8 (06-02-18 @ 04:08), Max: 98.8 (06-01-18 @ 16:52)  HR: 60 (06-02-18 @ 04:55) (60 - 100)  BP: 172/62 (06-02-18 @ 04:08) (153/75 - 182/66)  RR: 19 (06-02-18 @ 04:08) (18 - 22)  SpO2: 100% (06-02-18 @ 04:55) (70% - 100%)    PHYSICAL EXAM:  HEENT:   [ ]Tracheostomy:  [ ]Pupils equal  [ ]No oral lesions  [ ]Abnormal    SKIN  [ ]No Rash  [ ] Abnormal  [ ] pressure    CARDIAC  [ ]Regular  [ ]Abnormal    PULMONARY  [ ]Bilateral Clear Breath Sounds  [ ]Normal Excursion  [ ]Abnormal    GI  [ ]PEG      [ ] +BS		              [ ]Soft, nondistended, nontender	  [ ]Abnormal    MUSCULOSKELETAL                                   [ ]Bedbound                 [ ]Abnormal    [ ]Ambulatory/OOB to chair                           EXTREMITIES                                         [ ]Normal  [ ]Edema                           NEUROLOGIC  [ ] Normal, non focal  [ ] Focal findings:    PSYCHIATRIC  [ ]Alert and appropriate  [ ] Sedated	 [ ]Agitated    :  Ybarra: [ ] Yes, if yes: Date of Placement:                   [  ] No    LINES: Central Lines [ ] Yes, if yes: Date of Placement                                     [  ] No    HOSPITAL MEDICATIONS:  MEDICATIONS  (STANDING):  ampicillin/sulbactam  IVPB      ampicillin/sulbactam  IVPB 3 Gram(s) IV Intermittent every 6 hours  aspirin  chewable 81 milliGRAM(s) Oral daily  carbidopa/levodopa  25/100 0.5 Tablet(s) Oral three times a day  clonazePAM Tablet 0.5 milliGRAM(s) Oral <User Schedule>  clopidogrel Tablet 75 milliGRAM(s) Oral daily  dextrose 5% + sodium chloride 0.45%. 1000 milliLiter(s) (75 mL/Hr) IV Continuous <Continuous>  dextrose 5%. 1000 milliLiter(s) (50 mL/Hr) IV Continuous <Continuous>  dextrose 50% Injectable 12.5 Gram(s) IV Push once  dextrose 50% Injectable 25 Gram(s) IV Push once  dextrose 50% Injectable 25 Gram(s) IV Push once  doxazosin 2 milliGRAM(s) Oral at bedtime  enalapril 10 milliGRAM(s) Oral two times a day  heparin  Injectable 5000 Unit(s) SubCutaneous every 8 hours  hydrALAZINE 25 milliGRAM(s) Oral every 8 hours  insulin lispro (HumaLOG) corrective regimen sliding scale   SubCutaneous every 6 hours  metoprolol tartrate 100 milliGRAM(s) Oral two times a day  ofloxacin 0.3% Solution 1 Drop(s) Both EYES every 3 hours  pantoprazole  Injectable 40 milliGRAM(s) IV Push daily    MEDICATIONS  (PRN):  dextrose 40% Gel 15 Gram(s) Oral once PRN Blood Glucose LESS THAN 70 milliGRAM(s)/deciLiter  glucagon  Injectable 1 milliGRAM(s) IntraMuscular once PRN Glucose <70 milliGRAM(s)/deciLiter  melatonin 3 milliGRAM(s) Oral at bedtime PRN Insomnia  QUEtiapine 50 milliGRAM(s) Oral every 6 hours PRN break through aggitation      LABS:                        8.5    8.4   )-----------( 220      ( 01 Jun 2018 06:57 )             26.7     06-01    144  |  105  |  17  ----------------------------<  117<H>  3.6   |  32<H>  |  0.59    Ca    9.2      01 Jun 2018 06:56  Phos  2.3     06-01  Mg     1.9     06-01    TPro  5.7<L>  /  Alb  3.1<L>  /  TBili  0.8  /  DBili  x   /  AST  30  /  ALT  48<H>  /  AlkPhos  147<H>  06-01    PT/INR - ( 02 Jun 2018 07:13 )   PT: 12.1 sec;   INR: 1.12 ratio         PTT - ( 02 Jun 2018 07:13 )  PTT:33.0 sec        CAPILLARY BLOOD GLUCOSE    MICROBIOLOGY:     RADIOLOGY:  [ ] Reviewed and interpreted by me    Mode: AC/ CMV (Assist Control/ Continuous Mandatory Ventilation)  RR (machine): 16  TV (machine): 500  FiO2: 30  PEEP: 5  ITime: 1  MAP: 10  PIP: 28 Patient is a 77y old  Male who presents with a chief complaint of Admitted with fever (27 May 2018 20:28)      Interval Events:  no issues overnight    REVIEW OF SYSTEMS:  [ ] Positive  [ ] All other systems negative  [x ] Unable to assess ROS because  non verbal________    Vital Signs Last 24 Hrs  T(C): 37.1 (06-02-18 @ 04:08), Max: 37.1 (06-01-18 @ 16:52)  T(F): 98.8 (06-02-18 @ 04:08), Max: 98.8 (06-01-18 @ 16:52)  HR: 60 (06-02-18 @ 04:55) (60 - 100)  BP: 172/62 (06-02-18 @ 04:08) (153/75 - 182/66)  RR: 19 (06-02-18 @ 04:08) (18 - 22)  SpO2: 100% (06-02-18 @ 04:55) (70% - 100%)    PHYSICAL EXAM:  HEENT:   [x ]Tracheostomy:   ps 15/5 840  12/5 11:30-645pm   [x ]Pupils equal  [ ]No oral lesions  [ ]Abnormal    SKIN  [ ]No Rash  [ ] Abnormal  [ ] pressure    CARDIAC  [ ]Regular  [ ]Abnormal    PULMONARY  [ ]Bilateral Clear Breath Sounds  [ ]Normal Excursion  [ ]Abnormal    GI  [ ]PEG      [ ] +BS		              [ ]Soft, nondistended, nontender	  [ ]Abnormal    MUSCULOSKELETAL                                   [ ]Bedbound                 [ ]Abnormal    [ ]Ambulatory/OOB to chair                           EXTREMITIES                                         [ ]Normal  [ ]Edema                           NEUROLOGIC  [ ] Normal, non focal  [ ] Focal findings:    PSYCHIATRIC  [ ]Alert and appropriate  [ ] Sedated	 [ ]Agitated    :  Ybarra: [ ] Yes, if yes: Date of Placement:                   [  ] No    LINES: Central Lines [ ] Yes, if yes: Date of Placement                                     [  ] No    HOSPITAL MEDICATIONS:  MEDICATIONS  (STANDING):  ampicillin/sulbactam  IVPB      ampicillin/sulbactam  IVPB 3 Gram(s) IV Intermittent every 6 hours  aspirin  chewable 81 milliGRAM(s) Oral daily  carbidopa/levodopa  25/100 0.5 Tablet(s) Oral three times a day  clonazePAM Tablet 0.5 milliGRAM(s) Oral <User Schedule>  clopidogrel Tablet 75 milliGRAM(s) Oral daily  dextrose 5% + sodium chloride 0.45%. 1000 milliLiter(s) (75 mL/Hr) IV Continuous <Continuous>  dextrose 5%. 1000 milliLiter(s) (50 mL/Hr) IV Continuous <Continuous>  dextrose 50% Injectable 12.5 Gram(s) IV Push once  dextrose 50% Injectable 25 Gram(s) IV Push once  dextrose 50% Injectable 25 Gram(s) IV Push once  doxazosin 2 milliGRAM(s) Oral at bedtime  enalapril 10 milliGRAM(s) Oral two times a day  heparin  Injectable 5000 Unit(s) SubCutaneous every 8 hours  hydrALAZINE 25 milliGRAM(s) Oral every 8 hours  insulin lispro (HumaLOG) corrective regimen sliding scale   SubCutaneous every 6 hours  metoprolol tartrate 100 milliGRAM(s) Oral two times a day  ofloxacin 0.3% Solution 1 Drop(s) Both EYES every 3 hours  pantoprazole  Injectable 40 milliGRAM(s) IV Push daily    MEDICATIONS  (PRN):  dextrose 40% Gel 15 Gram(s) Oral once PRN Blood Glucose LESS THAN 70 milliGRAM(s)/deciLiter  glucagon  Injectable 1 milliGRAM(s) IntraMuscular once PRN Glucose <70 milliGRAM(s)/deciLiter  melatonin 3 milliGRAM(s) Oral at bedtime PRN Insomnia  QUEtiapine 50 milliGRAM(s) Oral every 6 hours PRN break through aggitation      LABS:                        8.5    8.4   )-----------( 220      ( 01 Jun 2018 06:57 )             26.7     06-01    144  |  105  |  17  ----------------------------<  117<H>  3.6   |  32<H>  |  0.59    Ca    9.2      01 Jun 2018 06:56  Phos  2.3     06-01  Mg     1.9     06-01    TPro  5.7<L>  /  Alb  3.1<L>  /  TBili  0.8  /  DBili  x   /  AST  30  /  ALT  48<H>  /  AlkPhos  147<H>  06-01    PT/INR - ( 02 Jun 2018 07:13 )   PT: 12.1 sec;   INR: 1.12 ratio         PTT - ( 02 Jun 2018 07:13 )  PTT:33.0 sec        CAPILLARY BLOOD GLUCOSE    MICROBIOLOGY:     RADIOLOGY:  [ ] Reviewed and interpreted by me    Mode: AC/ CMV (Assist Control/ Continuous Mandatory Ventilation)  RR (machine): 16  TV (machine): 500  FiO2: 30  PEEP: 5  ITime: 1  MAP: 10  PIP: 28 Patient is a 77y old  Male who presents with a chief complaint of Admitted with fever (27 May 2018 20:28)      Interval Events:  no issues overnight    REVIEW OF SYSTEMS:  [ ] Positive  [ ] All other systems negative  [x ] Unable to assess ROS because  non verbal________    Vital Signs Last 24 Hrs  T(C): 37.1 (06-02-18 @ 04:08), Max: 37.1 (06-01-18 @ 16:52)  T(F): 98.8 (06-02-18 @ 04:08), Max: 98.8 (06-01-18 @ 16:52)  HR: 60 (06-02-18 @ 04:55) (60 - 100)  BP: 172/62 (06-02-18 @ 04:08) (153/75 - 182/66)  RR: 19 (06-02-18 @ 04:08) (18 - 22)  SpO2: 100% (06-02-18 @ 04:55) (70% - 100%)    PHYSICAL EXAM:  HEENT:   [x ]Tracheostomy:   ps 15/5 840  12/5 11:30-645pm   [x ]Pupils equal  [ ]No oral lesions  [ ]Abnormal    SKIN  [x ]No Rash  [ ] Abnormal  [ ] pressure    CARDIAC  [x ]Regular  [ ]Abnormal    PULMONARY  [x ]Bilateral Clear Breath Sounds  [ ]Normal Excursion  [ ]Abnormal    GI  [x ]PEG      [ x] +BS		              [x ]Soft, nondistended, nontender	  [ ]Abnormal    MUSCULOSKELETAL                                   [x ]Bedbound                 [ ]Abnormal    [ ]Ambulatory/OOB to chair                           EXTREMITIES                                         [ ]Normal  [ ]Edema                           NEUROLOGIC  [ ] Normal, non focal  [ ] Focal findings:    PSYCHIATRIC  [x ]Alert and appropriate  [ ] Sedated	 [ ]Agitated    :  Ybarra: [ ] Yes, if yes: Date of Placement:                   [  ] No    LINES: Central Lines [ ] Yes, if yes: Date of Placement                                     [  ] No    HOSPITAL MEDICATIONS:  MEDICATIONS  (STANDING):  ampicillin/sulbactam  IVPB      ampicillin/sulbactam  IVPB 3 Gram(s) IV Intermittent every 6 hours  aspirin  chewable 81 milliGRAM(s) Oral daily  carbidopa/levodopa  25/100 0.5 Tablet(s) Oral three times a day  clonazePAM Tablet 0.5 milliGRAM(s) Oral <User Schedule>  clopidogrel Tablet 75 milliGRAM(s) Oral daily  dextrose 5% + sodium chloride 0.45%. 1000 milliLiter(s) (75 mL/Hr) IV Continuous <Continuous>  dextrose 5%. 1000 milliLiter(s) (50 mL/Hr) IV Continuous <Continuous>  dextrose 50% Injectable 12.5 Gram(s) IV Push once  dextrose 50% Injectable 25 Gram(s) IV Push once  dextrose 50% Injectable 25 Gram(s) IV Push once  doxazosin 2 milliGRAM(s) Oral at bedtime  enalapril 10 milliGRAM(s) Oral two times a day  heparin  Injectable 5000 Unit(s) SubCutaneous every 8 hours  hydrALAZINE 25 milliGRAM(s) Oral every 8 hours  insulin lispro (HumaLOG) corrective regimen sliding scale   SubCutaneous every 6 hours  metoprolol tartrate 100 milliGRAM(s) Oral two times a day  ofloxacin 0.3% Solution 1 Drop(s) Both EYES every 3 hours  pantoprazole  Injectable 40 milliGRAM(s) IV Push daily    MEDICATIONS  (PRN):  dextrose 40% Gel 15 Gram(s) Oral once PRN Blood Glucose LESS THAN 70 milliGRAM(s)/deciLiter  glucagon  Injectable 1 milliGRAM(s) IntraMuscular once PRN Glucose <70 milliGRAM(s)/deciLiter  melatonin 3 milliGRAM(s) Oral at bedtime PRN Insomnia  QUEtiapine 50 milliGRAM(s) Oral every 6 hours PRN break through aggitation      LABS:                        8.5    8.4   )-----------( 220      ( 01 Jun 2018 06:57 )             26.7     06-01    144  |  105  |  17  ----------------------------<  117<H>  3.6   |  32<H>  |  0.59    Ca    9.2      01 Jun 2018 06:56  Phos  2.3     06-01  Mg     1.9     06-01    TPro  5.7<L>  /  Alb  3.1<L>  /  TBili  0.8  /  DBili  x   /  AST  30  /  ALT  48<H>  /  AlkPhos  147<H>  06-01    PT/INR - ( 02 Jun 2018 07:13 )   PT: 12.1 sec;   INR: 1.12 ratio         PTT - ( 02 Jun 2018 07:13 )  PTT:33.0 sec        CAPILLARY BLOOD GLUCOSE    MICROBIOLOGY:     RADIOLOGY:  [ ] Reviewed and interpreted by me    Mode: AC/ CMV (Assist Control/ Continuous Mandatory Ventilation)  RR (machine): 16  TV (machine): 500  FiO2: 30  PEEP: 5  ITime: 1  MAP: 10  PIP: 28 Patient is a 77y old  Male who presents with a chief complaint of Admitted with fever (27 May 2018 20:28)      Interval Events:  no issues overnight    REVIEW OF SYSTEMS:  [ ] Positive  [ ] All other systems negative  [x ] Unable to assess ROS because  non verbal________    Vital Signs Last 24 Hrs  T(C): 37.1 (06-02-18 @ 04:08), Max: 37.1 (06-01-18 @ 16:52)  T(F): 98.8 (06-02-18 @ 04:08), Max: 98.8 (06-01-18 @ 16:52)  HR: 60 (06-02-18 @ 04:55) (60 - 100)  BP: 172/62 (06-02-18 @ 04:08) (153/75 - 182/66)  RR: 19 (06-02-18 @ 04:08) (18 - 22)  SpO2: 100% (06-02-18 @ 04:55) (70% - 100%)    PHYSICAL EXAM:  HEENT:   [x ]Tracheostomy:   ps 15/5 840  12/5 11:30-645pm   [x ]Pupils equal  [ ]No oral lesions  [ ]Abnormal    SKIN  [ ]No Rash  [x ] Abnormal  DTI  right and left buttock  [ ] pressure    CARDIAC  [x ]Regular  [ ]Abnormal    PULMONARY  [x ]Bilateral Clear Breath Sounds  [ ]Normal Excursion  [ ]Abnormal    GI  [x ]PEG      [ x] +BS		              [x ]Soft, nondistended, nontender	  [ ]Abnormal    MUSCULOSKELETAL                                   [x ]Bedbound                 [ ]Abnormal    [ ]Ambulatory/OOB to chair                           EXTREMITIES                                         [ ]Normal  [ ]Edema                           NEUROLOGIC  [ ] Normal, non focal  [ ] Focal findings:    PSYCHIATRIC  [x ]Alert and appropriate  [ ] Sedated	 [ ]Agitated    :  Ybarra: [ ] Yes, if yes: Date of Placement:                   [ x ] No    LINES: Central Lines [ ] Yes, if yes: Date of Placement                                     [x  ] No    HOSPITAL MEDICATIONS:  MEDICATIONS  (STANDING):  ampicillin/sulbactam  IVPB      ampicillin/sulbactam  IVPB 3 Gram(s) IV Intermittent every 6 hours  aspirin  chewable 81 milliGRAM(s) Oral daily  carbidopa/levodopa  25/100 0.5 Tablet(s) Oral three times a day  clonazePAM Tablet 0.5 milliGRAM(s) Oral <User Schedule>  clopidogrel Tablet 75 milliGRAM(s) Oral daily  dextrose 5% + sodium chloride 0.45%. 1000 milliLiter(s) (75 mL/Hr) IV Continuous <Continuous>  dextrose 5%. 1000 milliLiter(s) (50 mL/Hr) IV Continuous <Continuous>  dextrose 50% Injectable 12.5 Gram(s) IV Push once  dextrose 50% Injectable 25 Gram(s) IV Push once  dextrose 50% Injectable 25 Gram(s) IV Push once  doxazosin 2 milliGRAM(s) Oral at bedtime  enalapril 10 milliGRAM(s) Oral two times a day  heparin  Injectable 5000 Unit(s) SubCutaneous every 8 hours  hydrALAZINE 25 milliGRAM(s) Oral every 8 hours  insulin lispro (HumaLOG) corrective regimen sliding scale   SubCutaneous every 6 hours  metoprolol tartrate 100 milliGRAM(s) Oral two times a day  ofloxacin 0.3% Solution 1 Drop(s) Both EYES every 3 hours  pantoprazole  Injectable 40 milliGRAM(s) IV Push daily    MEDICATIONS  (PRN):  dextrose 40% Gel 15 Gram(s) Oral once PRN Blood Glucose LESS THAN 70 milliGRAM(s)/deciLiter  glucagon  Injectable 1 milliGRAM(s) IntraMuscular once PRN Glucose <70 milliGRAM(s)/deciLiter  melatonin 3 milliGRAM(s) Oral at bedtime PRN Insomnia  QUEtiapine 50 milliGRAM(s) Oral every 6 hours PRN break through aggitation      LABS:                        8.5    8.4   )-----------( 220      ( 01 Jun 2018 06:57 )             26.7     06-01    144  |  105  |  17  ----------------------------<  117<H>  3.6   |  32<H>  |  0.59    Ca    9.2      01 Jun 2018 06:56  Phos  2.3     06-01  Mg     1.9     06-01    TPro  5.7<L>  /  Alb  3.1<L>  /  TBili  0.8  /  DBili  x   /  AST  30  /  ALT  48<H>  /  AlkPhos  147<H>  06-01    PT/INR - ( 02 Jun 2018 07:13 )   PT: 12.1 sec;   INR: 1.12 ratio         PTT - ( 02 Jun 2018 07:13 )  PTT:33.0 sec        CAPILLARY BLOOD GLUCOSE    MICROBIOLOGY:     RADIOLOGY:  [ ] Reviewed and interpreted by me    Mode: AC/ CMV (Assist Control/ Continuous Mandatory Ventilation)  RR (machine): 16  TV (machine): 500  FiO2: 30  PEEP: 5  ITime: 1  MAP: 10  PIP: 28 Patient is a 77y old  Male who presents with a chief complaint of Admitted with fever (27 May 2018 20:28)      Interval Events:  no issues overnight    REVIEW OF SYSTEMS:  [ ] Positive  [ ] All other systems negative  [x ] Unable to assess ROS because  non verbal________    Vital Signs Last 24 Hrs  T(C): 37.1 (06-02-18 @ 04:08), Max: 37.1 (06-01-18 @ 16:52)  T(F): 98.8 (06-02-18 @ 04:08), Max: 98.8 (06-01-18 @ 16:52)  HR: 60 (06-02-18 @ 04:55) (60 - 100)  BP: 172/62 (06-02-18 @ 04:08) (153/75 - 182/66)  RR: 19 (06-02-18 @ 04:08) (18 - 22)  SpO2: 100% (06-02-18 @ 04:55) (70% - 100%)    PHYSICAL EXAM:  HEENT:   [x ]Tracheostomy:   ps 15/5 840  12/5 11:30-645pm   [x ]Pupils equal  [ ]No oral lesions  [ ]Abnormal    SKIN  [ ]No Rash  [x ] Abnormal  DTI  right and left buttock  [ ] pressure    CARDIAC  [x ]Regular  [ ]Abnormal    PULMONARY  [x ]Bilateral Clear Breath Sounds  [ ]Normal Excursion  [ ]Abnormal    GI  [x ]PEG      [ x] +BS		              [x ]Soft, nondistended, nontender	  [ ]Abnormal    MUSCULOSKELETAL                                   [x ]Bedbound                 [ ]Abnormal    [ ]Ambulatory/OOB to chair                           EXTREMITIES                                         [ ]Normal  [x ]Edema      trace                      NEUROLOGIC  [ ] Normal, non focal  [x ] Focal findings: tremors ue     PSYCHIATRIC  [x ]Alert and appropriate  [ ] Sedated	 [ ]Agitated    :  Ybarra: [ ] Yes, if yes: Date of Placement:                   [ x ] No    LINES: Central Lines [ ] Yes, if yes: Date of Placement                                     [x  ] No    HOSPITAL MEDICATIONS:  MEDICATIONS  (STANDING):  ampicillin/sulbactam  IVPB      ampicillin/sulbactam  IVPB 3 Gram(s) IV Intermittent every 6 hours  aspirin  chewable 81 milliGRAM(s) Oral daily  carbidopa/levodopa  25/100 0.5 Tablet(s) Oral three times a day  clonazePAM Tablet 0.5 milliGRAM(s) Oral <User Schedule>  clopidogrel Tablet 75 milliGRAM(s) Oral daily  dextrose 5% + sodium chloride 0.45%. 1000 milliLiter(s) (75 mL/Hr) IV Continuous <Continuous>  dextrose 5%. 1000 milliLiter(s) (50 mL/Hr) IV Continuous <Continuous>  dextrose 50% Injectable 12.5 Gram(s) IV Push once  dextrose 50% Injectable 25 Gram(s) IV Push once  dextrose 50% Injectable 25 Gram(s) IV Push once  doxazosin 2 milliGRAM(s) Oral at bedtime  enalapril 10 milliGRAM(s) Oral two times a day  heparin  Injectable 5000 Unit(s) SubCutaneous every 8 hours  hydrALAZINE 25 milliGRAM(s) Oral every 8 hours  insulin lispro (HumaLOG) corrective regimen sliding scale   SubCutaneous every 6 hours  metoprolol tartrate 100 milliGRAM(s) Oral two times a day  ofloxacin 0.3% Solution 1 Drop(s) Both EYES every 3 hours  pantoprazole  Injectable 40 milliGRAM(s) IV Push daily    MEDICATIONS  (PRN):  dextrose 40% Gel 15 Gram(s) Oral once PRN Blood Glucose LESS THAN 70 milliGRAM(s)/deciLiter  glucagon  Injectable 1 milliGRAM(s) IntraMuscular once PRN Glucose <70 milliGRAM(s)/deciLiter  melatonin 3 milliGRAM(s) Oral at bedtime PRN Insomnia  QUEtiapine 50 milliGRAM(s) Oral every 6 hours PRN break through aggitation      LABS:                        8.5    8.4   )-----------( 220      ( 01 Jun 2018 06:57 )             26.7     06-01    144  |  105  |  17  ----------------------------<  117<H>  3.6   |  32<H>  |  0.59    Ca    9.2      01 Jun 2018 06:56  Phos  2.3     06-01  Mg     1.9     06-01    TPro  5.7<L>  /  Alb  3.1<L>  /  TBili  0.8  /  DBili  x   /  AST  30  /  ALT  48<H>  /  AlkPhos  147<H>  06-01    PT/INR - ( 02 Jun 2018 07:13 )   PT: 12.1 sec;   INR: 1.12 ratio         PTT - ( 02 Jun 2018 07:13 )  PTT:33.0 sec        CAPILLARY BLOOD GLUCOSE    MICROBIOLOGY:     RADIOLOGY:  [ ] Reviewed and interpreted by me    Mode: AC/ CMV (Assist Control/ Continuous Mandatory Ventilation)  RR (machine): 16  TV (machine): 500  FiO2: 30  PEEP: 5  ITime: 1  MAP: 10  PIP: 28
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
## 2018-06-02 NOTE — PROGRESS NOTE ADULT - PROBLEM SELECTOR PLAN 2
-Coag neg staph here likely contaminant      Spoke to micro dept at [facility] 5/30- bcx from 5/27 grew Coag neg staph and Acinetobacter baumannii

## 2018-06-03 LAB
ALBUMIN SERPL ELPH-MCNC: 3.2 G/DL — LOW (ref 3.3–5)
ALP SERPL-CCNC: 147 U/L — HIGH (ref 40–120)
ALT FLD-CCNC: 25 U/L — SIGNIFICANT CHANGE UP (ref 10–45)
ANION GAP SERPL CALC-SCNC: 9 MMOL/L — SIGNIFICANT CHANGE UP (ref 5–17)
APTT BLD: 28.5 SEC — SIGNIFICANT CHANGE UP (ref 27.5–37.4)
AST SERPL-CCNC: 23 U/L — SIGNIFICANT CHANGE UP (ref 10–40)
BILIRUB SERPL-MCNC: 0.7 MG/DL — SIGNIFICANT CHANGE UP (ref 0.2–1.2)
BUN SERPL-MCNC: 14 MG/DL — SIGNIFICANT CHANGE UP (ref 7–23)
CALCIUM SERPL-MCNC: 9.4 MG/DL — SIGNIFICANT CHANGE UP (ref 8.4–10.5)
CHLORIDE SERPL-SCNC: 104 MMOL/L — SIGNIFICANT CHANGE UP (ref 96–108)
CO2 SERPL-SCNC: 29 MMOL/L — SIGNIFICANT CHANGE UP (ref 22–31)
CREAT SERPL-MCNC: 0.53 MG/DL — SIGNIFICANT CHANGE UP (ref 0.5–1.3)
CULTURE RESULTS: SIGNIFICANT CHANGE UP
CULTURE RESULTS: SIGNIFICANT CHANGE UP
GLUCOSE BLDC GLUCOMTR-MCNC: 111 MG/DL — HIGH (ref 70–99)
GLUCOSE BLDC GLUCOMTR-MCNC: 142 MG/DL — HIGH (ref 70–99)
GLUCOSE BLDC GLUCOMTR-MCNC: 157 MG/DL — HIGH (ref 70–99)
GLUCOSE BLDC GLUCOMTR-MCNC: 187 MG/DL — HIGH (ref 70–99)
GLUCOSE SERPL-MCNC: 120 MG/DL — HIGH (ref 70–99)
HCT VFR BLD CALC: 26.5 % — LOW (ref 39–50)
HGB BLD-MCNC: 8.7 G/DL — LOW (ref 13–17)
INR BLD: 1.1 RATIO — SIGNIFICANT CHANGE UP (ref 0.88–1.16)
MAGNESIUM SERPL-MCNC: 1.8 MG/DL — SIGNIFICANT CHANGE UP (ref 1.6–2.6)
MCHC RBC-ENTMCNC: 33 GM/DL — SIGNIFICANT CHANGE UP (ref 32–36)
MCHC RBC-ENTMCNC: 33.1 PG — SIGNIFICANT CHANGE UP (ref 27–34)
MCV RBC AUTO: 101 FL — HIGH (ref 80–100)
PHOSPHATE SERPL-MCNC: 2.9 MG/DL — SIGNIFICANT CHANGE UP (ref 2.5–4.5)
PLATELET # BLD AUTO: 266 K/UL — SIGNIFICANT CHANGE UP (ref 150–400)
POTASSIUM SERPL-MCNC: 4.2 MMOL/L — SIGNIFICANT CHANGE UP (ref 3.5–5.3)
POTASSIUM SERPL-SCNC: 4.2 MMOL/L — SIGNIFICANT CHANGE UP (ref 3.5–5.3)
PROT SERPL-MCNC: 6 G/DL — SIGNIFICANT CHANGE UP (ref 6–8.3)
PROTHROM AB SERPL-ACNC: 12 SEC — SIGNIFICANT CHANGE UP (ref 9.8–12.7)
RBC # BLD: 2.64 M/UL — LOW (ref 4.2–5.8)
RBC # FLD: 13.1 % — SIGNIFICANT CHANGE UP (ref 10.3–14.5)
SODIUM SERPL-SCNC: 142 MMOL/L — SIGNIFICANT CHANGE UP (ref 135–145)
SPECIMEN SOURCE: SIGNIFICANT CHANGE UP
SPECIMEN SOURCE: SIGNIFICANT CHANGE UP
WBC # BLD: 12 K/UL — HIGH (ref 3.8–10.5)
WBC # FLD AUTO: 12 K/UL — HIGH (ref 3.8–10.5)

## 2018-06-03 PROCEDURE — 99233 SBSQ HOSP IP/OBS HIGH 50: CPT | Mod: GC

## 2018-06-03 PROCEDURE — 99232 SBSQ HOSP IP/OBS MODERATE 35: CPT

## 2018-06-03 RX ADMIN — HEPARIN SODIUM 5000 UNIT(S): 5000 INJECTION INTRAVENOUS; SUBCUTANEOUS at 05:42

## 2018-06-03 RX ADMIN — Medication 0: at 18:03

## 2018-06-03 RX ADMIN — AMPICILLIN SODIUM AND SULBACTAM SODIUM 200 GRAM(S): 250; 125 INJECTION, POWDER, FOR SUSPENSION INTRAMUSCULAR; INTRAVENOUS at 05:41

## 2018-06-03 RX ADMIN — Medication 3 MILLIGRAM(S): at 21:26

## 2018-06-03 RX ADMIN — Medication 1 DROP(S): at 02:58

## 2018-06-03 RX ADMIN — AMPICILLIN SODIUM AND SULBACTAM SODIUM 200 GRAM(S): 250; 125 INJECTION, POWDER, FOR SUSPENSION INTRAMUSCULAR; INTRAVENOUS at 00:18

## 2018-06-03 RX ADMIN — HEPARIN SODIUM 5000 UNIT(S): 5000 INJECTION INTRAVENOUS; SUBCUTANEOUS at 21:26

## 2018-06-03 RX ADMIN — Medication 10 MILLIGRAM(S): at 18:03

## 2018-06-03 RX ADMIN — Medication 1 DROP(S): at 09:00

## 2018-06-03 RX ADMIN — Medication 2 MILLIGRAM(S): at 21:26

## 2018-06-03 RX ADMIN — Medication 40 MILLIEQUIVALENT(S): at 05:51

## 2018-06-03 RX ADMIN — Medication 1: at 00:17

## 2018-06-03 RX ADMIN — Medication 1 DROP(S): at 05:43

## 2018-06-03 RX ADMIN — Medication 25 MILLIGRAM(S): at 21:26

## 2018-06-03 RX ADMIN — Medication 0.5 MILLIGRAM(S): at 18:04

## 2018-06-03 RX ADMIN — Medication 40 MILLIEQUIVALENT(S): at 18:04

## 2018-06-03 RX ADMIN — Medication 10 MILLIGRAM(S): at 05:42

## 2018-06-03 RX ADMIN — Medication 25 MILLIGRAM(S): at 14:18

## 2018-06-03 RX ADMIN — CLOPIDOGREL BISULFATE 75 MILLIGRAM(S): 75 TABLET, FILM COATED ORAL at 11:54

## 2018-06-03 RX ADMIN — CARBIDOPA AND LEVODOPA 0.5 TABLET(S): 25; 100 TABLET ORAL at 14:18

## 2018-06-03 RX ADMIN — Medication 100 MILLIGRAM(S): at 05:43

## 2018-06-03 RX ADMIN — PANTOPRAZOLE SODIUM 40 MILLIGRAM(S): 20 TABLET, DELAYED RELEASE ORAL at 13:02

## 2018-06-03 RX ADMIN — Medication 1 DROP(S): at 14:17

## 2018-06-03 RX ADMIN — AMPICILLIN SODIUM AND SULBACTAM SODIUM 200 GRAM(S): 250; 125 INJECTION, POWDER, FOR SUSPENSION INTRAMUSCULAR; INTRAVENOUS at 20:12

## 2018-06-03 RX ADMIN — Medication 1: at 13:01

## 2018-06-03 RX ADMIN — AMPICILLIN SODIUM AND SULBACTAM SODIUM 200 GRAM(S): 250; 125 INJECTION, POWDER, FOR SUSPENSION INTRAMUSCULAR; INTRAVENOUS at 14:18

## 2018-06-03 RX ADMIN — Medication 100 MILLIGRAM(S): at 18:03

## 2018-06-03 RX ADMIN — Medication 25 MILLIGRAM(S): at 05:42

## 2018-06-03 RX ADMIN — HEPARIN SODIUM 5000 UNIT(S): 5000 INJECTION INTRAVENOUS; SUBCUTANEOUS at 14:18

## 2018-06-03 RX ADMIN — CARBIDOPA AND LEVODOPA 0.5 TABLET(S): 25; 100 TABLET ORAL at 21:26

## 2018-06-03 RX ADMIN — Medication 1 DROP(S): at 00:18

## 2018-06-03 RX ADMIN — CARBIDOPA AND LEVODOPA 0.5 TABLET(S): 25; 100 TABLET ORAL at 05:41

## 2018-06-03 RX ADMIN — Medication 81 MILLIGRAM(S): at 11:54

## 2018-06-03 NOTE — PROGRESS NOTE ADULT - PROBLEM SELECTOR PLAN 2
-Coag neg staph here likely contaminant      Spoke to micro dept at [facility] 5/30- bcx from 5/27 grew Coag neg staph and Acinetobacter baumannii

## 2018-06-03 NOTE — PROGRESS NOTE ADULT - SUBJECTIVE AND OBJECTIVE BOX
Patient is a 77y old  Male who presents with a chief complaint of Admitted with fever (27 May 2018 20:28)      Interval Events:    REVIEW OF SYSTEMS:  [ ] Positive  [ ] All other systems negative  [ ] Unable to assess ROS because ________    Vital Signs Last 24 Hrs  T(C): 36.8 (06-03-18 @ 04:23), Max: 36.9 (06-02-18 @ 12:50)  T(F): 98.3 (06-03-18 @ 04:23), Max: 98.5 (06-02-18 @ 21:55)  HR: 75 (06-03-18 @ 04:58) (65 - 80)  BP: 171/51 (06-03-18 @ 04:23) (149/57 - 173/56)  RR: 18 (06-03-18 @ 04:23) (18 - 24)  SpO2: 99% (06-03-18 @ 04:58) (99% - 100%)    PHYSICAL EXAM:  HEENT:   [ ]Tracheostomy:  [ ]Pupils equal  [ ]No oral lesions  [ ]Abnormal    SKIN  [ ]No Rash  [ ] Abnormal  [ ] pressure    CARDIAC  [ ]Regular  [ ]Abnormal    PULMONARY  [ ]Bilateral Clear Breath Sounds  [ ]Normal Excursion  [ ]Abnormal    GI  [ ]PEG      [ ] +BS		              [ ]Soft, nondistended, nontender	  [ ]Abnormal    MUSCULOSKELETAL                                   [ ]Bedbound                 [ ]Abnormal    [ ]Ambulatory/OOB to chair                           EXTREMITIES                                         [ ]Normal  [ ]Edema                           NEUROLOGIC  [ ] Normal, non focal  [ ] Focal findings:    PSYCHIATRIC  [ ]Alert and appropriate  [ ] Sedated	 [ ]Agitated    :  Ybarra: [ ] Yes, if yes: Date of Placement:                   [  ] No    LINES: Central Lines [ ] Yes, if yes: Date of Placement                                     [  ] No    HOSPITAL MEDICATIONS:  MEDICATIONS  (STANDING):  ampicillin/sulbactam  IVPB      ampicillin/sulbactam  IVPB 3 Gram(s) IV Intermittent every 6 hours  aspirin  chewable 81 milliGRAM(s) Oral daily  carbidopa/levodopa  25/100 0.5 Tablet(s) Oral three times a day  clonazePAM Tablet 0.5 milliGRAM(s) Oral <User Schedule>  clopidogrel Tablet 75 milliGRAM(s) Oral daily  dextrose 5%. 1000 milliLiter(s) (50 mL/Hr) IV Continuous <Continuous>  dextrose 50% Injectable 12.5 Gram(s) IV Push once  dextrose 50% Injectable 25 Gram(s) IV Push once  dextrose 50% Injectable 25 Gram(s) IV Push once  doxazosin 2 milliGRAM(s) Oral at bedtime  enalapril 10 milliGRAM(s) Oral two times a day  heparin  Injectable 5000 Unit(s) SubCutaneous every 8 hours  hydrALAZINE 25 milliGRAM(s) Oral every 8 hours  insulin lispro (HumaLOG) corrective regimen sliding scale   SubCutaneous every 6 hours  metoprolol tartrate 100 milliGRAM(s) Oral two times a day  ofloxacin 0.3% Solution 1 Drop(s) Both EYES every 3 hours  pantoprazole  Injectable 40 milliGRAM(s) IV Push daily  potassium chloride   Solution 40 milliEquivalent(s) Oral two times a day    MEDICATIONS  (PRN):  dextrose 40% Gel 15 Gram(s) Oral once PRN Blood Glucose LESS THAN 70 milliGRAM(s)/deciLiter  glucagon  Injectable 1 milliGRAM(s) IntraMuscular once PRN Glucose <70 milliGRAM(s)/deciLiter  melatonin 3 milliGRAM(s) Oral at bedtime PRN Insomnia  QUEtiapine 50 milliGRAM(s) Oral every 6 hours PRN break through aggitation      LABS:                        9.0    11.7  )-----------( 246      ( 02 Jun 2018 07:13 )             26.8     06-02    142  |  104  |  16  ----------------------------<  129<H>  3.4<L>   |  30  |  0.48<L>    Ca    9.2      02 Jun 2018 07:13  Phos  2.4     06-02  Mg     1.8     06-02    TPro  5.7<L>  /  Alb  2.9<L>  /  TBili  0.6  /  DBili  x   /  AST  23  /  ALT  8<L>  /  AlkPhos  144<H>  06-02    PT/INR - ( 03 Jun 2018 06:37 )   PT: 12.0 sec;   INR: 1.10 ratio         PTT - ( 03 Jun 2018 06:37 )  PTT:28.5 sec        CAPILLARY BLOOD GLUCOSE    MICROBIOLOGY:     RADIOLOGY:  [ ] Reviewed and interpreted by me    Mode: AC/ CMV (Assist Control/ Continuous Mandatory Ventilation)  RR (machine): 16  TV (machine): 500  FiO2: 30  PEEP: 5  ITime: 1  MAP: 10  PIP: 28 Patient is a 77y old  Male who presents with a chief complaint of Admitted with fever (27 May 2018 20:28)      Interval Events:  voiding on own-  residues  cc post void     REVIEW OF SYSTEMS:  [ ] Positive  [ ] All other systems negative  [x ] Unable to assess ROS because _non verbal _______    Vital Signs Last 24 Hrs  T(C): 36.8 (06-03-18 @ 04:23), Max: 36.9 (06-02-18 @ 12:50)  T(F): 98.3 (06-03-18 @ 04:23), Max: 98.5 (06-02-18 @ 21:55)  HR: 75 (06-03-18 @ 04:58) (65 - 80)  BP: 171/51 (06-03-18 @ 04:23) (149/57 - 173/56)  RR: 18 (06-03-18 @ 04:23) (18 - 24)  SpO2: 99% (06-03-18 @ 04:58) (99% - 100%)    PHYSICAL EXAM:  HEENT:   [xx ]Tracheostomy:  ps 12/5 740  10/5 [id number]  [x ]Pupils equal  [ ]No oral lesions  [ ]Abnormal    SKIN  [x ]No Rash  [ ] Abnormal  [ ] pressure    CARDIAC  [x ]Regular  [ ]Abnormal    PULMONARY  [x ]Bilateral Clear Breath Sounds  [ ]Normal Excursion  [ ]Abnormal    GI  [x ]PEG      [x ] +BS		              [x ]Soft, nondistended, nontender	  [ ]Abnormal    MUSCULOSKELETAL                                   [ ]Bedbound                 [ ]Abnormal    [x ]OOB to chair                           EXTREMITIES                                         [ x]Normal  [ ]Edema                           NEUROLOGIC  [ ] Normal, non focal  [ x] Focal findings:  interactive- sl hand tremors R>L     PSYCHIATRIC  [x ]Alert and appropriate  [ ] Sedated	 [ ]Agitated    :  Ybarra: [ ] Yes, if yes: Date of Placement:                   [ x ] No    LINES: Central Lines [ ] Yes, if yes: Date of Placement                                     [ x ] No    HOSPITAL MEDICATIONS:  MEDICATIONS  (STANDING):  ampicillin/sulbactam  IVPB      ampicillin/sulbactam  IVPB 3 Gram(s) IV Intermittent every 6 hours  aspirin  chewable 81 milliGRAM(s) Oral daily  carbidopa/levodopa  25/100 0.5 Tablet(s) Oral three times a day  clonazePAM Tablet 0.5 milliGRAM(s) Oral <User Schedule>  clopidogrel Tablet 75 milliGRAM(s) Oral daily  dextrose 5%. 1000 milliLiter(s) (50 mL/Hr) IV Continuous <Continuous>  dextrose 50% Injectable 12.5 Gram(s) IV Push once  dextrose 50% Injectable 25 Gram(s) IV Push once  dextrose 50% Injectable 25 Gram(s) IV Push once  doxazosin 2 milliGRAM(s) Oral at bedtime  enalapril 10 milliGRAM(s) Oral two times a day  heparin  Injectable 5000 Unit(s) SubCutaneous every 8 hours  hydrALAZINE 25 milliGRAM(s) Oral every 8 hours  insulin lispro (HumaLOG) corrective regimen sliding scale   SubCutaneous every 6 hours  metoprolol tartrate 100 milliGRAM(s) Oral two times a day  ofloxacin 0.3% Solution 1 Drop(s) Both EYES every 3 hours  pantoprazole  Injectable 40 milliGRAM(s) IV Push daily  potassium chloride   Solution 40 milliEquivalent(s) Oral two times a day    MEDICATIONS  (PRN):  dextrose 40% Gel 15 Gram(s) Oral once PRN Blood Glucose LESS THAN 70 milliGRAM(s)/deciLiter  glucagon  Injectable 1 milliGRAM(s) IntraMuscular once PRN Glucose <70 milliGRAM(s)/deciLiter  melatonin 3 milliGRAM(s) Oral at bedtime PRN Insomnia  QUEtiapine 50 milliGRAM(s) Oral every 6 hours PRN break through aggitation      LABS:                        9.0    11.7  )-----------( 246      ( 02 Jun 2018 07:13 )             26.8     06-02    142  |  104  |  16  ----------------------------<  129<H>  3.4<L>   |  30  |  0.48<L>    Ca    9.2      02 Jun 2018 07:13  Phos  2.4     06-02  Mg     1.8     06-02    TPro  5.7<L>  /  Alb  2.9<L>  /  TBili  0.6  /  DBili  x   /  AST  23  /  ALT  8<L>  /  AlkPhos  144<H>  06-02    PT/INR - ( 03 Jun 2018 06:37 )   PT: 12.0 sec;   INR: 1.10 ratio         PTT - ( 03 Jun 2018 06:37 )  PTT:28.5 sec        CAPILLARY BLOOD GLUCOSE    MICROBIOLOGY:     RADIOLOGY:  [ ] Reviewed and interpreted by me    Mode: AC/ CMV (Assist Control/ Continuous Mandatory Ventilation)  RR (machine): 16  TV (machine): 500  FiO2: 30  PEEP: 5  ITime: 1  MAP: 10  PIP: 28
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
## 2018-06-03 NOTE — PROGRESS NOTE ADULT - ATTENDING COMMENTS
Mickey Benites  Attending Physician   Division of Infectious Disease  Pager #[phone]  After 5pm/weekend or no response, call #[phone]    Please call the ID service [phone] with questions or concerns over the weekend.

## 2018-06-03 NOTE — PROGRESS NOTE ADULT - PROBLEM SELECTOR PLAN 2
Blood cx from [facility] + acienobacter and CNS   Blood Cx sent  1/2 GPC-  5/29 Bld cx negative times 2  Patient with + Urinalaysis, Urine Cx negative to date   Sputum Cx no growth   CT Chest Non Contrast and CT Abdomen and pelvis with po and IV contrast ordered as per Dr. Trevino to rule out infectious source. No specific infective course   6/1  Blood cx from [facility] with Acinebater and CNS abx changed to ertapenem as it is resistant Acinetobacter baumannii or Acinetobacter haemolyticus identified by diagnostic testing.  Plan: -on bcx+  -cont unasyn - plan 10-14 days - day 4 of abx  -repeat bcx negative  -suspect resp source. Blood cx from [facility] + acienobacter and CNS   Blood Cx sent  1/2 GPC-  5/29 Bld cx negative times 2  Patient with + Urinalaysis, Urine Cx negative to date   Sputum Cx no growth   CT Chest Non Contrast and CT Abdomen and pelvis with po and IV contrast ordered as per Dr. Trevino to rule out infectious source. No specific infective course   6/1  Blood cx from [facility] with Acinebater and CNS abx changed to ertapenem as it is resistant Acinetobacter baumannii or Acinetobacter haemolyticus identified by diagnostic testing.  Plan: -on bcx+  -cont unasyn - plan 10-14 days - day 5 of abx  -repeat bcx negative  -suspect resp source.

## 2018-06-04 LAB
ALBUMIN SERPL ELPH-MCNC: 3.2 G/DL — LOW (ref 3.3–5)
ALP SERPL-CCNC: 161 U/L — HIGH (ref 40–120)
ALT FLD-CCNC: 33 U/L — SIGNIFICANT CHANGE UP (ref 10–45)
ANION GAP SERPL CALC-SCNC: 10 MMOL/L — SIGNIFICANT CHANGE UP (ref 5–17)
APTT BLD: 29 SEC — SIGNIFICANT CHANGE UP (ref 27.5–37.4)
AST SERPL-CCNC: 28 U/L — SIGNIFICANT CHANGE UP (ref 10–40)
BILIRUB SERPL-MCNC: 0.8 MG/DL — SIGNIFICANT CHANGE UP (ref 0.2–1.2)
BUN SERPL-MCNC: 17 MG/DL — SIGNIFICANT CHANGE UP (ref 7–23)
CALCIUM SERPL-MCNC: 9.4 MG/DL — SIGNIFICANT CHANGE UP (ref 8.4–10.5)
CHLORIDE SERPL-SCNC: 104 MMOL/L — SIGNIFICANT CHANGE UP (ref 96–108)
CO2 SERPL-SCNC: 29 MMOL/L — SIGNIFICANT CHANGE UP (ref 22–31)
CREAT SERPL-MCNC: 0.57 MG/DL — SIGNIFICANT CHANGE UP (ref 0.5–1.3)
GLUCOSE BLDC GLUCOMTR-MCNC: 132 MG/DL — HIGH (ref 70–99)
GLUCOSE BLDC GLUCOMTR-MCNC: 137 MG/DL — HIGH (ref 70–99)
GLUCOSE BLDC GLUCOMTR-MCNC: 144 MG/DL — HIGH (ref 70–99)
GLUCOSE BLDC GLUCOMTR-MCNC: 146 MG/DL — HIGH (ref 70–99)
GLUCOSE BLDC GLUCOMTR-MCNC: 172 MG/DL — HIGH (ref 70–99)
GLUCOSE SERPL-MCNC: 114 MG/DL — HIGH (ref 70–99)
HCT VFR BLD CALC: 27.1 % — LOW (ref 39–50)
HGB BLD-MCNC: 9 G/DL — LOW (ref 13–17)
INR BLD: 1.09 RATIO — SIGNIFICANT CHANGE UP (ref 0.88–1.16)
MAGNESIUM SERPL-MCNC: 1.9 MG/DL — SIGNIFICANT CHANGE UP (ref 1.6–2.6)
MCHC RBC-ENTMCNC: 33.1 GM/DL — SIGNIFICANT CHANGE UP (ref 32–36)
MCHC RBC-ENTMCNC: 33.6 PG — SIGNIFICANT CHANGE UP (ref 27–34)
MCV RBC AUTO: 101 FL — HIGH (ref 80–100)
PHOSPHATE SERPL-MCNC: 2.8 MG/DL — SIGNIFICANT CHANGE UP (ref 2.5–4.5)
PLATELET # BLD AUTO: 273 K/UL — SIGNIFICANT CHANGE UP (ref 150–400)
POTASSIUM SERPL-MCNC: 4.2 MMOL/L — SIGNIFICANT CHANGE UP (ref 3.5–5.3)
POTASSIUM SERPL-SCNC: 4.2 MMOL/L — SIGNIFICANT CHANGE UP (ref 3.5–5.3)
PROT SERPL-MCNC: 6.3 G/DL — SIGNIFICANT CHANGE UP (ref 6–8.3)
PROTHROM AB SERPL-ACNC: 11.8 SEC — SIGNIFICANT CHANGE UP (ref 9.8–12.7)
RBC # BLD: 2.67 M/UL — LOW (ref 4.2–5.8)
RBC # FLD: 13.5 % — SIGNIFICANT CHANGE UP (ref 10.3–14.5)
SODIUM SERPL-SCNC: 143 MMOL/L — SIGNIFICANT CHANGE UP (ref 135–145)
WBC # BLD: 12.4 K/UL — HIGH (ref 3.8–10.5)
WBC # FLD AUTO: 12.4 K/UL — HIGH (ref 3.8–10.5)

## 2018-06-04 PROCEDURE — 99232 SBSQ HOSP IP/OBS MODERATE 35: CPT

## 2018-06-04 RX ADMIN — HEPARIN SODIUM 5000 UNIT(S): 5000 INJECTION INTRAVENOUS; SUBCUTANEOUS at 06:13

## 2018-06-04 RX ADMIN — Medication 81 MILLIGRAM(S): at 13:18

## 2018-06-04 RX ADMIN — HEPARIN SODIUM 5000 UNIT(S): 5000 INJECTION INTRAVENOUS; SUBCUTANEOUS at 23:25

## 2018-06-04 RX ADMIN — CARBIDOPA AND LEVODOPA 0.5 TABLET(S): 25; 100 TABLET ORAL at 13:19

## 2018-06-04 RX ADMIN — Medication 0.5 MILLIGRAM(S): at 18:29

## 2018-06-04 RX ADMIN — AMPICILLIN SODIUM AND SULBACTAM SODIUM 200 GRAM(S): 250; 125 INJECTION, POWDER, FOR SUSPENSION INTRAMUSCULAR; INTRAVENOUS at 02:37

## 2018-06-04 RX ADMIN — AMPICILLIN SODIUM AND SULBACTAM SODIUM 200 GRAM(S): 250; 125 INJECTION, POWDER, FOR SUSPENSION INTRAMUSCULAR; INTRAVENOUS at 13:20

## 2018-06-04 RX ADMIN — PANTOPRAZOLE SODIUM 40 MILLIGRAM(S): 20 TABLET, DELAYED RELEASE ORAL at 13:19

## 2018-06-04 RX ADMIN — AMPICILLIN SODIUM AND SULBACTAM SODIUM 200 GRAM(S): 250; 125 INJECTION, POWDER, FOR SUSPENSION INTRAMUSCULAR; INTRAVENOUS at 08:49

## 2018-06-04 RX ADMIN — AMPICILLIN SODIUM AND SULBACTAM SODIUM 200 GRAM(S): 250; 125 INJECTION, POWDER, FOR SUSPENSION INTRAMUSCULAR; INTRAVENOUS at 20:52

## 2018-06-04 RX ADMIN — Medication 100 MILLIGRAM(S): at 06:13

## 2018-06-04 RX ADMIN — Medication 2 MILLIGRAM(S): at 23:25

## 2018-06-04 RX ADMIN — CARBIDOPA AND LEVODOPA 0.5 TABLET(S): 25; 100 TABLET ORAL at 06:13

## 2018-06-04 RX ADMIN — Medication 10 MILLIGRAM(S): at 06:13

## 2018-06-04 RX ADMIN — Medication 25 MILLIGRAM(S): at 06:13

## 2018-06-04 RX ADMIN — HEPARIN SODIUM 5000 UNIT(S): 5000 INJECTION INTRAVENOUS; SUBCUTANEOUS at 13:19

## 2018-06-04 RX ADMIN — Medication 25 MILLIGRAM(S): at 13:19

## 2018-06-04 RX ADMIN — Medication 25 MILLIGRAM(S): at 23:25

## 2018-06-04 RX ADMIN — Medication 1: at 13:18

## 2018-06-04 RX ADMIN — CLOPIDOGREL BISULFATE 75 MILLIGRAM(S): 75 TABLET, FILM COATED ORAL at 13:18

## 2018-06-04 RX ADMIN — CARBIDOPA AND LEVODOPA 0.5 TABLET(S): 25; 100 TABLET ORAL at 23:25

## 2018-06-04 RX ADMIN — Medication 100 MILLIGRAM(S): at 17:41

## 2018-06-04 RX ADMIN — Medication 40 MILLIEQUIVALENT(S): at 06:13

## 2018-06-04 RX ADMIN — Medication 10 MILLIGRAM(S): at 17:41

## 2018-06-04 RX ADMIN — Medication 0: at 18:29

## 2018-06-04 RX ADMIN — Medication 3 MILLIGRAM(S): at 23:25

## 2018-06-04 NOTE — PROGRESS NOTE ADULT - ATTENDING COMMENTS
pt seen and examined at the bedside. stable.  cont weaning off the vent  on sinemet for parkinsonism  unasyn for acinetobacter  d/c planning

## 2018-06-04 NOTE — PROGRESS NOTE ADULT - PROBLEM SELECTOR PLAN 2
-Coag neg staph here likely contaminant      Spoke to micro dept at [facility] 5/30- bcx from 5/27 grew Coag neg staph and Acinetobacter baumannii

## 2018-06-04 NOTE — PROGRESS NOTE ADULT - ATTENDING COMMENTS
Mickey Benites  Attending Physician   Division of Infectious Disease  Pager #[phone]  After 5pm/weekend or no response, call #[phone]

## 2018-06-04 NOTE — PROGRESS NOTE ADULT - SUBJECTIVE AND OBJECTIVE BOX
Patient is a 77y old  Male who presents with a chief complaint of Admitted with fever (27 May 2018 20:28)      Interval Events:    REVIEW OF SYSTEMS:  [ ] Positive  [ ] All other systems negative  [ ] Unable to assess ROS because ________    Vital Signs Last 24 Hrs  T(C): 36.8 (06-04-18 @ 05:12), Max: 37.3 (06-03-18 @ 17:25)  T(F): 98.3 (06-04-18 @ 05:12), Max: 99.2 (06-03-18 @ 22:28)  HR: 67 (06-04-18 @ 05:12) (65 - 83)  BP: 159/69 (06-04-18 @ 05:12) (117/62 - 159/69)  RR: 18 (06-04-18 @ 05:12) (18 - 24)  SpO2: 100% (06-04-18 @ 05:12) (98% - 100%)    PHYSICAL EXAM:  HEENT:   [x]Tracheostomy: 7 cuffed portex  [ ]Pupils equal  [ ]No oral lesions  [ ]Abnormal    SKIN  [ x]No Rash  [ ] Abnormal  [ ] pressure    CARDIAC  [x ]Regular  [ ]Abnormal    PULMONARY  [x ]Bilateral Clear Breath Sounds  [ ]Normal Excursion  [ ]Abnormal    GI  [x ]PEG      [ x] +BS		              [x ]Soft, nondistended, nontender	  [ ]Abnormal    MUSCULOSKELETAL                                   [ ]Bedbound                 [ ]Abnormal    [x ]Ambulatory/OOB to chair                           EXTREMITIES                                         [ ]Normal  [x ]Edema                           NEUROLOGIC  [ ] Normal, non focal  [ x] Focal findings:    PSYCHIATRIC  [x ]Alert and appropriate  [ ] Sedated	 [ ]Agitated    :  Ybarra: [ ] Yes, if yes: Date of Placement:                   [  x] No    LINES: Central Lines [ ] Yes, if yes: Date of Placement                                     [ x ] No    HOSPITAL MEDICATIONS:  MEDICATIONS  (STANDING):  ampicillin/sulbactam  IVPB      ampicillin/sulbactam  IVPB 3 Gram(s) IV Intermittent every 6 hours  aspirin  chewable 81 milliGRAM(s) Oral daily  carbidopa/levodopa  25/100 0.5 Tablet(s) Oral three times a day  clonazePAM Tablet 0.5 milliGRAM(s) Oral <User Schedule>  clopidogrel Tablet 75 milliGRAM(s) Oral daily  dextrose 5%. 1000 milliLiter(s) (50 mL/Hr) IV Continuous <Continuous>  dextrose 50% Injectable 12.5 Gram(s) IV Push once  dextrose 50% Injectable 25 Gram(s) IV Push once  dextrose 50% Injectable 25 Gram(s) IV Push once  doxazosin 2 milliGRAM(s) Oral at bedtime  enalapril 10 milliGRAM(s) Oral two times a day  heparin  Injectable 5000 Unit(s) SubCutaneous every 8 hours  hydrALAZINE 25 milliGRAM(s) Oral every 8 hours  insulin lispro (HumaLOG) corrective regimen sliding scale   SubCutaneous every 6 hours  metoprolol tartrate 100 milliGRAM(s) Oral two times a day  pantoprazole  Injectable 40 milliGRAM(s) IV Push daily    MEDICATIONS  (PRN):  dextrose 40% Gel 15 Gram(s) Oral once PRN Blood Glucose LESS THAN 70 milliGRAM(s)/deciLiter  glucagon  Injectable 1 milliGRAM(s) IntraMuscular once PRN Glucose <70 milliGRAM(s)/deciLiter  melatonin 3 milliGRAM(s) Oral at bedtime PRN Insomnia  QUEtiapine 50 milliGRAM(s) Oral every 6 hours PRN break through aggitation      LABS:                        9.0    12.4  )-----------( 273      ( 04 Jun 2018 06:54 )             27.1     06-04    143  |  104  |  17  ----------------------------<  114<H>  4.2   |  29  |  0.57    Ca    9.4      04 Jun 2018 06:53  Phos  2.8     06-04  Mg     1.9     06-04    TPro  6.3  /  Alb  3.2<L>  /  TBili  0.8  /  DBili  x   /  AST  28  /  ALT  33  /  AlkPhos  161<H>  06-04    PT/INR - ( 04 Jun 2018 06:55 )   PT: 11.8 sec;   INR: 1.09 ratio         PTT - ( 04 Jun 2018 06:55 )  PTT:29.0 sec        CAPILLARY BLOOD GLUCOSE    MICROBIOLOGY:     RADIOLOGY:  [ ] Reviewed and interpreted by me    Mode: AC/ CMV (Assist Control/ Continuous Mandatory Ventilation)  RR (machine): 16  TV (machine): 500  FiO2: 30  PEEP: 5  ITime: 1  MAP: 12  PIP: 32
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
## 2018-06-04 NOTE — PROGRESS NOTE ADULT - SUBJECTIVE AND OBJECTIVE BOX
NANO SZYMANSKI 77y [id number]    Patient is a 77y old  Male who presents with a chief complaint of Admitted with fever (27 May 2018 20:28)      Follow Up/CC:  ID following for fever     Interval History/ROS: in RCU, NAD    Allergies    No Known Allergies    Intolerances        ANTIMICROBIALS:  ampicillin/sulbactam  IVPB    ampicillin/sulbactam  IVPB 3 every 6 hours      MEDICATIONS  (STANDING):  ampicillin/sulbactam  IVPB      ampicillin/sulbactam  IVPB 3 Gram(s) IV Intermittent every 6 hours  aspirin  chewable 81 milliGRAM(s) Oral daily  carbidopa/levodopa  25/100 0.5 Tablet(s) Oral three times a day  clonazePAM Tablet 0.5 milliGRAM(s) Oral <User Schedule>  clopidogrel Tablet 75 milliGRAM(s) Oral daily  dextrose 5%. 1000 milliLiter(s) (50 mL/Hr) IV Continuous <Continuous>  dextrose 50% Injectable 12.5 Gram(s) IV Push once  dextrose 50% Injectable 25 Gram(s) IV Push once  dextrose 50% Injectable 25 Gram(s) IV Push once  doxazosin 2 milliGRAM(s) Oral at bedtime  enalapril 10 milliGRAM(s) Oral two times a day  heparin  Injectable 5000 Unit(s) SubCutaneous every 8 hours  hydrALAZINE 25 milliGRAM(s) Oral every 8 hours  insulin lispro (HumaLOG) corrective regimen sliding scale   SubCutaneous every 6 hours  metoprolol tartrate 100 milliGRAM(s) Oral two times a day  pantoprazole  Injectable 40 milliGRAM(s) IV Push daily    MEDICATIONS  (PRN):  dextrose 40% Gel 15 Gram(s) Oral once PRN Blood Glucose LESS THAN 70 milliGRAM(s)/deciLiter  glucagon  Injectable 1 milliGRAM(s) IntraMuscular once PRN Glucose <70 milliGRAM(s)/deciLiter  melatonin 3 milliGRAM(s) Oral at bedtime PRN Insomnia  QUEtiapine 50 milliGRAM(s) Oral every 6 hours PRN break through aggitation        Vital Signs Last 24 Hrs  T(C): 36.7 (04 Jun 2018 12:58), Max: 37.3 (03 Jun 2018 17:25)  T(F): 98 (04 Jun 2018 12:58), Max: 99.2 (03 Jun 2018 22:28)  HR: 79 (04 Jun 2018 12:58) (65 - 83)  BP: 157/66 (04 Jun 2018 12:58) (117/62 - 159/69)  BP(mean): --  RR: 18 (04 Jun 2018 12:58) (18 - 20)  SpO2: 100% (04 Jun 2018 12:58) (99% - 100%)    CBC Full  -  ( 04 Jun 2018 06:54 )  WBC Count : 12.4 K/uL  Hemoglobin : 9.0 g/dL  Hematocrit : 27.1 %  Platelet Count - Automated : 273 K/uL  Mean Cell Volume : 101.0 fl  Mean Cell Hemoglobin : 33.6 pg  Mean Cell Hemoglobin Concentration : 33.1 gm/dL  Auto Neutrophil # : x  Auto Lymphocyte # : x  Auto Monocyte # : x  Auto Eosinophil # : x  Auto Basophil # : x  Auto Neutrophil % : x  Auto Lymphocyte % : x  Auto Monocyte % : x  Auto Eosinophil % : x  Auto Basophil % : x    06-04    143  |  104  |  17  ----------------------------<  114<H>  4.2   |  29  |  0.57    Ca    9.4      04 Jun 2018 06:53  Phos  2.8     06-04  Mg     1.9     06-04    TPro  6.3  /  Alb  3.2<L>  /  TBili  0.8  /  DBili  x   /  AST  28  /  ALT  33  /  AlkPhos  161<H>  06-04    LIVER FUNCTIONS - ( 04 Jun 2018 06:53 )  Alb: 3.2 g/dL / Pro: 6.3 g/dL / ALK PHOS: 161 U/L / ALT: 33 U/L / AST: 28 U/L / GGT: x               MICROBIOLOGY:  .Blood Blood  05-29-18   No growth at 5 days.  --  --      .Blood Blood  05-29-18   No growth at 5 days.  --  --      .Sputum Sputum, trap  05-29-18   Few Pseudomonas aeruginosa  Normal Respiratory Silvia present  --  Pseudomonas aeruginosa      .Blood Blood-Peripheral  05-27-18   No growth at 5 days.  --  Blood Culture PCR      .Urine Catheterized  05-27-18   No growth  --  --    RADIOLOGY

## 2018-06-04 NOTE — PROGRESS NOTE ADULT - SUBJECTIVE AND OBJECTIVE BOX
SUBJECTIVE: No new neurologic events overnight.  No new neurologic complaints.  tolerating sinemet    Medications:  MEDICATIONS  (STANDING):  ampicillin/sulbactam  IVPB      ampicillin/sulbactam  IVPB 3 Gram(s) IV Intermittent every 6 hours  aspirin  chewable 81 milliGRAM(s) Oral daily  clonazePAM Tablet 0.5 milliGRAM(s) Oral <User Schedule>  clopidogrel Tablet 75 milliGRAM(s) Oral daily  dextrose 5% + sodium chloride 0.45%. 1000 milliLiter(s) (75 mL/Hr) IV Continuous <Continuous>  dextrose 5%. 1000 milliLiter(s) (50 mL/Hr) IV Continuous <Continuous>  dextrose 50% Injectable 12.5 Gram(s) IV Push once  dextrose 50% Injectable 25 Gram(s) IV Push once  dextrose 50% Injectable 25 Gram(s) IV Push once  doxazosin 2 milliGRAM(s) Oral at bedtime  heparin  Injectable 5000 Unit(s) SubCutaneous every 8 hours  insulin lispro (HumaLOG) corrective regimen sliding scale   SubCutaneous every 6 hours  metoprolol tartrate 100 milliGRAM(s) Oral two times a day  ofloxacin 0.3% Solution 1 Drop(s) Both EYES every 3 hours  pantoprazole  Injectable 40 milliGRAM(s) IV Push daily    MEDICATIONS  (PRN):  dextrose 40% Gel 15 Gram(s) Oral once PRN Blood Glucose LESS THAN 70 milliGRAM(s)/deciLiter  glucagon  Injectable 1 milliGRAM(s) IntraMuscular once PRN Glucose <70 milliGRAM(s)/deciLiter  melatonin 3 milliGRAM(s) Oral at bedtime PRN Insomnia  QUEtiapine 50 milliGRAM(s) Oral every 6 hours PRN break through aggitation      Labs:  CBC Full  -  ( 01 Jun 2018 06:57 )  WBC Count : 8.4 K/uL  Hemoglobin : 8.5 g/dL  Hematocrit : 26.7 %  Platelet Count - Automated : 220 K/uL  Mean Cell Volume : 102.0 fl  Mean Cell Hemoglobin : 32.5 pg  Mean Cell Hemoglobin Concentration : 31.9 gm/dL  Auto Neutrophil # : x  Auto Lymphocyte # : x  Auto Monocyte # : x  Auto Eosinophil # : x  Auto Basophil # : x  Auto Neutrophil % : x  Auto Lymphocyte % : x  Auto Monocyte % : x  Auto Eosinophil % : x  Auto Basophil % : x    06-01    144  |  105  |  17  ----------------------------<  117<H>  3.6   |  32<H>  |  0.59    Ca    9.2      01 Jun 2018 06:56  Phos  2.3     06-01  Mg     1.9     06-01    TPro  5.7<L>  /  Alb  3.1<L>  /  TBili  0.8  /  DBili  x   /  AST  30  /  ALT  48<H>  /  AlkPhos  147<H>  06-01    CAPILLARY BLOOD GLUCOSE      POCT Blood Glucose.: 123 mg/dL (01 Jun 2018 05:14)  POCT Blood Glucose.: 142 mg/dL (31 May 2018 23:51)  POCT Blood Glucose.: 157 mg/dL (31 May 2018 17:53)  POCT Blood Glucose.: 182 mg/dL (31 May 2018 11:30)    LIVER FUNCTIONS - ( 01 Jun 2018 06:56 )  Alb: 3.1 g/dL / Pro: 5.7 g/dL / ALK PHOS: 147 U/L / ALT: 48 U/L / AST: 30 U/L / GGT: x           PT/INR - ( 01 Jun 2018 07:11 )   PT: 12.1 sec;   INR: 1.11 ratio         PTT - ( 01 Jun 2018 07:11 )  PTT:29.2 sec      Vitals:  Vital Signs Last 24 Hrs  T(C): 36.9 (01 Jun 2018 07:51), Max: 37.1 (31 May 2018 18:00)  T(F): 98.4 (01 Jun 2018 07:51), Max: 98.8 (31 May 2018 18:00)  HR: 100 (01 Jun 2018 08:42) (64 - 100)  BP: 173/69 (01 Jun 2018 07:51) (149/56 - 173/69)  BP(mean): --  RR: 18 (01 Jun 2018 07:51) (18 - 22)  SpO2: 70% (01 Jun 2018 08:42) (70% - 100%)          NEUROLOGICAL EXAM:    Mental status: trached, lying in bed, eyes closed, minimal wincing to DSR., +myerson's sign    Cranial Nerves: Pupils were equal, round, reactive to light. Extraocular movements were intact. Fundoscopic exam was deferred. Facial sensation was intact to light touch. There was no facial asymmetry. The palate was upgoing symmetrically and tongue was midline.    Motor exam: Diffuse markedly increased tone, cogwheeling at wrist bilaterally, bilateral parkinsonian pill rolling tremors in hands. Minimal movements in all extremities, exam limited by poor effort    Reflexes: 2+ in the bilateral upper extremities. 2+ in the bilateral lower extremities. Toes were mute    Sensation: responds to tactile stimuli in all extremities     Coordination: unable    Gait: unable.     Imaging:  < from: CT Head No Cont (03.26.18 @ 16:59) >  Impression:     There is straightening of the normal cervical lordosis. No acute   fracture or traumatic subluxations identified. There are large anterior   bridging osteophytes at C3-4 through C6-7. Multilevel uncovertebral joint   and facet arthropathy is again identified. The prevertebral soft tissues   are within normal limits.    Impression:    Brain CT: No acute hemorrhage, infarct or displaced calvarial fracture.    Cervical spine CT: No acute fracture or traumatic subluxation. Multilevel   degenerative changes.    BRYAN LYNCH M.D., ATTENDING RADIOLOGIST  This document has been electronically signed. Mar 26 2018  5:10PM

## 2018-06-04 NOTE — PROGRESS NOTE ADULT - ASSESSMENT
A/P: Clinically suspect underlining parkinsonism/PD but limited evaluation in the setting of multiple medical issues/hospitalization/infection. [place] scan 12/2017 supported diagnosis of PD    Plan  1.  started sinemet now given likelihood pt will have difficulty following up as an outpt in timely manner and to possibly help  his rehabilitation potential.   - started1/2 tab 25/100 mg tid. increase now to 1 tab tid  2. Continue supportive care, Abx per ID  3. PT  4. Frequent reorientation, high risk for delirium, avoidance of delirium provoking medications.  will monitor closely on sinemet as can cause confusion  Plan reviewed with RCU NP.

## 2018-06-04 NOTE — PROGRESS NOTE ADULT - PROBLEM SELECTOR PLAN 2
[facility]   Blood Cx sent  1/2 [facility]-  5/29 Bld cx negative times 2  Patient with + Urinalaysis, Urine Cx negative to date   Sputum Cx no growth   CT Chest Non Contrast and CT Abdomen and pelvis with po and IV contrast ordered as per Dr. Trevino to rule out infectious source. 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  Plan: -on bcx+  -cont unasyn - plan 10-14 days -   -repeat bcx negative  -suspect resp source.

## 2018-06-05 LAB
ALBUMIN SERPL ELPH-MCNC: 3.2 G/DL — LOW (ref 3.3–5)
ALP SERPL-CCNC: 151 U/L — HIGH (ref 40–120)
ALT FLD-CCNC: 25 U/L — SIGNIFICANT CHANGE UP (ref 10–45)
ANION GAP SERPL CALC-SCNC: 9 MMOL/L — SIGNIFICANT CHANGE UP (ref 5–17)
APTT BLD: 28.3 SEC — SIGNIFICANT CHANGE UP (ref 27.5–37.4)
AST SERPL-CCNC: 28 U/L — SIGNIFICANT CHANGE UP (ref 10–40)
BILIRUB SERPL-MCNC: 0.8 MG/DL — SIGNIFICANT CHANGE UP (ref 0.2–1.2)
BUN SERPL-MCNC: 19 MG/DL — SIGNIFICANT CHANGE UP (ref 7–23)
CALCIUM SERPL-MCNC: 9.5 MG/DL — SIGNIFICANT CHANGE UP (ref 8.4–10.5)
CHLORIDE SERPL-SCNC: 101 MMOL/L — SIGNIFICANT CHANGE UP (ref 96–108)
CO2 SERPL-SCNC: 30 MMOL/L — SIGNIFICANT CHANGE UP (ref 22–31)
CREAT SERPL-MCNC: 0.62 MG/DL — SIGNIFICANT CHANGE UP (ref 0.5–1.3)
GLUCOSE BLDC GLUCOMTR-MCNC: 100 MG/DL — HIGH (ref 70–99)
GLUCOSE BLDC GLUCOMTR-MCNC: 108 MG/DL — HIGH (ref 70–99)
GLUCOSE BLDC GLUCOMTR-MCNC: 147 MG/DL — HIGH (ref 70–99)
GLUCOSE BLDC GLUCOMTR-MCNC: 152 MG/DL — HIGH (ref 70–99)
GLUCOSE SERPL-MCNC: 95 MG/DL — SIGNIFICANT CHANGE UP (ref 70–99)
HCT VFR BLD CALC: 26.8 % — LOW (ref 39–50)
HGB BLD-MCNC: 8.8 G/DL — LOW (ref 13–17)
INR BLD: 1.05 RATIO — SIGNIFICANT CHANGE UP (ref 0.88–1.16)
MAGNESIUM SERPL-MCNC: 2.1 MG/DL — SIGNIFICANT CHANGE UP (ref 1.6–2.6)
MCHC RBC-ENTMCNC: 33.1 GM/DL — SIGNIFICANT CHANGE UP (ref 32–36)
MCHC RBC-ENTMCNC: 33.8 PG — SIGNIFICANT CHANGE UP (ref 27–34)
MCV RBC AUTO: 102 FL — HIGH (ref 80–100)
PHOSPHATE SERPL-MCNC: 3 MG/DL — SIGNIFICANT CHANGE UP (ref 2.5–4.5)
PLATELET # BLD AUTO: 262 K/UL — SIGNIFICANT CHANGE UP (ref 150–400)
POTASSIUM SERPL-MCNC: 3.9 MMOL/L — SIGNIFICANT CHANGE UP (ref 3.5–5.3)
POTASSIUM SERPL-SCNC: 3.9 MMOL/L — SIGNIFICANT CHANGE UP (ref 3.5–5.3)
PROT SERPL-MCNC: 6.1 G/DL — SIGNIFICANT CHANGE UP (ref 6–8.3)
PROTHROM AB SERPL-ACNC: 11.5 SEC — SIGNIFICANT CHANGE UP (ref 9.8–12.7)
RBC # BLD: 2.62 M/UL — LOW (ref 4.2–5.8)
RBC # FLD: 14.1 % — SIGNIFICANT CHANGE UP (ref 10.3–14.5)
SODIUM SERPL-SCNC: 140 MMOL/L — SIGNIFICANT CHANGE UP (ref 135–145)
WBC # BLD: 10.8 K/UL — HIGH (ref 3.8–10.5)
WBC # FLD AUTO: 10.8 K/UL — HIGH (ref 3.8–10.5)

## 2018-06-05 PROCEDURE — 99232 SBSQ HOSP IP/OBS MODERATE 35: CPT

## 2018-06-05 RX ORDER — CLONAZEPAM 1 MG
0.5 TABLET ORAL
Qty: 0 | Refills: 0 | Status: DISCONTINUED | OUTPATIENT
Start: 2018-06-05 | End: 2018-06-10

## 2018-06-05 RX ORDER — CARBIDOPA AND LEVODOPA 25; 100 MG/1; MG/1
1 TABLET ORAL THREE TIMES A DAY
Qty: 0 | Refills: 0 | Status: DISCONTINUED | OUTPATIENT
Start: 2018-06-05 | End: 2018-06-13

## 2018-06-05 RX ADMIN — Medication 2 MILLIGRAM(S): at 22:06

## 2018-06-05 RX ADMIN — HEPARIN SODIUM 5000 UNIT(S): 5000 INJECTION INTRAVENOUS; SUBCUTANEOUS at 22:06

## 2018-06-05 RX ADMIN — PANTOPRAZOLE SODIUM 40 MILLIGRAM(S): 20 TABLET, DELAYED RELEASE ORAL at 12:10

## 2018-06-05 RX ADMIN — CARBIDOPA AND LEVODOPA 0.5 TABLET(S): 25; 100 TABLET ORAL at 05:39

## 2018-06-05 RX ADMIN — Medication 0.5 MILLIGRAM(S): at 18:15

## 2018-06-05 RX ADMIN — CARBIDOPA AND LEVODOPA 1 TABLET(S): 25; 100 TABLET ORAL at 22:05

## 2018-06-05 RX ADMIN — QUETIAPINE FUMARATE 50 MILLIGRAM(S): 200 TABLET, FILM COATED ORAL at 13:06

## 2018-06-05 RX ADMIN — HEPARIN SODIUM 5000 UNIT(S): 5000 INJECTION INTRAVENOUS; SUBCUTANEOUS at 05:39

## 2018-06-05 RX ADMIN — Medication 25 MILLIGRAM(S): at 13:06

## 2018-06-05 RX ADMIN — Medication 100 MILLIGRAM(S): at 18:15

## 2018-06-05 RX ADMIN — CARBIDOPA AND LEVODOPA 1 TABLET(S): 25; 100 TABLET ORAL at 13:06

## 2018-06-05 RX ADMIN — AMPICILLIN SODIUM AND SULBACTAM SODIUM 200 GRAM(S): 250; 125 INJECTION, POWDER, FOR SUSPENSION INTRAMUSCULAR; INTRAVENOUS at 21:40

## 2018-06-05 RX ADMIN — Medication 25 MILLIGRAM(S): at 22:05

## 2018-06-05 RX ADMIN — AMPICILLIN SODIUM AND SULBACTAM SODIUM 200 GRAM(S): 250; 125 INJECTION, POWDER, FOR SUSPENSION INTRAMUSCULAR; INTRAVENOUS at 02:50

## 2018-06-05 RX ADMIN — CLOPIDOGREL BISULFATE 75 MILLIGRAM(S): 75 TABLET, FILM COATED ORAL at 12:10

## 2018-06-05 RX ADMIN — AMPICILLIN SODIUM AND SULBACTAM SODIUM 200 GRAM(S): 250; 125 INJECTION, POWDER, FOR SUSPENSION INTRAMUSCULAR; INTRAVENOUS at 13:05

## 2018-06-05 RX ADMIN — HEPARIN SODIUM 5000 UNIT(S): 5000 INJECTION INTRAVENOUS; SUBCUTANEOUS at 13:06

## 2018-06-05 RX ADMIN — AMPICILLIN SODIUM AND SULBACTAM SODIUM 200 GRAM(S): 250; 125 INJECTION, POWDER, FOR SUSPENSION INTRAMUSCULAR; INTRAVENOUS at 09:03

## 2018-06-05 RX ADMIN — Medication 81 MILLIGRAM(S): at 12:10

## 2018-06-05 RX ADMIN — Medication 1: at 23:22

## 2018-06-05 NOTE — PROGRESS NOTE ADULT - SUBJECTIVE AND OBJECTIVE BOX
Patient is a 77y old  Male who presents with a chief complaint of Admitted with fever (27 May 2018 20:28)      Interval Events:    REVIEW OF SYSTEMS:  [ ] Positive  [ ] All other systems negative  [x ] Unable to assess ROS because __non verbal______    Vital Signs Last 24 Hrs  T(C): 37 (06-05-18 @ 04:17), Max: 37.2 (06-04-18 @ 09:15)  T(F): 98.6 (06-05-18 @ 04:17), Max: 99 (06-04-18 @ 17:25)  HR: 60 (06-05-18 @ 07:56) (60 - 92)  BP: 106/72 (06-05-18 @ 04:17) (106/72 - 159/61)  RR: 18 (06-05-18 @ 04:17) (18 - 22)  SpO2: 100% (06-05-18 @ 07:56) (99% - 100%)    PHYSICAL EXAM:  HEENT:   [x ]Tracheostomy: 7 portex cuffed  [ ]Pupils equal  [ ]No oral lesions  [ ]Abnormal    SKIN  [ ]No Rash  [ ] Abnormal   [x ] pressure- L+R buttocks    CARDIAC  [x ]Regular  [ ]Abnormal    PULMONARY  []Bilateral Clear Breath Sounds  [ ]Normal Excursion  [x ]Abnormal-coarse    GI  [x ]PEG      [ x] +BS		              [x ]Soft, nondistended, nontender	  [ ]Abnormal    MUSCULOSKELETAL                                   [ x]Bedbound                 [ ]Abnormal    [ ]Ambulatory/OOB to chair                           EXTREMITIES                                         [ ]Normal  [x ]Edema                           NEUROLOGIC  [] Normal, non focal  [ x] Focal findings: parkinosnian tremors    PSYCHIATRIC  [x ]Alert and appropriate-at times  [ ] Sedated	 [ ]Agitated    :  Ybarra: [ ] Yes, if yes: Date of Placement:                   [ x ] No    LINES: Central Lines [ ] Yes, if yes: Date of Placement                                     [xx  ] No    HOSPITAL MEDICATIONS:  MEDICATIONS  (STANDING):  ampicillin/sulbactam  IVPB      ampicillin/sulbactam  IVPB 3 Gram(s) IV Intermittent every 6 hours  aspirin  chewable 81 milliGRAM(s) Oral daily  carbidopa/levodopa  25/100 1 Tablet(s) Oral three times a day  clonazePAM Tablet 0.5 milliGRAM(s) Oral <User Schedule>  clopidogrel Tablet 75 milliGRAM(s) Oral daily  dextrose 5%. 1000 milliLiter(s) (50 mL/Hr) IV Continuous <Continuous>  dextrose 50% Injectable 12.5 Gram(s) IV Push once  dextrose 50% Injectable 25 Gram(s) IV Push once  dextrose 50% Injectable 25 Gram(s) IV Push once  doxazosin 2 milliGRAM(s) Oral at bedtime  enalapril 10 milliGRAM(s) Oral two times a day  heparin  Injectable 5000 Unit(s) SubCutaneous every 8 hours  hydrALAZINE 25 milliGRAM(s) Oral every 8 hours  insulin lispro (HumaLOG) corrective regimen sliding scale   SubCutaneous every 6 hours  metoprolol tartrate 100 milliGRAM(s) Oral two times a day  pantoprazole  Injectable 40 milliGRAM(s) IV Push daily    MEDICATIONS  (PRN):  dextrose 40% Gel 15 Gram(s) Oral once PRN Blood Glucose LESS THAN 70 milliGRAM(s)/deciLiter  glucagon  Injectable 1 milliGRAM(s) IntraMuscular once PRN Glucose <70 milliGRAM(s)/deciLiter  melatonin 3 milliGRAM(s) Oral at bedtime PRN Insomnia  QUEtiapine 50 milliGRAM(s) Oral every 6 hours PRN break through aggitation      LABS:                        8.8    10.8  )-----------( 262      ( 05 Jun 2018 06:53 )             26.8     06-05    140  |  101  |  19  ----------------------------<  95  3.9   |  30  |  0.62    Ca    9.5      05 Jun 2018 06:53  Phos  3.0     06-05  Mg     2.1     06-05    TPro  6.1  /  Alb  3.2<L>  /  TBili  0.8  /  DBili  x   /  AST  28  /  ALT  25  /  AlkPhos  151<H>  06-05    PT/INR - ( 05 Jun 2018 06:53 )   PT: 11.5 sec;   INR: 1.05 ratio         PTT - ( 05 Jun 2018 06:53 )  PTT:28.3 sec        CAPILLARY BLOOD GLUCOSE    MICROBIOLOGY:     RADIOLOGY:  [ ] Reviewed and interpreted by me    Mode: AC/ CMV (Assist Control/ Continuous Mandatory Ventilation)  RR (machine): 16  TV (machine): 500  FiO2: 30  PEEP: 5  ITime: 1  MAP: 11  PIP: 29

## 2018-06-05 NOTE — PROGRESS NOTE ADULT - PROBLEM SELECTOR PLAN 6
Continue Lopressor 100 mg q 12 hrs  Hold for SBP< 110 or HR < 60  Continue enalapril and Hydralazine
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
## 2018-06-05 NOTE — PROGRESS NOTE ADULT - PROBLEM SELECTOR PLAN 2
[facility]   Blood Cx sent  1/2 [facility]-  5/29 Bld cx negative times 2  Patient with + Urinalaysis, Urine Cx negative to date   Sputum Cx no growth   CT Chest Non Contrast and CT Abdomen and pelvis with po and IV contrast ordered as per Dr. Trevino to rule out infectious source. 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  Plan: -on bcx+  -cont unasyn - plan 10-14 days -   -repeat bcx negative  -suspect resp source.

## 2018-06-05 NOTE — PROGRESS NOTE ADULT - PROBLEM SELECTOR PLAN 10
Clinically suspect underlining parkinsonism/PD but limited evaluation in the setting of multiple medical issues/hospitalization/infection. [facility] scan 12/2017 supported diagnosis of PD  Continue sinemet  follow up with neuro after discharge

## 2018-06-05 NOTE — PROGRESS NOTE ADULT - ATTENDING COMMENTS
pt seen and examined at the bedside. stable.  cont weaning off the vent  on sinemet for parkinsonism  unasyn for acinetobacter in blood at [facility], PICC Line  d/c planning .

## 2018-06-05 NOTE — PROGRESS NOTE ADULT - ASSESSMENT
A/P: Clinically suspect underlining parkinsonism/PD but limited evaluation in the setting of multiple medical issues/hospitalization/infection. [facility] scan 12/2017 supported diagnosis of PD    Plan  1. started sinemet now given likelihood pt will have difficulty following up as an outpt in timely manner and to possibly help  his rehabilitation potential.   - started1/2 tab 25/100 mg tid. increased now to 1 tab tid  2. Continue supportive care, Abx per ID  3. PT  4. Optimize sleep wake cycle, frequent reorientation, high risk for delirium, avoidance of delirium provoking medications.    will monitor closely on sinemet as can cause worsening confusion

## 2018-06-05 NOTE — PROGRESS NOTE ADULT - SUBJECTIVE AND OBJECTIVE BOX
NANO SZYMANSKI 77y [id number]    Patient is a 77y old  Male who presents with a chief complaint of Admitted with fever (27 May 2018 20:28)      Follow Up/CC:  ID following for fever    Interval History/ROS: in RCU, no acute issues    Allergies    No Known Allergies    Intolerances        ANTIMICROBIALS:  ampicillin/sulbactam  IVPB    ampicillin/sulbactam  IVPB 3 every 6 hours      MEDICATIONS  (STANDING):  ampicillin/sulbactam  IVPB      ampicillin/sulbactam  IVPB 3 Gram(s) IV Intermittent every 6 hours  aspirin  chewable 81 milliGRAM(s) Oral daily  carbidopa/levodopa  25/100 1 Tablet(s) Oral three times a day  clonazePAM Tablet 0.5 milliGRAM(s) Oral <User Schedule>  clopidogrel Tablet 75 milliGRAM(s) Oral daily  dextrose 5%. 1000 milliLiter(s) (50 mL/Hr) IV Continuous <Continuous>  dextrose 50% Injectable 12.5 Gram(s) IV Push once  dextrose 50% Injectable 25 Gram(s) IV Push once  dextrose 50% Injectable 25 Gram(s) IV Push once  doxazosin 2 milliGRAM(s) Oral at bedtime  enalapril 10 milliGRAM(s) Oral two times a day  heparin  Injectable 5000 Unit(s) SubCutaneous every 8 hours  hydrALAZINE 25 milliGRAM(s) Oral every 8 hours  insulin lispro (HumaLOG) corrective regimen sliding scale   SubCutaneous every 6 hours  metoprolol tartrate 100 milliGRAM(s) Oral two times a day  pantoprazole  Injectable 40 milliGRAM(s) IV Push daily    MEDICATIONS  (PRN):  dextrose 40% Gel 15 Gram(s) Oral once PRN Blood Glucose LESS THAN 70 milliGRAM(s)/deciLiter  glucagon  Injectable 1 milliGRAM(s) IntraMuscular once PRN Glucose <70 milliGRAM(s)/deciLiter  melatonin 3 milliGRAM(s) Oral at bedtime PRN Insomnia  QUEtiapine 50 milliGRAM(s) Oral every 6 hours PRN break through aggitation        Vital Signs Last 24 Hrs  T(C): 37.1 (05 Jun 2018 08:12), Max: 37.2 (04 Jun 2018 17:25)  T(F): 98.8 (05 Jun 2018 08:12), Max: 99 (04 Jun 2018 17:25)  HR: 80 (05 Jun 2018 10:54) (60 - 92)  BP: 147/71 (05 Jun 2018 08:12) (106/72 - 157/66)  BP(mean): --  RR: 18 (05 Jun 2018 04:17) (18 - 22)  SpO2: 98% (05 Jun 2018 10:54) (98% - 100%)    CBC Full  -  ( 05 Jun 2018 06:53 )  WBC Count : 10.8 K/uL  Hemoglobin : 8.8 g/dL  Hematocrit : 26.8 %  Platelet Count - Automated : 262 K/uL  Mean Cell Volume : 102.0 fl  Mean Cell Hemoglobin : 33.8 pg  Mean Cell Hemoglobin Concentration : 33.1 gm/dL  Auto Neutrophil # : x  Auto Lymphocyte # : x  Auto Monocyte # : x  Auto Eosinophil # : x  Auto Basophil # : x  Auto Neutrophil % : x  Auto Lymphocyte % : x  Auto Monocyte % : x  Auto Eosinophil % : x  Auto Basophil % : x    06-05    140  |  101  |  19  ----------------------------<  95  3.9   |  30  |  0.62    Ca    9.5      05 Jun 2018 06:53  Phos  3.0     06-05  Mg     2.1     06-05    TPro  6.1  /  Alb  3.2<L>  /  TBili  0.8  /  DBili  x   /  AST  28  /  ALT  25  /  AlkPhos  151<H>  06-05    LIVER FUNCTIONS - ( 05 Jun 2018 06:53 )  Alb: 3.2 g/dL / Pro: 6.1 g/dL / ALK PHOS: 151 U/L / ALT: 25 U/L / AST: 28 U/L / GGT: x               MICROBIOLOGY:  .Blood Blood  05-29-18   No growth at 5 days.  --  --      .Blood Blood  05-29-18   No growth at 5 days.  --  --      .Sputum Sputum, trap  05-29-18   Few Pseudomonas aeruginosa  Normal Respiratory Silvia present  --  Pseudomonas aeruginosa      .Blood Blood-Peripheral  05-27-18   No growth at 5 days.  --  Blood Culture PCR      .Urine Catheterized  05-27-18   No growth  --  --    RADIOLOGY

## 2018-06-05 NOTE — PROGRESS NOTE ADULT - SUBJECTIVE AND OBJECTIVE BOX
SUBJECTIVE:   Sitting in chair, eyes closed  No new events    Medications:  ampicillin/sulbactam  IVPB      ampicillin/sulbactam  IVPB 3 Gram(s) IV Intermittent every 6 hours  aspirin  chewable 81 milliGRAM(s) Oral daily  carbidopa/levodopa  25/100 1 Tablet(s) Oral three times a day  clonazePAM Tablet 0.5 milliGRAM(s) Oral <User Schedule>  clopidogrel Tablet 75 milliGRAM(s) Oral daily  dextrose 40% Gel 15 Gram(s) Oral once PRN  dextrose 5%. 1000 milliLiter(s) IV Continuous <Continuous>  dextrose 50% Injectable 12.5 Gram(s) IV Push once  dextrose 50% Injectable 25 Gram(s) IV Push once  dextrose 50% Injectable 25 Gram(s) IV Push once  doxazosin 2 milliGRAM(s) Oral at bedtime  enalapril 10 milliGRAM(s) Oral two times a day  glucagon  Injectable 1 milliGRAM(s) IntraMuscular once PRN  heparin  Injectable 5000 Unit(s) SubCutaneous every 8 hours  hydrALAZINE 25 milliGRAM(s) Oral every 8 hours  insulin lispro (HumaLOG) corrective regimen sliding scale   SubCutaneous every 6 hours  melatonin 3 milliGRAM(s) Oral at bedtime PRN  metoprolol tartrate 100 milliGRAM(s) Oral two times a day  pantoprazole  Injectable 40 milliGRAM(s) IV Push daily  QUEtiapine 50 milliGRAM(s) Oral every 6 hours PRN    Labs:  CBC Full  -  ( 05 Jun 2018 06:53 )  WBC Count : 10.8 K/uL  Hemoglobin : 8.8 g/dL  Hematocrit : 26.8 %  Platelet Count - Automated : 262 K/uL  Mean Cell Volume : 102.0 fl  Mean Cell Hemoglobin : 33.8 pg  Mean Cell Hemoglobin Concentration : 33.1 gm/dL  Auto Neutrophil # : x  Auto Lymphocyte # : x  Auto Monocyte # : x  Auto Eosinophil # : x  Auto Basophil # : x  Auto Neutrophil % : x  Auto Lymphocyte % : x  Auto Monocyte % : x  Auto Eosinophil % : x  Auto Basophil % : x    06-05    140  |  101  |  19  ----------------------------<  95  3.9   |  30  |  0.62    Ca    9.5      05 Jun 2018 06:53  Phos  3.0     06-05  Mg     2.1     06-05    TPro  6.1  /  Alb  3.2<L>  /  TBili  0.8  /  DBili  x   /  AST  28  /  ALT  25  /  AlkPhos  151<H>  06-05    CAPILLARY BLOOD GLUCOSE      POCT Blood Glucose.: 147 mg/dL (05 Jun 2018 11:53)  POCT Blood Glucose.: 100 mg/dL (05 Jun 2018 05:12)  POCT Blood Glucose.: 144 mg/dL (04 Jun 2018 23:47)  POCT Blood Glucose.: 137 mg/dL (04 Jun 2018 18:02)    LIVER FUNCTIONS - ( 05 Jun 2018 06:53 )  Alb: 3.2 g/dL / Pro: 6.1 g/dL / ALK PHOS: 151 U/L / ALT: 25 U/L / AST: 28 U/L / GGT: x           PT/INR - ( 05 Jun 2018 06:53 )   PT: 11.5 sec;   INR: 1.05 ratio    PTT - ( 05 Jun 2018 06:53 )  PTT:28.3 sec    Vitals:  Vital Signs Last 24 Hrs  T(C): 36.9 (05 Jun 2018 12:51), Max: 37.2 (04 Jun 2018 17:25)  T(F): 98.4 (05 Jun 2018 12:51), Max: 99 (04 Jun 2018 17:25)  HR: 100 (05 Jun 2018 13:15) (60 - 100)  BP: 163/62 (05 Jun 2018 12:51) (106/72 - 163/62)  BP(mean): --  RR: 98 (05 Jun 2018 12:51) (18 - 98)  SpO2: 100% (05 Jun 2018 13:15) (98% - 100%)      NEUROLOGICAL EXAM:    Mental status: trached, sitting in chair, eyes closed, minimal wincing to DSR., +myerson's sign    Cranial Nerves: Pupils were equal, round, reactive to light. Extraocular movements were intact. Fundoscopic exam was deferred. Facial sensation was intact to light touch. There was no facial asymmetry. The palate was upgoing symmetrically and tongue was midline.    Motor exam: Diffuse markedly increased tone, cogwheeling at wrist bilaterally, bilateral parkinsonian pill rolling tremors in hands. Minimal movements in all extremities, exam limited by poor effort    Reflexes: 2+ in the bilateral upper extremities. 2+ in the bilateral lower extremities. Toes were mute    Sensation: responds to tactile stimuli in all extremities     Coordination: unable    Gait: unable.     Imaging:  < from: CT Head No Cont (03.26.18 @ 16:59) >  Impression:     There is straightening of the normal cervical lordosis. No acute   fracture or traumatic subluxations identified. There are large anterior   bridging osteophytes at C3-4 through C6-7. Multilevel uncovertebral joint   and facet arthropathy is again identified. The prevertebral soft tissues   are within normal limits.    Impression:    Brain CT: No acute hemorrhage, infarct or displaced calvarial fracture.    Cervical spine CT: No acute fracture or traumatic subluxation. Multilevel   degenerative changes.    BRYAN LYNCH M.D., ATTENDING RADIOLOGIST  This document has been electronically signed. Mar 26 2018  5:10PM

## 2018-06-05 NOTE — PROGRESS NOTE ADULT - ATTENDING COMMENTS
Mickey Benites  Attending Physician   Division of Infectious Disease  Pager #[phone]  After 5pm/weekend or no response, call #[phone]

## 2018-06-06 LAB
ALBUMIN SERPL ELPH-MCNC: 3 G/DL — LOW (ref 3.3–5)
ALP SERPL-CCNC: 145 U/L — HIGH (ref 40–120)
ALT FLD-CCNC: 16 U/L — SIGNIFICANT CHANGE UP (ref 10–45)
ANION GAP SERPL CALC-SCNC: 8 MMOL/L — SIGNIFICANT CHANGE UP (ref 5–17)
APTT BLD: 31.4 SEC — SIGNIFICANT CHANGE UP (ref 27.5–37.4)
AST SERPL-CCNC: 23 U/L — SIGNIFICANT CHANGE UP (ref 10–40)
BILIRUB SERPL-MCNC: 0.7 MG/DL — SIGNIFICANT CHANGE UP (ref 0.2–1.2)
BUN SERPL-MCNC: 19 MG/DL — SIGNIFICANT CHANGE UP (ref 7–23)
CALCIUM SERPL-MCNC: 9.2 MG/DL — SIGNIFICANT CHANGE UP (ref 8.4–10.5)
CHLORIDE SERPL-SCNC: 104 MMOL/L — SIGNIFICANT CHANGE UP (ref 96–108)
CO2 SERPL-SCNC: 31 MMOL/L — SIGNIFICANT CHANGE UP (ref 22–31)
CREAT SERPL-MCNC: 0.63 MG/DL — SIGNIFICANT CHANGE UP (ref 0.5–1.3)
GLUCOSE BLDC GLUCOMTR-MCNC: 106 MG/DL — HIGH (ref 70–99)
GLUCOSE BLDC GLUCOMTR-MCNC: 143 MG/DL — HIGH (ref 70–99)
GLUCOSE BLDC GLUCOMTR-MCNC: 152 MG/DL — HIGH (ref 70–99)
GLUCOSE BLDC GLUCOMTR-MCNC: 163 MG/DL — HIGH (ref 70–99)
GLUCOSE SERPL-MCNC: 100 MG/DL — HIGH (ref 70–99)
HCT VFR BLD CALC: 24.4 % — LOW (ref 39–50)
HGB BLD-MCNC: 8.2 G/DL — LOW (ref 13–17)
INR BLD: 1.13 RATIO — SIGNIFICANT CHANGE UP (ref 0.88–1.16)
MAGNESIUM SERPL-MCNC: 1.9 MG/DL — SIGNIFICANT CHANGE UP (ref 1.6–2.6)
MCHC RBC-ENTMCNC: 33.6 GM/DL — SIGNIFICANT CHANGE UP (ref 32–36)
MCHC RBC-ENTMCNC: 34.8 PG — HIGH (ref 27–34)
MCV RBC AUTO: 103 FL — HIGH (ref 80–100)
PHOSPHATE SERPL-MCNC: 3.2 MG/DL — SIGNIFICANT CHANGE UP (ref 2.5–4.5)
PLATELET # BLD AUTO: 267 K/UL — SIGNIFICANT CHANGE UP (ref 150–400)
POTASSIUM SERPL-MCNC: 3.8 MMOL/L — SIGNIFICANT CHANGE UP (ref 3.5–5.3)
POTASSIUM SERPL-SCNC: 3.8 MMOL/L — SIGNIFICANT CHANGE UP (ref 3.5–5.3)
PROT SERPL-MCNC: 6.1 G/DL — SIGNIFICANT CHANGE UP (ref 6–8.3)
PROTHROM AB SERPL-ACNC: 12.2 SEC — SIGNIFICANT CHANGE UP (ref 9.8–12.7)
RBC # BLD: 2.36 M/UL — LOW (ref 4.2–5.8)
RBC # FLD: 14.2 % — SIGNIFICANT CHANGE UP (ref 10.3–14.5)
SODIUM SERPL-SCNC: 143 MMOL/L — SIGNIFICANT CHANGE UP (ref 135–145)
WBC # BLD: 10 K/UL — SIGNIFICANT CHANGE UP (ref 3.8–10.5)
WBC # FLD AUTO: 10 K/UL — SIGNIFICANT CHANGE UP (ref 3.8–10.5)

## 2018-06-06 PROCEDURE — 99232 SBSQ HOSP IP/OBS MODERATE 35: CPT

## 2018-06-06 RX ADMIN — AMPICILLIN SODIUM AND SULBACTAM SODIUM 200 GRAM(S): 250; 125 INJECTION, POWDER, FOR SUSPENSION INTRAMUSCULAR; INTRAVENOUS at 13:30

## 2018-06-06 RX ADMIN — CARBIDOPA AND LEVODOPA 1 TABLET(S): 25; 100 TABLET ORAL at 22:17

## 2018-06-06 RX ADMIN — Medication 25 MILLIGRAM(S): at 22:15

## 2018-06-06 RX ADMIN — CLOPIDOGREL BISULFATE 75 MILLIGRAM(S): 75 TABLET, FILM COATED ORAL at 13:23

## 2018-06-06 RX ADMIN — HEPARIN SODIUM 5000 UNIT(S): 5000 INJECTION INTRAVENOUS; SUBCUTANEOUS at 05:07

## 2018-06-06 RX ADMIN — Medication 2 MILLIGRAM(S): at 22:17

## 2018-06-06 RX ADMIN — AMPICILLIN SODIUM AND SULBACTAM SODIUM 200 GRAM(S): 250; 125 INJECTION, POWDER, FOR SUSPENSION INTRAMUSCULAR; INTRAVENOUS at 20:58

## 2018-06-06 RX ADMIN — Medication 1: at 13:28

## 2018-06-06 RX ADMIN — HEPARIN SODIUM 5000 UNIT(S): 5000 INJECTION INTRAVENOUS; SUBCUTANEOUS at 13:24

## 2018-06-06 RX ADMIN — Medication 100 MILLIGRAM(S): at 05:07

## 2018-06-06 RX ADMIN — CARBIDOPA AND LEVODOPA 1 TABLET(S): 25; 100 TABLET ORAL at 05:07

## 2018-06-06 RX ADMIN — CARBIDOPA AND LEVODOPA 1 TABLET(S): 25; 100 TABLET ORAL at 13:24

## 2018-06-06 RX ADMIN — Medication 10 MILLIGRAM(S): at 17:45

## 2018-06-06 RX ADMIN — AMPICILLIN SODIUM AND SULBACTAM SODIUM 200 GRAM(S): 250; 125 INJECTION, POWDER, FOR SUSPENSION INTRAMUSCULAR; INTRAVENOUS at 02:05

## 2018-06-06 RX ADMIN — Medication 10 MILLIGRAM(S): at 05:07

## 2018-06-06 RX ADMIN — Medication 25 MILLIGRAM(S): at 05:07

## 2018-06-06 RX ADMIN — Medication 0.5 MILLIGRAM(S): at 18:14

## 2018-06-06 RX ADMIN — AMPICILLIN SODIUM AND SULBACTAM SODIUM 200 GRAM(S): 250; 125 INJECTION, POWDER, FOR SUSPENSION INTRAMUSCULAR; INTRAVENOUS at 08:58

## 2018-06-06 RX ADMIN — Medication 25 MILLIGRAM(S): at 13:23

## 2018-06-06 RX ADMIN — Medication 3 MILLIGRAM(S): at 22:15

## 2018-06-06 RX ADMIN — Medication 81 MILLIGRAM(S): at 13:23

## 2018-06-06 RX ADMIN — PANTOPRAZOLE SODIUM 40 MILLIGRAM(S): 20 TABLET, DELAYED RELEASE ORAL at 13:24

## 2018-06-06 RX ADMIN — HEPARIN SODIUM 5000 UNIT(S): 5000 INJECTION INTRAVENOUS; SUBCUTANEOUS at 22:16

## 2018-06-06 RX ADMIN — Medication 100 MILLIGRAM(S): at 17:45

## 2018-06-06 NOTE — CHART NOTE - NSCHARTNOTEFT_GEN_A_CORE
At the request of interventional radiology, Primary team is aware of the slight bleeding risk of this PICC placement 2/2 to patient being on low dose ASA, Plavix 75 qd and heparin 5000 units sq  for DVT PPX. At this time the benefits out weigh the risks patient will need to have a picc line placed. D/W Dr. Rochelle Valerio -Pulmonary Attending.
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
## 2018-06-06 NOTE — PROGRESS NOTE ADULT - ATTENDING COMMENTS
pt seen and examined at the bedside. stable.  cont weaning off the vent  on sinemet for parkinsonism  Unasyn for acinetobacter in blood at [facility]  d/c planning .

## 2018-06-06 NOTE — PROGRESS NOTE ADULT - CONSTITUTIONAL DETAILS
no distress/in RCU
no distress
no distress/in RCU, connected to vent
no distress
no distress/in chair, in RCU
no distress

## 2018-06-06 NOTE — PROGRESS NOTE ADULT - PROBLEM SELECTOR PLAN 3
-on bcx+  -cont unasyn - plan 10 days - day 8 of abx - complete on 6/8  -repeat bcx negative  -suspect resp source

## 2018-06-06 NOTE — PROGRESS NOTE ADULT - MS EXT PE MLT D E PC
no cyanosis/no pedal edema
no pedal edema/no cyanosis
no cyanosis/no pedal edema
no pedal edema/no cyanosis
no cyanosis/no pedal edema

## 2018-06-06 NOTE — PROGRESS NOTE ADULT - NOSE
no discharge
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
## 2018-06-06 NOTE — PROGRESS NOTE ADULT - COMMENTS
pt gives no hx but more awake
pt gives no hx but awake
pt gives no hx but awake
pt gives no hx but more awake
pt gives no hx but awake
pt gives no hx but more awake
pt gives no hx but awake
pt gives no hx

## 2018-06-06 NOTE — PROGRESS NOTE ADULT - PROBLEM SELECTOR PLAN 2
[facility] + acienobacter and CNS   Blood Cx sent  1/2 GPC-  5/29 Bld cx negative times 2  Patient with + Urinalaysis, Urine Cx negative to date   Sputum Cx no growth   CT Chest Non Contrast and CT Abdomen and pelvis with po and IV contrast ordered as per Dr. Trevino to rule out infectious source. No specific infective course   6/1  Blood cx from [facility] with Acinebater and CNS abx changed to ertapenem as it is resistant Acinetobacter baumannii or Acinetobacter haemolyticus identified by diagnostic testing.  Plan: -on bcx+  -cont unasyn - plan 10-14 days -   -repeat bcx negative  -suspect resp source.

## 2018-06-06 NOTE — PROGRESS NOTE ADULT - SUBJECTIVE AND OBJECTIVE BOX
NANO SZYMANSKI 77y [id number]    Patient is a 77y old  Male who presents with a chief complaint of Admitted with fever (27 May 2018 20:28)      Follow Up/CC:  ID following for bacteremia    Interval History/ROS: in RCU, NAD    Allergies    No Known Allergies    Intolerances        ANTIMICROBIALS:  ampicillin/sulbactam  IVPB    ampicillin/sulbactam  IVPB 3 every 6 hours      MEDICATIONS  (STANDING):  ampicillin/sulbactam  IVPB      ampicillin/sulbactam  IVPB 3 Gram(s) IV Intermittent every 6 hours  aspirin  chewable 81 milliGRAM(s) Oral daily  carbidopa/levodopa  25/100 1 Tablet(s) Oral three times a day  clonazePAM Tablet 0.5 milliGRAM(s) Oral <User Schedule>  clopidogrel Tablet 75 milliGRAM(s) Oral daily  dextrose 5%. 1000 milliLiter(s) (50 mL/Hr) IV Continuous <Continuous>  dextrose 50% Injectable 12.5 Gram(s) IV Push once  dextrose 50% Injectable 25 Gram(s) IV Push once  dextrose 50% Injectable 25 Gram(s) IV Push once  doxazosin 2 milliGRAM(s) Oral at bedtime  enalapril 10 milliGRAM(s) Oral two times a day  heparin  Injectable 5000 Unit(s) SubCutaneous every 8 hours  hydrALAZINE 25 milliGRAM(s) Oral every 8 hours  insulin lispro (HumaLOG) corrective regimen sliding scale   SubCutaneous every 6 hours  metoprolol tartrate 100 milliGRAM(s) Oral two times a day  pantoprazole  Injectable 40 milliGRAM(s) IV Push daily    MEDICATIONS  (PRN):  dextrose 40% Gel 15 Gram(s) Oral once PRN Blood Glucose LESS THAN 70 milliGRAM(s)/deciLiter  glucagon  Injectable 1 milliGRAM(s) IntraMuscular once PRN Glucose <70 milliGRAM(s)/deciLiter  melatonin 3 milliGRAM(s) Oral at bedtime PRN Insomnia  QUEtiapine 50 milliGRAM(s) Oral every 6 hours PRN break through aggitation        Vital Signs Last 24 Hrs  T(C): 37.1 (06 Jun 2018 12:38), Max: 37.6 (05 Jun 2018 21:26)  T(F): 98.7 (06 Jun 2018 12:38), Max: 99.7 (05 Jun 2018 21:26)  HR: 83 (06 Jun 2018 12:38) (61 - 99)  BP: 141/65 (06 Jun 2018 12:38) (112/73 - 146/64)  BP(mean): --  RR: 18 (06 Jun 2018 12:38) (16 - 18)  SpO2: 100% (06 Jun 2018 12:38) (99% - 100%)    CBC Full  -  ( 06 Jun 2018 06:17 )  WBC Count : 10.0 K/uL  Hemoglobin : 8.2 g/dL  Hematocrit : 24.4 %  Platelet Count - Automated : 267 K/uL  Mean Cell Volume : 103.0 fl  Mean Cell Hemoglobin : 34.8 pg  Mean Cell Hemoglobin Concentration : 33.6 gm/dL  Auto Neutrophil # : x  Auto Lymphocyte # : x  Auto Monocyte # : x  Auto Eosinophil # : x  Auto Basophil # : x  Auto Neutrophil % : x  Auto Lymphocyte % : x  Auto Monocyte % : x  Auto Eosinophil % : x  Auto Basophil % : x    06-06    143  |  104  |  19  ----------------------------<  100<H>  3.8   |  31  |  0.63    Ca    9.2      06 Jun 2018 06:19  Phos  3.2     06-06  Mg     1.9     06-06    TPro  6.1  /  Alb  3.0<L>  /  TBili  0.7  /  DBili  x   /  AST  23  /  ALT  16  /  AlkPhos  145<H>  06-06    LIVER FUNCTIONS - ( 06 Jun 2018 06:19 )  Alb: 3.0 g/dL / Pro: 6.1 g/dL / ALK PHOS: 145 U/L / ALT: 16 U/L / AST: 23 U/L / GGT: x               MICROBIOLOGY:  .Blood Blood  05-29-18   No growth at 5 days.  --  --      .Blood Blood  05-29-18   No growth at 5 days.  --  --      .Sputum Sputum, trap  05-29-18   Few Pseudomonas aeruginosa  Normal Respiratory Silvia present  --  Pseudomonas aeruginosa      .Blood Blood-Peripheral  05-27-18   No growth at 5 days.  --  Blood Culture PCR      .Urine Catheterized  05-27-18   No growth  --  --              v            RADIOLOGY

## 2018-06-06 NOTE — PROGRESS NOTE ADULT - RS GEN PE MLT RESP DETAILS PC
airway patent/clear to auscultation bilaterally/respirations non-labored
respirations non-labored/clear to auscultation bilaterally/airway patent
clear to auscultation bilaterally/respirations non-labored/airway patent
airway patent/respirations non-labored/clear to auscultation bilaterally
respirations non-labored/airway patent/clear to auscultation bilaterally
airway patent/clear to auscultation bilaterally/respirations non-labored
airway patent/clear to auscultation bilaterally/respirations non-labored
respirations non-labored/airway patent/clear to auscultation bilaterally

## 2018-06-06 NOTE — PROGRESS NOTE ADULT - GASTROINTESTINAL DETAILS
no distention/soft/nontender/no rebound tenderness
no rebound tenderness/no distention/nontender/soft
no distention/no rebound tenderness/nontender/no guarding/soft
soft/nontender/no distention/no rebound tenderness
no distention/soft/no rebound tenderness/nontender
no rebound tenderness/soft/no distention/nontender
no distention/no rebound tenderness/soft/nontender
no rebound tenderness/soft/no distention/nontender

## 2018-06-06 NOTE — PROGRESS NOTE ADULT - SUBJECTIVE AND OBJECTIVE BOX
Patient is a 77y old  Male who presents with a chief complaint of Admitted with fever (27 May 2018 20:28)      Interval Events:    REVIEW OF SYSTEMS:  [ ] Positive  [ ] All other systems negative  [ x] Unable to assess ROS because __non verbal______    Vital Signs Last 24 Hrs  T(C): 37.1 (06-06-18 @ 04:48), Max: 37.6 (06-05-18 @ 21:26)  T(F): 98.7 (06-06-18 @ 04:48), Max: 99.7 (06-05-18 @ 21:26)  HR: 74 (06-06-18 @ 07:49) (61 - 100)  BP: 142/54 (06-06-18 @ 04:48) (112/73 - 163/62)  RR: 18 (06-06-18 @ 04:48) (16 - 18)  SpO2: 99% (06-06-18 @ 07:49) (98% - 100%)    PHYSICAL EXAM:  HEENT:   [ x]Tracheostomy: 7 portex cuffed  [ ]Pupils equal  [ ]No oral lesions  [ ]Abnormal    SKIN  [ x]No Rash  [ ] Abnormal  [ ] pressure    CARDIAC  [ x]Regular  [ ]Abnormal    PULMONARY  [ x]Bilateral Clear Breath Sounds  [ ]Normal Excursion  [ ]Abnormal    GI  [ x]PEG      [ ] +BS		              [x ]Soft, nondistended, nontender	  [ ]Abnormal    MUSCULOSKELETAL                                   [ ]Bedbound                 [ ]Abnormal    [x ]Ambulatory/OOB to chair                           EXTREMITIES                                         [ ]Normal  [x ]Edema- trace                           NEUROLOGIC  [ ] Normal, non focal  [ x] Focal findings: parkinsons- hand tremors none at rest    PSYCHIATRIC  [ x]Alert   [ ] Sedated	 [ ]Agitated    :  Ybarra: [ ] Yes, if yes: Date of Placement:                   [ x ] No    LINES: Central Lines [ ] Yes, if yes: Date of Placement                                     [ x] No    HOSPITAL MEDICATIONS:  MEDICATIONS  (STANDING):  ampicillin/sulbactam  IVPB      ampicillin/sulbactam  IVPB 3 Gram(s) IV Intermittent every 6 hours  aspirin  chewable 81 milliGRAM(s) Oral daily  carbidopa/levodopa  25/100 1 Tablet(s) Oral three times a day  clonazePAM Tablet 0.5 milliGRAM(s) Oral <User Schedule>  clopidogrel Tablet 75 milliGRAM(s) Oral daily  dextrose 5%. 1000 milliLiter(s) (50 mL/Hr) IV Continuous <Continuous>  dextrose 50% Injectable 12.5 Gram(s) IV Push once  dextrose 50% Injectable 25 Gram(s) IV Push once  dextrose 50% Injectable 25 Gram(s) IV Push once  doxazosin 2 milliGRAM(s) Oral at bedtime  enalapril 10 milliGRAM(s) Oral two times a day  heparin  Injectable 5000 Unit(s) SubCutaneous every 8 hours  hydrALAZINE 25 milliGRAM(s) Oral every 8 hours  insulin lispro (HumaLOG) corrective regimen sliding scale   SubCutaneous every 6 hours  metoprolol tartrate 100 milliGRAM(s) Oral two times a day  pantoprazole  Injectable 40 milliGRAM(s) IV Push daily    MEDICATIONS  (PRN):  dextrose 40% Gel 15 Gram(s) Oral once PRN Blood Glucose LESS THAN 70 milliGRAM(s)/deciLiter  glucagon  Injectable 1 milliGRAM(s) IntraMuscular once PRN Glucose <70 milliGRAM(s)/deciLiter  melatonin 3 milliGRAM(s) Oral at bedtime PRN Insomnia  QUEtiapine 50 milliGRAM(s) Oral every 6 hours PRN break through aggitation      LABS:                        8.2    10.0  )-----------( 267      ( 06 Jun 2018 06:17 )             24.4     06-06    143  |  104  |  19  ----------------------------<  100<H>  3.8   |  31  |  0.63    Ca    9.2      06 Jun 2018 06:19  Phos  3.2     06-06  Mg     1.9     06-06    TPro  6.1  /  Alb  3.0<L>  /  TBili  0.7  /  DBili  x   /  AST  23  /  ALT  16  /  AlkPhos  145<H>  06-06    PT/INR - ( 06 Jun 2018 06:18 )   PT: 12.2 sec;   INR: 1.13 ratio         PTT - ( 06 Jun 2018 06:18 )  PTT:31.4 sec        CAPILLARY BLOOD GLUCOSE    MICROBIOLOGY:     RADIOLOGY:  [ ] Reviewed and interpreted by me    Mode: CPAP with PS  FiO2: 30  PEEP: 5  PS: 10  MAP: 8  PIP: 16

## 2018-06-06 NOTE — PROGRESS NOTE ADULT - ATTENDING COMMENTS
Mickey Benites  Attending Physician   Division of Infectious Disease  Pager #[phone]  After 5pm/weekend or no response, call #[phone]    Will sign off, recall ID if needed #[phone].

## 2018-06-07 LAB
ALBUMIN SERPL ELPH-MCNC: 3.3 G/DL — SIGNIFICANT CHANGE UP (ref 3.3–5)
ALP SERPL-CCNC: 147 U/L — HIGH (ref 40–120)
ALT FLD-CCNC: 17 U/L — SIGNIFICANT CHANGE UP (ref 10–45)
ANION GAP SERPL CALC-SCNC: 12 MMOL/L — SIGNIFICANT CHANGE UP (ref 5–17)
AST SERPL-CCNC: 21 U/L — SIGNIFICANT CHANGE UP (ref 10–40)
BILIRUB SERPL-MCNC: 0.7 MG/DL — SIGNIFICANT CHANGE UP (ref 0.2–1.2)
BUN SERPL-MCNC: 19 MG/DL — SIGNIFICANT CHANGE UP (ref 7–23)
CALCIUM SERPL-MCNC: 9.3 MG/DL — SIGNIFICANT CHANGE UP (ref 8.4–10.5)
CHLORIDE SERPL-SCNC: 103 MMOL/L — SIGNIFICANT CHANGE UP (ref 96–108)
CO2 SERPL-SCNC: 28 MMOL/L — SIGNIFICANT CHANGE UP (ref 22–31)
CREAT SERPL-MCNC: 0.61 MG/DL — SIGNIFICANT CHANGE UP (ref 0.5–1.3)
GLUCOSE BLDC GLUCOMTR-MCNC: 116 MG/DL — HIGH (ref 70–99)
GLUCOSE BLDC GLUCOMTR-MCNC: 120 MG/DL — HIGH (ref 70–99)
GLUCOSE BLDC GLUCOMTR-MCNC: 133 MG/DL — HIGH (ref 70–99)
GLUCOSE BLDC GLUCOMTR-MCNC: 99 MG/DL — SIGNIFICANT CHANGE UP (ref 70–99)
GLUCOSE SERPL-MCNC: 92 MG/DL — SIGNIFICANT CHANGE UP (ref 70–99)
HCT VFR BLD CALC: 26.6 % — LOW (ref 39–50)
HGB BLD-MCNC: 8.5 G/DL — LOW (ref 13–17)
MAGNESIUM SERPL-MCNC: 2 MG/DL — SIGNIFICANT CHANGE UP (ref 1.6–2.6)
MCHC RBC-ENTMCNC: 31.9 GM/DL — LOW (ref 32–36)
MCHC RBC-ENTMCNC: 32.9 PG — SIGNIFICANT CHANGE UP (ref 27–34)
MCV RBC AUTO: 103 FL — HIGH (ref 80–100)
PHOSPHATE SERPL-MCNC: 2.8 MG/DL — SIGNIFICANT CHANGE UP (ref 2.5–4.5)
PLATELET # BLD AUTO: 258 K/UL — SIGNIFICANT CHANGE UP (ref 150–400)
POTASSIUM SERPL-MCNC: 3.7 MMOL/L — SIGNIFICANT CHANGE UP (ref 3.5–5.3)
POTASSIUM SERPL-SCNC: 3.7 MMOL/L — SIGNIFICANT CHANGE UP (ref 3.5–5.3)
PROT SERPL-MCNC: 6.2 G/DL — SIGNIFICANT CHANGE UP (ref 6–8.3)
RBC # BLD: 2.58 M/UL — LOW (ref 4.2–5.8)
RBC # FLD: 14.4 % — SIGNIFICANT CHANGE UP (ref 10.3–14.5)
SODIUM SERPL-SCNC: 143 MMOL/L — SIGNIFICANT CHANGE UP (ref 135–145)
WBC # BLD: 10.1 K/UL — SIGNIFICANT CHANGE UP (ref 3.8–10.5)
WBC # FLD AUTO: 10.1 K/UL — SIGNIFICANT CHANGE UP (ref 3.8–10.5)

## 2018-06-07 RX ADMIN — HEPARIN SODIUM 5000 UNIT(S): 5000 INJECTION INTRAVENOUS; SUBCUTANEOUS at 05:46

## 2018-06-07 RX ADMIN — Medication 2 MILLIGRAM(S): at 21:19

## 2018-06-07 RX ADMIN — AMPICILLIN SODIUM AND SULBACTAM SODIUM 200 GRAM(S): 250; 125 INJECTION, POWDER, FOR SUSPENSION INTRAMUSCULAR; INTRAVENOUS at 14:31

## 2018-06-07 RX ADMIN — AMPICILLIN SODIUM AND SULBACTAM SODIUM 200 GRAM(S): 250; 125 INJECTION, POWDER, FOR SUSPENSION INTRAMUSCULAR; INTRAVENOUS at 10:46

## 2018-06-07 RX ADMIN — Medication 81 MILLIGRAM(S): at 14:31

## 2018-06-07 RX ADMIN — Medication 0.5 MILLIGRAM(S): at 18:01

## 2018-06-07 RX ADMIN — CARBIDOPA AND LEVODOPA 1 TABLET(S): 25; 100 TABLET ORAL at 05:50

## 2018-06-07 RX ADMIN — Medication 100 MILLIGRAM(S): at 17:55

## 2018-06-07 RX ADMIN — Medication 25 MILLIGRAM(S): at 05:46

## 2018-06-07 RX ADMIN — PANTOPRAZOLE SODIUM 40 MILLIGRAM(S): 20 TABLET, DELAYED RELEASE ORAL at 12:05

## 2018-06-07 RX ADMIN — Medication 25 MILLIGRAM(S): at 14:31

## 2018-06-07 RX ADMIN — HEPARIN SODIUM 5000 UNIT(S): 5000 INJECTION INTRAVENOUS; SUBCUTANEOUS at 21:19

## 2018-06-07 RX ADMIN — Medication 10 MILLIGRAM(S): at 05:46

## 2018-06-07 RX ADMIN — Medication 100 MILLIGRAM(S): at 05:46

## 2018-06-07 RX ADMIN — CLOPIDOGREL BISULFATE 75 MILLIGRAM(S): 75 TABLET, FILM COATED ORAL at 12:05

## 2018-06-07 RX ADMIN — AMPICILLIN SODIUM AND SULBACTAM SODIUM 200 GRAM(S): 250; 125 INJECTION, POWDER, FOR SUSPENSION INTRAMUSCULAR; INTRAVENOUS at 21:18

## 2018-06-07 RX ADMIN — CARBIDOPA AND LEVODOPA 1 TABLET(S): 25; 100 TABLET ORAL at 14:31

## 2018-06-07 RX ADMIN — AMPICILLIN SODIUM AND SULBACTAM SODIUM 200 GRAM(S): 250; 125 INJECTION, POWDER, FOR SUSPENSION INTRAMUSCULAR; INTRAVENOUS at 02:18

## 2018-06-07 RX ADMIN — Medication 1: at 00:34

## 2018-06-07 RX ADMIN — Medication 10 MILLIGRAM(S): at 17:54

## 2018-06-07 RX ADMIN — Medication 25 MILLIGRAM(S): at 21:19

## 2018-06-07 RX ADMIN — HEPARIN SODIUM 5000 UNIT(S): 5000 INJECTION INTRAVENOUS; SUBCUTANEOUS at 14:32

## 2018-06-07 RX ADMIN — CARBIDOPA AND LEVODOPA 1 TABLET(S): 25; 100 TABLET ORAL at 21:18

## 2018-06-07 NOTE — PROGRESS NOTE ADULT - SUBJECTIVE AND OBJECTIVE BOX
Patient is a 77y old  Male who presents with a chief complaint of Admitted with fever (27 May 2018 20:28)      Interval Events:    REVIEW OF SYSTEMS:  [ ] Positive  [ ] All other systems negative  [x ] Unable to assess ROS because __non-verbal______    Vital Signs Last 24 Hrs  T(C): 37.3 (06-07-18 @ 04:18), Max: 37.4 (06-06-18 @ 22:12)  T(F): 99.1 (06-07-18 @ 04:18), Max: 99.3 (06-06-18 @ 22:12)  HR: 83 (06-07-18 @ 08:39) (62 - 89)  BP: 146/78 (06-07-18 @ 06:00) (141/65 - 160/67)  RR: 16 (06-07-18 @ 08:39) (16 - 18)  SpO2: 100% (06-07-18 @ 08:39) (99% - 100%)    PHYSICAL EXAM:  HEENT:   [x ]Tracheostomy: 7 portex uncuffed  [ ]Pupils equal  [ ]No oral lesions  [ ]Abnormal    SKIN  [x ]No Rash  [ ] Abnormal  [ ] pressure    CARDIAC  [x ]Regular  [ ]Abnormal    PULMONARY  [ x]Bilateral Clear Breath Sounds  [ ]Normal Excursion  [ ]Abnormal    GI  [x ]PEG      [x ] +BS		              [x ]Soft, nondistended, nontender	  [ ]Abnormal    MUSCULOSKELETAL                                   [ ]Bedbound                 [ ]Abnormal    [ x]Ambulatory/OOB to chair                           EXTREMITIES                                         [ ]Normal  [x ]Edema    trace                       NEUROLOGIC  [ ] Normal, non focal  [ x] Focal findings: intentional tremors -Parkinsons    PSYCHIATRIC  [x ]Alert   [ ] Sedated	 [ ]Agitated    :  Ybarra: [ ] Yes, if yes: Date of Placement:                   [x  ] No    LINES: Central Lines [ ] Yes, if yes: Date of Placement                                     [ x ] No    HOSPITAL MEDICATIONS:  MEDICATIONS  (STANDING):  ampicillin/sulbactam  IVPB      ampicillin/sulbactam  IVPB 3 Gram(s) IV Intermittent every 6 hours  aspirin  chewable 81 milliGRAM(s) Oral daily  carbidopa/levodopa  25/100 1 Tablet(s) Oral three times a day  clonazePAM Tablet 0.5 milliGRAM(s) Oral <User Schedule>  clopidogrel Tablet 75 milliGRAM(s) Oral daily  dextrose 5%. 1000 milliLiter(s) (50 mL/Hr) IV Continuous <Continuous>  dextrose 50% Injectable 12.5 Gram(s) IV Push once  dextrose 50% Injectable 25 Gram(s) IV Push once  dextrose 50% Injectable 25 Gram(s) IV Push once  doxazosin 2 milliGRAM(s) Oral at bedtime  enalapril 10 milliGRAM(s) Oral two times a day  heparin  Injectable 5000 Unit(s) SubCutaneous every 8 hours  hydrALAZINE 25 milliGRAM(s) Oral every 8 hours  insulin lispro (HumaLOG) corrective regimen sliding scale   SubCutaneous every 6 hours  metoprolol tartrate 100 milliGRAM(s) Oral two times a day  pantoprazole  Injectable 40 milliGRAM(s) IV Push daily    MEDICATIONS  (PRN):  dextrose 40% Gel 15 Gram(s) Oral once PRN Blood Glucose LESS THAN 70 milliGRAM(s)/deciLiter  glucagon  Injectable 1 milliGRAM(s) IntraMuscular once PRN Glucose <70 milliGRAM(s)/deciLiter  melatonin 3 milliGRAM(s) Oral at bedtime PRN Insomnia  QUEtiapine 50 milliGRAM(s) Oral every 6 hours PRN break through aggitation      LABS:                        8.5    10.1  )-----------( 258      ( 07 Jun 2018 06:45 )             26.6     06-07    143  |  103  |  19  ----------------------------<  92  3.7   |  28  |  0.61    Ca    9.3      07 Jun 2018 06:45  Phos  2.8     06-07  Mg     2.0     06-07    TPro  6.2  /  Alb  3.3  /  TBili  0.7  /  DBili  x   /  AST  21  /  ALT  17  /  AlkPhos  147<H>  06-07    PT/INR - ( 06 Jun 2018 06:18 )   PT: 12.2 sec;   INR: 1.13 ratio         PTT - ( 06 Jun 2018 06:18 )  PTT:31.4 sec        CAPILLARY BLOOD GLUCOSE    MICROBIOLOGY:     RADIOLOGY:  [ ] Reviewed and interpreted by me    Mode: AC/ CMV (Assist Control/ Continuous Mandatory Ventilation)  RR (machine): 16  TV (machine): 500  FiO2: 30  PEEP: 5  ITime: 1  MAP: 8  PIP: 28

## 2018-06-07 NOTE — PROGRESS NOTE ADULT - ATTENDING COMMENTS
pt seen and examined at the bedside. stable.  tolerating TC today  on sinemet for parkinsonism  Unasyn for acinetobacter in blood at [facility]  d/c planning .

## 2018-06-07 NOTE — PROGRESS NOTE ADULT - PROBLEM SELECTOR PLAN 2
Blood cx from [facility] + acienobacter and CNS   Blood Cx sent  1/2 [facility]-  5/29 Bld cx negative times 2  Patient with + Urinalaysis, Urine Cx negative to date   Sputum Cx no growth   CT Chest Non Contrast and CT Abdomen and pelvis with po and IV contrast ordered as per Dr. Trevino to rule out infectious source. No specific infective course   6/1  Blood cx from [facility] with Acinebater and CNS abx changed to ertapenem as it is resistant Acinetobacter baumannii or Acinetobacter haemolyticus identified by diagnostic testing.  Plan: -on bcx+. Completes abx on friday.  -cont unasyn - plan 10-14 days -   -repeat bcx negative  -suspect resp source.
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
## 2018-06-08 LAB
ALBUMIN SERPL ELPH-MCNC: 3.3 G/DL — SIGNIFICANT CHANGE UP (ref 3.3–5)
ALP SERPL-CCNC: 159 U/L — HIGH (ref 40–120)
ALT FLD-CCNC: 15 U/L — SIGNIFICANT CHANGE UP (ref 10–45)
ANION GAP SERPL CALC-SCNC: 9 MMOL/L — SIGNIFICANT CHANGE UP (ref 5–17)
AST SERPL-CCNC: 23 U/L — SIGNIFICANT CHANGE UP (ref 10–40)
BILIRUB SERPL-MCNC: 0.8 MG/DL — SIGNIFICANT CHANGE UP (ref 0.2–1.2)
BUN SERPL-MCNC: 18 MG/DL — SIGNIFICANT CHANGE UP (ref 7–23)
CALCIUM SERPL-MCNC: 9.5 MG/DL — SIGNIFICANT CHANGE UP (ref 8.4–10.5)
CHLORIDE SERPL-SCNC: 101 MMOL/L — SIGNIFICANT CHANGE UP (ref 96–108)
CO2 SERPL-SCNC: 29 MMOL/L — SIGNIFICANT CHANGE UP (ref 22–31)
CREAT SERPL-MCNC: 0.58 MG/DL — SIGNIFICANT CHANGE UP (ref 0.5–1.3)
GLUCOSE BLDC GLUCOMTR-MCNC: 148 MG/DL — HIGH (ref 70–99)
GLUCOSE BLDC GLUCOMTR-MCNC: 161 MG/DL — HIGH (ref 70–99)
GLUCOSE BLDC GLUCOMTR-MCNC: 170 MG/DL — HIGH (ref 70–99)
GLUCOSE BLDC GLUCOMTR-MCNC: 85 MG/DL — SIGNIFICANT CHANGE UP (ref 70–99)
GLUCOSE SERPL-MCNC: 91 MG/DL — SIGNIFICANT CHANGE UP (ref 70–99)
HCT VFR BLD CALC: 31.2 % — LOW (ref 39–50)
HGB BLD-MCNC: 10 G/DL — LOW (ref 13–17)
MAGNESIUM SERPL-MCNC: 2 MG/DL — SIGNIFICANT CHANGE UP (ref 1.6–2.6)
MCHC RBC-ENTMCNC: 31.9 GM/DL — LOW (ref 32–36)
MCHC RBC-ENTMCNC: 33.2 PG — SIGNIFICANT CHANGE UP (ref 27–34)
MCV RBC AUTO: 104 FL — HIGH (ref 80–100)
PHOSPHATE SERPL-MCNC: 2.6 MG/DL — SIGNIFICANT CHANGE UP (ref 2.5–4.5)
PLATELET # BLD AUTO: 276 K/UL — SIGNIFICANT CHANGE UP (ref 150–400)
POTASSIUM SERPL-MCNC: 4.1 MMOL/L — SIGNIFICANT CHANGE UP (ref 3.5–5.3)
POTASSIUM SERPL-SCNC: 4.1 MMOL/L — SIGNIFICANT CHANGE UP (ref 3.5–5.3)
PROT SERPL-MCNC: 6.7 G/DL — SIGNIFICANT CHANGE UP (ref 6–8.3)
RBC # BLD: 3.01 M/UL — LOW (ref 4.2–5.8)
RBC # FLD: 14.6 % — HIGH (ref 10.3–14.5)
SODIUM SERPL-SCNC: 139 MMOL/L — SIGNIFICANT CHANGE UP (ref 135–145)
WBC # BLD: 9.9 K/UL — SIGNIFICANT CHANGE UP (ref 3.8–10.5)
WBC # FLD AUTO: 9.9 K/UL — SIGNIFICANT CHANGE UP (ref 3.8–10.5)

## 2018-06-08 RX ADMIN — QUETIAPINE FUMARATE 50 MILLIGRAM(S): 200 TABLET, FILM COATED ORAL at 01:41

## 2018-06-08 RX ADMIN — CLOPIDOGREL BISULFATE 75 MILLIGRAM(S): 75 TABLET, FILM COATED ORAL at 11:29

## 2018-06-08 RX ADMIN — Medication 0.5 MILLIGRAM(S): at 18:08

## 2018-06-08 RX ADMIN — HEPARIN SODIUM 5000 UNIT(S): 5000 INJECTION INTRAVENOUS; SUBCUTANEOUS at 13:05

## 2018-06-08 RX ADMIN — AMPICILLIN SODIUM AND SULBACTAM SODIUM 200 GRAM(S): 250; 125 INJECTION, POWDER, FOR SUSPENSION INTRAMUSCULAR; INTRAVENOUS at 13:04

## 2018-06-08 RX ADMIN — QUETIAPINE FUMARATE 50 MILLIGRAM(S): 200 TABLET, FILM COATED ORAL at 11:26

## 2018-06-08 RX ADMIN — Medication 81 MILLIGRAM(S): at 11:29

## 2018-06-08 RX ADMIN — Medication 25 MILLIGRAM(S): at 13:07

## 2018-06-08 RX ADMIN — Medication 1: at 17:41

## 2018-06-08 RX ADMIN — HEPARIN SODIUM 5000 UNIT(S): 5000 INJECTION INTRAVENOUS; SUBCUTANEOUS at 05:12

## 2018-06-08 RX ADMIN — Medication 25 MILLIGRAM(S): at 22:44

## 2018-06-08 RX ADMIN — Medication 2 MILLIGRAM(S): at 22:43

## 2018-06-08 RX ADMIN — AMPICILLIN SODIUM AND SULBACTAM SODIUM 200 GRAM(S): 250; 125 INJECTION, POWDER, FOR SUSPENSION INTRAMUSCULAR; INTRAVENOUS at 22:43

## 2018-06-08 RX ADMIN — HEPARIN SODIUM 5000 UNIT(S): 5000 INJECTION INTRAVENOUS; SUBCUTANEOUS at 22:43

## 2018-06-08 RX ADMIN — QUETIAPINE FUMARATE 50 MILLIGRAM(S): 200 TABLET, FILM COATED ORAL at 17:41

## 2018-06-08 RX ADMIN — AMPICILLIN SODIUM AND SULBACTAM SODIUM 200 GRAM(S): 250; 125 INJECTION, POWDER, FOR SUSPENSION INTRAMUSCULAR; INTRAVENOUS at 01:38

## 2018-06-08 RX ADMIN — PANTOPRAZOLE SODIUM 40 MILLIGRAM(S): 20 TABLET, DELAYED RELEASE ORAL at 11:29

## 2018-06-08 RX ADMIN — CARBIDOPA AND LEVODOPA 1 TABLET(S): 25; 100 TABLET ORAL at 05:10

## 2018-06-08 RX ADMIN — AMPICILLIN SODIUM AND SULBACTAM SODIUM 200 GRAM(S): 250; 125 INJECTION, POWDER, FOR SUSPENSION INTRAMUSCULAR; INTRAVENOUS at 07:36

## 2018-06-08 RX ADMIN — Medication 10 MILLIGRAM(S): at 05:10

## 2018-06-08 RX ADMIN — Medication 10 MILLIGRAM(S): at 17:13

## 2018-06-08 RX ADMIN — Medication 100 MILLIGRAM(S): at 05:11

## 2018-06-08 RX ADMIN — Medication 1: at 12:25

## 2018-06-08 RX ADMIN — QUETIAPINE FUMARATE 50 MILLIGRAM(S): 200 TABLET, FILM COATED ORAL at 23:55

## 2018-06-08 RX ADMIN — Medication 100 MILLIGRAM(S): at 17:14

## 2018-06-08 RX ADMIN — CARBIDOPA AND LEVODOPA 1 TABLET(S): 25; 100 TABLET ORAL at 22:44

## 2018-06-08 RX ADMIN — CARBIDOPA AND LEVODOPA 1 TABLET(S): 25; 100 TABLET ORAL at 13:08

## 2018-06-08 RX ADMIN — Medication 25 MILLIGRAM(S): at 05:10

## 2018-06-08 NOTE — PROGRESS NOTE ADULT - SUBJECTIVE AND OBJECTIVE BOX
SUBJECTIVE:   lying in bed.  no confusion per RCU NP    Medications:  ampicillin/sulbactam  IVPB      ampicillin/sulbactam  IVPB 3 Gram(s) IV Intermittent every 6 hours  aspirin  chewable 81 milliGRAM(s) Oral daily  carbidopa/levodopa  25/100 1 Tablet(s) Oral three times a day  clonazePAM Tablet 0.5 milliGRAM(s) Oral <User Schedule>  clopidogrel Tablet 75 milliGRAM(s) Oral daily  dextrose 40% Gel 15 Gram(s) Oral once PRN  dextrose 5%. 1000 milliLiter(s) IV Continuous <Continuous>  dextrose 50% Injectable 12.5 Gram(s) IV Push once  dextrose 50% Injectable 25 Gram(s) IV Push once  dextrose 50% Injectable 25 Gram(s) IV Push once  doxazosin 2 milliGRAM(s) Oral at bedtime  enalapril 10 milliGRAM(s) Oral two times a day  glucagon  Injectable 1 milliGRAM(s) IntraMuscular once PRN  heparin  Injectable 5000 Unit(s) SubCutaneous every 8 hours  hydrALAZINE 25 milliGRAM(s) Oral every 8 hours  insulin lispro (HumaLOG) corrective regimen sliding scale   SubCutaneous every 6 hours  melatonin 3 milliGRAM(s) Oral at bedtime PRN  metoprolol tartrate 100 milliGRAM(s) Oral two times a day  pantoprazole  Injectable 40 milliGRAM(s) IV Push daily  QUEtiapine 50 milliGRAM(s) Oral every 6 hours PRN    Labs:  CBC Full  -  ( 05 Jun 2018 06:53 )  WBC Count : 10.8 K/uL  Hemoglobin : 8.8 g/dL  Hematocrit : 26.8 %  Platelet Count - Automated : 262 K/uL  Mean Cell Volume : 102.0 fl  Mean Cell Hemoglobin : 33.8 pg  Mean Cell Hemoglobin Concentration : 33.1 gm/dL  Auto Neutrophil # : x  Auto Lymphocyte # : x  Auto Monocyte # : x  Auto Eosinophil # : x  Auto Basophil # : x  Auto Neutrophil % : x  Auto Lymphocyte % : x  Auto Monocyte % : x  Auto Eosinophil % : x  Auto Basophil % : x    06-05    140  |  101  |  19  ----------------------------<  95  3.9   |  30  |  0.62    Ca    9.5      05 Jun 2018 06:53  Phos  3.0     06-05  Mg     2.1     06-05    TPro  6.1  /  Alb  3.2<L>  /  TBili  0.8  /  DBili  x   /  AST  28  /  ALT  25  /  AlkPhos  151<H>  06-05    CAPILLARY BLOOD GLUCOSE      POCT Blood Glucose.: 147 mg/dL (05 Jun 2018 11:53)  POCT Blood Glucose.: 100 mg/dL (05 Jun 2018 05:12)  POCT Blood Glucose.: 144 mg/dL (04 Jun 2018 23:47)  POCT Blood Glucose.: 137 mg/dL (04 Jun 2018 18:02)    LIVER FUNCTIONS - ( 05 Jun 2018 06:53 )  Alb: 3.2 g/dL / Pro: 6.1 g/dL / ALK PHOS: 151 U/L / ALT: 25 U/L / AST: 28 U/L / GGT: x           PT/INR - ( 05 Jun 2018 06:53 )   PT: 11.5 sec;   INR: 1.05 ratio    PTT - ( 05 Jun 2018 06:53 )  PTT:28.3 sec    Vitals:  Vital Signs Last 24 Hrs  T(C): 36.9 (05 Jun 2018 12:51), Max: 37.2 (04 Jun 2018 17:25)  T(F): 98.4 (05 Jun 2018 12:51), Max: 99 (04 Jun 2018 17:25)  HR: 100 (05 Jun 2018 13:15) (60 - 100)  BP: 163/62 (05 Jun 2018 12:51) (106/72 - 163/62)  BP(mean): --  RR: 98 (05 Jun 2018 12:51) (18 - 98)  SpO2: 100% (05 Jun 2018 13:15) (98% - 100%)      NEUROLOGICAL EXAM:    Mental status: trached, sitting in chair, eyes closed, minimal wincing to DSR., +myerson's sign    Cranial Nerves: Pupils were equal, round, reactive to light. Extraocular movements were intact. Fundoscopic exam was deferred. Facial sensation was intact to light touch. There was no facial asymmetry. The palate was upgoing symmetrically and tongue was midline.    Motor exam: Diffuse markedly increased tone, cogwheeling at wrist bilaterally, bilateral parkinsonian pill rolling tremors in hands. Minimal movements in all extremities, exam limited by poor effort    Reflexes: 2+ in the bilateral upper extremities. 2+ in the bilateral lower extremities. Toes were mute    Sensation: responds to tactile stimuli in all extremities     Coordination: unable    Gait: unable.     Imaging:  < from: CT Head No Cont (03.26.18 @ 16:59) >  Impression:     There is straightening of the normal cervical lordosis. No acute   fracture or traumatic subluxations identified. There are large anterior   bridging osteophytes at C3-4 through C6-7. Multilevel uncovertebral joint   and facet arthropathy is again identified. The prevertebral soft tissues   are within normal limits.    Impression:    Brain CT: No acute hemorrhage, infarct or displaced calvarial fracture.    Cervical spine CT: No acute fracture or traumatic subluxation. Multilevel   degenerative changes.    BRYAN LYNCH M.D., ATTENDING RADIOLOGIST  This document has been electronically signed. Mar 26 2018  5:10PM
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
## 2018-06-08 NOTE — PROGRESS NOTE ADULT - SUBJECTIVE AND OBJECTIVE BOX
Patient is a 77y old  Male who presents with a chief complaint of Admitted with fever (27 May 2018 20:28)      Interval Events:    REVIEW OF SYSTEMS:  [ ] Positive  [ ] All other systems negative  [ x] Unable to assess ROS because ________    Vital Signs Last 24 Hrs  T(C): 36.7 (06-08-18 @ 05:03), Max: 37.3 (06-07-18 @ 11:00)  T(F): 98 (06-08-18 @ 05:03), Max: 99.1 (06-07-18 @ 11:00)  HR: 60 (06-08-18 @ 07:55) (60 - 90)  BP: 128/76 (06-08-18 @ 05:03) (113/57 - 160/64)  RR: 20 (06-08-18 @ 05:03) (16 - 20)  SpO2: 100% (06-08-18 @ 07:55) (98% - 100%)    PHYSICAL EXAM:  HEENT:   [x ]Tracheostomy:  [ ]Pupils equal  [ ]No oral lesions  [ ]Abnormal    SKIN  [x ]No Rash  [ ] Abnormal  [ ] pressure    CARDIAC  [ x]Regular  [ ]Abnormal    PULMONARY  [x ]Bilateral Clear Breath Sounds  [ ]Normal Excursion  [ ]Abnormal    GI  [ x]PEG      [x ] +BS		              [x ]Soft, nondistended, nontender	  [ ]Abnormal    MUSCULOSKELETAL                                   [ ]Bedbound                 [ ]Abnormal    [ x]Ambulatory/OOB to chair                           EXTREMITIES                                         [ ]Normal  [ ]Edema                           NEUROLOGIC  [ ] Normal, non focal  [ ] Focal findings: hand tremors-PArkinsons    PSYCHIATRIC  x[ ]Alert   [ ] Sedated	 [ ]Agitated    :  Ybarra: [ ] Yes, if yes: Date of Placement:                   [ x ] No    LINES: Central Lines [ ] Yes, if yes: Date of Placement                                     [x  ] No    HOSPITAL MEDICATIONS:  MEDICATIONS  (STANDING):  ampicillin/sulbactam  IVPB      ampicillin/sulbactam  IVPB 3 Gram(s) IV Intermittent every 6 hours  aspirin  chewable 81 milliGRAM(s) Oral daily  carbidopa/levodopa  25/100 1 Tablet(s) Oral three times a day  clonazePAM Tablet 0.5 milliGRAM(s) Oral <User Schedule>  clopidogrel Tablet 75 milliGRAM(s) Oral daily  dextrose 5%. 1000 milliLiter(s) (50 mL/Hr) IV Continuous <Continuous>  dextrose 50% Injectable 12.5 Gram(s) IV Push once  dextrose 50% Injectable 25 Gram(s) IV Push once  dextrose 50% Injectable 25 Gram(s) IV Push once  doxazosin 2 milliGRAM(s) Oral at bedtime  enalapril 10 milliGRAM(s) Oral two times a day  heparin  Injectable 5000 Unit(s) SubCutaneous every 8 hours  hydrALAZINE 25 milliGRAM(s) Oral every 8 hours  insulin lispro (HumaLOG) corrective regimen sliding scale   SubCutaneous every 6 hours  metoprolol tartrate 100 milliGRAM(s) Oral two times a day  pantoprazole  Injectable 40 milliGRAM(s) IV Push daily    MEDICATIONS  (PRN):  dextrose 40% Gel 15 Gram(s) Oral once PRN Blood Glucose LESS THAN 70 milliGRAM(s)/deciLiter  glucagon  Injectable 1 milliGRAM(s) IntraMuscular once PRN Glucose <70 milliGRAM(s)/deciLiter  melatonin 3 milliGRAM(s) Oral at bedtime PRN Insomnia  QUEtiapine 50 milliGRAM(s) Oral every 6 hours PRN break through aggitation      LABS:                        10.0   9.9   )-----------( 276      ( 08 Jun 2018 06:36 )             31.2     06-08    139  |  101  |  18  ----------------------------<  91  4.1   |  29  |  0.58    Ca    9.5      08 Jun 2018 06:34  Phos  2.6     06-08  Mg     2.0     06-08    TPro  6.7  /  Alb  3.3  /  TBili  0.8  /  DBili  x   /  AST  23  /  ALT  15  /  AlkPhos  159<H>  06-08            CAPILLARY BLOOD GLUCOSE    MICROBIOLOGY:     RADIOLOGY:  [ ] Reviewed and interpreted by me    Mode: CPAP with PS  RR (machine): 16  TV (machine): 500  FiO2: 30  PEEP: 5  ITime: 1  MAP: 10  PIP: 26

## 2018-06-08 NOTE — PROGRESS NOTE ADULT - ATTENDING COMMENTS
pt seen and examined at the bedside. stable.  cont weaning  on sinemet for parkinsonism  Unasyn for acinetobacter in blood at [facility], finishing 10 days of abx today as per ID.  d/c planning .

## 2018-06-08 NOTE — PROGRESS NOTE ADULT - PROBLEM SELECTOR PLAN 2
Blood cx from [facility] + acienobacter and CNS   Blood Cx sent  1/2 GPC-  5/29 Bld cx negative times 2  Patient with + Urinalaysis, Urine Cx negative to date   Sputum Cx no growth   CT Chest Non Contrast and CT Abdomen and pelvis with po and IV contrast ordered as per Dr. Trevino to rule out infectious source. No specific infective course   6/1  Blood cx from [facility] with Acinebater and CNS abx changed to ertapenem as it is resistant Acinetobacter baumannii or Acinetobacter haemolyticus identified by diagnostic testing.  Plan: -on bcx+. Completes abx on friday.  -cont unasyn - plan 10-14 days -   -repeat bcx negative  -suspect resp source.

## 2018-06-08 NOTE — PROGRESS NOTE ADULT - ASSESSMENT
A/P: Clinically suspect underlining parkinsonism/PD but limited evaluation in the setting of multiple medical issues/hospitalization/infection. [place] scan 12/2017 supported diagnosis of PD    Plan  1. started sinemet now given likelihood pt will have difficulty following up as an outpt in timely manner and to possibly help  his rehabilitation potential.   - started1/2 tab 25/100 mg tid. increased now to 1 tab tid.  no indication of adverse effect  2. Continue supportive care, Abx per ID  3. PT  4. Optimize sleep wake cycle, frequent reorientation, high risk for delirium, avoidance of delirium provoking medications.    will monitor closely on sinemet as can cause worsening confusion

## 2018-06-09 DIAGNOSIS — G20 PARKINSON'S DISEASE: ICD-10-CM

## 2018-06-09 DIAGNOSIS — Z71.89 OTHER SPECIFIED COUNSELING: ICD-10-CM

## 2018-06-09 LAB
ALBUMIN SERPL ELPH-MCNC: 3 G/DL — LOW (ref 3.3–5)
ALP SERPL-CCNC: 146 U/L — HIGH (ref 40–120)
ALT FLD-CCNC: 11 U/L — SIGNIFICANT CHANGE UP (ref 10–45)
ANION GAP SERPL CALC-SCNC: 10 MMOL/L — SIGNIFICANT CHANGE UP (ref 5–17)
AST SERPL-CCNC: 20 U/L — SIGNIFICANT CHANGE UP (ref 10–40)
BILIRUB SERPL-MCNC: 0.7 MG/DL — SIGNIFICANT CHANGE UP (ref 0.2–1.2)
BUN SERPL-MCNC: 17 MG/DL — SIGNIFICANT CHANGE UP (ref 7–23)
CALCIUM SERPL-MCNC: 9.5 MG/DL — SIGNIFICANT CHANGE UP (ref 8.4–10.5)
CHLORIDE SERPL-SCNC: 103 MMOL/L — SIGNIFICANT CHANGE UP (ref 96–108)
CO2 SERPL-SCNC: 29 MMOL/L — SIGNIFICANT CHANGE UP (ref 22–31)
CREAT SERPL-MCNC: 0.6 MG/DL — SIGNIFICANT CHANGE UP (ref 0.5–1.3)
GLUCOSE BLDC GLUCOMTR-MCNC: 122 MG/DL — HIGH (ref 70–99)
GLUCOSE BLDC GLUCOMTR-MCNC: 139 MG/DL — HIGH (ref 70–99)
GLUCOSE BLDC GLUCOMTR-MCNC: 141 MG/DL — HIGH (ref 70–99)
GLUCOSE SERPL-MCNC: 120 MG/DL — HIGH (ref 70–99)
HCT VFR BLD CALC: 27.4 % — LOW (ref 39–50)
HGB BLD-MCNC: 8.8 G/DL — LOW (ref 13–17)
MAGNESIUM SERPL-MCNC: 2 MG/DL — SIGNIFICANT CHANGE UP (ref 1.6–2.6)
MCHC RBC-ENTMCNC: 32 GM/DL — SIGNIFICANT CHANGE UP (ref 32–36)
MCHC RBC-ENTMCNC: 33 PG — SIGNIFICANT CHANGE UP (ref 27–34)
MCV RBC AUTO: 103 FL — HIGH (ref 80–100)
PHOSPHATE SERPL-MCNC: 2.9 MG/DL — SIGNIFICANT CHANGE UP (ref 2.5–4.5)
PLATELET # BLD AUTO: 253 K/UL — SIGNIFICANT CHANGE UP (ref 150–400)
POTASSIUM SERPL-MCNC: 3.6 MMOL/L — SIGNIFICANT CHANGE UP (ref 3.5–5.3)
POTASSIUM SERPL-SCNC: 3.6 MMOL/L — SIGNIFICANT CHANGE UP (ref 3.5–5.3)
PROT SERPL-MCNC: 6 G/DL — SIGNIFICANT CHANGE UP (ref 6–8.3)
RBC # BLD: 2.65 M/UL — LOW (ref 4.2–5.8)
RBC # FLD: 14.5 % — SIGNIFICANT CHANGE UP (ref 10.3–14.5)
SODIUM SERPL-SCNC: 142 MMOL/L — SIGNIFICANT CHANGE UP (ref 135–145)
WBC # BLD: 8.1 K/UL — SIGNIFICANT CHANGE UP (ref 3.8–10.5)
WBC # FLD AUTO: 8.1 K/UL — SIGNIFICANT CHANGE UP (ref 3.8–10.5)

## 2018-06-09 PROCEDURE — 99232 SBSQ HOSP IP/OBS MODERATE 35: CPT

## 2018-06-09 RX ADMIN — HEPARIN SODIUM 5000 UNIT(S): 5000 INJECTION INTRAVENOUS; SUBCUTANEOUS at 05:16

## 2018-06-09 RX ADMIN — Medication 25 MILLIGRAM(S): at 14:18

## 2018-06-09 RX ADMIN — Medication 25 MILLIGRAM(S): at 05:16

## 2018-06-09 RX ADMIN — Medication 10 MILLIGRAM(S): at 18:16

## 2018-06-09 RX ADMIN — Medication 81 MILLIGRAM(S): at 12:32

## 2018-06-09 RX ADMIN — CARBIDOPA AND LEVODOPA 1 TABLET(S): 25; 100 TABLET ORAL at 14:18

## 2018-06-09 RX ADMIN — AMPICILLIN SODIUM AND SULBACTAM SODIUM 200 GRAM(S): 250; 125 INJECTION, POWDER, FOR SUSPENSION INTRAMUSCULAR; INTRAVENOUS at 09:13

## 2018-06-09 RX ADMIN — HEPARIN SODIUM 5000 UNIT(S): 5000 INJECTION INTRAVENOUS; SUBCUTANEOUS at 21:19

## 2018-06-09 RX ADMIN — CLOPIDOGREL BISULFATE 75 MILLIGRAM(S): 75 TABLET, FILM COATED ORAL at 12:32

## 2018-06-09 RX ADMIN — QUETIAPINE FUMARATE 50 MILLIGRAM(S): 200 TABLET, FILM COATED ORAL at 21:20

## 2018-06-09 RX ADMIN — Medication 10 MILLIGRAM(S): at 05:16

## 2018-06-09 RX ADMIN — CARBIDOPA AND LEVODOPA 1 TABLET(S): 25; 100 TABLET ORAL at 21:20

## 2018-06-09 RX ADMIN — AMPICILLIN SODIUM AND SULBACTAM SODIUM 200 GRAM(S): 250; 125 INJECTION, POWDER, FOR SUSPENSION INTRAMUSCULAR; INTRAVENOUS at 02:34

## 2018-06-09 RX ADMIN — PANTOPRAZOLE SODIUM 40 MILLIGRAM(S): 20 TABLET, DELAYED RELEASE ORAL at 12:32

## 2018-06-09 RX ADMIN — CARBIDOPA AND LEVODOPA 1 TABLET(S): 25; 100 TABLET ORAL at 05:17

## 2018-06-09 RX ADMIN — Medication 100 MILLIGRAM(S): at 05:16

## 2018-06-09 RX ADMIN — Medication 2 MILLIGRAM(S): at 21:19

## 2018-06-09 RX ADMIN — Medication 0.5 MILLIGRAM(S): at 18:46

## 2018-06-09 RX ADMIN — HEPARIN SODIUM 5000 UNIT(S): 5000 INJECTION INTRAVENOUS; SUBCUTANEOUS at 14:18

## 2018-06-09 RX ADMIN — Medication 100 MILLIGRAM(S): at 18:16

## 2018-06-09 RX ADMIN — Medication 25 MILLIGRAM(S): at 21:19
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
## 2018-06-09 NOTE — PROGRESS NOTE ADULT - SUBJECTIVE AND OBJECTIVE BOX
Patient is a 77y old  Male who presents with a chief complaint of Admitted with fever (27 May 2018 20:28)    Interval Events: No events reported overnight     REVIEW OF SYSTEMS:  [ ] Positive  [ ] All other systems negative  [x] Unable to assess ROS because patient is confused     Vital Signs Last 24 Hrs  T(C): 36.9 (06-09-18 @ 09:35), Max: 37.3 (06-08-18 @ 13:00)  T(F): 98.5 (06-09-18 @ 09:35), Max: 99.2 (06-08-18 @ 13:00)  HR: 70 (06-09-18 @ 09:35) (60 - 80)  BP: 135/60 (06-09-18 @ 09:35) (135/60 - 158/51)  RR: 16 (06-09-18 @ 09:35) (16 - 20)  SpO2: 100% (06-09-18 @ 09:35) (98% - 100%)    PHYSICAL EXAM:  HEENT:   [x]Tracheostomy:  [x]Pupils equal  [ ]No oral lesions  [ ]Abnormal    SKIN  [x]No Rash  [ ] Abnormal  [ ] pressure    CARDIAC  [x]Regular  [ ]Abnormal    PULMONARY  [ ]Bilateral Clear Breath Sounds  [ ]Normal Excursion  [x]Abnormal: Decreased Breath sounds at bases Left > RT     GI  [x]PEG      [x] +BS		              [x]Soft, nondistended, nontender	  [ ]Abnormal    MUSCULOSKELETAL                                   [ ]Bedbound                 [ ]Abnormal    [x]OOB to chair                           EXTREMITIES                                         [x]Normal  [ ]Edema                           NEUROLOGIC  [ ] Normal, non focal  [x] Focal findings: + Tremor     PSYCHIATRIC  [x]Alert / Restless/ Confused at times   [ ] Sedated	 [ ]Agitated    :  Ybarra: [ ] Yes, if yes: Date of Placement:                   [x] No    LINES: Central Lines [ ] Yes, if yes: Date of Placement                                     [x] No    HOSPITAL MEDICATIONS:  MEDICATIONS  (STANDING):  ampicillin/sulbactam  IVPB      ampicillin/sulbactam  IVPB 3 Gram(s) IV Intermittent every 6 hours  aspirin  chewable 81 milliGRAM(s) Oral daily  carbidopa/levodopa  25/100 1 Tablet(s) Oral three times a day  clonazePAM Tablet 0.5 milliGRAM(s) Oral <User Schedule>  clopidogrel Tablet 75 milliGRAM(s) Oral daily  dextrose 5%. 1000 milliLiter(s) (50 mL/Hr) IV Continuous <Continuous>  dextrose 50% Injectable 12.5 Gram(s) IV Push once  dextrose 50% Injectable 25 Gram(s) IV Push once  dextrose 50% Injectable 25 Gram(s) IV Push once  doxazosin 2 milliGRAM(s) Oral at bedtime  enalapril 10 milliGRAM(s) Oral two times a day  heparin  Injectable 5000 Unit(s) SubCutaneous every 8 hours  hydrALAZINE 25 milliGRAM(s) Oral every 8 hours  insulin lispro (HumaLOG) corrective regimen sliding scale   SubCutaneous every 6 hours  metoprolol tartrate 100 milliGRAM(s) Oral two times a day  pantoprazole  Injectable 40 milliGRAM(s) IV Push daily    MEDICATIONS  (PRN):  dextrose 40% Gel 15 Gram(s) Oral once PRN Blood Glucose LESS THAN 70 milliGRAM(s)/deciLiter  glucagon  Injectable 1 milliGRAM(s) IntraMuscular once PRN Glucose <70 milliGRAM(s)/deciLiter  melatonin 3 milliGRAM(s) Oral at bedtime PRN Insomnia  QUEtiapine 50 milliGRAM(s) Oral every 6 hours PRN break through aggitation      LABS:                        8.8    8.1   )-----------( 253      ( 09 Jun 2018 06:57 )             27.4     06-09    142  |  103  |  17  ----------------------------<  120<H>  3.6   |  29  |  0.60    Ca    9.5      09 Jun 2018 06:56  Phos  2.9     06-09  Mg     2.0     06-09    TPro  6.0  /  Alb  3.0<L>  /  TBili  0.7  /  DBili  x   /  AST  20  /  ALT  11  /  AlkPhos  146<H>  06-09            CAPILLARY BLOOD GLUCOSE    MICROBIOLOGY:     RADIOLOGY:  [ ] Reviewed and interpreted by me    Mode: CPAP with PS  FiO2: 30  PEEP: 5  PS: 10  ITime: 1  MAP: 8  PIP: 14 Patient is a 77y old  Male who presents with a chief complaint of Admitted with fever (27 May 2018 20:28)    Interval Events: No events reported overnight     REVIEW OF SYSTEMS:  [ ] Positive  [ ] All other systems negative  [x] Unable to assess ROS because patient is confused     Vital Signs Last 24 Hrs  T(C): 36.9 (06-09-18 @ 09:35), Max: 37.3 (06-08-18 @ 13:00)  T(F): 98.5 (06-09-18 @ 09:35), Max: 99.2 (06-08-18 @ 13:00)  HR: 70 (06-09-18 @ 09:35) (60 - 80)  BP: 135/60 (06-09-18 @ 09:35) (135/60 - 158/51)  RR: 16 (06-09-18 @ 09:35) (16 - 20)  SpO2: 100% (06-09-18 @ 09:35) (98% - 100%)    PHYSICAL EXAM:  HEENT:   [x]Tracheostomy  [x]Pupils equal  [ ]No oral lesions  [ ]Abnormal    SKIN  [x]No Rash  [ ] Abnormal  [ ] pressure    CARDIAC  [x]Regular  [ ]Abnormal    PULMONARY  [ ]Bilateral Clear Breath Sounds  [ ]Normal Excursion  [x]Abnormal: Decreased Breath sounds at bases Left > RT     GI  [x]PEG      [x] +BS		              [x]Soft, nondistended, nontender	  [ ]Abnormal    MUSCULOSKELETAL                                   [ ]Bedbound                 [ ]Abnormal    [x]OOB to chair                           EXTREMITIES                                         [x]Normal  [ ]Edema                           NEUROLOGIC  [ ] Normal, non focal  [x] Focal findings: + Tremor     PSYCHIATRIC  [x]Alert / Restless/ Confused at times   [ ] Sedated	 [ ]Agitated    :  Ybarra: [ ] Yes, if yes: Date of Placement:                   [x] No    LINES: Central Lines [ ] Yes, if yes: Date of Placement                                     [x] No    HOSPITAL MEDICATIONS:  MEDICATIONS  (STANDING):  ampicillin/sulbactam  IVPB      ampicillin/sulbactam  IVPB 3 Gram(s) IV Intermittent every 6 hours  aspirin  chewable 81 milliGRAM(s) Oral daily  carbidopa/levodopa  25/100 1 Tablet(s) Oral three times a day  clonazePAM Tablet 0.5 milliGRAM(s) Oral <User Schedule>  clopidogrel Tablet 75 milliGRAM(s) Oral daily  dextrose 5%. 1000 milliLiter(s) (50 mL/Hr) IV Continuous <Continuous>  dextrose 50% Injectable 12.5 Gram(s) IV Push once  dextrose 50% Injectable 25 Gram(s) IV Push once  dextrose 50% Injectable 25 Gram(s) IV Push once  doxazosin 2 milliGRAM(s) Oral at bedtime  enalapril 10 milliGRAM(s) Oral two times a day  heparin  Injectable 5000 Unit(s) SubCutaneous every 8 hours  hydrALAZINE 25 milliGRAM(s) Oral every 8 hours  insulin lispro (HumaLOG) corrective regimen sliding scale   SubCutaneous every 6 hours  metoprolol tartrate 100 milliGRAM(s) Oral two times a day  pantoprazole  Injectable 40 milliGRAM(s) IV Push daily    MEDICATIONS  (PRN):  dextrose 40% Gel 15 Gram(s) Oral once PRN Blood Glucose LESS THAN 70 milliGRAM(s)/deciLiter  glucagon  Injectable 1 milliGRAM(s) IntraMuscular once PRN Glucose <70 milliGRAM(s)/deciLiter  melatonin 3 milliGRAM(s) Oral at bedtime PRN Insomnia  QUEtiapine 50 milliGRAM(s) Oral every 6 hours PRN break through aggitation      LABS:                        8.8    8.1   )-----------( 253      ( 09 Jun 2018 06:57 )             27.4     06-09    142  |  103  |  17  ----------------------------<  120<H>  3.6   |  29  |  0.60    Ca    9.5      09 Jun 2018 06:56  Phos  2.9     06-09  Mg     2.0     06-09    TPro  6.0  /  Alb  3.0<L>  /  TBili  0.7  /  DBili  x   /  AST  20  /  ALT  11  /  AlkPhos  146<H>  06-09            CAPILLARY BLOOD GLUCOSE    MICROBIOLOGY:     RADIOLOGY:  [ ] Reviewed and interpreted by me    Mode: CPAP with PS  FiO2: 30  PEEP: 5  PS: 10  ITime: 1  MAP: 8  PIP: 14

## 2018-06-09 NOTE — PROGRESS NOTE ADULT - PROBLEM SELECTOR PLAN 10
Long Discussion and Counseling provided to patients wife Tiana this afternoon   Palliative Care consult called to discuss further GOC with her

## 2018-06-09 NOTE — PROGRESS NOTE ADULT - PROBLEM SELECTOR PLAN 2
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

## 2018-06-09 NOTE — PROGRESS NOTE ADULT - ATTENDING COMMENTS
I have seen and examined the patient. I agree with the above history, physical exam, and plan of care except for as detailed below.    Complete course of abx for acinetobacter bacteremia. Continue weaning trials.  Discharge planning.

## 2018-06-09 NOTE — PROGRESS NOTE ADULT - PROBLEM SELECTOR PLAN 4
CT Abd/ Pelvis 4/14: Previously seen 1.3 cm Stone in Neck of GB  Continue to monitor intermittent LFTs  Statin Discontinued

## 2018-06-09 NOTE — PROGRESS NOTE ADULT - PROBLEM SELECTOR PLAN 9
Patient with Hx of Delirium last admission   Continue Klonopin Q pm   Continue PRN Seroquel and Melatonin

## 2018-06-10 LAB
ALBUMIN SERPL ELPH-MCNC: 3.2 G/DL — LOW (ref 3.3–5)
ALP SERPL-CCNC: 151 U/L — HIGH (ref 40–120)
ALT FLD-CCNC: 19 U/L — SIGNIFICANT CHANGE UP (ref 10–45)
ANION GAP SERPL CALC-SCNC: 9 MMOL/L — SIGNIFICANT CHANGE UP (ref 5–17)
AST SERPL-CCNC: 25 U/L — SIGNIFICANT CHANGE UP (ref 10–40)
BILIRUB SERPL-MCNC: 0.7 MG/DL — SIGNIFICANT CHANGE UP (ref 0.2–1.2)
BUN SERPL-MCNC: 18 MG/DL — SIGNIFICANT CHANGE UP (ref 7–23)
CALCIUM SERPL-MCNC: 9.6 MG/DL — SIGNIFICANT CHANGE UP (ref 8.4–10.5)
CHLORIDE SERPL-SCNC: 103 MMOL/L — SIGNIFICANT CHANGE UP (ref 96–108)
CO2 SERPL-SCNC: 31 MMOL/L — SIGNIFICANT CHANGE UP (ref 22–31)
CREAT SERPL-MCNC: 0.62 MG/DL — SIGNIFICANT CHANGE UP (ref 0.5–1.3)
GLUCOSE BLDC GLUCOMTR-MCNC: 111 MG/DL — HIGH (ref 70–99)
GLUCOSE BLDC GLUCOMTR-MCNC: 140 MG/DL — HIGH (ref 70–99)
GLUCOSE BLDC GLUCOMTR-MCNC: 146 MG/DL — HIGH (ref 70–99)
GLUCOSE BLDC GLUCOMTR-MCNC: 147 MG/DL — HIGH (ref 70–99)
GLUCOSE BLDC GLUCOMTR-MCNC: 157 MG/DL — HIGH (ref 70–99)
GLUCOSE SERPL-MCNC: 103 MG/DL — HIGH (ref 70–99)
HCT VFR BLD CALC: 28.4 % — LOW (ref 39–50)
HGB BLD-MCNC: 9.2 G/DL — LOW (ref 13–17)
MAGNESIUM SERPL-MCNC: 1.9 MG/DL — SIGNIFICANT CHANGE UP (ref 1.6–2.6)
MCHC RBC-ENTMCNC: 32.6 GM/DL — SIGNIFICANT CHANGE UP (ref 32–36)
MCHC RBC-ENTMCNC: 34 PG — SIGNIFICANT CHANGE UP (ref 27–34)
MCV RBC AUTO: 104 FL — HIGH (ref 80–100)
PHOSPHATE SERPL-MCNC: 2.8 MG/DL — SIGNIFICANT CHANGE UP (ref 2.5–4.5)
PLATELET # BLD AUTO: 269 K/UL — SIGNIFICANT CHANGE UP (ref 150–400)
POTASSIUM SERPL-MCNC: 4.5 MMOL/L — SIGNIFICANT CHANGE UP (ref 3.5–5.3)
POTASSIUM SERPL-SCNC: 4.5 MMOL/L — SIGNIFICANT CHANGE UP (ref 3.5–5.3)
PROT SERPL-MCNC: 6.4 G/DL — SIGNIFICANT CHANGE UP (ref 6–8.3)
RBC # BLD: 2.72 M/UL — LOW (ref 4.2–5.8)
RBC # FLD: 14.3 % — SIGNIFICANT CHANGE UP (ref 10.3–14.5)
SODIUM SERPL-SCNC: 143 MMOL/L — SIGNIFICANT CHANGE UP (ref 135–145)
WBC # BLD: 9.8 K/UL — SIGNIFICANT CHANGE UP (ref 3.8–10.5)
WBC # FLD AUTO: 9.8 K/UL — SIGNIFICANT CHANGE UP (ref 3.8–10.5)

## 2018-06-10 PROCEDURE — 99232 SBSQ HOSP IP/OBS MODERATE 35: CPT

## 2018-06-10 RX ORDER — CLONAZEPAM 1 MG
0.5 TABLET ORAL
Qty: 0 | Refills: 0 | Status: DISCONTINUED | OUTPATIENT
Start: 2018-06-10 | End: 2018-06-15

## 2018-06-10 RX ADMIN — Medication 0.5 MILLIGRAM(S): at 18:25

## 2018-06-10 RX ADMIN — Medication 25 MILLIGRAM(S): at 14:12

## 2018-06-10 RX ADMIN — Medication 25 MILLIGRAM(S): at 21:21

## 2018-06-10 RX ADMIN — CARBIDOPA AND LEVODOPA 1 TABLET(S): 25; 100 TABLET ORAL at 05:44

## 2018-06-10 RX ADMIN — Medication 2 MILLIGRAM(S): at 21:21

## 2018-06-10 RX ADMIN — Medication 100 MILLIGRAM(S): at 05:56

## 2018-06-10 RX ADMIN — Medication 25 MILLIGRAM(S): at 05:43

## 2018-06-10 RX ADMIN — CARBIDOPA AND LEVODOPA 1 TABLET(S): 25; 100 TABLET ORAL at 14:12

## 2018-06-10 RX ADMIN — Medication 3 MILLIGRAM(S): at 21:22

## 2018-06-10 RX ADMIN — Medication 81 MILLIGRAM(S): at 14:11

## 2018-06-10 RX ADMIN — HEPARIN SODIUM 5000 UNIT(S): 5000 INJECTION INTRAVENOUS; SUBCUTANEOUS at 14:12

## 2018-06-10 RX ADMIN — PANTOPRAZOLE SODIUM 40 MILLIGRAM(S): 20 TABLET, DELAYED RELEASE ORAL at 14:12

## 2018-06-10 RX ADMIN — HEPARIN SODIUM 5000 UNIT(S): 5000 INJECTION INTRAVENOUS; SUBCUTANEOUS at 05:44

## 2018-06-10 RX ADMIN — CARBIDOPA AND LEVODOPA 1 TABLET(S): 25; 100 TABLET ORAL at 21:22

## 2018-06-10 RX ADMIN — Medication 10 MILLIGRAM(S): at 17:42

## 2018-06-10 RX ADMIN — Medication 100 MILLIGRAM(S): at 17:42

## 2018-06-10 RX ADMIN — Medication 10 MILLIGRAM(S): at 05:43

## 2018-06-10 RX ADMIN — QUETIAPINE FUMARATE 50 MILLIGRAM(S): 200 TABLET, FILM COATED ORAL at 05:44

## 2018-06-10 RX ADMIN — CLOPIDOGREL BISULFATE 75 MILLIGRAM(S): 75 TABLET, FILM COATED ORAL at 14:12

## 2018-06-10 RX ADMIN — HEPARIN SODIUM 5000 UNIT(S): 5000 INJECTION INTRAVENOUS; SUBCUTANEOUS at 21:21

## 2018-06-10 RX ADMIN — Medication 1: at 17:42

## 2018-06-10 NOTE — PROGRESS NOTE ADULT - PROBLEM SELECTOR PLAN 1
Patient S/p Tracheostomy last admission   Continue Mechanical Ventilation   Continue Daily weaning trials as tolerated   Continue Chest PT and Suctioning

## 2018-06-10 NOTE — PROGRESS NOTE ADULT - PROBLEM SELECTOR PLAN 10
Long Discussion and Counseling provided to patients wife Tiana Yesterday  Palliative Care consult called to discuss further GOC with her patient will be seen tomorrow Long Discussion and Counseling provided to patients wife Tiana Yesterday  Palliative Care consult called to discuss further GOC with her   Patient will be seen tomorrow

## 2018-06-10 NOTE — PROGRESS NOTE ADULT - SUBJECTIVE AND OBJECTIVE BOX
Patient is a 77y old  Male who presents with a chief complaint of Admitted with fever (27 May 2018 20:28)    Interval Events: RN overnight reports patient did not sleep all night, Patient sleeping this morning on Physical exam     REVIEW OF SYSTEMS:  [ ] Positive  [ ] All other systems negative  [x] Unable to assess ROS because Patient with periods of Delirium     Vital Signs Last 24 Hrs  T(C): 36.9 (06-10-18 @ 09:37), Max: 36.9 (06-09-18 @ 18:15)  T(F): 98.4 (06-10-18 @ 09:37), Max: 98.5 (06-10-18 @ 05:34)  HR: 65 (06-10-18 @ 09:37) (65 - 89)  BP: 99/47 (06-10-18 @ 09:37) (99/47 - 158/60)  RR: 18 (06-10-18 @ 09:37) (18 - 20)  SpO2: 100% (06-10-18 @ 09:37) (99% - 100%)    PHYSICAL EXAM:  HEENT:   [x]Tracheostomy  [x]Pupils equal  [ ]No oral lesions  [ ]Abnormal    SKIN  [x]No Rash  [ ] Abnormal  [ ] pressure    CARDIAC  [x]Regular  [ ]Abnormal    PULMONARY  [ ]Bilateral Clear Breath Sounds  [ ]Normal Excursion  [x]Abnormal: Decreased Breath sounds at bases Left > RT     GI  [x]PEG      [x] +BS		              [x]Soft, nondistended, nontender	  [ ]Abnormal    MUSCULOSKELETAL                                   [ ]Bedbound                 [ ]Abnormal    [x]OOB to chair                           EXTREMITIES                                         [x]Normal  [ ]Edema                           NEUROLOGIC  [ ] Normal, non focal  [x] Focal findings: + Tremor     PSYCHIATRIC  [x]Alert / Restless/ Confused at times   [ ] Sedated	 [ ]Agitated    :  Ybarra: [ ] Yes, if yes: Date of Placement:                   [x] No    LINES: Central Lines [ ] Yes, if yes: Date of Placement                                     [x] No    HOSPITAL MEDICATIONS:  MEDICATIONS  (STANDING):  aspirin  chewable 81 milliGRAM(s) Oral daily  carbidopa/levodopa  25/100 1 Tablet(s) Oral three times a day  clonazePAM Tablet 0.5 milliGRAM(s) Oral <User Schedule>  clopidogrel Tablet 75 milliGRAM(s) Oral daily  dextrose 5%. 1000 milliLiter(s) (50 mL/Hr) IV Continuous <Continuous>  dextrose 50% Injectable 12.5 Gram(s) IV Push once  dextrose 50% Injectable 25 Gram(s) IV Push once  dextrose 50% Injectable 25 Gram(s) IV Push once  doxazosin 2 milliGRAM(s) Oral at bedtime  enalapril 10 milliGRAM(s) Oral two times a day  heparin  Injectable 5000 Unit(s) SubCutaneous every 8 hours  hydrALAZINE 25 milliGRAM(s) Oral every 8 hours  insulin lispro (HumaLOG) corrective regimen sliding scale   SubCutaneous every 6 hours  metoprolol tartrate 100 milliGRAM(s) Oral two times a day  pantoprazole  Injectable 40 milliGRAM(s) IV Push daily    MEDICATIONS  (PRN):  dextrose 40% Gel 15 Gram(s) Oral once PRN Blood Glucose LESS THAN 70 milliGRAM(s)/deciLiter  glucagon  Injectable 1 milliGRAM(s) IntraMuscular once PRN Glucose <70 milliGRAM(s)/deciLiter  melatonin 3 milliGRAM(s) Oral at bedtime PRN Insomnia  QUEtiapine 50 milliGRAM(s) Oral every 6 hours PRN break through aggitation      LABS:                        9.2    9.8   )-----------( 269      ( 10 Oni 2018 07:07 )             28.4     06-10    143  |  103  |  18  ----------------------------<  103<H>  4.5   |  31  |  0.62    Ca    9.6      10 Oni 2018 07:05  Phos  2.8     06-10  Mg     1.9     06-10    TPro  6.4  /  Alb  3.2<L>  /  TBili  0.7  /  DBili  x   /  AST  25  /  ALT  19  /  AlkPhos  151<H>  06-10            CAPILLARY BLOOD GLUCOSE    MICROBIOLOGY:     RADIOLOGY:  [ ] Reviewed and interpreted by me    Mode: CPAP with PS  FiO2: 30  PEEP: 5  PS: 10  MAP: 7  PIP: 14

## 2018-06-10 NOTE — PROGRESS NOTE ADULT - PROBLEM SELECTOR PLAN 2
Blood cx from [facility]: Acinetobacter and CNS  Resistant Acinetobacter baumannii or Acinetobacter haemolyticus identified by diagnostic testing at [facility]   CT Abdomen Pelvis 5/28: No Source of bacteremia identified   Blood cx 5/29: No Growth   Patient completed full 10 day course of Unasyn
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
## 2018-06-11 DIAGNOSIS — R45.1 RESTLESSNESS AND AGITATION: ICD-10-CM

## 2018-06-11 DIAGNOSIS — J96.20 ACUTE AND CHRONIC RESPIRATORY FAILURE, UNSPECIFIED WHETHER WITH HYPOXIA OR HYPERCAPNIA: ICD-10-CM

## 2018-06-11 DIAGNOSIS — J95.00 UNSPECIFIED TRACHEOSTOMY COMPLICATION: ICD-10-CM

## 2018-06-11 DIAGNOSIS — R13.10 DYSPHAGIA, UNSPECIFIED: ICD-10-CM

## 2018-06-11 DIAGNOSIS — F41.9 ANXIETY DISORDER, UNSPECIFIED: ICD-10-CM

## 2018-06-11 LAB
ANION GAP SERPL CALC-SCNC: 12 MMOL/L — SIGNIFICANT CHANGE UP (ref 5–17)
BUN SERPL-MCNC: 23 MG/DL — SIGNIFICANT CHANGE UP (ref 7–23)
CALCIUM SERPL-MCNC: 9.4 MG/DL — SIGNIFICANT CHANGE UP (ref 8.4–10.5)
CHLORIDE SERPL-SCNC: 100 MMOL/L — SIGNIFICANT CHANGE UP (ref 96–108)
CO2 SERPL-SCNC: 28 MMOL/L — SIGNIFICANT CHANGE UP (ref 22–31)
CREAT SERPL-MCNC: 0.61 MG/DL — SIGNIFICANT CHANGE UP (ref 0.5–1.3)
GLUCOSE BLDC GLUCOMTR-MCNC: 100 MG/DL — HIGH (ref 70–99)
GLUCOSE BLDC GLUCOMTR-MCNC: 115 MG/DL — HIGH (ref 70–99)
GLUCOSE BLDC GLUCOMTR-MCNC: 143 MG/DL — HIGH (ref 70–99)
GLUCOSE BLDC GLUCOMTR-MCNC: 151 MG/DL — HIGH (ref 70–99)
GLUCOSE SERPL-MCNC: 94 MG/DL — SIGNIFICANT CHANGE UP (ref 70–99)
HCT VFR BLD CALC: 29.2 % — LOW (ref 39–50)
HGB BLD-MCNC: 9.5 G/DL — LOW (ref 13–17)
MAGNESIUM SERPL-MCNC: 2 MG/DL — SIGNIFICANT CHANGE UP (ref 1.6–2.6)
MCHC RBC-ENTMCNC: 32.4 GM/DL — SIGNIFICANT CHANGE UP (ref 32–36)
MCHC RBC-ENTMCNC: 33.8 PG — SIGNIFICANT CHANGE UP (ref 27–34)
MCV RBC AUTO: 104 FL — HIGH (ref 80–100)
PHOSPHATE SERPL-MCNC: 3 MG/DL — SIGNIFICANT CHANGE UP (ref 2.5–4.5)
PLATELET # BLD AUTO: 253 K/UL — SIGNIFICANT CHANGE UP (ref 150–400)
POTASSIUM SERPL-MCNC: 4.1 MMOL/L — SIGNIFICANT CHANGE UP (ref 3.5–5.3)
POTASSIUM SERPL-SCNC: 4.1 MMOL/L — SIGNIFICANT CHANGE UP (ref 3.5–5.3)
RBC # BLD: 2.8 M/UL — LOW (ref 4.2–5.8)
RBC # FLD: 14.2 % — SIGNIFICANT CHANGE UP (ref 10.3–14.5)
SODIUM SERPL-SCNC: 140 MMOL/L — SIGNIFICANT CHANGE UP (ref 135–145)
WBC # BLD: 9.4 K/UL — SIGNIFICANT CHANGE UP (ref 3.8–10.5)
WBC # FLD AUTO: 9.4 K/UL — SIGNIFICANT CHANGE UP (ref 3.8–10.5)

## 2018-06-11 PROCEDURE — 99497 ADVNCD CARE PLAN 30 MIN: CPT | Mod: 25

## 2018-06-11 PROCEDURE — 99223 1ST HOSP IP/OBS HIGH 75: CPT | Mod: GC

## 2018-06-11 PROCEDURE — 99233 SBSQ HOSP IP/OBS HIGH 50: CPT | Mod: GC

## 2018-06-11 RX ORDER — IPRATROPIUM/ALBUTEROL SULFATE 18-103MCG
3 AEROSOL WITH ADAPTER (GRAM) INHALATION EVERY 6 HOURS
Qty: 0 | Refills: 0 | Status: DISCONTINUED | OUTPATIENT
Start: 2018-06-11 | End: 2018-06-22

## 2018-06-11 RX ORDER — SODIUM CHLORIDE 9 MG/ML
3 INJECTION INTRAMUSCULAR; INTRAVENOUS; SUBCUTANEOUS EVERY 6 HOURS
Qty: 0 | Refills: 0 | Status: DISCONTINUED | OUTPATIENT
Start: 2018-06-11 | End: 2018-06-20

## 2018-06-11 RX ORDER — OLANZAPINE 15 MG/1
5 TABLET, FILM COATED ORAL EVERY 12 HOURS
Qty: 0 | Refills: 0 | Status: DISCONTINUED | OUTPATIENT
Start: 2018-06-11 | End: 2018-07-09

## 2018-06-11 RX ORDER — PANTOPRAZOLE SODIUM 20 MG/1
40 TABLET, DELAYED RELEASE ORAL DAILY
Qty: 0 | Refills: 0 | Status: DISCONTINUED | OUTPATIENT
Start: 2018-06-11 | End: 2018-06-15

## 2018-06-11 RX ADMIN — Medication 100 MILLIGRAM(S): at 05:27

## 2018-06-11 RX ADMIN — Medication 3 MILLILITER(S): at 23:55

## 2018-06-11 RX ADMIN — Medication 1 MILLIGRAM(S): at 11:20

## 2018-06-11 RX ADMIN — Medication 0.5 MILLIGRAM(S): at 22:26

## 2018-06-11 RX ADMIN — Medication 10 MILLIGRAM(S): at 05:26

## 2018-06-11 RX ADMIN — HEPARIN SODIUM 5000 UNIT(S): 5000 INJECTION INTRAVENOUS; SUBCUTANEOUS at 21:15

## 2018-06-11 RX ADMIN — PANTOPRAZOLE SODIUM 40 MILLIGRAM(S): 20 TABLET, DELAYED RELEASE ORAL at 12:32

## 2018-06-11 RX ADMIN — CARBIDOPA AND LEVODOPA 1 TABLET(S): 25; 100 TABLET ORAL at 14:04

## 2018-06-11 RX ADMIN — CARBIDOPA AND LEVODOPA 1 TABLET(S): 25; 100 TABLET ORAL at 21:15

## 2018-06-11 RX ADMIN — Medication 2 MILLIGRAM(S): at 22:26

## 2018-06-11 RX ADMIN — Medication 0.5 MILLIGRAM(S): at 21:15

## 2018-06-11 RX ADMIN — HEPARIN SODIUM 5000 UNIT(S): 5000 INJECTION INTRAVENOUS; SUBCUTANEOUS at 14:04

## 2018-06-11 RX ADMIN — QUETIAPINE FUMARATE 50 MILLIGRAM(S): 200 TABLET, FILM COATED ORAL at 17:23

## 2018-06-11 RX ADMIN — Medication 1: at 12:33

## 2018-06-11 RX ADMIN — QUETIAPINE FUMARATE 50 MILLIGRAM(S): 200 TABLET, FILM COATED ORAL at 05:27

## 2018-06-11 RX ADMIN — Medication 25 MILLIGRAM(S): at 22:26

## 2018-06-11 RX ADMIN — Medication 81 MILLIGRAM(S): at 12:32

## 2018-06-11 RX ADMIN — CLOPIDOGREL BISULFATE 75 MILLIGRAM(S): 75 TABLET, FILM COATED ORAL at 12:33

## 2018-06-11 RX ADMIN — CARBIDOPA AND LEVODOPA 1 TABLET(S): 25; 100 TABLET ORAL at 05:26

## 2018-06-11 RX ADMIN — Medication 3 MILLILITER(S): at 18:41

## 2018-06-11 RX ADMIN — HEPARIN SODIUM 5000 UNIT(S): 5000 INJECTION INTRAVENOUS; SUBCUTANEOUS at 05:26

## 2018-06-11 RX ADMIN — Medication 25 MILLIGRAM(S): at 05:26

## 2018-06-11 NOTE — CONSULT NOTE ADULT - PROBLEM SELECTOR RECOMMENDATION 2
Will DC Seroquel and start Zyprexa 5 mg q 12 PRN agitation due to possible PD.
-Coag neg staph is likely contaminant   -F/u bcx from [facility] with GPR  -Cont current abx  -keep vanco trough 15-20

## 2018-06-11 NOTE — PROGRESS NOTE ADULT - PROBLEM SELECTOR PLAN 10
Long Discussion and Counseling provided to patients wife Tiana Yesterday  Palliative Care consult called to discuss further GOC with her

## 2018-06-11 NOTE — PROGRESS NOTE ADULT - SUBJECTIVE AND OBJECTIVE BOX
Patient is a 77y old  Male who presents with a chief complaint of Admitted with fever (27 May 2018 20:28)      Interval Events:    REVIEW OF SYSTEMS:  [ ] Positive  [ ] All other systems negative  [x ] Unable to assess ROS because non verbal________    Vital Signs Last 24 Hrs  T(C): 37.1 (06-11-18 @ 03:00), Max: 37.2 (06-10-18 @ 14:49)  T(F): 98.7 (06-11-18 @ 03:00), Max: 99 (06-10-18 @ 14:49)  HR: 62 (06-11-18 @ 05:00) (60 - 84)  BP: 127/74 (06-11-18 @ 05:00) (110/70 - 153/57)  RR: 20 (06-11-18 @ 05:00) (18 - 20)  SpO2: 100% (06-11-18 @ 05:00) (99% - 100%)    PHYSICAL EXAM:  HEENT:   [x ]Tracheostomy:   [ ]Pupils equal  [ ]No oral lesions  [ ]Abnormal    SKIN  [x ]No Rash  [ ] Abnormal  [ ] pressure    CARDIAC  [x ]Regular  [ ]Abnormal    PULMONARY  [ x]Bilateral Clear Breath Sounds  [ ]Normal Excursion  [ ]Abnormal    GI  [x ]PEG      [x ] +BS		              [x ]Soft, nondistended, nontender	  [ ]Abnormal    MUSCULOSKELETAL                                   [ ]Bedbound                 [ ]Abnormal    [x ]Ambulatory/OOB to chair                           EXTREMITIES                                         [ ]Normal  [x ]Edema                           NEUROLOGIC  [ ] Normal, non focal  [x ] Focal findings: tremors on activity-recent parkinsons dx    PSYCHIATRIC  [x ]Alert and appropriate  [ ] Sedated	 [ ]Agitated    :  Ybarra: [ ] Yes, if yes: Date of Placement:                   [ x ] No    LINES: Central Lines [ ] Yes, if yes: Date of Placement                                     [ x ] No    HOSPITAL MEDICATIONS:  MEDICATIONS  (STANDING):  aspirin  chewable 81 milliGRAM(s) Oral daily  carbidopa/levodopa  25/100 1 Tablet(s) Oral three times a day  clonazePAM Tablet 0.5 milliGRAM(s) Oral <User Schedule>  clopidogrel Tablet 75 milliGRAM(s) Oral daily  dextrose 5%. 1000 milliLiter(s) (50 mL/Hr) IV Continuous <Continuous>  dextrose 50% Injectable 12.5 Gram(s) IV Push once  dextrose 50% Injectable 25 Gram(s) IV Push once  dextrose 50% Injectable 25 Gram(s) IV Push once  doxazosin 2 milliGRAM(s) Oral at bedtime  enalapril 10 milliGRAM(s) Oral two times a day  heparin  Injectable 5000 Unit(s) SubCutaneous every 8 hours  hydrALAZINE 25 milliGRAM(s) Oral every 8 hours  insulin lispro (HumaLOG) corrective regimen sliding scale   SubCutaneous every 6 hours  metoprolol tartrate 100 milliGRAM(s) Oral two times a day  pantoprazole  Injectable 40 milliGRAM(s) IV Push daily    MEDICATIONS  (PRN):  dextrose 40% Gel 15 Gram(s) Oral once PRN Blood Glucose LESS THAN 70 milliGRAM(s)/deciLiter  glucagon  Injectable 1 milliGRAM(s) IntraMuscular once PRN Glucose <70 milliGRAM(s)/deciLiter  melatonin 3 milliGRAM(s) Oral at bedtime PRN Insomnia  QUEtiapine 50 milliGRAM(s) Oral every 6 hours PRN break through aggitation      LABS:                        9.5    9.4   )-----------( 253      ( 11 Jun 2018 06:53 )             29.2     06-11    140  |  100  |  23  ----------------------------<  94  4.1   |  28  |  0.61    Ca    9.4      11 Jun 2018 06:53  Phos  3.0     06-11  Mg     2.0     06-11    TPro  6.4  /  Alb  3.2<L>  /  TBili  0.7  /  DBili  x   /  AST  25  /  ALT  19  /  AlkPhos  151<H>  06-10            CAPILLARY BLOOD GLUCOSE    MICROBIOLOGY:     RADIOLOGY:  [ ] Reviewed and interpreted by me    Mode: AC/ CMV (Assist Control/ Continuous Mandatory Ventilation)  RR (machine): 14  TV (machine): 500  FiO2: 30  PEEP: 5  ITime: 1  MAP: 9  PIP: 25 Patient is a 77y old  Male who presents with a chief complaint of Admitted with fever (27 May 2018 20:28)      Interval Events: No acute events overnight. Had 2 episodes of desaturation with mucus plugging this am. Scoped by ENT and trach in place, no obvious obstruction. Now stable on vent.     REVIEW OF SYSTEMS:  [ ] Positive  [ ] All other systems negative  [x ] Unable to assess ROS because non verbal________    Vital Signs Last 24 Hrs  T(C): 37.1 (06-11-18 @ 03:00), Max: 37.2 (06-10-18 @ 14:49)  T(F): 98.7 (06-11-18 @ 03:00), Max: 99 (06-10-18 @ 14:49)  HR: 62 (06-11-18 @ 05:00) (60 - 84)  BP: 127/74 (06-11-18 @ 05:00) (110/70 - 153/57)  RR: 20 (06-11-18 @ 05:00) (18 - 20)  SpO2: 100% (06-11-18 @ 05:00) (99% - 100%)    PHYSICAL EXAM:  HEENT:   [x ]Tracheostomy:   [x ]Pupils equal  [ ]No oral lesions  [ ]Abnormal    SKIN  [x ]No Rash  [ ] Abnormal  [ ] pressure    CARDIAC  [x ]Regular  [ ]Abnormal    PULMONARY  [ x]Bilateral Clear Breath Sounds  [ ]Normal Excursion  [ ]Abnormal    GI  [x ]PEG      [x ] +BS		              [x ]Soft, nondistended, nontender	  [ ]Abnormal    MUSCULOSKELETAL                                   [ ]Bedbound                 [ ]Abnormal    [x ]Ambulatory/OOB to chair                           EXTREMITIES                                         [ ]Normal  [x ]Edema                           NEUROLOGIC  [ ] Normal, non focal  [x ] Focal findings: tremors on activity-recent parkinsons dx    PSYCHIATRIC  [x ]Alert and appropriate  [ ] Sedated	 [ ]Agitated    :  Ybarra: [ ] Yes, if yes: Date of Placement:                   [ x ] No    LINES: Central Lines [ ] Yes, if yes: Date of Placement                                     [ x ] No    HOSPITAL MEDICATIONS:  MEDICATIONS  (STANDING):  aspirin  chewable 81 milliGRAM(s) Oral daily  carbidopa/levodopa  25/100 1 Tablet(s) Oral three times a day  clonazePAM Tablet 0.5 milliGRAM(s) Oral <User Schedule>  clopidogrel Tablet 75 milliGRAM(s) Oral daily  dextrose 5%. 1000 milliLiter(s) (50 mL/Hr) IV Continuous <Continuous>  dextrose 50% Injectable 12.5 Gram(s) IV Push once  dextrose 50% Injectable 25 Gram(s) IV Push once  dextrose 50% Injectable 25 Gram(s) IV Push once  doxazosin 2 milliGRAM(s) Oral at bedtime  enalapril 10 milliGRAM(s) Oral two times a day  heparin  Injectable 5000 Unit(s) SubCutaneous every 8 hours  hydrALAZINE 25 milliGRAM(s) Oral every 8 hours  insulin lispro (HumaLOG) corrective regimen sliding scale   SubCutaneous every 6 hours  metoprolol tartrate 100 milliGRAM(s) Oral two times a day  pantoprazole  Injectable 40 milliGRAM(s) IV Push daily    MEDICATIONS  (PRN):  dextrose 40% Gel 15 Gram(s) Oral once PRN Blood Glucose LESS THAN 70 milliGRAM(s)/deciLiter  glucagon  Injectable 1 milliGRAM(s) IntraMuscular once PRN Glucose <70 milliGRAM(s)/deciLiter  melatonin 3 milliGRAM(s) Oral at bedtime PRN Insomnia  QUEtiapine 50 milliGRAM(s) Oral every 6 hours PRN break through aggitation      LABS:                        9.5    9.4   )-----------( 253      ( 11 Jun 2018 06:53 )             29.2     06-11    140  |  100  |  23  ----------------------------<  94  4.1   |  28  |  0.61    Ca    9.4      11 Jun 2018 06:53  Phos  3.0     06-11  Mg     2.0     06-11    TPro  6.4  /  Alb  3.2<L>  /  TBili  0.7  /  DBili  x   /  AST  25  /  ALT  19  /  AlkPhos  151<H>  06-10            CAPILLARY BLOOD GLUCOSE    MICROBIOLOGY:     RADIOLOGY:  [ ] Reviewed and interpreted by me    Mode: AC/ CMV (Assist Control/ Continuous Mandatory Ventilation)  RR (machine): 14  TV (machine): 500  FiO2: 30  PEEP: 5  ITime: 1  MAP: 9  PIP: 25
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
## 2018-06-11 NOTE — PROGRESS NOTE ADULT - ATTENDING COMMENTS
Pt seen and examined.   1. Chronic resp failure with hypoxia:  - Cont vent support at current settings. Appreciate ENT evaluation  - Cont aggressive pulm toilet. Will add nebulized hypertonic saline preceded by Duonebs to assist with mucus clearance   2. Acinetobacter bacteremia likely 2/2 bacterial pneumonia:  - Completed a course of Unasyn. Surveillance Cxs NGTD.   3. Oropharyngeal dysphagia:  - Cont tube feeds  4. Parkinson's disease:  - Cont Sinemet   4. Gen:  - Overall prognosis guarded. Ongoing GOC discussions with wife. Appreciate palliative care input.
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
## 2018-06-11 NOTE — PROGRESS NOTE ADULT - SUBJECTIVE AND OBJECTIVE BOX
SUBJECTIVE:   lying in bed.  no confusion per RCU NP    Medications:  ampicillin/sulbactam  IVPB      ampicillin/sulbactam  IVPB 3 Gram(s) IV Intermittent every 6 hours  aspirin  chewable 81 milliGRAM(s) Oral daily  carbidopa/levodopa  25/100 1 Tablet(s) Oral three times a day  clonazePAM Tablet 0.5 milliGRAM(s) Oral <User Schedule>  clopidogrel Tablet 75 milliGRAM(s) Oral daily  dextrose 40% Gel 15 Gram(s) Oral once PRN  dextrose 5%. 1000 milliLiter(s) IV Continuous <Continuous>  dextrose 50% Injectable 12.5 Gram(s) IV Push once  dextrose 50% Injectable 25 Gram(s) IV Push once  dextrose 50% Injectable 25 Gram(s) IV Push once  doxazosin 2 milliGRAM(s) Oral at bedtime  enalapril 10 milliGRAM(s) Oral two times a day  glucagon  Injectable 1 milliGRAM(s) IntraMuscular once PRN  heparin  Injectable 5000 Unit(s) SubCutaneous every 8 hours  hydrALAZINE 25 milliGRAM(s) Oral every 8 hours  insulin lispro (HumaLOG) corrective regimen sliding scale   SubCutaneous every 6 hours  melatonin 3 milliGRAM(s) Oral at bedtime PRN  metoprolol tartrate 100 milliGRAM(s) Oral two times a day  pantoprazole  Injectable 40 milliGRAM(s) IV Push daily  QUEtiapine 50 milliGRAM(s) Oral every 6 hours PRN    Labs:  CBC Full  -  ( 05 Jun 2018 06:53 )  WBC Count : 10.8 K/uL  Hemoglobin : 8.8 g/dL  Hematocrit : 26.8 %  Platelet Count - Automated : 262 K/uL  Mean Cell Volume : 102.0 fl  Mean Cell Hemoglobin : 33.8 pg  Mean Cell Hemoglobin Concentration : 33.1 gm/dL  Auto Neutrophil # : x  Auto Lymphocyte # : x  Auto Monocyte # : x  Auto Eosinophil # : x  Auto Basophil # : x  Auto Neutrophil % : x  Auto Lymphocyte % : x  Auto Monocyte % : x  Auto Eosinophil % : x  Auto Basophil % : x    06-05    140  |  101  |  19  ----------------------------<  95  3.9   |  30  |  0.62    Ca    9.5      05 Jun 2018 06:53  Phos  3.0     06-05  Mg     2.1     06-05    TPro  6.1  /  Alb  3.2<L>  /  TBili  0.8  /  DBili  x   /  AST  28  /  ALT  25  /  AlkPhos  151<H>  06-05    CAPILLARY BLOOD GLUCOSE      POCT Blood Glucose.: 147 mg/dL (05 Jun 2018 11:53)  POCT Blood Glucose.: 100 mg/dL (05 Jun 2018 05:12)  POCT Blood Glucose.: 144 mg/dL (04 Jun 2018 23:47)  POCT Blood Glucose.: 137 mg/dL (04 Jun 2018 18:02)    LIVER FUNCTIONS - ( 05 Jun 2018 06:53 )  Alb: 3.2 g/dL / Pro: 6.1 g/dL / ALK PHOS: 151 U/L / ALT: 25 U/L / AST: 28 U/L / GGT: x           PT/INR - ( 05 Jun 2018 06:53 )   PT: 11.5 sec;   INR: 1.05 ratio    PTT - ( 05 Jun 2018 06:53 )  PTT:28.3 sec    Vitals:  Vital Signs Last 24 Hrs  T(C): 36.9 (05 Jun 2018 12:51), Max: 37.2 (04 Jun 2018 17:25)  T(F): 98.4 (05 Jun 2018 12:51), Max: 99 (04 Jun 2018 17:25)  HR: 100 (05 Jun 2018 13:15) (60 - 100)  BP: 163/62 (05 Jun 2018 12:51) (106/72 - 163/62)  BP(mean): --  RR: 98 (05 Jun 2018 12:51) (18 - 98)  SpO2: 100% (05 Jun 2018 13:15) (98% - 100%)      NEUROLOGICAL EXAM:    Mental status: trached, sitting in chair, eyes open, looks when called, follows some commands    Cranial Nerves: Pupils were equal, round, reactive to light. Extraocular movements were intact. Facial sensation was intact to light touch. There was no facial asymmetry. The palate was upgoing symmetrically and tongue was midline.    Motor exam: Diffuse markedly increased tone, cogwheeling at wrist bilaterally, bilateral parkinsonian pill rolling tremors in hands. moves all ext    Reflexes: 2+ in the bilateral upper extremities. 2+ in the bilateral lower extremities. Toes were mute    Sensation: responds to tactile stimuli in all extremities     Coordination: unable    Gait: unable.     Imaging:  < from: CT Head No Cont (03.26.18 @ 16:59) >  Impression:     There is straightening of the normal cervical lordosis. No acute   fracture or traumatic subluxations identified. There are large anterior   bridging osteophytes at C3-4 through C6-7. Multilevel uncovertebral joint   and facet arthropathy is again identified. The prevertebral soft tissues   are within normal limits.    Impression:    Brain CT: No acute hemorrhage, infarct or displaced calvarial fracture.    Cervical spine CT: No acute fracture or traumatic subluxation. Multilevel   degenerative changes.    BRYAN LYNCH M.D., ATTENDING RADIOLOGIST  This document has been electronically signed. Mar 26 2018  5:10PM

## 2018-06-11 NOTE — GOALS OF CARE CONVERSATION - PERSONAL ADVANCE DIRECTIVE - CONVERSATION DETAILS
Dr. Reginaldo Mckeon LCSW and 2 medical residents met with wife at patient's bedside to discuss goals of care.  Wife in agreement with de-escalation of care, DNR, transfer to the PCU and electively extubate after patient transferred into the PCU.  Patient's son arriving this afternoon.  Wife indicated son is not a decision maker or a support to her.  Wife recognizes patient will no longer be able to be restored to a meaningful quality of life and states she didn't "realize he was on life support."  She wants to honor his wishes in his Living Will, transfer to PCU and focus on his comfort and stop PEG feeds.  She is tearful and grieving his decline.  Supportive grief and bereavement counseling provided to wife. Wife to meet with PCU team after transfer to determine date for elective extubation.  Wife has been in touch with the  from her [employer] who is away but "praying for both of them."  Wife encouraged to eat lunch as she has lost over 30 pounds since the death of her son.  Ongoing support for wife to be provided with PCU team.  Ruthy Norton LMSW will follow in the PCU.

## 2018-06-11 NOTE — CONSULT NOTE ADULT - PROBLEM SELECTOR RECOMMENDATION 9
Patient remains dependent on mechanical ventilation with difficulty weaning.   Goals of care: Patient's current medical condition and wishes discussed by Palliative care team with wife Tiana (HCP). Wife Tiana states that patient's wishes are to avoid sustenance of his life by artificial means in case of a medical condition that would not allow return to the quality of life/enjoyment of life he has had in the past. She also notes he would not wish to live in a nursing home. Such wishes are also expressed in the patient's living will which was discussed.   -Decision made by wife and team to transition patient to PCU with eventual goal of withdrawing mechanical ventilation.   -Patient to be made DNR per wife's wishes.   35' spent by Dr Mckeon in [facility]
-fever better  -?source  -CT with no obvious infection  -minimal secretions per RN
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
## 2018-06-11 NOTE — PROGRESS NOTE ADULT - PROBLEM SELECTOR PLAN 2
Blood cx from [facility]: Acinetobacter and CNS  Resistant Acinetobacter baumannii or Acinetobacter haemolyticus identified by diagnostic testing at [facility]   CT Abdomen Pelvis 5/28: No Source of bacteremia identified   Blood cx 5/29: No Growth   Patient completed full 10 day course of Unasyn
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
## 2018-06-11 NOTE — PROGRESS NOTE ADULT - ASSESSMENT
A/P: Clinically suspect underlining parkinsonism/PD but limited evaluation in the setting of multiple medical issues/hospitalization/infection. [place] scan 12/2017 supported diagnosis of PD    Plan  1. started sinemet now given likelihood pt will have difficulty following up as an outpt in timely manner and to possibly help  his rehabilitation potential.   - started1/2 tab 25/100 mg tid. increased now to 1 tab tid.  no indication of adverse effect  If not confused, can increase to 11/2 tabs as still very stiff,  2. Continue supportive care, Abx per ID  3. PT  4. Optimize sleep wake cycle, frequent reorientation, high risk for delirium, avoidance of delirium provoking medications.    will monitor closely on sinemet as can cause worsening confusion

## 2018-06-12 DIAGNOSIS — Z51.5 ENCOUNTER FOR PALLIATIVE CARE: ICD-10-CM

## 2018-06-12 PROCEDURE — 99233 SBSQ HOSP IP/OBS HIGH 50: CPT | Mod: GC

## 2018-06-12 PROCEDURE — 31615 TRCHEOBRNCHSC EST TRACHS INC: CPT

## 2018-06-12 PROCEDURE — 99232 SBSQ HOSP IP/OBS MODERATE 35: CPT | Mod: 25

## 2018-06-12 RX ADMIN — Medication 3 MILLILITER(S): at 05:12

## 2018-06-12 RX ADMIN — Medication 25 MILLIGRAM(S): at 21:20

## 2018-06-12 RX ADMIN — PANTOPRAZOLE SODIUM 40 MILLIGRAM(S): 20 TABLET, DELAYED RELEASE ORAL at 11:46

## 2018-06-12 RX ADMIN — Medication 100 MILLIGRAM(S): at 05:28

## 2018-06-12 RX ADMIN — Medication 0.5 MILLIGRAM(S): at 19:01

## 2018-06-12 RX ADMIN — SODIUM CHLORIDE 3 MILLILITER(S): 9 INJECTION INTRAMUSCULAR; INTRAVENOUS; SUBCUTANEOUS at 12:00

## 2018-06-12 RX ADMIN — Medication 3 MILLILITER(S): at 18:11

## 2018-06-12 RX ADMIN — Medication 0.5 MILLIGRAM(S): at 18:11

## 2018-06-12 RX ADMIN — Medication 25 MILLIGRAM(S): at 05:28

## 2018-06-12 RX ADMIN — CARBIDOPA AND LEVODOPA 1 TABLET(S): 25; 100 TABLET ORAL at 14:32

## 2018-06-12 RX ADMIN — Medication 10 MILLIGRAM(S): at 05:28

## 2018-06-12 RX ADMIN — HEPARIN SODIUM 5000 UNIT(S): 5000 INJECTION INTRAVENOUS; SUBCUTANEOUS at 14:32

## 2018-06-12 RX ADMIN — Medication 100 MILLIGRAM(S): at 18:11

## 2018-06-12 RX ADMIN — CARBIDOPA AND LEVODOPA 1 TABLET(S): 25; 100 TABLET ORAL at 21:20

## 2018-06-12 RX ADMIN — Medication 81 MILLIGRAM(S): at 11:47

## 2018-06-12 RX ADMIN — CARBIDOPA AND LEVODOPA 1 TABLET(S): 25; 100 TABLET ORAL at 05:28

## 2018-06-12 RX ADMIN — SODIUM CHLORIDE 3 MILLILITER(S): 9 INJECTION INTRAMUSCULAR; INTRAVENOUS; SUBCUTANEOUS at 05:08

## 2018-06-12 RX ADMIN — Medication 2 MILLIGRAM(S): at 21:20

## 2018-06-12 RX ADMIN — CLOPIDOGREL BISULFATE 75 MILLIGRAM(S): 75 TABLET, FILM COATED ORAL at 11:47

## 2018-06-12 RX ADMIN — HEPARIN SODIUM 5000 UNIT(S): 5000 INJECTION INTRAVENOUS; SUBCUTANEOUS at 21:19

## 2018-06-12 RX ADMIN — Medication 0.5 MILLIGRAM(S): at 05:06

## 2018-06-12 RX ADMIN — Medication 10 MILLIGRAM(S): at 18:11

## 2018-06-12 RX ADMIN — Medication 25 MILLIGRAM(S): at 14:32

## 2018-06-12 RX ADMIN — Medication 3 MILLILITER(S): at 11:46

## 2018-06-12 RX ADMIN — SODIUM CHLORIDE 3 MILLILITER(S): 9 INJECTION INTRAMUSCULAR; INTRAVENOUS; SUBCUTANEOUS at 00:30

## 2018-06-12 RX ADMIN — SODIUM CHLORIDE 3 MILLILITER(S): 9 INJECTION INTRAMUSCULAR; INTRAVENOUS; SUBCUTANEOUS at 18:11

## 2018-06-12 RX ADMIN — HEPARIN SODIUM 5000 UNIT(S): 5000 INJECTION INTRAVENOUS; SUBCUTANEOUS at 05:27

## 2018-06-12 NOTE — PROGRESS NOTE ADULT - PROBLEM SELECTOR PLAN 1
DNR not DNI on vent   6/12: Started on CPAP trials on 10/5   Goals of care:  Tiana (HCP/wife)- attests to living will and that patient would not like to be on life sustaining measures. Transferred to PCU for eventual decannulation.  Family meeting tomorrow 10 am.

## 2018-06-12 NOTE — PROGRESS NOTE ADULT - ATTENDING COMMENTS
I have personally seen and examined this patient and agree with the above assessment and plan.  Family meeting tomorrow.

## 2018-06-12 NOTE — PROGRESS NOTE ADULT - PROBLEM SELECTOR PLAN 2
Seroquel d/padmini'ed on the medical floor  Started on Zyprexa 5 mg q 12 PRN agitation due to possible PD.
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
## 2018-06-13 PROCEDURE — 99233 SBSQ HOSP IP/OBS HIGH 50: CPT | Mod: GC

## 2018-06-13 RX ORDER — CARBIDOPA AND LEVODOPA 25; 100 MG/1; MG/1
1.5 TABLET ORAL THREE TIMES A DAY
Qty: 0 | Refills: 0 | Status: DISCONTINUED | OUTPATIENT
Start: 2018-06-13 | End: 2018-06-15

## 2018-06-13 RX ADMIN — HEPARIN SODIUM 5000 UNIT(S): 5000 INJECTION INTRAVENOUS; SUBCUTANEOUS at 05:59

## 2018-06-13 RX ADMIN — Medication 0.5 MILLIGRAM(S): at 10:55

## 2018-06-13 RX ADMIN — Medication 3 MILLILITER(S): at 17:28

## 2018-06-13 RX ADMIN — Medication 25 MILLIGRAM(S): at 21:48

## 2018-06-13 RX ADMIN — SODIUM CHLORIDE 3 MILLILITER(S): 9 INJECTION INTRAMUSCULAR; INTRAVENOUS; SUBCUTANEOUS at 00:50

## 2018-06-13 RX ADMIN — CARBIDOPA AND LEVODOPA 1 TABLET(S): 25; 100 TABLET ORAL at 05:59

## 2018-06-13 RX ADMIN — Medication 10 MILLIGRAM(S): at 19:08

## 2018-06-13 RX ADMIN — Medication 2 MILLIGRAM(S): at 21:48

## 2018-06-13 RX ADMIN — HEPARIN SODIUM 5000 UNIT(S): 5000 INJECTION INTRAVENOUS; SUBCUTANEOUS at 21:48

## 2018-06-13 RX ADMIN — CLOPIDOGREL BISULFATE 75 MILLIGRAM(S): 75 TABLET, FILM COATED ORAL at 12:19

## 2018-06-13 RX ADMIN — HEPARIN SODIUM 5000 UNIT(S): 5000 INJECTION INTRAVENOUS; SUBCUTANEOUS at 14:34

## 2018-06-13 RX ADMIN — Medication 3 MILLILITER(S): at 05:37

## 2018-06-13 RX ADMIN — Medication 3 MILLILITER(S): at 11:01

## 2018-06-13 RX ADMIN — Medication 0.5 MILLIGRAM(S): at 19:29

## 2018-06-13 RX ADMIN — PANTOPRAZOLE SODIUM 40 MILLIGRAM(S): 20 TABLET, DELAYED RELEASE ORAL at 12:18

## 2018-06-13 RX ADMIN — CARBIDOPA AND LEVODOPA 1.5 TABLET(S): 25; 100 TABLET ORAL at 21:49

## 2018-06-13 RX ADMIN — Medication 0.5 MILLIGRAM(S): at 22:15

## 2018-06-13 RX ADMIN — Medication 100 MILLIGRAM(S): at 19:08

## 2018-06-13 RX ADMIN — Medication 100 MILLIGRAM(S): at 05:59

## 2018-06-13 RX ADMIN — Medication 0.5 MILLIGRAM(S): at 19:08

## 2018-06-13 RX ADMIN — OLANZAPINE 5 MILLIGRAM(S): 15 TABLET, FILM COATED ORAL at 23:04

## 2018-06-13 RX ADMIN — SODIUM CHLORIDE 3 MILLILITER(S): 9 INJECTION INTRAMUSCULAR; INTRAVENOUS; SUBCUTANEOUS at 11:01

## 2018-06-13 RX ADMIN — Medication 81 MILLIGRAM(S): at 12:19

## 2018-06-13 RX ADMIN — Medication 10 MILLIGRAM(S): at 05:59

## 2018-06-13 RX ADMIN — Medication 3 MILLILITER(S): at 00:50

## 2018-06-13 RX ADMIN — SODIUM CHLORIDE 3 MILLILITER(S): 9 INJECTION INTRAMUSCULAR; INTRAVENOUS; SUBCUTANEOUS at 17:28

## 2018-06-13 RX ADMIN — CARBIDOPA AND LEVODOPA 1.5 TABLET(S): 25; 100 TABLET ORAL at 14:35

## 2018-06-13 RX ADMIN — Medication 0.5 MILLIGRAM(S): at 11:57

## 2018-06-13 RX ADMIN — SODIUM CHLORIDE 3 MILLILITER(S): 9 INJECTION INTRAMUSCULAR; INTRAVENOUS; SUBCUTANEOUS at 23:05

## 2018-06-13 RX ADMIN — SODIUM CHLORIDE 3 MILLILITER(S): 9 INJECTION INTRAMUSCULAR; INTRAVENOUS; SUBCUTANEOUS at 05:37

## 2018-06-13 RX ADMIN — Medication 3 MILLILITER(S): at 23:05

## 2018-06-13 RX ADMIN — Medication 25 MILLIGRAM(S): at 14:34

## 2018-06-13 RX ADMIN — Medication 25 MILLIGRAM(S): at 05:59

## 2018-06-13 NOTE — PROGRESS NOTE ADULT - PROBLEM SELECTOR PLAN 1
DNR not DNI on vent   6/12: Started on CPAP trials on 10/5   Will c/w CPAP trial today  Goals of care:  Tiana (HCP/wife)- attests to living will and that patient would not like to be on life sustaining measures. Transferred to PCU for eventual decannulation.  Family meeting today awaiting wife.

## 2018-06-13 NOTE — PROGRESS NOTE ADULT - PROBLEM SELECTOR PLAN 2
Seroquel d/c'ed on the medical floor  Started on Zyprexa 5 mg q 12 PRN agitation due to possible PD.  Likely exacerbated w/ tremor

## 2018-06-13 NOTE — PROGRESS NOTE ADULT - ATTENDING COMMENTS
I have personally seen and examined this patient and agree with the above assessment and plan. cpap trials, family meeting to discuss next steps.

## 2018-06-13 NOTE — PROGRESS NOTE ADULT - PROBLEM SELECTOR PLAN 5
Appreciated neurology reccs:  -was started on sinemet on the medical floor  -tremors and stiffness persist; will increase sinemet dose to 1.5 mg TID as per neuro recommendations. Patient remains at baseline mental status.
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
## 2018-06-14 PROCEDURE — 99233 SBSQ HOSP IP/OBS HIGH 50: CPT | Mod: GC

## 2018-06-14 RX ORDER — HYDROMORPHONE HYDROCHLORIDE 2 MG/ML
0.2 INJECTION INTRAMUSCULAR; INTRAVENOUS; SUBCUTANEOUS
Qty: 0 | Refills: 0 | Status: DISCONTINUED | OUTPATIENT
Start: 2018-06-14 | End: 2018-06-19

## 2018-06-14 RX ORDER — HEPARIN SODIUM 5000 [USP'U]/ML
5000 INJECTION INTRAVENOUS; SUBCUTANEOUS EVERY 12 HOURS
Qty: 0 | Refills: 0 | Status: DISCONTINUED | OUTPATIENT
Start: 2018-06-14 | End: 2018-06-15

## 2018-06-14 RX ADMIN — Medication 100 MILLIGRAM(S): at 17:41

## 2018-06-14 RX ADMIN — HEPARIN SODIUM 5000 UNIT(S): 5000 INJECTION INTRAVENOUS; SUBCUTANEOUS at 05:04

## 2018-06-14 RX ADMIN — CARBIDOPA AND LEVODOPA 1.5 TABLET(S): 25; 100 TABLET ORAL at 05:03

## 2018-06-14 RX ADMIN — SODIUM CHLORIDE 3 MILLILITER(S): 9 INJECTION INTRAMUSCULAR; INTRAVENOUS; SUBCUTANEOUS at 18:01

## 2018-06-14 RX ADMIN — Medication 25 MILLIGRAM(S): at 13:25

## 2018-06-14 RX ADMIN — Medication 25 MILLIGRAM(S): at 05:03

## 2018-06-14 RX ADMIN — CARBIDOPA AND LEVODOPA 1.5 TABLET(S): 25; 100 TABLET ORAL at 22:47

## 2018-06-14 RX ADMIN — SODIUM CHLORIDE 3 MILLILITER(S): 9 INJECTION INTRAMUSCULAR; INTRAVENOUS; SUBCUTANEOUS at 11:41

## 2018-06-14 RX ADMIN — Medication 3 MILLILITER(S): at 05:02

## 2018-06-14 RX ADMIN — Medication 25 MILLIGRAM(S): at 22:47

## 2018-06-14 RX ADMIN — Medication 0.5 MILLIGRAM(S): at 17:42

## 2018-06-14 RX ADMIN — Medication 0.5 MILLIGRAM(S): at 07:33

## 2018-06-14 RX ADMIN — Medication 10 MILLIGRAM(S): at 17:41

## 2018-06-14 RX ADMIN — Medication 3 MILLILITER(S): at 11:41

## 2018-06-14 RX ADMIN — Medication 1 MILLIGRAM(S): at 17:50

## 2018-06-14 RX ADMIN — CARBIDOPA AND LEVODOPA 1.5 TABLET(S): 25; 100 TABLET ORAL at 13:25

## 2018-06-14 RX ADMIN — HEPARIN SODIUM 5000 UNIT(S): 5000 INJECTION INTRAVENOUS; SUBCUTANEOUS at 17:41

## 2018-06-14 RX ADMIN — Medication 0.5 MILLIGRAM(S): at 06:24

## 2018-06-14 RX ADMIN — Medication 10 MILLIGRAM(S): at 05:03

## 2018-06-14 RX ADMIN — Medication 2 MILLIGRAM(S): at 22:47

## 2018-06-14 RX ADMIN — Medication 1 MILLIGRAM(S): at 11:52

## 2018-06-14 RX ADMIN — SODIUM CHLORIDE 3 MILLILITER(S): 9 INJECTION INTRAMUSCULAR; INTRAVENOUS; SUBCUTANEOUS at 05:03

## 2018-06-14 RX ADMIN — PANTOPRAZOLE SODIUM 40 MILLIGRAM(S): 20 TABLET, DELAYED RELEASE ORAL at 11:50

## 2018-06-14 RX ADMIN — CLOPIDOGREL BISULFATE 75 MILLIGRAM(S): 75 TABLET, FILM COATED ORAL at 11:51

## 2018-06-14 RX ADMIN — Medication 81 MILLIGRAM(S): at 11:51

## 2018-06-14 RX ADMIN — Medication 1 MILLIGRAM(S): at 15:04

## 2018-06-14 RX ADMIN — Medication 1 MILLIGRAM(S): at 08:13

## 2018-06-14 RX ADMIN — Medication 3 MILLILITER(S): at 18:01

## 2018-06-14 NOTE — PROGRESS NOTE ADULT - PROBLEM SELECTOR PLAN 6
DNR not DNI   Family meeting with Tiana (wife) done today:  Tiana spoke about patients background on how the family recently lost their son Burak to cancer (?) Dec 3d 2017. Her  was a huge source of support who is currently taking care of his ill mother in [place]. She stated that she had seen the outcome of patients being in a NH facility and would never want her  to go that route especially since his living will states that he would not want to be on life sustaining interventions (i.e vent machine). She requests that de escalation of the vent happens on 6/21 as this is when patients sister (who is deaf) and her trusted  can be present.

## 2018-06-14 NOTE — PROGRESS NOTE ADULT - ATTENDING COMMENTS
I have personally seen and examined this patient and agree with the above assessment and plan.   See note as above.  Possible decannulation next week.
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
## 2018-06-15 LAB
ANION GAP SERPL CALC-SCNC: 12 MMOL/L — SIGNIFICANT CHANGE UP (ref 5–17)
ANION GAP SERPL CALC-SCNC: 15 MMOL/L — SIGNIFICANT CHANGE UP (ref 5–17)
BUN SERPL-MCNC: 31 MG/DL — HIGH (ref 7–23)
BUN SERPL-MCNC: 31 MG/DL — HIGH (ref 7–23)
CALCIUM SERPL-MCNC: 9.2 MG/DL — SIGNIFICANT CHANGE UP (ref 8.4–10.5)
CALCIUM SERPL-MCNC: 9.3 MG/DL — SIGNIFICANT CHANGE UP (ref 8.4–10.5)
CHLORIDE SERPL-SCNC: 104 MMOL/L — SIGNIFICANT CHANGE UP (ref 96–108)
CHLORIDE SERPL-SCNC: 105 MMOL/L — SIGNIFICANT CHANGE UP (ref 96–108)
CO2 SERPL-SCNC: 17 MMOL/L — LOW (ref 22–31)
CO2 SERPL-SCNC: 25 MMOL/L — SIGNIFICANT CHANGE UP (ref 22–31)
CREAT SERPL-MCNC: 0.57 MG/DL — SIGNIFICANT CHANGE UP (ref 0.5–1.3)
CREAT SERPL-MCNC: 0.6 MG/DL — SIGNIFICANT CHANGE UP (ref 0.5–1.3)
GLUCOSE SERPL-MCNC: 121 MG/DL — HIGH (ref 70–99)
GLUCOSE SERPL-MCNC: 131 MG/DL — HIGH (ref 70–99)
POTASSIUM SERPL-MCNC: 4 MMOL/L — SIGNIFICANT CHANGE UP (ref 3.5–5.3)
POTASSIUM SERPL-MCNC: 6.2 MMOL/L — CRITICAL HIGH (ref 3.5–5.3)
POTASSIUM SERPL-SCNC: 4 MMOL/L — SIGNIFICANT CHANGE UP (ref 3.5–5.3)
POTASSIUM SERPL-SCNC: 6.2 MMOL/L — CRITICAL HIGH (ref 3.5–5.3)
SODIUM SERPL-SCNC: 137 MMOL/L — SIGNIFICANT CHANGE UP (ref 135–145)
SODIUM SERPL-SCNC: 141 MMOL/L — SIGNIFICANT CHANGE UP (ref 135–145)

## 2018-06-15 PROCEDURE — 99233 SBSQ HOSP IP/OBS HIGH 50: CPT | Mod: GC

## 2018-06-15 RX ORDER — CARBIDOPA AND LEVODOPA 25; 100 MG/1; MG/1
1 TABLET ORAL THREE TIMES A DAY
Qty: 0 | Refills: 0 | Status: DISCONTINUED | OUTPATIENT
Start: 2018-06-15 | End: 2018-06-22

## 2018-06-15 RX ORDER — PANTOPRAZOLE SODIUM 20 MG/1
40 TABLET, DELAYED RELEASE ORAL DAILY
Qty: 0 | Refills: 0 | Status: DISCONTINUED | OUTPATIENT
Start: 2018-06-15 | End: 2018-06-22

## 2018-06-15 RX ORDER — CLONAZEPAM 1 MG
0.5 TABLET ORAL
Qty: 0 | Refills: 0 | Status: DISCONTINUED | OUTPATIENT
Start: 2018-06-15 | End: 2018-06-22

## 2018-06-15 RX ADMIN — Medication 81 MILLIGRAM(S): at 11:40

## 2018-06-15 RX ADMIN — Medication 3 MILLILITER(S): at 12:44

## 2018-06-15 RX ADMIN — Medication 3 MILLILITER(S): at 17:04

## 2018-06-15 RX ADMIN — Medication 25 MILLIGRAM(S): at 05:41

## 2018-06-15 RX ADMIN — Medication 10 MILLIGRAM(S): at 17:56

## 2018-06-15 RX ADMIN — CARBIDOPA AND LEVODOPA 1 TABLET(S): 25; 100 TABLET ORAL at 22:01

## 2018-06-15 RX ADMIN — Medication 100 MILLIGRAM(S): at 17:56

## 2018-06-15 RX ADMIN — Medication 1 MILLIGRAM(S): at 11:57

## 2018-06-15 RX ADMIN — Medication 1 MILLIGRAM(S): at 07:40

## 2018-06-15 RX ADMIN — CARBIDOPA AND LEVODOPA 1.5 TABLET(S): 25; 100 TABLET ORAL at 05:41

## 2018-06-15 RX ADMIN — Medication 10 MILLIGRAM(S): at 05:41

## 2018-06-15 RX ADMIN — Medication 1 MILLIGRAM(S): at 17:50

## 2018-06-15 RX ADMIN — Medication 3 MILLILITER(S): at 00:50

## 2018-06-15 RX ADMIN — Medication 100 MILLIGRAM(S): at 05:41

## 2018-06-15 RX ADMIN — Medication 2 MILLIGRAM(S): at 22:01

## 2018-06-15 RX ADMIN — Medication 1 MILLIGRAM(S): at 14:14

## 2018-06-15 RX ADMIN — CLOPIDOGREL BISULFATE 75 MILLIGRAM(S): 75 TABLET, FILM COATED ORAL at 11:40

## 2018-06-15 RX ADMIN — Medication 0.5 MILLIGRAM(S): at 17:54

## 2018-06-15 RX ADMIN — SODIUM CHLORIDE 3 MILLILITER(S): 9 INJECTION INTRAMUSCULAR; INTRAVENOUS; SUBCUTANEOUS at 17:04

## 2018-06-15 RX ADMIN — CARBIDOPA AND LEVODOPA 1 TABLET(S): 25; 100 TABLET ORAL at 13:05

## 2018-06-15 RX ADMIN — Medication 3 MILLILITER(S): at 06:46

## 2018-06-15 RX ADMIN — Medication 25 MILLIGRAM(S): at 13:05

## 2018-06-15 RX ADMIN — Medication 25 MILLIGRAM(S): at 22:01

## 2018-06-15 RX ADMIN — SODIUM CHLORIDE 3 MILLILITER(S): 9 INJECTION INTRAMUSCULAR; INTRAVENOUS; SUBCUTANEOUS at 00:51

## 2018-06-15 RX ADMIN — PANTOPRAZOLE SODIUM 40 MILLIGRAM(S): 20 TABLET, DELAYED RELEASE ORAL at 11:40

## 2018-06-15 RX ADMIN — SODIUM CHLORIDE 3 MILLILITER(S): 9 INJECTION INTRAMUSCULAR; INTRAVENOUS; SUBCUTANEOUS at 06:48

## 2018-06-15 RX ADMIN — HEPARIN SODIUM 5000 UNIT(S): 5000 INJECTION INTRAVENOUS; SUBCUTANEOUS at 05:41

## 2018-06-15 RX ADMIN — SODIUM CHLORIDE 3 MILLILITER(S): 9 INJECTION INTRAMUSCULAR; INTRAVENOUS; SUBCUTANEOUS at 12:44
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
## 2018-06-15 NOTE — PROGRESS NOTE ADULT - PROBLEM SELECTOR PLAN 5
Appreciated neurology reccs:  -was started on sinemet on the medical floor  Currently goal is for control of symptoms. Sinemet as per wife has worsened patients agitation and tremors. We will continue with Ativan for tremors and restlessness. Will taper off sinemet according to following protocol:  Sinemet being tapered:  6/15 decreased to 1 tab TID  6/18 decrease to 1 tab BID  6/21 decrease to 1 tab OD  6/24 stop

## 2018-06-15 NOTE — PROGRESS NOTE ADULT - PROBLEM SELECTOR PLAN 1
DNR not DNI on vent   6/12: Started on CPAP trials on 10/5   Failed CPAP trials x 2  Decannulation/ extubation planed for 6/21

## 2018-06-15 NOTE — PROGRESS NOTE ADULT - PROBLEM SELECTOR PLAN 6
DNR not DNI   Family meeting with Tiana (wife) done today:  Further GOC discussed with Tiana today, will stop non essential meds. Will d/c asa, plavix, heparin sq. Will manny off sinemet as per above protocol.   Will continue to provide support to patient and family.

## 2018-06-16 PROCEDURE — 99233 SBSQ HOSP IP/OBS HIGH 50: CPT | Mod: GC

## 2018-06-16 RX ORDER — CIPROFLOXACIN HCL 0.3 %
1 DROPS OPHTHALMIC (EYE)
Qty: 0 | Refills: 0 | Status: DISCONTINUED | OUTPATIENT
Start: 2018-06-16 | End: 2018-06-22

## 2018-06-16 RX ADMIN — PANTOPRAZOLE SODIUM 40 MILLIGRAM(S): 20 TABLET, DELAYED RELEASE ORAL at 14:06

## 2018-06-16 RX ADMIN — CARBIDOPA AND LEVODOPA 1 TABLET(S): 25; 100 TABLET ORAL at 05:51

## 2018-06-16 RX ADMIN — Medication 1 DROP(S): at 23:56

## 2018-06-16 RX ADMIN — Medication 25 MILLIGRAM(S): at 14:05

## 2018-06-16 RX ADMIN — Medication 0.5 MILLIGRAM(S): at 19:28

## 2018-06-16 RX ADMIN — Medication 1 MILLIGRAM(S): at 14:05

## 2018-06-16 RX ADMIN — Medication 25 MILLIGRAM(S): at 21:21

## 2018-06-16 RX ADMIN — Medication 1 DROP(S): at 18:49

## 2018-06-16 RX ADMIN — Medication 2 MILLIGRAM(S): at 21:21

## 2018-06-16 RX ADMIN — HYDROMORPHONE HYDROCHLORIDE 0.2 MILLIGRAM(S): 2 INJECTION INTRAMUSCULAR; INTRAVENOUS; SUBCUTANEOUS at 19:36

## 2018-06-16 RX ADMIN — SODIUM CHLORIDE 3 MILLILITER(S): 9 INJECTION INTRAMUSCULAR; INTRAVENOUS; SUBCUTANEOUS at 00:05

## 2018-06-16 RX ADMIN — Medication 100 MILLIGRAM(S): at 23:55

## 2018-06-16 RX ADMIN — SODIUM CHLORIDE 3 MILLILITER(S): 9 INJECTION INTRAMUSCULAR; INTRAVENOUS; SUBCUTANEOUS at 06:46

## 2018-06-16 RX ADMIN — CARBIDOPA AND LEVODOPA 1 TABLET(S): 25; 100 TABLET ORAL at 14:06

## 2018-06-16 RX ADMIN — Medication 3 MILLILITER(S): at 17:32

## 2018-06-16 RX ADMIN — Medication 10 MILLIGRAM(S): at 05:51

## 2018-06-16 RX ADMIN — Medication 3 MILLILITER(S): at 14:05

## 2018-06-16 RX ADMIN — Medication 100 MILLIGRAM(S): at 06:01

## 2018-06-16 RX ADMIN — Medication 10 MILLIGRAM(S): at 23:54

## 2018-06-16 RX ADMIN — Medication 25 MILLIGRAM(S): at 05:51

## 2018-06-16 RX ADMIN — CARBIDOPA AND LEVODOPA 1 TABLET(S): 25; 100 TABLET ORAL at 21:21

## 2018-06-16 RX ADMIN — Medication 3 MILLILITER(S): at 00:05

## 2018-06-16 RX ADMIN — Medication 3 MILLILITER(S): at 06:46

## 2018-06-16 RX ADMIN — HYDROMORPHONE HYDROCHLORIDE 0.2 MILLIGRAM(S): 2 INJECTION INTRAMUSCULAR; INTRAVENOUS; SUBCUTANEOUS at 19:50

## 2018-06-16 NOTE — PROGRESS NOTE ADULT - PROBLEM SELECTOR PLAN 6
DNR not DNI   Per family meeting with Tiana (wife) 6/15:   Plan to discontinue non-essential meds.  Will manny off sinemet as per above protocol

## 2018-06-16 NOTE — PROGRESS NOTE ADULT - PROBLEM SELECTOR PLAN 2
Continue Zyprexa 5 mg q 12 PRN agitation due to possible PD Continue Zyprexa 5 mg q 12 PRN agitation due to delirium
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
## 2018-06-16 NOTE — PROGRESS NOTE ADULT - PROBLEM SELECTOR PLAN 5
Appreciated neurology recs:  Currently goal is for control of symptoms. Sinemet as per wife has worsened patients agitation and tremors. We will continue with Ativan for tremors and restlessness. Will taper off sinemet according to following protocol:  Sinemet being tapered:  6/15 decreased to 1 tab TID  6/18 decrease to 1 tab BID  6/21 decrease to 1 tab OD  6/24 stop Appreciated neurology recs:  Currently goal is for control of symptoms. Sinemet as per wife has worsened patients agitation and tremors. We will continue with Ativan for tremors and restlessness. Will taper off sinemet as below  6/15 decreased to 1 tab TID  6/18 decrease to 1 tab BID  6/21 decrease to 1 tab OD  6/24 stop
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
## 2018-06-17 PROCEDURE — 99233 SBSQ HOSP IP/OBS HIGH 50: CPT | Mod: GC

## 2018-06-17 RX ADMIN — Medication 100 MILLIGRAM(S): at 23:02

## 2018-06-17 RX ADMIN — Medication 1 DROP(S): at 05:04

## 2018-06-17 RX ADMIN — Medication 2 MILLIGRAM(S): at 23:02

## 2018-06-17 RX ADMIN — CARBIDOPA AND LEVODOPA 1 TABLET(S): 25; 100 TABLET ORAL at 14:46

## 2018-06-17 RX ADMIN — Medication 10 MILLIGRAM(S): at 23:02

## 2018-06-17 RX ADMIN — Medication 3 MILLILITER(S): at 12:59

## 2018-06-17 RX ADMIN — Medication 10 MILLIGRAM(S): at 10:27

## 2018-06-17 RX ADMIN — Medication 3 MILLILITER(S): at 07:38

## 2018-06-17 RX ADMIN — SODIUM CHLORIDE 3 MILLILITER(S): 9 INJECTION INTRAMUSCULAR; INTRAVENOUS; SUBCUTANEOUS at 07:39

## 2018-06-17 RX ADMIN — SODIUM CHLORIDE 3 MILLILITER(S): 9 INJECTION INTRAMUSCULAR; INTRAVENOUS; SUBCUTANEOUS at 13:27

## 2018-06-17 RX ADMIN — Medication 3 MILLILITER(S): at 01:06

## 2018-06-17 RX ADMIN — HYDROMORPHONE HYDROCHLORIDE 0.2 MILLIGRAM(S): 2 INJECTION INTRAMUSCULAR; INTRAVENOUS; SUBCUTANEOUS at 20:31

## 2018-06-17 RX ADMIN — Medication 1 DROP(S): at 17:56

## 2018-06-17 RX ADMIN — SODIUM CHLORIDE 3 MILLILITER(S): 9 INJECTION INTRAMUSCULAR; INTRAVENOUS; SUBCUTANEOUS at 01:06

## 2018-06-17 RX ADMIN — Medication 1 DROP(S): at 23:03

## 2018-06-17 RX ADMIN — Medication 0.5 MILLIGRAM(S): at 18:36

## 2018-06-17 RX ADMIN — Medication 3 MILLILITER(S): at 17:58

## 2018-06-17 RX ADMIN — CARBIDOPA AND LEVODOPA 1 TABLET(S): 25; 100 TABLET ORAL at 05:03

## 2018-06-17 RX ADMIN — Medication 25 MILLIGRAM(S): at 14:46

## 2018-06-17 RX ADMIN — HYDROMORPHONE HYDROCHLORIDE 0.2 MILLIGRAM(S): 2 INJECTION INTRAMUSCULAR; INTRAVENOUS; SUBCUTANEOUS at 20:45

## 2018-06-17 RX ADMIN — Medication 25 MILLIGRAM(S): at 23:02

## 2018-06-17 RX ADMIN — Medication 1 DROP(S): at 12:55

## 2018-06-17 RX ADMIN — SODIUM CHLORIDE 3 MILLILITER(S): 9 INJECTION INTRAMUSCULAR; INTRAVENOUS; SUBCUTANEOUS at 18:36

## 2018-06-17 RX ADMIN — PANTOPRAZOLE SODIUM 40 MILLIGRAM(S): 20 TABLET, DELAYED RELEASE ORAL at 12:59

## 2018-06-17 RX ADMIN — CARBIDOPA AND LEVODOPA 1 TABLET(S): 25; 100 TABLET ORAL at 23:02

## 2018-06-17 RX ADMIN — Medication 100 MILLIGRAM(S): at 10:27
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
## 2018-06-17 NOTE — PROGRESS NOTE ADULT - PROBLEM SELECTOR PLAN 6
DNR, not DNI on vent.  Per family meeting with Tiana (wife) 6/15:  Plan to discontinue non-essential meds.  Will manny off sinemet as per above protocol.  Will continue to offer emotional support to pt and wife (who have been  for 50+ years)

## 2018-06-17 NOTE — PROGRESS NOTE ADULT - PROBLEM SELECTOR PLAN 5
Appreciated neurology recs:  Currently goal is for control of symptoms. Sinemet as per wife has worsened patients agitation and tremors. We will continue with Ativan for tremors and restlessness. Will taper off sinemet as below  6/15 decreased to 1 tab TID  6/18 decrease to 1 tab BID  6/21 decrease to 1 tab OD  6/24 stop
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
## 2018-06-17 NOTE — PROGRESS NOTE ADULT - ATTENDING COMMENTS
I have personally seen and examined this patient and agree with the above assessment and plan.   Elective decannulation Thursday.  OOB to chair.

## 2018-06-18 PROCEDURE — 99233 SBSQ HOSP IP/OBS HIGH 50: CPT | Mod: GC

## 2018-06-18 RX ADMIN — Medication 10 MILLIGRAM(S): at 09:41

## 2018-06-18 RX ADMIN — SODIUM CHLORIDE 3 MILLILITER(S): 9 INJECTION INTRAMUSCULAR; INTRAVENOUS; SUBCUTANEOUS at 23:47

## 2018-06-18 RX ADMIN — CARBIDOPA AND LEVODOPA 1 TABLET(S): 25; 100 TABLET ORAL at 05:01

## 2018-06-18 RX ADMIN — HYDROMORPHONE HYDROCHLORIDE 0.2 MILLIGRAM(S): 2 INJECTION INTRAMUSCULAR; INTRAVENOUS; SUBCUTANEOUS at 05:01

## 2018-06-18 RX ADMIN — CARBIDOPA AND LEVODOPA 1 TABLET(S): 25; 100 TABLET ORAL at 22:19

## 2018-06-18 RX ADMIN — Medication 3 MILLILITER(S): at 17:08

## 2018-06-18 RX ADMIN — Medication 3 MILLILITER(S): at 00:42

## 2018-06-18 RX ADMIN — Medication 2 MILLIGRAM(S): at 22:19

## 2018-06-18 RX ADMIN — HYDROMORPHONE HYDROCHLORIDE 0.2 MILLIGRAM(S): 2 INJECTION INTRAMUSCULAR; INTRAVENOUS; SUBCUTANEOUS at 18:49

## 2018-06-18 RX ADMIN — Medication 1 DROP(S): at 05:01

## 2018-06-18 RX ADMIN — Medication 1 DROP(S): at 18:04

## 2018-06-18 RX ADMIN — Medication 1 DROP(S): at 11:09

## 2018-06-18 RX ADMIN — Medication 3 MILLILITER(S): at 06:34

## 2018-06-18 RX ADMIN — SODIUM CHLORIDE 3 MILLILITER(S): 9 INJECTION INTRAMUSCULAR; INTRAVENOUS; SUBCUTANEOUS at 07:44

## 2018-06-18 RX ADMIN — Medication 0.5 MILLIGRAM(S): at 18:04

## 2018-06-18 RX ADMIN — HYDROMORPHONE HYDROCHLORIDE 0.2 MILLIGRAM(S): 2 INJECTION INTRAMUSCULAR; INTRAVENOUS; SUBCUTANEOUS at 05:16

## 2018-06-18 RX ADMIN — Medication 25 MILLIGRAM(S): at 13:55

## 2018-06-18 RX ADMIN — Medication 100 MILLIGRAM(S): at 09:40

## 2018-06-18 RX ADMIN — Medication 3 MILLILITER(S): at 23:46

## 2018-06-18 RX ADMIN — SODIUM CHLORIDE 3 MILLILITER(S): 9 INJECTION INTRAMUSCULAR; INTRAVENOUS; SUBCUTANEOUS at 00:42

## 2018-06-18 RX ADMIN — CARBIDOPA AND LEVODOPA 1 TABLET(S): 25; 100 TABLET ORAL at 13:55

## 2018-06-18 RX ADMIN — PANTOPRAZOLE SODIUM 40 MILLIGRAM(S): 20 TABLET, DELAYED RELEASE ORAL at 11:08

## 2018-06-18 RX ADMIN — Medication 3 MILLILITER(S): at 11:22

## 2018-06-18 NOTE — PROGRESS NOTE ADULT - PROBLEM SELECTOR PLAN 1
DNR not DNI on vent   Has failed CPAP trials   Decannulation/extubation originally planned for this Thursday 6/21  Son will be arriving on Saturday- Wife would like to wait until next week for decannulation.
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
## 2018-06-18 NOTE — PROGRESS NOTE ADULT - PROBLEM SELECTOR PLAN 6
DNR, not DNI on vent.    Per family meeting with Tiana (wife) 6/18:  son will be arriving from [place] on Saturday.  will be arriving on Thursday morning.   Wife is very spiritual- has her own   Emotional support provider

## 2018-06-19 PROCEDURE — 99233 SBSQ HOSP IP/OBS HIGH 50: CPT | Mod: GC

## 2018-06-19 RX ORDER — HYDROMORPHONE HYDROCHLORIDE 2 MG/ML
0.2 INJECTION INTRAMUSCULAR; INTRAVENOUS; SUBCUTANEOUS
Qty: 0 | Refills: 0 | Status: DISCONTINUED | OUTPATIENT
Start: 2018-06-19 | End: 2018-06-24

## 2018-06-19 RX ADMIN — Medication 100 MILLIGRAM(S): at 22:09

## 2018-06-19 RX ADMIN — HYDROMORPHONE HYDROCHLORIDE 0.2 MILLIGRAM(S): 2 INJECTION INTRAMUSCULAR; INTRAVENOUS; SUBCUTANEOUS at 00:55

## 2018-06-19 RX ADMIN — Medication 25 MILLIGRAM(S): at 14:23

## 2018-06-19 RX ADMIN — SODIUM CHLORIDE 3 MILLILITER(S): 9 INJECTION INTRAMUSCULAR; INTRAVENOUS; SUBCUTANEOUS at 23:09

## 2018-06-19 RX ADMIN — Medication 10 MILLIGRAM(S): at 10:53

## 2018-06-19 RX ADMIN — Medication 1 DROP(S): at 00:20

## 2018-06-19 RX ADMIN — Medication 0.5 MILLIGRAM(S): at 18:35

## 2018-06-19 RX ADMIN — Medication 25 MILLIGRAM(S): at 05:09

## 2018-06-19 RX ADMIN — SODIUM CHLORIDE 3 MILLILITER(S): 9 INJECTION INTRAMUSCULAR; INTRAVENOUS; SUBCUTANEOUS at 18:29

## 2018-06-19 RX ADMIN — Medication 3 MILLILITER(S): at 05:30

## 2018-06-19 RX ADMIN — CARBIDOPA AND LEVODOPA 1 TABLET(S): 25; 100 TABLET ORAL at 14:24

## 2018-06-19 RX ADMIN — Medication 1 MILLIGRAM(S): at 13:22

## 2018-06-19 RX ADMIN — HYDROMORPHONE HYDROCHLORIDE 0.2 MILLIGRAM(S): 2 INJECTION INTRAMUSCULAR; INTRAVENOUS; SUBCUTANEOUS at 16:23

## 2018-06-19 RX ADMIN — Medication 1 DROP(S): at 11:12

## 2018-06-19 RX ADMIN — Medication 2 MILLIGRAM(S): at 22:09

## 2018-06-19 RX ADMIN — Medication 10 MILLIGRAM(S): at 23:02

## 2018-06-19 RX ADMIN — SODIUM CHLORIDE 3 MILLILITER(S): 9 INJECTION INTRAMUSCULAR; INTRAVENOUS; SUBCUTANEOUS at 05:29

## 2018-06-19 RX ADMIN — PANTOPRAZOLE SODIUM 40 MILLIGRAM(S): 20 TABLET, DELAYED RELEASE ORAL at 11:20

## 2018-06-19 RX ADMIN — Medication 1 DROP(S): at 05:09

## 2018-06-19 RX ADMIN — Medication 1 DROP(S): at 17:34

## 2018-06-19 RX ADMIN — Medication 1 DROP(S): at 23:03

## 2018-06-19 RX ADMIN — CARBIDOPA AND LEVODOPA 1 TABLET(S): 25; 100 TABLET ORAL at 22:09

## 2018-06-19 RX ADMIN — HYDROMORPHONE HYDROCHLORIDE 0.2 MILLIGRAM(S): 2 INJECTION INTRAMUSCULAR; INTRAVENOUS; SUBCUTANEOUS at 11:22

## 2018-06-19 RX ADMIN — Medication 3 MILLILITER(S): at 18:29

## 2018-06-19 RX ADMIN — Medication 25 MILLIGRAM(S): at 22:09

## 2018-06-19 RX ADMIN — HYDROMORPHONE HYDROCHLORIDE 0.2 MILLIGRAM(S): 2 INJECTION INTRAMUSCULAR; INTRAVENOUS; SUBCUTANEOUS at 11:11

## 2018-06-19 RX ADMIN — Medication 3 MILLILITER(S): at 12:50

## 2018-06-19 RX ADMIN — Medication 3 MILLILITER(S): at 23:09

## 2018-06-19 RX ADMIN — SODIUM CHLORIDE 3 MILLILITER(S): 9 INJECTION INTRAMUSCULAR; INTRAVENOUS; SUBCUTANEOUS at 12:49

## 2018-06-19 RX ADMIN — CARBIDOPA AND LEVODOPA 1 TABLET(S): 25; 100 TABLET ORAL at 05:09

## 2018-06-19 RX ADMIN — Medication 100 MILLIGRAM(S): at 09:17

## 2018-06-19 NOTE — CHART NOTE - NSCHARTNOTEFT_GEN_A_CORE
Source: Patient [ ]    Family [ ]     other [ x ] RN, Comprehensive chart review     Pt seen for nutrition follow up. Chart reviewed, events noted- pt with PEG/trach and on vent, possible plan for decannulation noted. Per RN pt continues to tolerate Vital AF at goal rate 50ml/hr [id number]/day. Noted last BM on 6/17.    Diet : NPO with tube feeding   Enteral /Parenteral Nutrition: Vital AF, goal rate 50ml/hr x18 hours provides 1080kcal and 68gm prot (15kcal/kg and 0.9gm prot/kg per adm wt 72.1kg).       Admission Weight: 72.1kg  Current Weight: (6/10) 74.6kg  No new wts noted. No peripheral edema noted.     Pertinent Medications: MEDICATIONS  (STANDING):  ALBUTerol/ipratropium for Nebulization 3 milliLiter(s) Nebulizer every 6 hours  carbidopa/levodopa  25/100 1 Tablet(s) Oral three times a day  ciprofloxacin  0.3% Ophthalmic Solution 1 Drop(s) Both EYES four times a day  clonazePAM Tablet 0.5 milliGRAM(s) Oral <User Schedule>  doxazosin 2 milliGRAM(s) Oral at bedtime  enalapril 10 milliGRAM(s) Oral two times a day  hydrALAZINE 25 milliGRAM(s) Oral every 8 hours  metoprolol tartrate 100 milliGRAM(s) Oral two times a day  pantoprazole  Injectable 40 milliGRAM(s) IV Push daily  sodium chloride 0.9% for Nebulization 3 milliLiter(s) Nebulizer every 6 hours    MEDICATIONS  (PRN):  bisacodyl Suppository 10 milliGRAM(s) Rectal daily PRN Constipation  HYDROmorphone  Injectable 0.2 milliGRAM(s) IV Push every 2 hours PRN dyspnea  HYDROmorphone  Injectable 0.2 milliGRAM(s) IV Push every 2 hours PRN Mild Pain (1 - 3)  LORazepam   Injectable 1 milliGRAM(s) IV Push every 2 hours PRN Agitation  melatonin 3 milliGRAM(s) Oral at bedtime PRN Insomnia  OLANZapine 5 milliGRAM(s) Oral every 12 hours PRN agitation    Pertinent Labs:    none pertinent to address.     Skin: pt with stage II pressure ulcers to left and right buttocks per RN documentation.    Estimated Needs:   [ x ] no change since previous assessment  [ ] recalculated:       Previous Nutrition Diagnosis:   Increased Nutrient Needs remains.         New Nutrition Diagnosis: [ x ] not applicable    Interventions:   Recommend increase goal rate of Vital AF to 95ml/hr x18 hours to provide 2052kcal and 128gm prot (28kcal/kg and 1.8gm prot/kg per adm wt 72.1kg) if enteral nutrition support remains within goals of care and pt/family wishes.  D/w team.          Monitoring and Evaluation:     [ ] PO intake [ x ] Tolerance to diet prescription [ x ] weights [ x ] follow up per protocol    [ ] other:
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
## 2018-06-19 NOTE — PROGRESS NOTE ADULT - PROBLEM SELECTOR PLAN 6
DNR, not DNI on vent.    Per family meeting with Tiana (wife) 6/19: As son is arriving from [place] on Saturday, wife would like to postpone decannulation. Sister and step brother today state patient would never want to live in his current medical state. Wife still struggling with this decision however states patient has clearly stated that he never wanted to be in a nursing home. He was very active and would never want to be bedbound or vent dependent. Her  is coming from [place] on Thursday. Emotional support provided

## 2018-06-19 NOTE — PROGRESS NOTE ADULT - PROBLEM SELECTOR PLAN 1
DNR not DNI on vent   Has failed CPAP trials     Son will be arriving on Saturday- Wife would like to wait until next week for decannulation.

## 2018-06-20 PROCEDURE — 99233 SBSQ HOSP IP/OBS HIGH 50: CPT | Mod: GC

## 2018-06-20 RX ORDER — HALOPERIDOL DECANOATE 100 MG/ML
2 INJECTION INTRAMUSCULAR ONCE
Qty: 0 | Refills: 0 | Status: COMPLETED | OUTPATIENT
Start: 2018-06-20 | End: 2018-06-20

## 2018-06-20 RX ADMIN — HALOPERIDOL DECANOATE 2 MILLIGRAM(S): 100 INJECTION INTRAMUSCULAR at 14:40

## 2018-06-20 RX ADMIN — Medication 3 MILLILITER(S): at 05:59

## 2018-06-20 RX ADMIN — Medication 25 MILLIGRAM(S): at 22:46

## 2018-06-20 RX ADMIN — Medication 1 MILLIGRAM(S): at 13:34

## 2018-06-20 RX ADMIN — Medication 3 MILLILITER(S): at 23:50

## 2018-06-20 RX ADMIN — Medication 1 MILLIGRAM(S): at 03:07

## 2018-06-20 RX ADMIN — Medication 0.5 MILLIGRAM(S): at 19:40

## 2018-06-20 RX ADMIN — Medication 100 MILLIGRAM(S): at 22:47

## 2018-06-20 RX ADMIN — Medication 25 MILLIGRAM(S): at 13:05

## 2018-06-20 RX ADMIN — Medication 1 DROP(S): at 05:59

## 2018-06-20 RX ADMIN — Medication 25 MILLIGRAM(S): at 06:00

## 2018-06-20 RX ADMIN — Medication 1 DROP(S): at 16:09

## 2018-06-20 RX ADMIN — PANTOPRAZOLE SODIUM 40 MILLIGRAM(S): 20 TABLET, DELAYED RELEASE ORAL at 11:09

## 2018-06-20 RX ADMIN — CARBIDOPA AND LEVODOPA 1 TABLET(S): 25; 100 TABLET ORAL at 22:46

## 2018-06-20 RX ADMIN — Medication 3 MILLILITER(S): at 18:27

## 2018-06-20 RX ADMIN — Medication 2 MILLIGRAM(S): at 22:46

## 2018-06-20 RX ADMIN — Medication 100 MILLIGRAM(S): at 10:57

## 2018-06-20 RX ADMIN — Medication 10 MILLIGRAM(S): at 10:55

## 2018-06-20 RX ADMIN — CARBIDOPA AND LEVODOPA 1 TABLET(S): 25; 100 TABLET ORAL at 13:05

## 2018-06-20 RX ADMIN — Medication 3 MILLILITER(S): at 11:06

## 2018-06-20 RX ADMIN — CARBIDOPA AND LEVODOPA 1 TABLET(S): 25; 100 TABLET ORAL at 05:59

## 2018-06-20 RX ADMIN — SODIUM CHLORIDE 3 MILLILITER(S): 9 INJECTION INTRAMUSCULAR; INTRAVENOUS; SUBCUTANEOUS at 05:59

## 2018-06-20 RX ADMIN — Medication 10 MILLIGRAM(S): at 22:46

## 2018-06-20 RX ADMIN — HYDROMORPHONE HYDROCHLORIDE 0.2 MILLIGRAM(S): 2 INJECTION INTRAMUSCULAR; INTRAVENOUS; SUBCUTANEOUS at 20:20

## 2018-06-20 RX ADMIN — Medication 1 DROP(S): at 11:09
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
## 2018-06-20 NOTE — PROGRESS NOTE ADULT - ATTENDING COMMENTS
I have personally seen and examined this patient and agree with the above assessment and plan.   Awaiting decision for elective decannulation and arrival of son from [place]

## 2018-06-20 NOTE — PROGRESS NOTE ADULT - PROBLEM SELECTOR PLAN 6
DNR, not DNI on vent.    Per family meeting with Tiana (wife) 6/19: As son is arriving from [place] on Saturday, wife would like to postpone decannulation. Sister and step brother today state patient would never want to live in his current medical state. Wife still struggling with this decision however states patient has clearly stated that he never wanted to be in a nursing home. He was very active and would never want to be bedbound or vent dependent. Her  is coming from [place] on Thursday. Emotional support provided

## 2018-06-21 PROCEDURE — 99233 SBSQ HOSP IP/OBS HIGH 50: CPT | Mod: GC

## 2018-06-21 RX ADMIN — HYDROMORPHONE HYDROCHLORIDE 0.2 MILLIGRAM(S): 2 INJECTION INTRAMUSCULAR; INTRAVENOUS; SUBCUTANEOUS at 12:04

## 2018-06-21 RX ADMIN — HYDROMORPHONE HYDROCHLORIDE 0.2 MILLIGRAM(S): 2 INJECTION INTRAMUSCULAR; INTRAVENOUS; SUBCUTANEOUS at 06:35

## 2018-06-21 RX ADMIN — CARBIDOPA AND LEVODOPA 1 TABLET(S): 25; 100 TABLET ORAL at 21:23

## 2018-06-21 RX ADMIN — Medication 1 DROP(S): at 23:25

## 2018-06-21 RX ADMIN — Medication 2 MILLIGRAM(S): at 21:28

## 2018-06-21 RX ADMIN — Medication 3 MILLILITER(S): at 23:24

## 2018-06-21 RX ADMIN — HYDROMORPHONE HYDROCHLORIDE 0.2 MILLIGRAM(S): 2 INJECTION INTRAMUSCULAR; INTRAVENOUS; SUBCUTANEOUS at 12:18

## 2018-06-21 RX ADMIN — Medication 25 MILLIGRAM(S): at 14:14

## 2018-06-21 RX ADMIN — Medication 1 DROP(S): at 00:33

## 2018-06-21 RX ADMIN — PANTOPRAZOLE SODIUM 40 MILLIGRAM(S): 20 TABLET, DELAYED RELEASE ORAL at 12:23

## 2018-06-21 RX ADMIN — OLANZAPINE 5 MILLIGRAM(S): 15 TABLET, FILM COATED ORAL at 21:23

## 2018-06-21 RX ADMIN — CARBIDOPA AND LEVODOPA 1 TABLET(S): 25; 100 TABLET ORAL at 05:09

## 2018-06-21 RX ADMIN — Medication 1 MILLIGRAM(S): at 19:14

## 2018-06-21 RX ADMIN — Medication 3 MILLILITER(S): at 06:29

## 2018-06-21 RX ADMIN — Medication 1 MILLIGRAM(S): at 05:00

## 2018-06-21 RX ADMIN — Medication 10 MILLIGRAM(S): at 11:12

## 2018-06-21 RX ADMIN — Medication 1 DROP(S): at 05:09

## 2018-06-21 RX ADMIN — Medication 2 MILLIGRAM(S): at 20:14

## 2018-06-21 RX ADMIN — Medication 2 MILLIGRAM(S): at 21:23

## 2018-06-21 RX ADMIN — Medication 3 MILLILITER(S): at 11:12

## 2018-06-21 RX ADMIN — CARBIDOPA AND LEVODOPA 1 TABLET(S): 25; 100 TABLET ORAL at 14:14

## 2018-06-21 RX ADMIN — Medication 1 DROP(S): at 17:33

## 2018-06-21 RX ADMIN — Medication 3 MILLILITER(S): at 17:31

## 2018-06-21 RX ADMIN — Medication 100 MILLIGRAM(S): at 11:11

## 2018-06-21 RX ADMIN — Medication 1 DROP(S): at 12:24

## 2018-06-21 RX ADMIN — Medication 0.5 MILLIGRAM(S): at 19:14

## 2018-06-21 NOTE — PROGRESS NOTE ADULT - PROBLEM SELECTOR PLAN 2
Continue Zyprexa 5 mg q 12 PRN agitation due to delirium  Haldol 2 mg works better for him than Ativan
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
## 2018-06-21 NOTE — PROGRESS NOTE ADULT - PROBLEM SELECTOR PLAN 1
DNR not DNI on vent   Has failed CPAP trials   Decannulation 10 am tomorrow    Son will be arriving on Saturday- Wife would like to wait until next week for decannulation.

## 2018-06-21 NOTE — PROGRESS NOTE ADULT - PROBLEM SELECTOR PLAN 6
DNR, not DNI on vent.    Per family meeting with Tiana (wife) 6/21: Clear documented wishes that patient would not want to be maintained on life support if he would not be able to have a meaningful, enjoyable life. Multiple family members stating "he would never want to live this way." Patient trying to self decannulate. Grand dtr from [place] arrived today.  from [place] is arriving tonight. Plans for decannulation for tomorrow 10:00 AM . Patient will receive communion prior to this

## 2018-06-22 PROCEDURE — 99291 CRITICAL CARE FIRST HOUR: CPT

## 2018-06-22 RX ORDER — HYDROMORPHONE HYDROCHLORIDE 2 MG/ML
1 INJECTION INTRAMUSCULAR; INTRAVENOUS; SUBCUTANEOUS ONCE
Qty: 0 | Refills: 0 | Status: DISCONTINUED | OUTPATIENT
Start: 2018-06-22 | End: 2018-06-22

## 2018-06-22 RX ORDER — ROBINUL 0.2 MG/ML
0.4 INJECTION INTRAMUSCULAR; INTRAVENOUS ONCE
Qty: 0 | Refills: 0 | Status: COMPLETED | OUTPATIENT
Start: 2018-06-22 | End: 2018-06-22

## 2018-06-22 RX ORDER — HYDROMORPHONE HYDROCHLORIDE 2 MG/ML
0.5 INJECTION INTRAMUSCULAR; INTRAVENOUS; SUBCUTANEOUS ONCE
Qty: 0 | Refills: 0 | Status: DISCONTINUED | OUTPATIENT
Start: 2018-06-22 | End: 2018-06-22

## 2018-06-22 RX ADMIN — Medication 1 MILLIGRAM(S): at 11:57

## 2018-06-22 RX ADMIN — ROBINUL 0.4 MILLIGRAM(S): 0.2 INJECTION INTRAMUSCULAR; INTRAVENOUS at 11:57

## 2018-06-22 RX ADMIN — Medication 1 DROP(S): at 13:41

## 2018-06-22 RX ADMIN — Medication 10 MILLIGRAM(S): at 10:13

## 2018-06-22 RX ADMIN — CARBIDOPA AND LEVODOPA 1 TABLET(S): 25; 100 TABLET ORAL at 05:26

## 2018-06-22 RX ADMIN — Medication 25 MILLIGRAM(S): at 23:23

## 2018-06-22 RX ADMIN — PANTOPRAZOLE SODIUM 40 MILLIGRAM(S): 20 TABLET, DELAYED RELEASE ORAL at 13:36

## 2018-06-22 RX ADMIN — HYDROMORPHONE HYDROCHLORIDE 0.2 MILLIGRAM(S): 2 INJECTION INTRAMUSCULAR; INTRAVENOUS; SUBCUTANEOUS at 22:47

## 2018-06-22 RX ADMIN — CARBIDOPA AND LEVODOPA 1 TABLET(S): 25; 100 TABLET ORAL at 13:36

## 2018-06-22 RX ADMIN — Medication 100 MILLIGRAM(S): at 10:13

## 2018-06-22 RX ADMIN — Medication 1 MILLIGRAM(S): at 22:18

## 2018-06-22 RX ADMIN — Medication 2 MILLIGRAM(S): at 23:22

## 2018-06-22 RX ADMIN — Medication 3 MILLILITER(S): at 05:26

## 2018-06-22 RX ADMIN — HYDROMORPHONE HYDROCHLORIDE 0.5 MILLIGRAM(S): 2 INJECTION INTRAMUSCULAR; INTRAVENOUS; SUBCUTANEOUS at 11:56

## 2018-06-22 RX ADMIN — Medication 1 DROP(S): at 05:27

## 2018-06-22 RX ADMIN — Medication 10 MILLIGRAM(S): at 23:22

## 2018-06-22 RX ADMIN — Medication 100 MILLIGRAM(S): at 23:23

## 2018-06-22 NOTE — PROGRESS NOTE ADULT - PROBLEM SELECTOR PLAN 5
Appreciated neurology recs:  Currently goal is for control of symptoms  Sinemet changed to 1 tab QD Sinemet  1 tab QD
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
## 2018-06-22 NOTE — PROGRESS NOTE ADULT - PROBLEM SELECTOR PLAN 3
Continue clonazepam 0.5 mg PO qd   C/w Ativan and Haldol Continue clonazepam 0.5 mg PO qd and Ativan 1 mg q 1 PRN

## 2018-06-22 NOTE — PROGRESS NOTE ADULT - PROBLEM SELECTOR PLAN 6
DNR, not DNI on vent.    Per family meeting with Tiana (wife) 6/21: Clear documented wishes that patient would not want to be maintained on life support if he would not be able to have a meaningful, enjoyable life. Multiple family members stating "he would never want to live this way." Patient trying to self decannulate. Grand dtr from [place] arrived today.  from [place] is arriving tonight. Plans for decannulation for tomorrow 10:00 AM . Patient will receive communion prior to this Patient is DNR. No plans for further mechanical ventialtion     Spiritual and emotional support was provided by multiple team members during and after the mechanical ventilator was removed.   35' of critical care were provided by Dr Mckeon during this encounter. Patient is DNR. No plans for further mechanical ventialtion     Spiritual and emotional support was provided by multiple team members during and after the mechanical ventilator was removed.   35' of critical care were provided during this encounter.
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
## 2018-06-22 NOTE — PROGRESS NOTE ADULT - PROBLEM SELECTOR PLAN 2
Continue Zyprexa 5 mg q 12 PRN agitation due to delirium  Haldol 2 mg works better for him than Ativan Continue Zyprexa 5 mg q 12 PRN agitation due to delirium

## 2018-06-23 PROCEDURE — 99233 SBSQ HOSP IP/OBS HIGH 50: CPT | Mod: GC

## 2018-06-23 RX ORDER — HYDROMORPHONE HYDROCHLORIDE 2 MG/ML
0.2 INJECTION INTRAMUSCULAR; INTRAVENOUS; SUBCUTANEOUS EVERY 8 HOURS
Qty: 0 | Refills: 0 | Status: DISCONTINUED | OUTPATIENT
Start: 2018-06-23 | End: 2018-06-24

## 2018-06-23 RX ORDER — ROBINUL 0.2 MG/ML
0.4 INJECTION INTRAMUSCULAR; INTRAVENOUS EVERY 6 HOURS
Qty: 0 | Refills: 0 | Status: DISCONTINUED | OUTPATIENT
Start: 2018-06-23 | End: 2018-06-24

## 2018-06-23 RX ORDER — CLONAZEPAM 1 MG
0.5 TABLET ORAL
Qty: 0 | Refills: 0 | Status: DISCONTINUED | OUTPATIENT
Start: 2018-06-23 | End: 2018-06-24

## 2018-06-23 RX ADMIN — Medication 25 MILLIGRAM(S): at 21:31

## 2018-06-23 RX ADMIN — HYDROMORPHONE HYDROCHLORIDE 0.2 MILLIGRAM(S): 2 INJECTION INTRAMUSCULAR; INTRAVENOUS; SUBCUTANEOUS at 08:29

## 2018-06-23 RX ADMIN — Medication 100 MILLIGRAM(S): at 10:27

## 2018-06-23 RX ADMIN — HYDROMORPHONE HYDROCHLORIDE 0.2 MILLIGRAM(S): 2 INJECTION INTRAMUSCULAR; INTRAVENOUS; SUBCUTANEOUS at 21:31

## 2018-06-23 RX ADMIN — HYDROMORPHONE HYDROCHLORIDE 0.2 MILLIGRAM(S): 2 INJECTION INTRAMUSCULAR; INTRAVENOUS; SUBCUTANEOUS at 17:56

## 2018-06-23 RX ADMIN — Medication 100 MILLIGRAM(S): at 21:31

## 2018-06-23 RX ADMIN — Medication 10 MILLIGRAM(S): at 21:30

## 2018-06-23 RX ADMIN — Medication 0.5 MILLIGRAM(S): at 21:30

## 2018-06-23 RX ADMIN — ROBINUL 0.4 MILLIGRAM(S): 0.2 INJECTION INTRAMUSCULAR; INTRAVENOUS at 10:52

## 2018-06-23 RX ADMIN — HYDROMORPHONE HYDROCHLORIDE 0.2 MILLIGRAM(S): 2 INJECTION INTRAMUSCULAR; INTRAVENOUS; SUBCUTANEOUS at 14:31

## 2018-06-23 RX ADMIN — Medication 2 MILLIGRAM(S): at 21:31
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
## 2018-06-23 NOTE — PROGRESS NOTE ADULT - PROBLEM SELECTOR PLAN 1
S/p Palliative extubation 6/22; not decannulated as trach may provide comfort and ease for self ventilation and management of secretions.   3 BT Dilaudid in 24H- will start on Dilaudid 0.2mg IVP Q8H ATC  C/w Dilaudid 0.2 mg IVP Q2H for dyspnea/ pain

## 2018-06-23 NOTE — PROGRESS NOTE ADULT - PROBLEM SELECTOR PLAN 5
Sinemet tapered off   Wife is more comfortable w/ symptoms of tremor and restlessness to be controlled with ativan.

## 2018-06-23 NOTE — PROGRESS NOTE ADULT - PROBLEM SELECTOR PLAN 6
DNR, now DNI as patient is s/p palliative extubation  Wife Tiana at bedside was supported. Patients son Dayton reassured that patients condition has become irreversible and the medical team has followed his wishes as per his living will. Agree to continue w/ symptomatic management.   Prognosis hours to days.

## 2018-06-24 DIAGNOSIS — R09.89 OTHER SPECIFIED SYMPTOMS AND SIGNS INVOLVING THE CIRCULATORY AND RESPIRATORY SYSTEMS: ICD-10-CM

## 2018-06-24 DIAGNOSIS — K59.00 CONSTIPATION, UNSPECIFIED: ICD-10-CM

## 2018-06-24 PROCEDURE — 99233 SBSQ HOSP IP/OBS HIGH 50: CPT

## 2018-06-24 RX ORDER — HYDROMORPHONE HYDROCHLORIDE 2 MG/ML
0.2 INJECTION INTRAMUSCULAR; INTRAVENOUS; SUBCUTANEOUS
Qty: 0 | Refills: 0 | Status: DISCONTINUED | OUTPATIENT
Start: 2018-06-24 | End: 2018-06-24

## 2018-06-24 RX ORDER — HYDROMORPHONE HYDROCHLORIDE 2 MG/ML
0.5 INJECTION INTRAMUSCULAR; INTRAVENOUS; SUBCUTANEOUS
Qty: 0 | Refills: 0 | Status: DISCONTINUED | OUTPATIENT
Start: 2018-06-24 | End: 2018-07-01

## 2018-06-24 RX ORDER — SENNA PLUS 8.6 MG/1
15 TABLET ORAL AT BEDTIME
Qty: 0 | Refills: 0 | Status: DISCONTINUED | OUTPATIENT
Start: 2018-06-24 | End: 2018-07-09

## 2018-06-24 RX ORDER — HYDROMORPHONE HYDROCHLORIDE 2 MG/ML
0.2 INJECTION INTRAMUSCULAR; INTRAVENOUS; SUBCUTANEOUS EVERY 4 HOURS
Qty: 0 | Refills: 0 | Status: DISCONTINUED | OUTPATIENT
Start: 2018-06-24 | End: 2018-07-01

## 2018-06-24 RX ORDER — SENNA PLUS 8.6 MG/1
15 TABLET ORAL AT BEDTIME
Qty: 0 | Refills: 0 | Status: DISCONTINUED | OUTPATIENT
Start: 2018-06-24 | End: 2018-06-24

## 2018-06-24 RX ADMIN — Medication 10 MILLIGRAM(S): at 09:00

## 2018-06-24 RX ADMIN — SENNA PLUS 15 MILLILITER(S): 8.6 TABLET ORAL at 21:19

## 2018-06-24 RX ADMIN — HYDROMORPHONE HYDROCHLORIDE 0.2 MILLIGRAM(S): 2 INJECTION INTRAMUSCULAR; INTRAVENOUS; SUBCUTANEOUS at 19:10

## 2018-06-24 RX ADMIN — HYDROMORPHONE HYDROCHLORIDE 0.5 MILLIGRAM(S): 2 INJECTION INTRAMUSCULAR; INTRAVENOUS; SUBCUTANEOUS at 16:32

## 2018-06-24 RX ADMIN — Medication 100 MILLIGRAM(S): at 09:00

## 2018-06-24 RX ADMIN — Medication 2 MILLIGRAM(S): at 21:16

## 2018-06-24 RX ADMIN — HYDROMORPHONE HYDROCHLORIDE 0.2 MILLIGRAM(S): 2 INJECTION INTRAMUSCULAR; INTRAVENOUS; SUBCUTANEOUS at 09:15

## 2018-06-24 RX ADMIN — Medication 0.5 MILLIGRAM(S): at 21:17

## 2018-06-24 RX ADMIN — HYDROMORPHONE HYDROCHLORIDE 0.2 MILLIGRAM(S): 2 INJECTION INTRAMUSCULAR; INTRAVENOUS; SUBCUTANEOUS at 08:59

## 2018-06-24 RX ADMIN — HYDROMORPHONE HYDROCHLORIDE 0.5 MILLIGRAM(S): 2 INJECTION INTRAMUSCULAR; INTRAVENOUS; SUBCUTANEOUS at 16:50

## 2018-06-24 RX ADMIN — HYDROMORPHONE HYDROCHLORIDE 0.2 MILLIGRAM(S): 2 INJECTION INTRAMUSCULAR; INTRAVENOUS; SUBCUTANEOUS at 05:45

## 2018-06-24 RX ADMIN — Medication 25 MILLIGRAM(S): at 05:45

## 2018-06-24 RX ADMIN — Medication 1 MILLIGRAM(S): at 09:28

## 2018-06-24 RX ADMIN — HYDROMORPHONE HYDROCHLORIDE 0.2 MILLIGRAM(S): 2 INJECTION INTRAMUSCULAR; INTRAVENOUS; SUBCUTANEOUS at 21:16

## 2018-06-24 NOTE — PROGRESS NOTE ADULT - PROBLEM SELECTOR PLAN 6
DNR, now DNI as patient is s/p palliative extubation  Wife Tiana at bedside was supported. Patients son Dayton reassured that patients condition has become irreversible and the medical team has followed his wishes as per his living will. Agree to continue w/ symptomatic management.   Prognosis hours to days. DNR, now DNI as patient is s/p palliative extubation  The patient's wife was concerned about he  still being alive after extubation and wanted to know if the patient was going to be DC to a Nursing home. I explained her that the patient is having active symptoms that needed further adjustment in his symptoms Rx. I also explain her that the patient is entering his dying process and that understanding his situation it may take hours to days.    She also talked to me about the importance of her christin on being able to go through the patient's clinical decline and the lost of her son (at the beginning of this year). She indicated she has a lot of support in the [place]. I further provided emotional and spiritual support for her.   Last chaplaincy visit was on 6/12. Will ask for a chaplaincy follow up. Due to lack of response to Sinemet it was DC   Wife agree w/ symptoms of tremor and restlessness to be controlled with ativan.
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
## 2018-06-24 NOTE — PROGRESS NOTE ADULT - PROBLEM SELECTOR PLAN 2
C/w Zyprexa 5 mg q 12 PRN agitation due to delirium Will start Ativan 0.5 mg IV q 4 ATC and 1 mg q 1 PRN   C/w Zyprexa 5 mg q 12 PRN agitation due to delirium

## 2018-06-24 NOTE — PROGRESS NOTE ADULT - PROBLEM SELECTOR PLAN 5
Sinemet tapered off   Wife is more comfortable w/ symptoms of tremor and restlessness to be controlled with ativan. Due to lack of response to Sinemet it was DC   Wife agree w/ symptoms of tremor and restlessness to be controlled with ativan. Tube feeds discontinued as per wishes stated in living will

## 2018-06-24 NOTE — PROGRESS NOTE ADULT - PROBLEM SELECTOR PLAN 4
Tube feeds discontinued as per wishes stated in living will Will DC Glyco due to thick secretions.   Suctioning PRN

## 2018-06-24 NOTE — PROGRESS NOTE ADULT - PROBLEM SELECTOR PLAN 3
C/w Ativan 1 mg Q1H PRN for anxiety/ restlessness  Restarted Klonopin 0.5 mg QHS Ativan as above.   Will DC Klonopin
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
## 2018-06-25 PROCEDURE — 99233 SBSQ HOSP IP/OBS HIGH 50: CPT | Mod: GC

## 2018-06-25 RX ADMIN — Medication 25 MILLIGRAM(S): at 17:34

## 2018-06-25 RX ADMIN — HYDROMORPHONE HYDROCHLORIDE 0.2 MILLIGRAM(S): 2 INJECTION INTRAMUSCULAR; INTRAVENOUS; SUBCUTANEOUS at 22:00

## 2018-06-25 RX ADMIN — Medication 0.5 MILLIGRAM(S): at 00:22

## 2018-06-25 RX ADMIN — Medication 0.5 MILLIGRAM(S): at 05:14

## 2018-06-25 RX ADMIN — Medication 100 MILLIGRAM(S): at 22:00

## 2018-06-25 RX ADMIN — Medication 25 MILLIGRAM(S): at 22:00

## 2018-06-25 RX ADMIN — HYDROMORPHONE HYDROCHLORIDE 0.2 MILLIGRAM(S): 2 INJECTION INTRAMUSCULAR; INTRAVENOUS; SUBCUTANEOUS at 17:34

## 2018-06-25 RX ADMIN — Medication 0.5 MILLIGRAM(S): at 12:54

## 2018-06-25 RX ADMIN — Medication 1 MILLIGRAM(S): at 22:00

## 2018-06-25 RX ADMIN — SENNA PLUS 15 MILLILITER(S): 8.6 TABLET ORAL at 22:00

## 2018-06-25 RX ADMIN — Medication 10 MILLIGRAM(S): at 10:53

## 2018-06-25 RX ADMIN — HYDROMORPHONE HYDROCHLORIDE 0.2 MILLIGRAM(S): 2 INJECTION INTRAMUSCULAR; INTRAVENOUS; SUBCUTANEOUS at 01:11

## 2018-06-25 RX ADMIN — Medication 2 MILLIGRAM(S): at 22:00

## 2018-06-25 RX ADMIN — HYDROMORPHONE HYDROCHLORIDE 0.2 MILLIGRAM(S): 2 INJECTION INTRAMUSCULAR; INTRAVENOUS; SUBCUTANEOUS at 05:15

## 2018-06-25 RX ADMIN — Medication 100 MILLIGRAM(S): at 10:53

## 2018-06-25 RX ADMIN — Medication 25 MILLIGRAM(S): at 05:19

## 2018-06-25 RX ADMIN — HYDROMORPHONE HYDROCHLORIDE 0.2 MILLIGRAM(S): 2 INJECTION INTRAMUSCULAR; INTRAVENOUS; SUBCUTANEOUS at 10:57

## 2018-06-25 RX ADMIN — Medication 10 MILLIGRAM(S): at 22:00

## 2018-06-25 NOTE — PROGRESS NOTE ADULT - PROBLEM SELECTOR PLAN 2
C/w Ativan 0.5 mg IV q 4 ATC and 1 mg q 1 PRN - needed 1 PRN in last 24 hours  C/w Zyprexa 5 mg q 12 PRN agitation due to delirium
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
## 2018-06-25 NOTE — CHART NOTE - NSCHARTNOTEFT_GEN_A_CORE
Patient has [employer] AICD  Patient has A2DR01  Serial number: [id number] Patient has [employer] Pacemaker model # A2DR01, Serial number: [id number]

## 2018-06-25 NOTE — PROGRESS NOTE ADULT - PROBLEM SELECTOR PLAN 6
Due to lack of response to Sinemet it was DC   Tremor and restlessness to be controlled with ativan.

## 2018-06-25 NOTE — PROGRESS NOTE ADULT - PROBLEM SELECTOR PLAN 1
S/p Palliative extubation 6/22; not decannulated as trach may provide comfort and ease for self ventilation and management of secretions.   C/w Dilaudid 0.2 mg IVP Q4H ATC  and 0.5 mg IV q 1 PRN- needed 1 PRN in last 24 hours
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
## 2018-06-25 NOTE — CHART NOTE - NSCHARTNOTEFT_GEN_A_CORE
Nutrition note.    Per discussion with RN, pt is not appropriate for nutrition follow up at this time S/P elective extubation, tube feeding now discontinued. RD to remain available as needed.    Rut Huizar R.D., [facility], Pager #[phone]

## 2018-06-26 PROCEDURE — 99233 SBSQ HOSP IP/OBS HIGH 50: CPT | Mod: GC

## 2018-06-26 RX ADMIN — Medication 0.5 MILLIGRAM(S): at 12:10

## 2018-06-26 RX ADMIN — Medication 0.5 MILLIGRAM(S): at 16:19

## 2018-06-26 RX ADMIN — Medication 0.5 MILLIGRAM(S): at 01:03

## 2018-06-26 RX ADMIN — HYDROMORPHONE HYDROCHLORIDE 0.2 MILLIGRAM(S): 2 INJECTION INTRAMUSCULAR; INTRAVENOUS; SUBCUTANEOUS at 06:46

## 2018-06-26 RX ADMIN — Medication 0.5 MILLIGRAM(S): at 07:53

## 2018-06-26 RX ADMIN — Medication 0.5 MILLIGRAM(S): at 20:10

## 2018-06-26 RX ADMIN — Medication 100 MILLIGRAM(S): at 09:48

## 2018-06-26 RX ADMIN — Medication 1 MILLIGRAM(S): at 06:46

## 2018-06-26 RX ADMIN — Medication 25 MILLIGRAM(S): at 22:42

## 2018-06-26 RX ADMIN — HYDROMORPHONE HYDROCHLORIDE 0.2 MILLIGRAM(S): 2 INJECTION INTRAMUSCULAR; INTRAVENOUS; SUBCUTANEOUS at 17:26

## 2018-06-26 RX ADMIN — HYDROMORPHONE HYDROCHLORIDE 0.2 MILLIGRAM(S): 2 INJECTION INTRAMUSCULAR; INTRAVENOUS; SUBCUTANEOUS at 22:43

## 2018-06-26 RX ADMIN — Medication 10 MILLIGRAM(S): at 22:42

## 2018-06-26 RX ADMIN — Medication 25 MILLIGRAM(S): at 06:46

## 2018-06-26 RX ADMIN — HYDROMORPHONE HYDROCHLORIDE 0.2 MILLIGRAM(S): 2 INJECTION INTRAMUSCULAR; INTRAVENOUS; SUBCUTANEOUS at 09:48

## 2018-06-26 RX ADMIN — Medication 0.5 MILLIGRAM(S): at 04:22

## 2018-06-26 RX ADMIN — Medication 2 MILLIGRAM(S): at 22:42

## 2018-06-26 RX ADMIN — HYDROMORPHONE HYDROCHLORIDE 0.2 MILLIGRAM(S): 2 INJECTION INTRAMUSCULAR; INTRAVENOUS; SUBCUTANEOUS at 13:38

## 2018-06-26 RX ADMIN — Medication 100 MILLIGRAM(S): at 22:43

## 2018-06-26 RX ADMIN — SENNA PLUS 15 MILLILITER(S): 8.6 TABLET ORAL at 22:43

## 2018-06-26 NOTE — PROGRESS NOTE ADULT - PROBLEM SELECTOR PLAN 1
S/p Palliative extubation 6/22; not decannulated as trach may provide comfort and ease for self ventilation and management of secretions.   C/w Dilaudid 0.2 mg IVP Q4H ATC  and 0.5 mg IV q 1 PRN- Did not need PRNs in the last 24 hours

## 2018-06-26 NOTE — PROGRESS NOTE ADULT - ATTENDING COMMENTS
I have personally seen and examined this patient and agree with the above assessment and plan.   Hospice evaluation.
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
## 2018-06-26 NOTE — PROGRESS NOTE ADULT - PROBLEM SELECTOR PLAN 2
C/w Ativan 0.5 mg IV q 4 ATC and 1 mg q 1 PRN - needed 2 PRNs in last 24 hours (however, 2 doses of ATC ativan held)  C/w Zyprexa 5 mg q 12 PRN agitation due to delirium

## 2018-06-27 PROCEDURE — 99233 SBSQ HOSP IP/OBS HIGH 50: CPT | Mod: GC

## 2018-06-27 RX ADMIN — HYDROMORPHONE HYDROCHLORIDE 0.2 MILLIGRAM(S): 2 INJECTION INTRAMUSCULAR; INTRAVENOUS; SUBCUTANEOUS at 10:43

## 2018-06-27 RX ADMIN — Medication 10 MILLIGRAM(S): at 22:42

## 2018-06-27 RX ADMIN — Medication 100 MILLIGRAM(S): at 22:42

## 2018-06-27 RX ADMIN — Medication 0.5 MILLIGRAM(S): at 07:25

## 2018-06-27 RX ADMIN — Medication 0.5 MILLIGRAM(S): at 00:25

## 2018-06-27 RX ADMIN — Medication 0.5 MILLIGRAM(S): at 21:51

## 2018-06-27 RX ADMIN — Medication 25 MILLIGRAM(S): at 22:42

## 2018-06-27 RX ADMIN — HYDROMORPHONE HYDROCHLORIDE 0.2 MILLIGRAM(S): 2 INJECTION INTRAMUSCULAR; INTRAVENOUS; SUBCUTANEOUS at 06:01

## 2018-06-27 RX ADMIN — HYDROMORPHONE HYDROCHLORIDE 0.2 MILLIGRAM(S): 2 INJECTION INTRAMUSCULAR; INTRAVENOUS; SUBCUTANEOUS at 13:22

## 2018-06-27 RX ADMIN — HYDROMORPHONE HYDROCHLORIDE 0.2 MILLIGRAM(S): 2 INJECTION INTRAMUSCULAR; INTRAVENOUS; SUBCUTANEOUS at 22:39

## 2018-06-27 RX ADMIN — HYDROMORPHONE HYDROCHLORIDE 0.2 MILLIGRAM(S): 2 INJECTION INTRAMUSCULAR; INTRAVENOUS; SUBCUTANEOUS at 18:08

## 2018-06-27 RX ADMIN — Medication 0.5 MILLIGRAM(S): at 16:00

## 2018-06-27 RX ADMIN — SENNA PLUS 15 MILLILITER(S): 8.6 TABLET ORAL at 23:10

## 2018-06-27 RX ADMIN — Medication 0.5 MILLIGRAM(S): at 13:23

## 2018-06-27 RX ADMIN — HYDROMORPHONE HYDROCHLORIDE 0.2 MILLIGRAM(S): 2 INJECTION INTRAMUSCULAR; INTRAVENOUS; SUBCUTANEOUS at 02:30

## 2018-06-27 RX ADMIN — Medication 2 MILLIGRAM(S): at 22:41
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
## 2018-06-27 NOTE — PROGRESS NOTE ADULT - ATTENDING COMMENTS
I have personally seen and examined this patient and agree with the above assessment and plan. Emotional support provided to family.

## 2018-06-28 DIAGNOSIS — I10 ESSENTIAL (PRIMARY) HYPERTENSION: ICD-10-CM

## 2018-06-28 PROCEDURE — 99233 SBSQ HOSP IP/OBS HIGH 50: CPT | Mod: GC

## 2018-06-28 RX ADMIN — Medication 0.5 MILLIGRAM(S): at 11:52

## 2018-06-28 RX ADMIN — HYDROMORPHONE HYDROCHLORIDE 0.2 MILLIGRAM(S): 2 INJECTION INTRAMUSCULAR; INTRAVENOUS; SUBCUTANEOUS at 06:25

## 2018-06-28 RX ADMIN — Medication 0.5 MILLIGRAM(S): at 21:08

## 2018-06-28 RX ADMIN — SENNA PLUS 15 MILLILITER(S): 8.6 TABLET ORAL at 21:07

## 2018-06-28 RX ADMIN — Medication 0.5 MILLIGRAM(S): at 04:42

## 2018-06-28 RX ADMIN — HYDROMORPHONE HYDROCHLORIDE 0.2 MILLIGRAM(S): 2 INJECTION INTRAMUSCULAR; INTRAVENOUS; SUBCUTANEOUS at 03:10

## 2018-06-28 RX ADMIN — HYDROMORPHONE HYDROCHLORIDE 0.2 MILLIGRAM(S): 2 INJECTION INTRAMUSCULAR; INTRAVENOUS; SUBCUTANEOUS at 11:02

## 2018-06-28 RX ADMIN — Medication 0.5 MILLIGRAM(S): at 07:17

## 2018-06-28 RX ADMIN — HYDROMORPHONE HYDROCHLORIDE 0.2 MILLIGRAM(S): 2 INJECTION INTRAMUSCULAR; INTRAVENOUS; SUBCUTANEOUS at 17:42

## 2018-06-28 RX ADMIN — HYDROMORPHONE HYDROCHLORIDE 0.2 MILLIGRAM(S): 2 INJECTION INTRAMUSCULAR; INTRAVENOUS; SUBCUTANEOUS at 22:42

## 2018-06-28 RX ADMIN — Medication 2 MILLIGRAM(S): at 21:07

## 2018-06-28 RX ADMIN — HYDROMORPHONE HYDROCHLORIDE 0.2 MILLIGRAM(S): 2 INJECTION INTRAMUSCULAR; INTRAVENOUS; SUBCUTANEOUS at 13:25

## 2018-06-28 RX ADMIN — Medication 0.5 MILLIGRAM(S): at 00:51

## 2018-06-28 NOTE — PROGRESS NOTE ADULT - PROBLEM SELECTOR PLAN 2
C/w Ativan 0.5 mg IV q 4 ATC and 1 mg q 1 PRN - Did not need PRNs in the last 24 hours.  C/w Zyprexa 5 mg q 12 PRN agitation due to delirium
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
## 2018-06-29 PROCEDURE — 99233 SBSQ HOSP IP/OBS HIGH 50: CPT | Mod: GC

## 2018-06-29 RX ORDER — METOPROLOL TARTRATE 50 MG
100 TABLET ORAL
Qty: 0 | Refills: 0 | Status: DISCONTINUED | OUTPATIENT
Start: 2018-06-29 | End: 2018-07-02

## 2018-06-29 RX ADMIN — HYDROMORPHONE HYDROCHLORIDE 0.2 MILLIGRAM(S): 2 INJECTION INTRAMUSCULAR; INTRAVENOUS; SUBCUTANEOUS at 21:31

## 2018-06-29 RX ADMIN — HYDROMORPHONE HYDROCHLORIDE 0.2 MILLIGRAM(S): 2 INJECTION INTRAMUSCULAR; INTRAVENOUS; SUBCUTANEOUS at 17:03

## 2018-06-29 RX ADMIN — Medication 2 MILLIGRAM(S): at 21:31

## 2018-06-29 RX ADMIN — HYDROMORPHONE HYDROCHLORIDE 0.2 MILLIGRAM(S): 2 INJECTION INTRAMUSCULAR; INTRAVENOUS; SUBCUTANEOUS at 09:43

## 2018-06-29 RX ADMIN — HYDROMORPHONE HYDROCHLORIDE 0.2 MILLIGRAM(S): 2 INJECTION INTRAMUSCULAR; INTRAVENOUS; SUBCUTANEOUS at 02:49

## 2018-06-29 RX ADMIN — HYDROMORPHONE HYDROCHLORIDE 0.2 MILLIGRAM(S): 2 INJECTION INTRAMUSCULAR; INTRAVENOUS; SUBCUTANEOUS at 13:19

## 2018-06-29 RX ADMIN — SENNA PLUS 15 MILLILITER(S): 8.6 TABLET ORAL at 21:31

## 2018-06-29 RX ADMIN — Medication 0.5 MILLIGRAM(S): at 04:31

## 2018-06-29 RX ADMIN — Medication 0.5 MILLIGRAM(S): at 00:33

## 2018-06-29 RX ADMIN — Medication 0.5 MILLIGRAM(S): at 08:28

## 2018-06-29 RX ADMIN — Medication 0.5 MILLIGRAM(S): at 19:36

## 2018-06-29 RX ADMIN — Medication 0.5 MILLIGRAM(S): at 15:45

## 2018-06-29 RX ADMIN — Medication 0.5 MILLIGRAM(S): at 12:30

## 2018-06-29 RX ADMIN — HYDROMORPHONE HYDROCHLORIDE 0.2 MILLIGRAM(S): 2 INJECTION INTRAMUSCULAR; INTRAVENOUS; SUBCUTANEOUS at 06:11

## 2018-06-29 RX ADMIN — Medication 100 MILLIGRAM(S): at 17:07
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
## 2018-06-30 PROCEDURE — 99233 SBSQ HOSP IP/OBS HIGH 50: CPT | Mod: GC

## 2018-06-30 RX ADMIN — HYDROMORPHONE HYDROCHLORIDE 0.2 MILLIGRAM(S): 2 INJECTION INTRAMUSCULAR; INTRAVENOUS; SUBCUTANEOUS at 01:17

## 2018-06-30 RX ADMIN — HYDROMORPHONE HYDROCHLORIDE 0.2 MILLIGRAM(S): 2 INJECTION INTRAMUSCULAR; INTRAVENOUS; SUBCUTANEOUS at 17:41

## 2018-06-30 RX ADMIN — Medication 0.5 MILLIGRAM(S): at 15:49

## 2018-06-30 RX ADMIN — SENNA PLUS 15 MILLILITER(S): 8.6 TABLET ORAL at 21:17

## 2018-06-30 RX ADMIN — Medication 100 MILLIGRAM(S): at 17:41

## 2018-06-30 RX ADMIN — Medication 0.5 MILLIGRAM(S): at 01:17

## 2018-06-30 RX ADMIN — Medication 100 MILLIGRAM(S): at 05:14

## 2018-06-30 RX ADMIN — Medication 0.5 MILLIGRAM(S): at 12:09

## 2018-06-30 RX ADMIN — HYDROMORPHONE HYDROCHLORIDE 0.2 MILLIGRAM(S): 2 INJECTION INTRAMUSCULAR; INTRAVENOUS; SUBCUTANEOUS at 21:18

## 2018-06-30 RX ADMIN — HYDROMORPHONE HYDROCHLORIDE 0.2 MILLIGRAM(S): 2 INJECTION INTRAMUSCULAR; INTRAVENOUS; SUBCUTANEOUS at 15:48

## 2018-06-30 RX ADMIN — Medication 2 MILLIGRAM(S): at 21:18

## 2018-06-30 RX ADMIN — Medication 0.5 MILLIGRAM(S): at 05:14

## 2018-06-30 RX ADMIN — HYDROMORPHONE HYDROCHLORIDE 0.2 MILLIGRAM(S): 2 INJECTION INTRAMUSCULAR; INTRAVENOUS; SUBCUTANEOUS at 11:24

## 2018-06-30 RX ADMIN — Medication 0.5 MILLIGRAM(S): at 07:24

## 2018-06-30 RX ADMIN — HYDROMORPHONE HYDROCHLORIDE 0.2 MILLIGRAM(S): 2 INJECTION INTRAMUSCULAR; INTRAVENOUS; SUBCUTANEOUS at 05:14

## 2018-06-30 RX ADMIN — Medication 0.5 MILLIGRAM(S): at 21:19
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
## 2018-06-30 NOTE — PROGRESS NOTE ADULT - PROBLEM SELECTOR PLAN 1
S/p Palliative extubation 6/22; not decannulated as trach may provide comfort and ease for self ventilation and management of secretions.   C/w Dilaudid 0.2 mg IVP Q4H ATC  and 0.5 mg IV q 1 PRN- Did not need PRNs in the last 48 hours

## 2018-06-30 NOTE — PROGRESS NOTE ADULT - PROBLEM SELECTOR PLAN 2
C/w Ativan 0.5 mg IV q 4 ATC and 1 mg q 1 PRN - Did not need PRNs in the last 24 hours.  C/w Zyprexa 5 mg q 12 PRN agitation due to delirium C/w Ativan 0.5 mg IV q 4 ATC and 1 mg q 1 PRN - Did not need PRNs in the last 48 hours.  C/w Zyprexa 5 mg q 12 PRN agitation due to delirium

## 2018-07-01 PROCEDURE — 99233 SBSQ HOSP IP/OBS HIGH 50: CPT | Mod: GC

## 2018-07-01 RX ORDER — HYDROMORPHONE HYDROCHLORIDE 2 MG/ML
0.5 INJECTION INTRAMUSCULAR; INTRAVENOUS; SUBCUTANEOUS
Qty: 0 | Refills: 0 | Status: DISCONTINUED | OUTPATIENT
Start: 2018-07-01 | End: 2018-07-05

## 2018-07-01 RX ADMIN — Medication 0.5 MILLIGRAM(S): at 00:09

## 2018-07-01 RX ADMIN — HYDROMORPHONE HYDROCHLORIDE 0.2 MILLIGRAM(S): 2 INJECTION INTRAMUSCULAR; INTRAVENOUS; SUBCUTANEOUS at 05:32

## 2018-07-01 RX ADMIN — HYDROMORPHONE HYDROCHLORIDE 0.2 MILLIGRAM(S): 2 INJECTION INTRAMUSCULAR; INTRAVENOUS; SUBCUTANEOUS at 03:00

## 2018-07-01 RX ADMIN — Medication 100 MILLIGRAM(S): at 17:30

## 2018-07-01 RX ADMIN — Medication 2 MILLIGRAM(S): at 21:09

## 2018-07-01 RX ADMIN — HYDROMORPHONE HYDROCHLORIDE 0.2 MILLIGRAM(S): 2 INJECTION INTRAMUSCULAR; INTRAVENOUS; SUBCUTANEOUS at 10:39

## 2018-07-01 RX ADMIN — Medication 0.5 MILLIGRAM(S): at 21:09

## 2018-07-01 RX ADMIN — Medication 0.5 MILLIGRAM(S): at 15:40

## 2018-07-01 RX ADMIN — HYDROMORPHONE HYDROCHLORIDE 0.2 MILLIGRAM(S): 2 INJECTION INTRAMUSCULAR; INTRAVENOUS; SUBCUTANEOUS at 17:29

## 2018-07-01 RX ADMIN — HYDROMORPHONE HYDROCHLORIDE 0.2 MILLIGRAM(S): 2 INJECTION INTRAMUSCULAR; INTRAVENOUS; SUBCUTANEOUS at 21:09

## 2018-07-01 RX ADMIN — Medication 0.5 MILLIGRAM(S): at 11:41

## 2018-07-01 RX ADMIN — Medication 0.5 MILLIGRAM(S): at 07:17

## 2018-07-01 RX ADMIN — Medication 100 MILLIGRAM(S): at 05:32

## 2018-07-01 RX ADMIN — HYDROMORPHONE HYDROCHLORIDE 0.2 MILLIGRAM(S): 2 INJECTION INTRAMUSCULAR; INTRAVENOUS; SUBCUTANEOUS at 13:24

## 2018-07-01 RX ADMIN — SENNA PLUS 15 MILLILITER(S): 8.6 TABLET ORAL at 21:08

## 2018-07-01 RX ADMIN — Medication 0.5 MILLIGRAM(S): at 03:01
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
## 2018-07-01 NOTE — PROGRESS NOTE ADULT - PROBLEM SELECTOR PLAN 2
C/w Ativan 0.5 mg IV q 4 ATC and 1 mg q 1 PRN - Did not need PRNs in the last 72 hours.  C/w Zyprexa 5 mg q 12 PRN agitation due to delirium

## 2018-07-01 NOTE — PROGRESS NOTE ADULT - PROBLEM SELECTOR PLAN 9
DNR, now DNI as patient is s/p palliative extubation  Ongoing support for family. DNR, now DNI as patient is s/p palliative extubation  Ongoing support for family

## 2018-07-01 NOTE — PROGRESS NOTE ADULT - PROBLEM SELECTOR PLAN 1
S/p Palliative extubation 6/22; not decannulated as trach may provide comfort and ease for self ventilation and management of secretions.   C/w Dilaudid 0.2 mg IVP Q4H ATC  and 0.5 mg IV q 1 PRN- Did not need PRNs in the last 72 hours

## 2018-07-02 PROCEDURE — 99233 SBSQ HOSP IP/OBS HIGH 50: CPT | Mod: GC

## 2018-07-02 RX ORDER — METOPROLOL TARTRATE 50 MG
2.5 TABLET ORAL EVERY 6 HOURS
Qty: 0 | Refills: 0 | Status: DISCONTINUED | OUTPATIENT
Start: 2018-07-02 | End: 2018-07-09

## 2018-07-02 RX ORDER — HYDROMORPHONE HYDROCHLORIDE 2 MG/ML
0.2 INJECTION INTRAMUSCULAR; INTRAVENOUS; SUBCUTANEOUS EVERY 4 HOURS
Qty: 0 | Refills: 0 | Status: DISCONTINUED | OUTPATIENT
Start: 2018-07-02 | End: 2018-07-05

## 2018-07-02 RX ADMIN — Medication 10 MILLIGRAM(S): at 16:18

## 2018-07-02 RX ADMIN — SENNA PLUS 15 MILLILITER(S): 8.6 TABLET ORAL at 21:04

## 2018-07-02 RX ADMIN — HYDROMORPHONE HYDROCHLORIDE 0.2 MILLIGRAM(S): 2 INJECTION INTRAMUSCULAR; INTRAVENOUS; SUBCUTANEOUS at 13:42

## 2018-07-02 RX ADMIN — Medication 0.5 MILLIGRAM(S): at 09:40

## 2018-07-02 RX ADMIN — HYDROMORPHONE HYDROCHLORIDE 0.2 MILLIGRAM(S): 2 INJECTION INTRAMUSCULAR; INTRAVENOUS; SUBCUTANEOUS at 17:11

## 2018-07-02 RX ADMIN — HYDROMORPHONE HYDROCHLORIDE 0.2 MILLIGRAM(S): 2 INJECTION INTRAMUSCULAR; INTRAVENOUS; SUBCUTANEOUS at 21:03

## 2018-07-02 RX ADMIN — Medication 100 MILLIGRAM(S): at 05:06

## 2018-07-02 RX ADMIN — Medication 0.5 MILLIGRAM(S): at 17:11

## 2018-07-02 RX ADMIN — Medication 1 MILLIGRAM(S): at 07:26

## 2018-07-02 RX ADMIN — Medication 0.5 MILLIGRAM(S): at 21:03

## 2018-07-02 RX ADMIN — Medication 0.5 MILLIGRAM(S): at 13:42

## 2018-07-02 RX ADMIN — HYDROMORPHONE HYDROCHLORIDE 0.2 MILLIGRAM(S): 2 INJECTION INTRAMUSCULAR; INTRAVENOUS; SUBCUTANEOUS at 09:40

## 2018-07-02 NOTE — PROGRESS NOTE ADULT - PROBLEM SELECTOR PLAN 9
DNR, now DNI as patient is s/p palliative extubation  Ongoing support for family  Ybarra placed today for increased comfort  Will get suppository for constipation

## 2018-07-02 NOTE — PROGRESS NOTE ADULT - ATTENDING COMMENTS
I have personally seen and examined this patient and agree with the above assessment and plan.  Unresponsive.  Patient is actively dying.  Family educated as to what to expect.  Emotional support provided.
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
## 2018-07-02 NOTE — CHART NOTE - NSCHARTNOTEFT_GEN_A_CORE
Nutrition note.     Per discussion with RN, pt is not appropriate for nutrition follow up at this time, S/P palliative extuabation 6/22, tube feeds discontinued as per wishes stated in living will. RD to remain available as needed.     Rut Huizar R.D., [facility], Pager #[phone]

## 2018-07-03 PROCEDURE — 99233 SBSQ HOSP IP/OBS HIGH 50: CPT | Mod: GC

## 2018-07-03 RX ADMIN — Medication 0.5 MILLIGRAM(S): at 05:04

## 2018-07-03 RX ADMIN — HYDROMORPHONE HYDROCHLORIDE 0.2 MILLIGRAM(S): 2 INJECTION INTRAMUSCULAR; INTRAVENOUS; SUBCUTANEOUS at 21:43

## 2018-07-03 RX ADMIN — Medication 0.5 MILLIGRAM(S): at 21:43

## 2018-07-03 RX ADMIN — HYDROMORPHONE HYDROCHLORIDE 0.2 MILLIGRAM(S): 2 INJECTION INTRAMUSCULAR; INTRAVENOUS; SUBCUTANEOUS at 02:24

## 2018-07-03 RX ADMIN — HYDROMORPHONE HYDROCHLORIDE 0.2 MILLIGRAM(S): 2 INJECTION INTRAMUSCULAR; INTRAVENOUS; SUBCUTANEOUS at 18:03

## 2018-07-03 RX ADMIN — Medication 0.5 MILLIGRAM(S): at 10:17

## 2018-07-03 RX ADMIN — SENNA PLUS 15 MILLILITER(S): 8.6 TABLET ORAL at 21:43

## 2018-07-03 RX ADMIN — Medication 0.5 MILLIGRAM(S): at 18:04

## 2018-07-03 RX ADMIN — Medication 0.5 MILLIGRAM(S): at 14:09

## 2018-07-03 RX ADMIN — HYDROMORPHONE HYDROCHLORIDE 0.2 MILLIGRAM(S): 2 INJECTION INTRAMUSCULAR; INTRAVENOUS; SUBCUTANEOUS at 10:16

## 2018-07-03 RX ADMIN — Medication 0.5 MILLIGRAM(S): at 02:24

## 2018-07-03 RX ADMIN — HYDROMORPHONE HYDROCHLORIDE 0.2 MILLIGRAM(S): 2 INJECTION INTRAMUSCULAR; INTRAVENOUS; SUBCUTANEOUS at 05:04

## 2018-07-03 RX ADMIN — HYDROMORPHONE HYDROCHLORIDE 0.2 MILLIGRAM(S): 2 INJECTION INTRAMUSCULAR; INTRAVENOUS; SUBCUTANEOUS at 14:08
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
## 2018-07-03 NOTE — PROGRESS NOTE ADULT - PROBLEM SELECTOR PROBLEM 7
DM (diabetes mellitus)
Constipation, unspecified constipation type
DM (diabetes mellitus)
DM (diabetes mellitus)
Constipation, unspecified constipation type
DM (diabetes mellitus)
HTN (hypertension)
DM (diabetes mellitus)
HTN (hypertension)
Constipation, unspecified constipation type
HTN (hypertension)
Constipation, unspecified constipation type

## 2018-07-03 NOTE — PROGRESS NOTE ADULT - PROBLEM SELECTOR PROBLEM 8
Hypertension, unspecified type
DM (diabetes mellitus)
Delirium
Hypertension, unspecified type
Hypertension, unspecified type
Delirium
Delirium
DM (diabetes mellitus)
Delirium
Encounter for palliative care
Hypertension, unspecified type
Delirium
DM (diabetes mellitus)
Encounter for palliative care

## 2018-07-03 NOTE — PROGRESS NOTE ADULT - PROBLEM SELECTOR PROBLEM 9
Encounter for palliative care
Delirium
Prophylactic measure
Encounter for palliative care
Encounter for palliative care
Prophylactic measure
Prophylactic measure
Delirium
Encounter for palliative care
Prophylactic measure
Delirium

## 2018-07-03 NOTE — PROGRESS NOTE ADULT - PROBLEM SELECTOR PLAN 5
Tube feeds discontinued as per wishes stated in living will patient had bowel movement today   Dulcolax PRN, Senna 15 ml QHS
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
## 2018-07-03 NOTE — PROGRESS NOTE ADULT - ATTENDING COMMENTS
I have personally seen and examined this patient and agree with the above assessment and plan.   Patient unresponsive.

## 2018-07-03 NOTE — PROGRESS NOTE ADULT - PROBLEM SELECTOR PLAN 1
S/p Palliative extubation 6/22; not decannulated as trach may provide comfort and ease for self ventilation and management of secretions.   C/w Dilaudid 0.2 mg IVP q/ 4H ATC and 0.5 mg IV q 1 PRN

## 2018-07-03 NOTE — PROGRESS NOTE ADULT - PROBLEM SELECTOR PLAN 8
standing metoprolol 100 mg q12
Continue to monitor Blood glucose levels  Continue Insulin Sliding Scale
Patient with Hx of Delirium last admission   Continue Clonopin Q pm   Continue PRN Seroquel and Melatonin
/53   Enalapril, hydralazine, metoprolol discontinued today
due to hypotension   will DC BP medications
Patient with Hx of Delirium last admission   Continue Clonopin Q am   Continue PRN Seroquel and Melatonin
Patient with Hx of Delirium last admission   Continue Clonopin Q pm   Continue PRN Seroquel and Melatonin
BP elevated 159/47, , may be re-bound secondary to beta blocker discontinuation 6/28.   Re-added standing metoprolol 100 mg q12
Continue to monitor Blood glucose levels  Continue Insulin Sliding Scale
DNR, now DNI as patient is s/p palliative extubation  Ongoing support for family.
Patient with Hx of Delirium last admission   Continue Clonopin Q am   Continue PRN Seroquel and Melatonin
Patient with Hx of Delirium last admission   Continue Clonopin Q pm   Continue PRN Seroquel and Melatonin
Patient with Hx of Delirium last admission   Continue Clonopin Q pm   Continue PRN Seroquel and Melatonin
standing metoprolol 100 mg q12
standing metoprolol 100 mg q12
Patient with Hx of Delirium last admission   Continue Clonopin Q am   Continue PRN Seroquel and Melatonin
Continue to monitor Blood glucose levels  Continue Insulin Sliding Scale
DNR, now DNI as patient is s/p palliative extubation  The patient's wife was concerned about he  still being alive after extubation and wanted to know if the patient was going to be DC to a Nursing home. I explained her that the patient is having active symptoms that needed further adjustment in his symptoms Rx. I also explain her that the patient is entering his dying process and that understanding his situation it may take hours to days.    She also talked to me about the importance of her christin on being able to go through the patient's clinical decline and the lost of her son (at the beginning of this year). She indicated she has a lot of support in the Protestant. I further provided emotional and spiritual support for her.   Last chaplaincy visit was on 6/12. Will ask for a chaplaincy follow up.

## 2018-07-03 NOTE — PROGRESS NOTE ADULT - PROBLEM SELECTOR PLAN 6
Due to lack of response to Sinemet it was DC   Tremor and restlessness to be controlled with ativan. DNR, now DNI as patient is s/p palliative extubation  Ongoing support for family  Ybarra placed today for increased comfort  Will get suppository for constipation

## 2018-07-04 PROCEDURE — 99233 SBSQ HOSP IP/OBS HIGH 50: CPT | Mod: GC

## 2018-07-04 RX ADMIN — HYDROMORPHONE HYDROCHLORIDE 0.2 MILLIGRAM(S): 2 INJECTION INTRAMUSCULAR; INTRAVENOUS; SUBCUTANEOUS at 01:53

## 2018-07-04 RX ADMIN — Medication 0.5 MILLIGRAM(S): at 21:43

## 2018-07-04 RX ADMIN — Medication 0.5 MILLIGRAM(S): at 17:16

## 2018-07-04 RX ADMIN — HYDROMORPHONE HYDROCHLORIDE 0.2 MILLIGRAM(S): 2 INJECTION INTRAMUSCULAR; INTRAVENOUS; SUBCUTANEOUS at 21:43

## 2018-07-04 RX ADMIN — HYDROMORPHONE HYDROCHLORIDE 0.2 MILLIGRAM(S): 2 INJECTION INTRAMUSCULAR; INTRAVENOUS; SUBCUTANEOUS at 06:07

## 2018-07-04 RX ADMIN — HYDROMORPHONE HYDROCHLORIDE 0.2 MILLIGRAM(S): 2 INJECTION INTRAMUSCULAR; INTRAVENOUS; SUBCUTANEOUS at 17:15

## 2018-07-04 RX ADMIN — Medication 0.5 MILLIGRAM(S): at 06:08

## 2018-07-04 RX ADMIN — HYDROMORPHONE HYDROCHLORIDE 0.2 MILLIGRAM(S): 2 INJECTION INTRAMUSCULAR; INTRAVENOUS; SUBCUTANEOUS at 13:18

## 2018-07-04 RX ADMIN — SENNA PLUS 15 MILLILITER(S): 8.6 TABLET ORAL at 21:43

## 2018-07-04 RX ADMIN — HYDROMORPHONE HYDROCHLORIDE 0.2 MILLIGRAM(S): 2 INJECTION INTRAMUSCULAR; INTRAVENOUS; SUBCUTANEOUS at 10:38

## 2018-07-04 RX ADMIN — Medication 0.5 MILLIGRAM(S): at 01:53

## 2018-07-04 RX ADMIN — Medication 0.5 MILLIGRAM(S): at 13:17

## 2018-07-04 RX ADMIN — Medication 0.5 MILLIGRAM(S): at 10:38

## 2018-07-04 NOTE — PROGRESS NOTE ADULT - PROBLEM SELECTOR PLAN 5
patient had bowel movement today   Dulcolax PRN, Senna 15 ml QHS c/w bowl regimen  Dulcolax PRN, Senna 15 ml QHS

## 2018-07-04 NOTE — PROGRESS NOTE ADULT - PROBLEM SELECTOR PLAN 6
DNR, now DNI as patient is s/p palliative extubation  Ongoing support for family  Ybarra placed today for increased comfort  Will get suppository for constipation DNR, now DNI as patient is s/p palliative extubation  Ongoing support for family  Ybarra placed for increased comfort
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
## 2018-07-04 NOTE — PROGRESS NOTE ADULT - PROBLEM SELECTOR PLAN 2
C/w Ativan 0.5 mg IV q 4 ATC and 1 mg q 1 PRN   C/w Zyprexa 5 mg q 12 PRN agitation due to delirium C/w Ativan 0.5 mg IV q 4 ATC and 1 mg q 1 PRN  (no prn needed in last 24 hours)  C/w Zyprexa 5 mg q 12 PRN agitation due to delirium
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
## 2018-07-04 NOTE — PROGRESS NOTE ADULT - PROBLEM SELECTOR PLAN 1
S/p Palliative extubation 6/22; not decannulated as trach may provide comfort and ease for self ventilation and management of secretions.   C/w Dilaudid 0.2 mg IVP q/ 4H ATC and 0.5 mg IV q 1 PRN S/p Palliative extubation 6/22; not decannulated as trach may provide comfort and ease for self ventilation and management of secretions.   C/w Dilaudid 0.2 mg IVP q/ 4H ATC and 0.5 mg IV q 1 PRN (no prn needed in last 24 hours)

## 2018-07-05 PROCEDURE — 99233 SBSQ HOSP IP/OBS HIGH 50: CPT | Mod: GC

## 2018-07-05 RX ORDER — HYDROMORPHONE HYDROCHLORIDE 2 MG/ML
1 INJECTION INTRAMUSCULAR; INTRAVENOUS; SUBCUTANEOUS
Qty: 0 | Refills: 0 | Status: DISCONTINUED | OUTPATIENT
Start: 2018-07-05 | End: 2018-07-07

## 2018-07-05 RX ORDER — HYDROMORPHONE HYDROCHLORIDE 2 MG/ML
0.4 INJECTION INTRAMUSCULAR; INTRAVENOUS; SUBCUTANEOUS EVERY 4 HOURS
Qty: 0 | Refills: 0 | Status: DISCONTINUED | OUTPATIENT
Start: 2018-07-05 | End: 2018-07-06

## 2018-07-05 RX ADMIN — HYDROMORPHONE HYDROCHLORIDE 0.2 MILLIGRAM(S): 2 INJECTION INTRAMUSCULAR; INTRAVENOUS; SUBCUTANEOUS at 06:08

## 2018-07-05 RX ADMIN — HYDROMORPHONE HYDROCHLORIDE 0.4 MILLIGRAM(S): 2 INJECTION INTRAMUSCULAR; INTRAVENOUS; SUBCUTANEOUS at 13:54

## 2018-07-05 RX ADMIN — HYDROMORPHONE HYDROCHLORIDE 0.2 MILLIGRAM(S): 2 INJECTION INTRAMUSCULAR; INTRAVENOUS; SUBCUTANEOUS at 02:08

## 2018-07-05 RX ADMIN — Medication 0.5 MILLIGRAM(S): at 06:08

## 2018-07-05 RX ADMIN — Medication 0.5 MILLIGRAM(S): at 17:25

## 2018-07-05 RX ADMIN — Medication 0.5 MILLIGRAM(S): at 09:17

## 2018-07-05 RX ADMIN — HYDROMORPHONE HYDROCHLORIDE 0.2 MILLIGRAM(S): 2 INJECTION INTRAMUSCULAR; INTRAVENOUS; SUBCUTANEOUS at 09:17

## 2018-07-05 RX ADMIN — SENNA PLUS 15 MILLILITER(S): 8.6 TABLET ORAL at 22:14

## 2018-07-05 RX ADMIN — HYDROMORPHONE HYDROCHLORIDE 0.4 MILLIGRAM(S): 2 INJECTION INTRAMUSCULAR; INTRAVENOUS; SUBCUTANEOUS at 17:25

## 2018-07-05 RX ADMIN — HYDROMORPHONE HYDROCHLORIDE 0.4 MILLIGRAM(S): 2 INJECTION INTRAMUSCULAR; INTRAVENOUS; SUBCUTANEOUS at 22:15

## 2018-07-05 RX ADMIN — Medication 0.5 MILLIGRAM(S): at 13:55

## 2018-07-05 RX ADMIN — Medication 0.5 MILLIGRAM(S): at 22:16

## 2018-07-05 RX ADMIN — Medication 0.5 MILLIGRAM(S): at 02:09

## 2018-07-05 NOTE — PROGRESS NOTE ADULT - PROBLEM SELECTOR PLAN 2
pt appears uncomfortable at times possible 2/2 increase tolerance will increased dose of Dilaudid to 0.4 q/ 4H ATC and 1 mg IV q 1 PRN (no prn needed in last 24 hours)  C/w Zyprexa 5 mg q 12 PRN agitation due to delirium

## 2018-07-05 NOTE — PROGRESS NOTE ADULT - PROBLEM SELECTOR PLAN 1
S/p Palliative extubation 6/22; not decannulated as trach may provide comfort and ease for self ventilation and management of secretions  increased dose of Dilaudid to 0.4 q/ 4H ATC and 1 mg IV q 1 PRN (no prn needed in last 24 hours)
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
## 2018-07-05 NOTE — PROGRESS NOTE ADULT - PROBLEM SELECTOR PLAN 6
DNR, now DNI as patient is s/p palliative extubation  Ongoing support for family  Ybarra placed for increased comfort

## 2018-07-05 NOTE — CHART NOTE - NSCHARTNOTEFT_GEN_A_CORE
Per discussion with palliative fellow and NP pt is not appropriate for nutrition follow up, pt remains NPO, S/P palliative extuabation 6/22, tube feeds discontinued as per wishes stated in living will. RD to remain available as needed.     Rut Huizar R.D., [facility], Pager #[phone]

## 2018-07-05 NOTE — PROGRESS NOTE ADULT - ATTENDING COMMENTS
I have personally seen and examined this patient and agree with the above assessment and plan.   Pt unresponsive, blood pressure dropping, skin color changing.  Wife at bedside.  Emotional support provided.

## 2018-07-06 PROCEDURE — 99233 SBSQ HOSP IP/OBS HIGH 50: CPT | Mod: GC

## 2018-07-06 RX ORDER — HYDROMORPHONE HYDROCHLORIDE 2 MG/ML
0.2 INJECTION INTRAMUSCULAR; INTRAVENOUS; SUBCUTANEOUS
Qty: 100 | Refills: 0 | Status: DISCONTINUED | OUTPATIENT
Start: 2018-07-06 | End: 2018-07-09

## 2018-07-06 RX ORDER — SODIUM CHLORIDE 9 MG/ML
1000 INJECTION INTRAMUSCULAR; INTRAVENOUS; SUBCUTANEOUS
Qty: 0 | Refills: 0 | Status: DISCONTINUED | OUTPATIENT
Start: 2018-07-06 | End: 2018-07-09

## 2018-07-06 RX ADMIN — Medication 0.5 MILLIGRAM(S): at 06:41

## 2018-07-06 RX ADMIN — Medication 0.5 MILLIGRAM(S): at 09:41

## 2018-07-06 RX ADMIN — Medication 0.5 MILLIGRAM(S): at 22:58

## 2018-07-06 RX ADMIN — Medication 0.5 MILLIGRAM(S): at 14:06

## 2018-07-06 RX ADMIN — SENNA PLUS 15 MILLILITER(S): 8.6 TABLET ORAL at 22:58

## 2018-07-06 RX ADMIN — HYDROMORPHONE HYDROCHLORIDE 0.4 MILLIGRAM(S): 2 INJECTION INTRAMUSCULAR; INTRAVENOUS; SUBCUTANEOUS at 09:42

## 2018-07-06 RX ADMIN — HYDROMORPHONE HYDROCHLORIDE 0.4 MILLIGRAM(S): 2 INJECTION INTRAMUSCULAR; INTRAVENOUS; SUBCUTANEOUS at 06:41

## 2018-07-06 RX ADMIN — Medication 0.5 MILLIGRAM(S): at 17:50

## 2018-07-06 NOTE — PROGRESS NOTE ADULT - PROBLEM SELECTOR PLAN 4
- Patient actively dying; continue with care. Ybarra placed for increased comfort  - DNR/ DNI  - Ongoing support for family provided.

## 2018-07-06 NOTE — PROGRESS NOTE ADULT - ATTENDING COMMENTS
I have personally seen and examined this patient and agree with the above assessment and plan.   Pt unresponsive, blood pressure dropping, skin color changing.  Wife at bedside.  Emotional support provided. I have personally seen and examined this patient and agree with the above assessment and plan.   Pt unresponsive, blood pressure rebounding today, skin color changing.  Wife at bedside.  Emotional support provided.
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
## 2018-07-06 NOTE — PROGRESS NOTE ADULT - PROBLEM SELECTOR PLAN 2
- On Ativan 0.5 IVP Q4 ATC: No PRN use in the past 24 hours   - On Zyprexa 5 mg q 12 PRN agitation due to delirium. No PRN use in the past 24 hours
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
## 2018-07-06 NOTE — PROGRESS NOTE ADULT - PROBLEM SELECTOR PLAN 1
- S/p Palliative extubation 6/22; not decannulated as trach may provide comfort and ease for self ventilation and management of secretions  - On humidified 02 - 40% Venti mask  - Suction PRN  - Started on Dilaudid gtt @ 0.2mg/hr. PRN Dilaudid 1mg Q1 PRN labored breathing

## 2018-07-07 PROCEDURE — 99233 SBSQ HOSP IP/OBS HIGH 50: CPT | Mod: GC

## 2018-07-07 RX ORDER — HYDROMORPHONE HYDROCHLORIDE 2 MG/ML
0.4 INJECTION INTRAMUSCULAR; INTRAVENOUS; SUBCUTANEOUS
Qty: 0 | Refills: 0 | Status: DISCONTINUED | OUTPATIENT
Start: 2018-07-07 | End: 2018-07-09

## 2018-07-07 RX ORDER — DOCUSATE SODIUM 100 MG
100 CAPSULE ORAL
Qty: 0 | Refills: 0 | Status: DISCONTINUED | OUTPATIENT
Start: 2018-07-07 | End: 2018-07-09

## 2018-07-07 RX ORDER — DOCUSATE SODIUM 100 MG
100 CAPSULE ORAL THREE TIMES A DAY
Qty: 0 | Refills: 0 | Status: DISCONTINUED | OUTPATIENT
Start: 2018-07-07 | End: 2018-07-07

## 2018-07-07 RX ADMIN — Medication 0.5 MILLIGRAM(S): at 18:22

## 2018-07-07 RX ADMIN — Medication 0.5 MILLIGRAM(S): at 14:12

## 2018-07-07 RX ADMIN — Medication 0.5 MILLIGRAM(S): at 05:49

## 2018-07-07 RX ADMIN — Medication 0.5 MILLIGRAM(S): at 21:10

## 2018-07-07 RX ADMIN — SENNA PLUS 15 MILLILITER(S): 8.6 TABLET ORAL at 21:09

## 2018-07-07 RX ADMIN — HYDROMORPHONE HYDROCHLORIDE 0.4 MILLIGRAM(S): 2 INJECTION INTRAMUSCULAR; INTRAVENOUS; SUBCUTANEOUS at 16:15

## 2018-07-07 RX ADMIN — Medication 0.5 MILLIGRAM(S): at 10:29

## 2018-07-07 RX ADMIN — HYDROMORPHONE HYDROCHLORIDE 0.4 MILLIGRAM(S): 2 INJECTION INTRAMUSCULAR; INTRAVENOUS; SUBCUTANEOUS at 15:59

## 2018-07-07 RX ADMIN — SODIUM CHLORIDE 10 MILLILITER(S): 9 INJECTION INTRAMUSCULAR; INTRAVENOUS; SUBCUTANEOUS at 05:50

## 2018-07-07 RX ADMIN — HYDROMORPHONE HYDROCHLORIDE 0.2 MG/HR: 2 INJECTION INTRAMUSCULAR; INTRAVENOUS; SUBCUTANEOUS at 07:36

## 2018-07-07 RX ADMIN — HYDROMORPHONE HYDROCHLORIDE 0.2 MG/HR: 2 INJECTION INTRAMUSCULAR; INTRAVENOUS; SUBCUTANEOUS at 15:54

## 2018-07-07 RX ADMIN — SODIUM CHLORIDE 10 MILLILITER(S): 9 INJECTION INTRAMUSCULAR; INTRAVENOUS; SUBCUTANEOUS at 07:36

## 2018-07-07 RX ADMIN — Medication 1 MILLIGRAM(S): at 16:16

## 2018-07-07 RX ADMIN — Medication 0.5 MILLIGRAM(S): at 02:38
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
## 2018-07-07 NOTE — PROGRESS NOTE ADULT - PROBLEM SELECTOR PLAN 1
- S/p Palliative extubation 6/22; not decannulated as trach may provide comfort and ease for self ventilation and management of secretions  - On humidified 02 - 40% Venti mask  - Suction PRN  - Started on Dilaudid gtt @ 0.2mg/hr. PRN Dilaudid 0.4mg Q1 PRN labored breathing

## 2018-07-08 VITALS — OXYGEN SATURATION: 92 % | RESPIRATION RATE: 16 BRPM | HEART RATE: 98 BPM

## 2018-07-08 PROCEDURE — 99233 SBSQ HOSP IP/OBS HIGH 50: CPT | Mod: GC

## 2018-07-08 RX ADMIN — Medication 0.5 MILLIGRAM(S): at 05:46

## 2018-07-08 RX ADMIN — Medication 0.5 MILLIGRAM(S): at 13:08

## 2018-07-08 RX ADMIN — SENNA PLUS 15 MILLILITER(S): 8.6 TABLET ORAL at 21:47

## 2018-07-08 RX ADMIN — Medication 0.5 MILLIGRAM(S): at 17:36

## 2018-07-08 RX ADMIN — Medication 0.5 MILLIGRAM(S): at 21:47

## 2018-07-08 RX ADMIN — HYDROMORPHONE HYDROCHLORIDE 0.2 MG/HR: 2 INJECTION INTRAMUSCULAR; INTRAVENOUS; SUBCUTANEOUS at 13:08

## 2018-07-08 RX ADMIN — SODIUM CHLORIDE 10 MILLILITER(S): 9 INJECTION INTRAMUSCULAR; INTRAVENOUS; SUBCUTANEOUS at 05:46

## 2018-07-08 RX ADMIN — HYDROMORPHONE HYDROCHLORIDE 0.2 MG/HR: 2 INJECTION INTRAMUSCULAR; INTRAVENOUS; SUBCUTANEOUS at 20:38

## 2018-07-08 RX ADMIN — HYDROMORPHONE HYDROCHLORIDE 0.2 MG/HR: 2 INJECTION INTRAMUSCULAR; INTRAVENOUS; SUBCUTANEOUS at 07:23

## 2018-07-08 RX ADMIN — Medication 0.5 MILLIGRAM(S): at 01:12

## 2018-07-08 RX ADMIN — SODIUM CHLORIDE 10 MILLILITER(S): 9 INJECTION INTRAMUSCULAR; INTRAVENOUS; SUBCUTANEOUS at 07:23

## 2018-07-08 RX ADMIN — Medication 0.5 MILLIGRAM(S): at 09:18

## 2018-07-08 NOTE — PROGRESS NOTE ADULT - PROBLEM SELECTOR PLAN 3
Last BM 7/3   On bowl regimen: Dulcolax PRN, Senna 15 ml QHS through PEG added Colace Last BM 7/3   On bowel regimen: Dulcolax PRN, Senna 15 ml QHS through PEG added Colace

## 2018-07-08 NOTE — PROGRESS NOTE ADULT - PROBLEM SELECTOR PROBLEM 3
Anxiety
Anxiety
Acinetobacter baumannii or Acinetobacter haemolyticus identified by diagnostic testing
Constipation, unspecified constipation type
Transaminitis
Transaminitis
Anxiety
Transaminitis
Anxiety
Constipation, unspecified constipation type
Constipation, unspecified constipation type
Parkinson disease
Transaminitis
Anxiety
Transaminitis
Parkinson disease
Acinetobacter baumannii or Acinetobacter haemolyticus identified by diagnostic testing
Anxiety
Acinetobacter baumannii or Acinetobacter haemolyticus identified by diagnostic testing
Anxiety
Parkinson disease
Transaminitis
Anxiety
Acinetobacter baumannii or Acinetobacter haemolyticus identified by diagnostic testing

## 2018-07-08 NOTE — PROGRESS NOTE ADULT - PROVIDER SPECIALTY LIST ADULT
ENT
Infectious Disease
Neurology
Palliative Care
Pulmonology
Palliative Care
Neurology
Palliative Care
Pulmonology
Infectious Disease
Palliative Care
Infectious Disease
Pulmonology
Infectious Disease

## 2018-07-08 NOTE — PROGRESS NOTE ADULT - PROBLEM SELECTOR PLAN 4
Patient actively dying; continue with care. Ybarra placed for increased comfort  DNR/ DNI  Ongoing support for family provided.
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
## 2018-07-08 NOTE — PROGRESS NOTE ADULT - PROBLEM SELECTOR PROBLEM 2
Agitation
Agitation
Infection
Positive blood culture
Agitation
Agitation
Infection
Agitation
Infection
Agitation
Infection
Infection
Agitation
Positive blood culture
Positive blood culture
Agitation
Infection
Infection
Positive blood culture
Positive blood culture
Agitation
Agitation
Positive blood culture
Positive blood culture
Infection
Positive blood culture

## 2018-07-08 NOTE — PROGRESS NOTE ADULT - PROBLEM SELECTOR PLAN 2
On Ativan 0.5 IVP Q4 ATC: required BTD x 1 in past 24 hours   On Zyprexa 5 mg q 12 PRN agitation due to delirium. No PRN use in the past 24 hours

## 2018-07-08 NOTE — PROGRESS NOTE ADULT - PROBLEM SELECTOR PLAN 1
S/p Palliative extubation 6/22; not decannulated as trach may provide comfort and ease for self ventilation and management of secretions  On humidified 02 - 40% Venti mask, wife requested to manny down as tolerated, will remove and monitor for now  Suction PRN  c/w Dilaudid gtt @ 0.2mg/hr. Required BTD of Dilaudid x 1 in past 24 hrs

## 2018-07-09 RX ADMIN — Medication 0.5 MILLIGRAM(S): at 02:31
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
## 2018-07-09 NOTE — PROVIDER CONTACT NOTE (OTHER) - ASSESSMENT
No response to external stimuli  No spontaneous respirations  No apical hear rate  Negative papillary response to light

## 2018-07-09 NOTE — PROVIDER CONTACT NOTE (OTHER) - ACTION/TREATMENT ORDERED:
continue to monitor. rechecks after ice packs
MD made aware, orders ativan 2mg, pt safety maintained, will continue to monitor
See pt expiration note

## 2018-07-09 NOTE — PROVIDER CONTACT NOTE (OTHER) - SITUATION
Patient without spontaneous respirations or pulse
pt agitated, pt keeps trying to self extubate, no relief s/p ativan 1mg
rectal temp 102

## 2018-08-02 NOTE — PROGRESS NOTE ADULT - PROBLEM SELECTOR PROBLEM 3
Message from [employer]:  Original authorizing provider: David Chavez MD, MD Lamont Daniels would like a refill of the following medications:  mirtazapine (REMERON) 15 MG tablet [David Chavez MD, MD]    Preferred pharmacy: [address]    Comment:     Type 2 diabetes mellitus without complication, without long-term current use of insulin

## 2018-08-13 NOTE — PROGRESS NOTE ADULT - PROBLEM SELECTOR PLAN 1
8/13/2018         RE: Liliana Kat  [address] [facility] [id number]        Dear Colleague,    Thank you for referring your patient, Liliana Kat, to the [facility]. Please see a copy of my visit note below.    Dictating procedure report.  Preoperative diagnosis lower lip hemangioma.  Postoperative diagnosis same  Procedure: Ablation of lower lip hemangioma with bipolar cautery.  Surgeon: Cordell Pacheco  Assistants none  Patient with a history of lower lip hemangioma bites on it and bothered by it.  She understands risks and benefits of procedure were stable and agrees to it.    Patient is taken to procedure room appropriate position prepped and draped.  Topical Cetacaine was first used followed by local injection using 1% lidocaine 1-100,000 epinephrine.  Using fine tip bipolar cautery at the low setting of 10 hemangioma was ablated systematically in layers.  Patient tolerated procedure well.  Silvadene cream was applied.  Patient will use Silvadene for a few days followed by topical Vaseline.  She will follow-up with us in 2 weeks.    Again, thank you for allowing me to participate in the care of your patient.        Sincerely,        Cordell Pacheco MD, MD     S/p Palliative extubation 6/22; not decannulated as trach may provide comfort and ease for self ventilation and management of secretions.   3 BT Dilaudid in 24H- will start on Dilaudid 0.2mg IVP Q8H ATC  C/w Dilaudid 0.2 mg IVP Q2H for dyspnea/ pain S/p Palliative extubation 6/22; not decannulated as trach may provide comfort and ease for self ventilation and management of secretions.   Due to recurrent symptoms will changes medications as follows:   Dilaudid 0.2 mg IVP Q4H ATC  and 0.5 mg IV q 1 PRN

## 2018-09-14 PROCEDURE — 87086 URINE CULTURE/COLONY COUNT: CPT

## 2018-09-14 PROCEDURE — 97166 OT EVAL MOD COMPLEX 45 MIN: CPT

## 2018-09-14 PROCEDURE — 83010 ASSAY OF HAPTOGLOBIN QUANT: CPT

## 2018-09-14 PROCEDURE — 84295 ASSAY OF SERUM SODIUM: CPT

## 2018-09-14 PROCEDURE — 37243 VASC EMBOLIZE/OCCLUDE ORGAN: CPT

## 2018-09-14 PROCEDURE — 84484 ASSAY OF TROPONIN QUANT: CPT

## 2018-09-14 PROCEDURE — 94799 UNLISTED PULMONARY SVC/PX: CPT

## 2018-09-14 PROCEDURE — 83935 ASSAY OF URINE OSMOLALITY: CPT

## 2018-09-14 PROCEDURE — 36600 WITHDRAWAL OF ARTERIAL BLOOD: CPT

## 2018-09-14 PROCEDURE — 83735 ASSAY OF MAGNESIUM: CPT

## 2018-09-14 PROCEDURE — 93926 LOWER EXTREMITY STUDY: CPT

## 2018-09-14 PROCEDURE — 36569 INSJ PICC 5 YR+ W/O IMAGING: CPT

## 2018-09-14 PROCEDURE — P9016: CPT

## 2018-09-14 PROCEDURE — 73552 X-RAY EXAM OF FEMUR 2/>: CPT

## 2018-09-14 PROCEDURE — 36556 INSERT NON-TUNNEL CV CATH: CPT

## 2018-09-14 PROCEDURE — 71260 CT THORAX DX C+: CPT

## 2018-09-14 PROCEDURE — 70450 CT HEAD/BRAIN W/O DYE: CPT

## 2018-09-14 PROCEDURE — 87040 BLOOD CULTURE FOR BACTERIA: CPT

## 2018-09-14 PROCEDURE — L8699: CPT

## 2018-09-14 PROCEDURE — 84300 ASSAY OF URINE SODIUM: CPT

## 2018-09-14 PROCEDURE — 94002 VENT MGMT INPAT INIT DAY: CPT

## 2018-09-14 PROCEDURE — 85027 COMPLETE CBC AUTOMATED: CPT

## 2018-09-14 PROCEDURE — 80053 COMPREHEN METABOLIC PANEL: CPT

## 2018-09-14 PROCEDURE — C1887: CPT

## 2018-09-14 PROCEDURE — 72125 CT NECK SPINE W/O DYE: CPT

## 2018-09-14 PROCEDURE — 97167 OT EVAL HIGH COMPLEX 60 MIN: CPT

## 2018-09-14 PROCEDURE — 86923 COMPATIBILITY TEST ELECTRIC: CPT

## 2018-09-14 PROCEDURE — 31500 INSERT EMERGENCY AIRWAY: CPT

## 2018-09-14 PROCEDURE — 82435 ASSAY OF BLOOD CHLORIDE: CPT

## 2018-09-14 PROCEDURE — 97112 NEUROMUSCULAR REEDUCATION: CPT

## 2018-09-14 PROCEDURE — 94668 MNPJ CHEST WALL SBSQ: CPT

## 2018-09-14 PROCEDURE — 97110 THERAPEUTIC EXERCISES: CPT

## 2018-09-14 PROCEDURE — 82947 ASSAY GLUCOSE BLOOD QUANT: CPT

## 2018-09-14 PROCEDURE — 49450 REPLACE G/C TUBE PERC: CPT

## 2018-09-14 PROCEDURE — 87449 NOS EACH ORGANISM AG IA: CPT

## 2018-09-14 PROCEDURE — 93306 TTE W/DOPPLER COMPLETE: CPT

## 2018-09-14 PROCEDURE — 82962 GLUCOSE BLOOD TEST: CPT

## 2018-09-14 PROCEDURE — 36245 INS CATH ABD/L-EXT ART 1ST: CPT | Mod: XS

## 2018-09-14 PROCEDURE — 82803 BLOOD GASES ANY COMBINATION: CPT

## 2018-09-14 PROCEDURE — 96375 TX/PRO/DX INJ NEW DRUG ADDON: CPT

## 2018-09-14 PROCEDURE — 36246 INS CATH ABD/L-EXT ART 2ND: CPT | Mod: XS

## 2018-09-14 PROCEDURE — 86900 BLOOD TYPING SEROLOGIC ABO: CPT

## 2018-09-14 PROCEDURE — 83036 HEMOGLOBIN GLYCOSYLATED A1C: CPT

## 2018-09-14 PROCEDURE — 93005 ELECTROCARDIOGRAM TRACING: CPT

## 2018-09-14 PROCEDURE — 84100 ASSAY OF PHOSPHORUS: CPT

## 2018-09-14 PROCEDURE — 83605 ASSAY OF LACTIC ACID: CPT

## 2018-09-14 PROCEDURE — C1894: CPT

## 2018-09-14 PROCEDURE — 71045 X-RAY EXAM CHEST 1 VIEW: CPT

## 2018-09-14 PROCEDURE — 71250 CT THORAX DX C-: CPT

## 2018-09-14 PROCEDURE — 83615 LACTATE (LD) (LDH) ENZYME: CPT

## 2018-09-14 PROCEDURE — 76700 US EXAM ABDOM COMPLETE: CPT

## 2018-09-14 PROCEDURE — 73000 X-RAY EXAM OF COLLAR BONE: CPT

## 2018-09-14 PROCEDURE — 99281 EMR DPT VST MAYX REQ PHY/QHP: CPT | Mod: 25

## 2018-09-14 PROCEDURE — 93970 EXTREMITY STUDY: CPT

## 2018-09-14 PROCEDURE — 81001 URINALYSIS AUTO W/SCOPE: CPT

## 2018-09-14 PROCEDURE — 85384 FIBRINOGEN ACTIVITY: CPT

## 2018-09-14 PROCEDURE — 85610 PROTHROMBIN TIME: CPT

## 2018-09-14 PROCEDURE — C1760: CPT

## 2018-09-14 PROCEDURE — 73030 X-RAY EXAM OF SHOULDER: CPT

## 2018-09-14 PROCEDURE — 99291 CRITICAL CARE FIRST HOUR: CPT | Mod: 25

## 2018-09-14 PROCEDURE — 85014 HEMATOCRIT: CPT

## 2018-09-14 PROCEDURE — 36248 INS CATH ABD/L-EXT ART ADDL: CPT | Mod: 59

## 2018-09-14 PROCEDURE — 87070 CULTURE OTHR SPECIMN AEROBIC: CPT

## 2018-09-14 PROCEDURE — P9059: CPT

## 2018-09-14 PROCEDURE — 87150 DNA/RNA AMPLIFIED PROBE: CPT

## 2018-09-14 PROCEDURE — P9040: CPT

## 2018-09-14 PROCEDURE — 80202 ASSAY OF VANCOMYCIN: CPT

## 2018-09-14 PROCEDURE — 80164 ASSAY DIPROPYLACETIC ACD TOT: CPT

## 2018-09-14 PROCEDURE — C1769: CPT

## 2018-09-14 PROCEDURE — 82330 ASSAY OF CALCIUM: CPT

## 2018-09-14 PROCEDURE — 76937 US GUIDE VASCULAR ACCESS: CPT

## 2018-09-14 PROCEDURE — 94003 VENT MGMT INPAT SUBQ DAY: CPT

## 2018-09-14 PROCEDURE — 85730 THROMBOPLASTIN TIME PARTIAL: CPT

## 2018-09-14 PROCEDURE — 82565 ASSAY OF CREATININE: CPT

## 2018-09-14 PROCEDURE — 84132 ASSAY OF SERUM POTASSIUM: CPT

## 2018-09-14 PROCEDURE — 84145 PROCALCITONIN (PCT): CPT

## 2018-09-14 PROCEDURE — 97163 PT EVAL HIGH COMPLEX 45 MIN: CPT

## 2018-09-14 PROCEDURE — 97162 PT EVAL MOD COMPLEX 30 MIN: CPT

## 2018-09-14 PROCEDURE — 81003 URINALYSIS AUTO W/O SCOPE: CPT

## 2018-09-14 PROCEDURE — 72170 X-RAY EXAM OF PELVIS: CPT

## 2018-09-14 PROCEDURE — 74018 RADEX ABDOMEN 1 VIEW: CPT

## 2018-09-14 PROCEDURE — G0515: CPT

## 2018-09-14 PROCEDURE — 73562 X-RAY EXAM OF KNEE 3: CPT

## 2018-09-14 PROCEDURE — P9012: CPT

## 2018-09-14 PROCEDURE — C1751: CPT

## 2018-09-14 PROCEDURE — 94640 AIRWAY INHALATION TREATMENT: CPT

## 2018-09-14 PROCEDURE — 82436 ASSAY OF URINE CHLORIDE: CPT

## 2018-09-14 PROCEDURE — 36430 TRANSFUSION BLD/BLD COMPNT: CPT

## 2018-09-14 PROCEDURE — 93971 EXTREMITY STUDY: CPT

## 2018-09-14 PROCEDURE — 93005 ELECTROCARDIOGRAM TRACING: CPT | Mod: XU

## 2018-09-14 PROCEDURE — P9037: CPT

## 2018-09-14 PROCEDURE — P9011: CPT

## 2018-09-14 PROCEDURE — 37244 VASC EMBOLIZE/OCCLUDE BLEED: CPT

## 2018-09-14 PROCEDURE — 82570 ASSAY OF URINE CREATININE: CPT

## 2018-09-14 PROCEDURE — 97530 THERAPEUTIC ACTIVITIES: CPT

## 2018-09-14 PROCEDURE — 76705 ECHO EXAM OF ABDOMEN: CPT

## 2018-09-14 PROCEDURE — 87186 SC STD MICRODIL/AGAR DIL: CPT

## 2018-09-14 PROCEDURE — 82550 ASSAY OF CK (CPK): CPT

## 2018-09-14 PROCEDURE — 80048 BASIC METABOLIC PNL TOTAL CA: CPT

## 2018-09-14 PROCEDURE — 73070 X-RAY EXAM OF ELBOW: CPT

## 2018-09-14 PROCEDURE — 73551 X-RAY EXAM OF FEMUR 1: CPT

## 2018-09-14 PROCEDURE — 86901 BLOOD TYPING SEROLOGIC RH(D): CPT

## 2018-09-14 PROCEDURE — 73130 X-RAY EXAM OF HAND: CPT

## 2018-09-14 PROCEDURE — 74177 CT ABD & PELVIS W/CONTRAST: CPT

## 2018-09-14 PROCEDURE — 96374 THER/PROPH/DIAG INJ IV PUSH: CPT | Mod: XU

## 2018-09-14 PROCEDURE — C1889: CPT

## 2018-09-14 PROCEDURE — 83880 ASSAY OF NATRIURETIC PEPTIDE: CPT

## 2018-09-14 PROCEDURE — 87581 M.PNEUMON DNA AMP PROBE: CPT

## 2018-09-14 PROCEDURE — 82553 CREATINE MB FRACTION: CPT

## 2018-09-14 PROCEDURE — 86850 RBC ANTIBODY SCREEN: CPT

## 2018-09-14 PROCEDURE — 99285 EMERGENCY DEPT VISIT HI MDM: CPT | Mod: 25

## 2018-09-14 PROCEDURE — 87798 DETECT AGENT NOS DNA AMP: CPT

## 2018-09-14 PROCEDURE — P9017: CPT

## 2018-09-14 PROCEDURE — 96374 THER/PROPH/DIAG INJ IV PUSH: CPT

## 2018-09-14 PROCEDURE — 87486 CHLMYD PNEUM DNA AMP PROBE: CPT

## 2018-09-14 PROCEDURE — P9047: CPT

## 2018-09-14 PROCEDURE — 80076 HEPATIC FUNCTION PANEL: CPT

## 2018-09-14 PROCEDURE — 87633 RESP VIRUS 12-25 TARGETS: CPT

## 2018-09-14 PROCEDURE — 73060 X-RAY EXAM OF HUMERUS: CPT

## 2018-09-14 PROCEDURE — 36247 INS CATH ABD/L-EXT ART 3RD: CPT

## 2020-01-30 NOTE — PROGRESS NOTE ADULT - PROBLEM SELECTOR PROBLEM 5
Patient left message on voicemail for Dr. Tam's nurse. He wanted to discuss medications and inquire about taking Eplerenone instead of the Spironolactone.    Type 2 diabetes mellitus without complication, without long-term current use of insulin

## 2020-03-05 NOTE — H&P ADULT - GLASGOW COMA SCALE: BEST VERBAL RESPONSE, MLM
(V1) none Interpolation Flap Text: A decision was made to reconstruct the defect utilizing an interpolation axial flap and a staged reconstruction.  A telfa template was made of the defect.  This telfa template was then used to outline the interpolation flap.  The donor area for the pedicle flap was then injected with anesthesia.  The flap was excised through the skin and subcutaneous tissue down to the layer of the underlying musculature.  The interpolation flap was carefully excised within this deep plane to maintain its blood supply.  The edges of the donor site were undermined.   The donor site was closed in a primary fashion.  The pedicle was then rotated into position and sutured.  Once the tube was sutured into place, adequate blood supply was confirmed with blanching and refill.  The pedicle was then wrapped with xeroform gauze and dressed appropriately with a telfa and gauze bandage to ensure continued blood supply and protect the attached pedicle.

## 2020-09-17 NOTE — PROGRESS NOTE BEHAVIORAL HEALTH - ORIENTATION OTHER
(3) walks occasionally
Acute change in orientation in middle of interview. Could be due to administration of opioid medication immediately prior to interview.

## 2020-12-09 NOTE — PROGRESS NOTE ADULT - CARDIOVASCULAR DETAILS
positive S1/positive S2
positive S2/positive S1
*
positive S2/positive S1
positive S2/positive S1
There were no behavioral problems on the unit.  Patient did not become agitated, did not require emergency intramuscular medications or seclusion/restraints, and did not self-harm on the unit. Patient remained actively engaged in treatment and participated in individual, group, and milieu therapy.  Patient got along appropriately with staff and peers.  A family meeting was held prior to discharge for safety planning and disposition planning. The patient was arranged to be discharged to his cousin’s house as his mother did not want him to return home at this time. Family is supportive and available.
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
## 2021-07-12 NOTE — ED PROCEDURE NOTE - PROCEDURE NAME, MLM
General Reason To Defer Override: patient will complete treatment in fall when less sun exposure Introduction Text (Please End With A Colon): : Procedure To Be Performed At Next Visit: Other Detail Level: Detailed Other Procedure: Efudex Procedure To Be Performed At Next Visit: Biopsy by punch method Introduction Text (Please End With A Colon): :)punch biopsy

## 2022-02-13 NOTE — PROGRESS NOTE ADULT - PROBLEM SELECTOR PLAN 2
-Coag neg staph here likely contaminant      Spoke to micro dept at [facility] 5/30- bcx from 5/27 grew Coag neg staph and Acinetobacter baumannii 1.76

## 2022-07-06 NOTE — BEHAVIORAL HEALTH ASSESSMENT NOTE - SUICIDALITY
Outpatient oncologist: [facility] (Lara)   > Gyn/onc recs reviewed- patient with declining performance status and not a candidate for DMT at this time   > 7/5: Spoke to patient today regarding plan of care. She seems to understand that her performance status has declined significantly and that continuing with DMT would be more harmful than beneficial at this time. None known in lifetime
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
## 2023-01-16 NOTE — PROGRESS NOTE BEHAVIORAL HEALTH - PERCEPTIONS
No abnormalities
4 = No assist / stand by assistance
No abnormalities
Other/No abnormalities
No abnormalities/Other
Other/No abnormalities
No abnormalities
Other/No abnormalities

## 2023-02-19 NOTE — PROGRESS NOTE ADULT - RESPIRATORY
detailed exam
detailed exam
Normal for race
detailed exam
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
## 2023-04-20 NOTE — PROGRESS NOTE BEHAVIORAL HEALTH - [facility] ROS ENDOCRINE
No complaints
None
No complaints
Unable to assess
No complaints
Unable to assess
No complaints

## 2023-08-03 NOTE — PROGRESS NOTE ADULT - PROBLEM SELECTOR PLAN 9
Ayan Eaton, Your CT calcium score is 0 which is great. No significant abnormalities. We will keep an eye on your cholesterol levels, you do not need a statin at this time. Continue Cardura   Continue bladder scan q 6-8hr and straight cath prn

## 2025-06-10 NOTE — PROGRESS NOTE BEHAVIORAL HEALTH - [facility]AGITATION_PSY_A_CORE FT
Left Voicemail (1st Attempt) and Sent Mychart (1st Attempt) for the patient to call back and schedule the following:    Appointment type: Post Covid Return Video Visit  Provider: Estela Kramer  Return date: around 8/9/25  Specialty phone number: [phone]  Additional appointment(s) needed: NA  Additonal Notes: 2 month follow up    Sherry Ennis on 6/10/2025 at 10:13 AM      
1. Quetiapine 25mg p.o. q6hr, PRN for agitation.  2. Haloperidol 2-5 mg IV push q8hr, PRN for agitation    Please reduce haloperidol use to only when necessary. Use quetiapine first for tx of agitation.
1. Quetiapine 12.5mg p.o. q6hr, PRN for agitation.  2. Haloperidol 1mg IV push q8hr, PRN for agitation    Please reduce haloperidol use to only when necessary. Use quetiapine first for tx of agitation.
1. Increase Seroquel from 25mg p.o. q6hr to 50mg p.o q6hr, PRN for agitation.  2. Continue haloperidol 2-5 mg IV push q8hr, PRN for agitation. In the context of clogged PEG, must use haloperidol as Seroquel is not IV.
1. Quetiapine 25mg p.o. q6hr, PRN for agitation.  2. Haloperidol 2-5 mg IV push q8hr, PRN for agitation    Please reduce haloperidol use to only when necessary. Use quetiapine first for tx of agitation.
1. Quetiapine 25mg p.o. q6hr, PRN for agitation.  2. Haloperidol 2-5 mg IV push q8hr, PRN for agitation    Please reduce haloperidol use to only when necessary. Use quetiapine first for tx of agitation.
Continue haloperidol q4 PRN for agitation

## 2025-07-16 NOTE — PROGRESS NOTE ADULT - PROBLEM SELECTOR PROBLEM 1
Outpatient Physical Therapy        [x] Phone: [phone]   Fax: [phone]   Physician: Italo Abdi MD        From: Og Richards PT,     Patient: Audi Flores                  : 1964  Diagnosis: Frequent falls [R29.6]  Poor balance [R26.89]        Date: 2025  Treatment Diagnosis:   debility, instability     []  Progress Note                [x]  Discharge Note    Total Visits to date:   5 Cancels/No-shows to date:  0    Subjective:Feels stronger and like his balance is better. No falls. No pain      Plan of Care/Treatment to date:  [x] Therapeutic Exercise    [] Modalities:  [x] Therapeutic Activity     [] Ultrasound  [] Electric Stimulation  [x] Gait Training      [] Cervical Traction    [] Lumbar Traction  [x] Neuromuscular Re-education  [] Cold/hotpack [] Iontophoresis  [x] Instruction in HEP      Other:  [] Manual Therapy       []  Vasopneumatic  [] Self care management                           [] Dry needling trigger point/pain management                      Objective/Significant Findings At Last Visit/Comments:      Long Term Goal 1: I in home program.                                                            MET - reissued handouts  Long Term Goal 2: no falls x 2 weeks                                                              no falls.    Long Term Goal 3: 5x sit/ under 16sec to decrease risk of falls.    no hands. 15.9sec  Long Term Goal 4: SLS 10sec B                                                                      MET     Tug 9 sec   Discussed removing throw rugs and trip hazards at his home.           Patient Status: [] Continue per initial plan of Care     [x] Patient now discharged     [] Additional visits requested, Please re-certify for additional visits:    Electronically signed by:  Og Richards PT, 2025, 2:41 PM    If you have any questions or concerns, please don't hesitate to call.  Thank you for your referral.      
Acute on chronic respiratory failure, unspecified whether with hypoxia or hypercapnia
Respiratory failure
Acute on chronic respiratory failure, unspecified whether with hypoxia or hypercapnia
Acute on chronic respiratory failure, unspecified whether with hypoxia or hypercapnia
Respiratory failure
Acute on chronic respiratory failure, unspecified whether with hypoxia or hypercapnia
Respiratory failure
Tracheostomy complication
Acute on chronic respiratory failure, unspecified whether with hypoxia or hypercapnia
Respiratory failure
Respiratory failure
Acute on chronic respiratory failure, unspecified whether with hypoxia or hypercapnia
Fever
Fever
Acute on chronic respiratory failure, unspecified whether with hypoxia or hypercapnia
Respiratory failure
Respiratory failure
Acute on chronic respiratory failure, unspecified whether with hypoxia or hypercapnia
Fever
Fever
Acute on chronic respiratory failure, unspecified whether with hypoxia or hypercapnia
Acute on chronic respiratory failure, unspecified whether with hypoxia or hypercapnia
Fever
Fever
Respiratory failure
Fever
Fever